# Patient Record
Sex: MALE | Race: WHITE | NOT HISPANIC OR LATINO | Employment: FULL TIME | URBAN - METROPOLITAN AREA
[De-identification: names, ages, dates, MRNs, and addresses within clinical notes are randomized per-mention and may not be internally consistent; named-entity substitution may affect disease eponyms.]

---

## 2024-01-02 ENCOUNTER — HOSPITAL ENCOUNTER (INPATIENT)
Facility: HOSPITAL | Age: 59
LOS: 19 days | Discharge: HOME | DRG: 381 | End: 2024-01-22
Attending: EMERGENCY MEDICINE | Admitting: INTERNAL MEDICINE
Payer: COMMERCIAL

## 2024-01-02 DIAGNOSIS — D64.9 SEVERE ANEMIA: ICD-10-CM

## 2024-01-02 DIAGNOSIS — F10.11 HISTORY OF ALCOHOL ABUSE: ICD-10-CM

## 2024-01-02 DIAGNOSIS — Z71.89 ACP (ADVANCE CARE PLANNING): ICD-10-CM

## 2024-01-02 DIAGNOSIS — Z51.5 PALLIATIVE CARE ENCOUNTER: ICD-10-CM

## 2024-01-02 DIAGNOSIS — K92.2 ACUTE UPPER GI BLEED: ICD-10-CM

## 2024-01-02 DIAGNOSIS — K74.60 HEPATIC CIRRHOSIS, UNSPECIFIED HEPATIC CIRRHOSIS TYPE, UNSPECIFIED WHETHER ASCITES PRESENT: Primary | ICD-10-CM

## 2024-01-02 DIAGNOSIS — D69.6 THROMBOCYTOPENIA: ICD-10-CM

## 2024-01-02 DIAGNOSIS — K72.00 ACUTE LIVER FAILURE WITHOUT HEPATIC COMA: ICD-10-CM

## 2024-01-02 LAB
BLD PROD TYP BPU: NORMAL
BLOOD UNIT EXPIRATION DATE: NORMAL
BLOOD UNIT TYPE CODE: 9500
BLOOD UNIT TYPE: NORMAL
CODING SYSTEM: NORMAL
CROSSMATCH INTERPRETATION: NORMAL
DISPENSE STATUS: NORMAL
TRANS ERYTHROCYTES VOL PATIENT: NORMAL ML

## 2024-01-02 PROCEDURE — 86920 COMPATIBILITY TEST SPIN: CPT | Performed by: EMERGENCY MEDICINE

## 2024-01-02 PROCEDURE — P9021 RED BLOOD CELLS UNIT: HCPCS | Performed by: EMERGENCY MEDICINE

## 2024-01-02 PROCEDURE — 82077 ASSAY SPEC XCP UR&BREATH IA: CPT | Performed by: EMERGENCY MEDICINE

## 2024-01-02 PROCEDURE — 36430 TRANSFUSION BLD/BLD COMPNT: CPT

## 2024-01-02 PROCEDURE — 25000003 PHARM REV CODE 250: Performed by: EMERGENCY MEDICINE

## 2024-01-02 PROCEDURE — 99285 EMERGENCY DEPT VISIT HI MDM: CPT | Mod: 25

## 2024-01-02 PROCEDURE — C9113 INJ PANTOPRAZOLE SODIUM, VIA: HCPCS | Performed by: EMERGENCY MEDICINE

## 2024-01-02 PROCEDURE — 85610 PROTHROMBIN TIME: CPT | Performed by: EMERGENCY MEDICINE

## 2024-01-02 PROCEDURE — 86901 BLOOD TYPING SEROLOGIC RH(D): CPT | Performed by: EMERGENCY MEDICINE

## 2024-01-02 PROCEDURE — 80053 COMPREHEN METABOLIC PANEL: CPT | Performed by: EMERGENCY MEDICINE

## 2024-01-02 PROCEDURE — 85025 COMPLETE CBC W/AUTO DIFF WBC: CPT | Performed by: EMERGENCY MEDICINE

## 2024-01-02 PROCEDURE — 85730 THROMBOPLASTIN TIME PARTIAL: CPT | Performed by: EMERGENCY MEDICINE

## 2024-01-02 PROCEDURE — 96375 TX/PRO/DX INJ NEW DRUG ADDON: CPT

## 2024-01-02 PROCEDURE — 96374 THER/PROPH/DIAG INJ IV PUSH: CPT

## 2024-01-02 PROCEDURE — 96361 HYDRATE IV INFUSION ADD-ON: CPT

## 2024-01-02 PROCEDURE — 63600175 PHARM REV CODE 636 W HCPCS: Performed by: EMERGENCY MEDICINE

## 2024-01-02 RX ORDER — OCTREOTIDE ACETATE 100 UG/ML
100 INJECTION, SOLUTION INTRAVENOUS; SUBCUTANEOUS ONCE
Status: COMPLETED | OUTPATIENT
Start: 2024-01-03 | End: 2024-01-02

## 2024-01-02 RX ORDER — PANTOPRAZOLE SODIUM 40 MG/10ML
80 INJECTION, POWDER, LYOPHILIZED, FOR SOLUTION INTRAVENOUS
Status: COMPLETED | OUTPATIENT
Start: 2024-01-02 | End: 2024-01-02

## 2024-01-02 RX ORDER — HYDROCODONE BITARTRATE AND ACETAMINOPHEN 500; 5 MG/1; MG/1
TABLET ORAL
Status: DISCONTINUED | OUTPATIENT
Start: 2024-01-03 | End: 2024-01-03

## 2024-01-02 RX ADMIN — SODIUM CHLORIDE 1000 ML: 9 INJECTION, SOLUTION INTRAVENOUS at 11:01

## 2024-01-02 RX ADMIN — PANTOPRAZOLE SODIUM 80 MG: 40 INJECTION, POWDER, FOR SOLUTION INTRAVENOUS at 11:01

## 2024-01-02 RX ADMIN — OCTREOTIDE ACETATE 100 MCG: 100 INJECTION, SOLUTION INTRAVENOUS; SUBCUTANEOUS at 11:01

## 2024-01-03 ENCOUNTER — ANESTHESIA (OUTPATIENT)
Dept: ENDOSCOPY | Facility: HOSPITAL | Age: 59
DRG: 381 | End: 2024-01-03
Payer: COMMERCIAL

## 2024-01-03 ENCOUNTER — ANESTHESIA EVENT (OUTPATIENT)
Dept: ENDOSCOPY | Facility: HOSPITAL | Age: 59
DRG: 381 | End: 2024-01-03
Payer: COMMERCIAL

## 2024-01-03 PROBLEM — K92.2 ACUTE UPPER GI BLEED: Status: ACTIVE | Noted: 2024-01-03

## 2024-01-03 PROBLEM — E87.20 LACTIC ACIDOSIS: Status: ACTIVE | Noted: 2024-01-03

## 2024-01-03 PROBLEM — E87.29 HIGH ANION GAP METABOLIC ACIDOSIS: Status: ACTIVE | Noted: 2024-01-03

## 2024-01-03 PROBLEM — R74.01 TRANSAMINITIS: Status: ACTIVE | Noted: 2024-01-03

## 2024-01-03 PROBLEM — D68.9 COAGULOPATHY: Status: ACTIVE | Noted: 2024-01-03

## 2024-01-03 PROBLEM — K74.60 CIRRHOSIS: Status: ACTIVE | Noted: 2024-01-03

## 2024-01-03 PROBLEM — N17.9 AKI (ACUTE KIDNEY INJURY): Status: ACTIVE | Noted: 2024-01-03

## 2024-01-03 PROBLEM — D69.6 THROMBOCYTOPENIA: Status: ACTIVE | Noted: 2024-01-03

## 2024-01-03 PROBLEM — Z78.9 ALCOHOL USE: Status: ACTIVE | Noted: 2024-01-03

## 2024-01-03 LAB
ABO + RH BLD: NORMAL
ALBUMIN SERPL BCP-MCNC: 2.1 G/DL (ref 3.5–5.2)
ALBUMIN SERPL BCP-MCNC: 2.1 G/DL (ref 3.5–5.2)
ALP SERPL-CCNC: 82 U/L (ref 55–135)
ALP SERPL-CCNC: 91 U/L (ref 55–135)
ALT SERPL W/O P-5'-P-CCNC: 44 U/L (ref 10–44)
ALT SERPL W/O P-5'-P-CCNC: 52 U/L (ref 10–44)
ANION GAP SERPL CALC-SCNC: 15 MMOL/L (ref 8–16)
ANION GAP SERPL CALC-SCNC: 25 MMOL/L (ref 8–16)
ANISOCYTOSIS BLD QL SMEAR: SLIGHT
APTT PPP: 28.1 SEC (ref 21–32)
AST SERPL-CCNC: 153 U/L (ref 10–40)
AST SERPL-CCNC: 193 U/L (ref 10–40)
BASOPHILS # BLD AUTO: 0.01 K/UL (ref 0–0.2)
BASOPHILS NFR BLD: 0.1 % (ref 0–1.9)
BILIRUB DIRECT SERPL-MCNC: 3.1 MG/DL (ref 0.1–0.3)
BILIRUB SERPL-MCNC: 5.4 MG/DL (ref 0.1–1)
BILIRUB SERPL-MCNC: 5.9 MG/DL (ref 0.1–1)
BLD GP AB SCN CELLS X3 SERPL QL: NORMAL
BLD PROD TYP BPU: NORMAL
BLOOD UNIT EXPIRATION DATE: NORMAL
BLOOD UNIT TYPE CODE: 2800
BLOOD UNIT TYPE CODE: 8400
BLOOD UNIT TYPE: NORMAL
BUN SERPL-MCNC: 57 MG/DL (ref 6–20)
BUN SERPL-MCNC: 58 MG/DL (ref 6–20)
BURR CELLS BLD QL SMEAR: ABNORMAL
CALCIUM SERPL-MCNC: 7.9 MG/DL (ref 8.7–10.5)
CALCIUM SERPL-MCNC: 7.9 MG/DL (ref 8.7–10.5)
CHLORIDE SERPL-SCNC: 102 MMOL/L (ref 95–110)
CHLORIDE SERPL-SCNC: 106 MMOL/L (ref 95–110)
CO2 SERPL-SCNC: 14 MMOL/L (ref 23–29)
CO2 SERPL-SCNC: 21 MMOL/L (ref 23–29)
CODING SYSTEM: NORMAL
CREAT SERPL-MCNC: 1.6 MG/DL (ref 0.5–1.4)
CREAT SERPL-MCNC: 1.7 MG/DL (ref 0.5–1.4)
CROSSMATCH INTERPRETATION: NORMAL
DIFFERENTIAL METHOD BLD: ABNORMAL
DISPENSE STATUS: NORMAL
DOHLE BOD BLD QL SMEAR: PRESENT
EOSINOPHIL # BLD AUTO: 0 K/UL (ref 0–0.5)
EOSINOPHIL NFR BLD: 0 % (ref 0–8)
EOSINOPHIL NFR BLD: 0 % (ref 0–8)
EOSINOPHIL NFR BLD: 0.1 % (ref 0–8)
EOSINOPHIL NFR BLD: 0.2 % (ref 0–8)
ERYTHROCYTE [DISTWIDTH] IN BLOOD BY AUTOMATED COUNT: 17.5 % (ref 11.5–14.5)
ERYTHROCYTE [DISTWIDTH] IN BLOOD BY AUTOMATED COUNT: 22.5 % (ref 11.5–14.5)
ERYTHROCYTE [DISTWIDTH] IN BLOOD BY AUTOMATED COUNT: 24.7 % (ref 11.5–14.5)
ERYTHROCYTE [DISTWIDTH] IN BLOOD BY AUTOMATED COUNT: 25.2 % (ref 11.5–14.5)
EST. GFR  (NO RACE VARIABLE): 46.4 ML/MIN/1.73 M^2
EST. GFR  (NO RACE VARIABLE): 49.9 ML/MIN/1.73 M^2
ETHANOL SERPL-MCNC: <10 MG/DL
GLUCOSE SERPL-MCNC: 104 MG/DL (ref 70–110)
GLUCOSE SERPL-MCNC: 122 MG/DL (ref 70–110)
HCT VFR BLD AUTO: 15.2 % (ref 40–54)
HCT VFR BLD AUTO: 18.1 % (ref 40–54)
HCT VFR BLD AUTO: 21.5 % (ref 40–54)
HCT VFR BLD AUTO: 24.5 % (ref 40–54)
HGB BLD-MCNC: 5 G/DL (ref 14–18)
HGB BLD-MCNC: 6.2 G/DL (ref 14–18)
HGB BLD-MCNC: 7.2 G/DL (ref 14–18)
HGB BLD-MCNC: 8.4 G/DL (ref 14–18)
HOWELL-JOLLY BOD BLD QL SMEAR: ABNORMAL
HYPOCHROMIA BLD QL SMEAR: ABNORMAL
HYPOCHROMIA BLD QL SMEAR: ABNORMAL
IMM GRANULOCYTES # BLD AUTO: 0.09 K/UL (ref 0–0.04)
IMM GRANULOCYTES # BLD AUTO: 0.09 K/UL (ref 0–0.04)
IMM GRANULOCYTES # BLD AUTO: 0.1 K/UL (ref 0–0.04)
IMM GRANULOCYTES # BLD AUTO: 0.14 K/UL (ref 0–0.04)
IMM GRANULOCYTES NFR BLD AUTO: 0.6 % (ref 0–0.5)
IMM GRANULOCYTES NFR BLD AUTO: 0.7 % (ref 0–0.5)
IMM GRANULOCYTES NFR BLD AUTO: 1 % (ref 0–0.5)
IMM GRANULOCYTES NFR BLD AUTO: 1.6 % (ref 0–0.5)
INR PPP: 2.4 (ref 0.8–1.2)
INR PPP: 2.4 (ref 0.8–1.2)
LACTATE SERPL-SCNC: 2.3 MMOL/L (ref 0.5–2.2)
LACTATE SERPL-SCNC: 4.5 MMOL/L (ref 0.5–2.2)
LACTATE SERPL-SCNC: >12 MMOL/L (ref 0.5–2.2)
LYMPHOCYTES # BLD AUTO: 1.5 K/UL (ref 1–4.8)
LYMPHOCYTES # BLD AUTO: 1.6 K/UL (ref 1–4.8)
LYMPHOCYTES # BLD AUTO: 1.8 K/UL (ref 1–4.8)
LYMPHOCYTES # BLD AUTO: 2 K/UL (ref 1–4.8)
LYMPHOCYTES NFR BLD: 13.4 % (ref 18–48)
LYMPHOCYTES NFR BLD: 13.5 % (ref 18–48)
LYMPHOCYTES NFR BLD: 15.8 % (ref 18–48)
LYMPHOCYTES NFR BLD: 18 % (ref 18–48)
MAGNESIUM SERPL-MCNC: 1.3 MG/DL (ref 1.6–2.6)
MCH RBC QN AUTO: 32.9 PG (ref 27–31)
MCH RBC QN AUTO: 34 PG (ref 27–31)
MCH RBC QN AUTO: 34.6 PG (ref 27–31)
MCH RBC QN AUTO: 38.2 PG (ref 27–31)
MCHC RBC AUTO-ENTMCNC: 32.9 G/DL (ref 32–36)
MCHC RBC AUTO-ENTMCNC: 33.5 G/DL (ref 32–36)
MCHC RBC AUTO-ENTMCNC: 34.3 G/DL (ref 32–36)
MCHC RBC AUTO-ENTMCNC: 34.3 G/DL (ref 32–36)
MCV RBC AUTO: 101 FL (ref 82–98)
MCV RBC AUTO: 101 FL (ref 82–98)
MCV RBC AUTO: 116 FL (ref 82–98)
MCV RBC AUTO: 96 FL (ref 82–98)
MONOCYTES # BLD AUTO: 1 K/UL (ref 0.3–1)
MONOCYTES # BLD AUTO: 1.1 K/UL (ref 0.3–1)
MONOCYTES # BLD AUTO: 1.2 K/UL (ref 0.3–1)
MONOCYTES # BLD AUTO: 1.6 K/UL (ref 0.3–1)
MONOCYTES NFR BLD: 10.3 % (ref 4–15)
MONOCYTES NFR BLD: 10.7 % (ref 4–15)
MONOCYTES NFR BLD: 11.8 % (ref 4–15)
MONOCYTES NFR BLD: 8.7 % (ref 4–15)
NEUTROPHILS # BLD AUTO: 10.4 K/UL (ref 1.8–7.7)
NEUTROPHILS # BLD AUTO: 11.3 K/UL (ref 1.8–7.7)
NEUTROPHILS # BLD AUTO: 6.1 K/UL (ref 1.8–7.7)
NEUTROPHILS # BLD AUTO: 6.8 K/UL (ref 1.8–7.7)
NEUTROPHILS NFR BLD: 68.3 % (ref 38–73)
NEUTROPHILS NFR BLD: 72.7 % (ref 38–73)
NEUTROPHILS NFR BLD: 75.2 % (ref 38–73)
NEUTROPHILS NFR BLD: 77 % (ref 38–73)
NRBC BLD-RTO: 0 /100 WBC
NRBC BLD-RTO: 0 /100 WBC
NRBC BLD-RTO: 1 /100 WBC
NRBC BLD-RTO: 1 /100 WBC
NUM UNITS TRANS FFP: NORMAL
NUM UNITS TRANS PACKED RBC: NORMAL
OVALOCYTES BLD QL SMEAR: ABNORMAL
PHOSPHATE SERPL-MCNC: 3.1 MG/DL (ref 2.7–4.5)
PLATELET # BLD AUTO: 107 K/UL (ref 150–450)
PLATELET # BLD AUTO: 42 K/UL (ref 150–450)
PLATELET # BLD AUTO: 42 K/UL (ref 150–450)
PLATELET # BLD AUTO: 71 K/UL (ref 150–450)
PLATELET BLD QL SMEAR: ABNORMAL
PMV BLD AUTO: 11.3 FL (ref 9.2–12.9)
PMV BLD AUTO: 11.5 FL (ref 9.2–12.9)
PMV BLD AUTO: 11.6 FL (ref 9.2–12.9)
PMV BLD AUTO: 11.6 FL (ref 9.2–12.9)
POIKILOCYTOSIS BLD QL SMEAR: SLIGHT
POLYCHROMASIA BLD QL SMEAR: ABNORMAL
POTASSIUM SERPL-SCNC: 4.7 MMOL/L (ref 3.5–5.1)
POTASSIUM SERPL-SCNC: 4.8 MMOL/L (ref 3.5–5.1)
PROT SERPL-MCNC: 4.7 G/DL (ref 6–8.4)
PROT SERPL-MCNC: 5.1 G/DL (ref 6–8.4)
PROTHROMBIN TIME: 24.4 SEC (ref 9–12.5)
PROTHROMBIN TIME: 24.5 SEC (ref 9–12.5)
RBC # BLD AUTO: 1.31 M/UL (ref 4.6–6.2)
RBC # BLD AUTO: 1.79 M/UL (ref 4.6–6.2)
RBC # BLD AUTO: 2.12 M/UL (ref 4.6–6.2)
RBC # BLD AUTO: 2.55 M/UL (ref 4.6–6.2)
SODIUM SERPL-SCNC: 141 MMOL/L (ref 136–145)
SODIUM SERPL-SCNC: 142 MMOL/L (ref 136–145)
SPECIMEN OUTDATE: NORMAL
TOXIC GRANULES BLD QL SMEAR: PRESENT
TRANS ERYTHROCYTES VOL PATIENT: NORMAL ML
TRANS ERYTHROCYTES VOL PATIENT: NORMAL ML
WBC # BLD AUTO: 13.56 K/UL (ref 3.9–12.7)
WBC # BLD AUTO: 15.06 K/UL (ref 3.9–12.7)
WBC # BLD AUTO: 8.9 K/UL (ref 3.9–12.7)
WBC # BLD AUTO: 9.35 K/UL (ref 3.9–12.7)

## 2024-01-03 PROCEDURE — 84100 ASSAY OF PHOSPHORUS: CPT | Performed by: NURSE PRACTITIONER

## 2024-01-03 PROCEDURE — P9016 RBC LEUKOCYTES REDUCED: HCPCS | Performed by: EMERGENCY MEDICINE

## 2024-01-03 PROCEDURE — 63600175 PHARM REV CODE 636 W HCPCS: Performed by: NURSE PRACTITIONER

## 2024-01-03 PROCEDURE — 63600175 PHARM REV CODE 636 W HCPCS

## 2024-01-03 PROCEDURE — 30233L1 TRANSFUSION OF NONAUTOLOGOUS FRESH PLASMA INTO PERIPHERAL VEIN, PERCUTANEOUS APPROACH: ICD-10-PCS | Performed by: INTERNAL MEDICINE

## 2024-01-03 PROCEDURE — 37000008 HC ANESTHESIA 1ST 15 MINUTES: Performed by: INTERNAL MEDICINE

## 2024-01-03 PROCEDURE — D9220A PRA ANESTHESIA: Mod: CRNA,,, | Performed by: NURSE ANESTHETIST, CERTIFIED REGISTERED

## 2024-01-03 PROCEDURE — 80053 COMPREHEN METABOLIC PANEL: CPT | Performed by: NURSE PRACTITIONER

## 2024-01-03 PROCEDURE — 83605 ASSAY OF LACTIC ACID: CPT | Performed by: NURSE PRACTITIONER

## 2024-01-03 PROCEDURE — 20000000 HC ICU ROOM

## 2024-01-03 PROCEDURE — 63600175 PHARM REV CODE 636 W HCPCS: Mod: JA | Performed by: EMERGENCY MEDICINE

## 2024-01-03 PROCEDURE — P9021 RED BLOOD CELLS UNIT: HCPCS | Performed by: EMERGENCY MEDICINE

## 2024-01-03 PROCEDURE — 86920 COMPATIBILITY TEST SPIN: CPT | Performed by: EMERGENCY MEDICINE

## 2024-01-03 PROCEDURE — 25000003 PHARM REV CODE 250

## 2024-01-03 PROCEDURE — 94761 N-INVAS EAR/PLS OXIMETRY MLT: CPT

## 2024-01-03 PROCEDURE — 82248 BILIRUBIN DIRECT: CPT | Performed by: NURSE PRACTITIONER

## 2024-01-03 PROCEDURE — 85610 PROTHROMBIN TIME: CPT | Performed by: STUDENT IN AN ORGANIZED HEALTH CARE EDUCATION/TRAINING PROGRAM

## 2024-01-03 PROCEDURE — D9220A PRA ANESTHESIA: Mod: ANES,,, | Performed by: ANESTHESIOLOGY

## 2024-01-03 PROCEDURE — 85025 COMPLETE CBC W/AUTO DIFF WBC: CPT | Mod: 91 | Performed by: NURSE PRACTITIONER

## 2024-01-03 PROCEDURE — 43235 EGD DIAGNOSTIC BRUSH WASH: CPT | Mod: ,,, | Performed by: INTERNAL MEDICINE

## 2024-01-03 PROCEDURE — P9017 PLASMA 1 DONOR FRZ W/IN 8 HR: HCPCS

## 2024-01-03 PROCEDURE — 37000009 HC ANESTHESIA EA ADD 15 MINS: Performed by: INTERNAL MEDICINE

## 2024-01-03 PROCEDURE — 5A09457 ASSISTANCE WITH RESPIRATORY VENTILATION, 24-96 CONSECUTIVE HOURS, CONTINUOUS POSITIVE AIRWAY PRESSURE: ICD-10-PCS | Performed by: INTERNAL MEDICINE

## 2024-01-03 PROCEDURE — 63600175 PHARM REV CODE 636 W HCPCS: Mod: JA

## 2024-01-03 PROCEDURE — 63600175 PHARM REV CODE 636 W HCPCS: Performed by: NURSE ANESTHETIST, CERTIFIED REGISTERED

## 2024-01-03 PROCEDURE — 25000003 PHARM REV CODE 250: Performed by: NURSE PRACTITIONER

## 2024-01-03 PROCEDURE — 0DJ08ZZ INSPECTION OF UPPER INTESTINAL TRACT, VIA NATURAL OR ARTIFICIAL OPENING ENDOSCOPIC: ICD-10-PCS | Performed by: INTERNAL MEDICINE

## 2024-01-03 PROCEDURE — 25000003 PHARM REV CODE 250: Performed by: NURSE ANESTHETIST, CERTIFIED REGISTERED

## 2024-01-03 PROCEDURE — C9113 INJ PANTOPRAZOLE SODIUM, VIA: HCPCS | Performed by: NURSE PRACTITIONER

## 2024-01-03 PROCEDURE — 83605 ASSAY OF LACTIC ACID: CPT | Mod: 91

## 2024-01-03 PROCEDURE — 83735 ASSAY OF MAGNESIUM: CPT | Performed by: NURSE PRACTITIONER

## 2024-01-03 PROCEDURE — 43239 EGD BIOPSY SINGLE/MULTIPLE: CPT | Performed by: INTERNAL MEDICINE

## 2024-01-03 RX ORDER — SODIUM CHLORIDE 0.9 % (FLUSH) 0.9 %
10 SYRINGE (ML) INJECTION
Status: DISCONTINUED | OUTPATIENT
Start: 2024-01-03 | End: 2024-01-22 | Stop reason: HOSPADM

## 2024-01-03 RX ORDER — HYDROCODONE BITARTRATE AND ACETAMINOPHEN 500; 5 MG/1; MG/1
TABLET ORAL
Status: DISCONTINUED | OUTPATIENT
Start: 2024-01-03 | End: 2024-01-03

## 2024-01-03 RX ORDER — HYDROCHLOROTHIAZIDE 25 MG/1
25 TABLET ORAL DAILY
COMMUNITY
Start: 2023-12-23

## 2024-01-03 RX ORDER — LIDOCAINE HYDROCHLORIDE 20 MG/ML
INJECTION, SOLUTION EPIDURAL; INFILTRATION; INTRACAUDAL; PERINEURAL
Status: DISCONTINUED | OUTPATIENT
Start: 2024-01-03 | End: 2024-01-03

## 2024-01-03 RX ORDER — ESCITALOPRAM OXALATE 10 MG/1
10 TABLET ORAL DAILY
Status: DISCONTINUED | OUTPATIENT
Start: 2024-01-04 | End: 2024-01-22 | Stop reason: HOSPADM

## 2024-01-03 RX ORDER — MAGNESIUM SULFATE HEPTAHYDRATE 40 MG/ML
2 INJECTION, SOLUTION INTRAVENOUS ONCE
Status: COMPLETED | OUTPATIENT
Start: 2024-01-03 | End: 2024-01-03

## 2024-01-03 RX ORDER — PROPOFOL 10 MG/ML
VIAL (ML) INTRAVENOUS
Status: DISCONTINUED | OUTPATIENT
Start: 2024-01-03 | End: 2024-01-03

## 2024-01-03 RX ORDER — PANTOPRAZOLE SODIUM 40 MG/1
40 TABLET, DELAYED RELEASE ORAL 2 TIMES DAILY
Status: DISCONTINUED | OUTPATIENT
Start: 2024-01-03 | End: 2024-01-22 | Stop reason: HOSPADM

## 2024-01-03 RX ORDER — THIAMINE HCL 100 MG
100 TABLET ORAL DAILY
Status: DISCONTINUED | OUTPATIENT
Start: 2024-01-03 | End: 2024-01-22 | Stop reason: HOSPADM

## 2024-01-03 RX ORDER — LORAZEPAM 2 MG/ML
2 INJECTION INTRAMUSCULAR
Status: DISCONTINUED | OUTPATIENT
Start: 2024-01-03 | End: 2024-01-13

## 2024-01-03 RX ORDER — ESCITALOPRAM OXALATE 10 MG/1
10 TABLET ORAL DAILY
COMMUNITY
Start: 2023-12-23

## 2024-01-03 RX ORDER — TRAZODONE HYDROCHLORIDE 50 MG/1
50-100 TABLET ORAL NIGHTLY
COMMUNITY
Start: 2023-12-26

## 2024-01-03 RX ORDER — PANTOPRAZOLE SODIUM 40 MG/10ML
40 INJECTION, POWDER, LYOPHILIZED, FOR SOLUTION INTRAVENOUS 2 TIMES DAILY
Status: DISCONTINUED | OUTPATIENT
Start: 2024-01-03 | End: 2024-01-03

## 2024-01-03 RX ORDER — FOLIC ACID 1 MG/1
1 TABLET ORAL DAILY
Status: DISCONTINUED | OUTPATIENT
Start: 2024-01-03 | End: 2024-01-22 | Stop reason: HOSPADM

## 2024-01-03 RX ORDER — HYDROCODONE BITARTRATE AND ACETAMINOPHEN 500; 5 MG/1; MG/1
TABLET ORAL
Status: DISCONTINUED | OUTPATIENT
Start: 2024-01-03 | End: 2024-01-22 | Stop reason: HOSPADM

## 2024-01-03 RX ADMIN — PHYTONADIONE 10 MG: 10 INJECTION, EMULSION INTRAMUSCULAR; INTRAVENOUS; SUBCUTANEOUS at 11:01

## 2024-01-03 RX ADMIN — THERA TABS 1 TABLET: TAB at 08:01

## 2024-01-03 RX ADMIN — OCTREOTIDE ACETATE 50 MCG/HR: 500 INJECTION, SOLUTION INTRAVENOUS; SUBCUTANEOUS at 05:01

## 2024-01-03 RX ADMIN — PROPOFOL 40 MG: 10 INJECTION, EMULSION INTRAVENOUS at 03:01

## 2024-01-03 RX ADMIN — CEFTRIAXONE SODIUM 1 G: 1 INJECTION, POWDER, FOR SOLUTION INTRAMUSCULAR; INTRAVENOUS at 04:01

## 2024-01-03 RX ADMIN — MAGNESIUM SULFATE HEPTAHYDRATE 2 G: 40 INJECTION, SOLUTION INTRAVENOUS at 09:01

## 2024-01-03 RX ADMIN — OCTREOTIDE ACETATE 50 MCG/HR: 500 INJECTION, SOLUTION INTRAVENOUS; SUBCUTANEOUS at 02:01

## 2024-01-03 RX ADMIN — SODIUM CHLORIDE: 9 INJECTION, SOLUTION INTRAVENOUS at 03:01

## 2024-01-03 RX ADMIN — PANTOPRAZOLE SODIUM 40 MG: 40 TABLET, DELAYED RELEASE ORAL at 08:01

## 2024-01-03 RX ADMIN — Medication 100 MG: at 08:01

## 2024-01-03 RX ADMIN — PROPOFOL 80 MG: 10 INJECTION, EMULSION INTRAVENOUS at 03:01

## 2024-01-03 RX ADMIN — FOLIC ACID 1 MG: 1 TABLET ORAL at 08:01

## 2024-01-03 RX ADMIN — LIDOCAINE HYDROCHLORIDE 100 MG: 20 INJECTION, SOLUTION EPIDURAL; INFILTRATION; INTRACAUDAL; PERINEURAL at 03:01

## 2024-01-03 RX ADMIN — PANTOPRAZOLE SODIUM 40 MG: 40 INJECTION, POWDER, FOR SOLUTION INTRAVENOUS at 08:01

## 2024-01-03 NOTE — ED TRIAGE NOTES
Pt presents to ED via EMS with c/o GI bleeding with multiple episodes of dark red bloody stool that has been worsening x4 days with intermittent episodes of hematemesis. Hx of liver cirrhosis. Pt reports feeling dizzy on the way to restroom and states he fell and hit his face on the toilet seat. +bleeding from pt's nose. Denies LOC. Denies fever, chills, CP, SOB, dysuria,

## 2024-01-03 NOTE — ED PROVIDER NOTES
History:  Blu Deleon is a 58 y.o. male who presents to the ED with GI Bleeding (Pt arrives c/o gi bleed. )    Described as 58-year-old male with a history of hypertension and cirrhosis as well as alcohol abuse presenting to the emergency department with concern for GI bleeding.  He reports the past 4 days he has had multiple episodes of bright red bloody emesis and dark black tarry diarrhea.  He denies any abdominal pain.  He denies any history of GI bleeds in the past and has never had an endoscopy.  Last drink was yesterday.  He endorses history of alcohol withdrawal in the past, though does not feel as though he was currently withdrawing.  He reports today he felt lightheaded trying to go to the toilet to vomit and fell, striking his face on the edge of the toilet.  He denies any head or neck pain.    Review of Systems: All systems reviewed and are negative except as per history of present illness.    Medications:   Previous Medications    No medications on file       PMH:   Past Medical History:   Diagnosis Date    Hypertension      PSH: History reviewed. No pertinent surgical history.  Allergies: He has no allergies on file.  Social History: Marital Status: . He  reports that he has never smoked. He has never used smokeless tobacco.. He  reports current alcohol use..       Exam:  VITAL SIGNS:   Vitals:    01/03/24 0302 01/03/24 0322 01/03/24 0400 01/03/24 0432   BP: 122/77 (!) 157/76 (!) 164/82 (!) 164/83   Pulse: (!) 122 (!) 122 (!) 122 (!) 119   Resp: 17 18 19 12   Temp:  98.6 °F (37 °C) 99 °F (37.2 °C)    TempSrc:  Oral Oral    SpO2: 98% 98% 97% 98%   Weight:         Const: Awake and alert, ill-appearing, covered in bright red blood and dark tarry stool.   Head: Atraumatic  Eyes:  Pale Conjunctiva  ENT: Normal External Ears, Nose and Mouth. +bright red blood in oropharynx. + nosebleed. No nasal TTP.   Neck: Full range of motion. No meningismus. No midline TTP, no stepoffs  Resp: Normal  respiratory effort, No distress, CTAB  Cardio: Equal and intact distal pulses, RRR  Abd: Soft, non tender, non distended.  No rebound or guarding  Skin:  Pallor  Ext: No cyanosis, or edema  Neur: Awake and alert, GCS 15, moves all extremities equally, no tremor  Psych: Normal Mood and Affect    Data:  Results for orders placed or performed during the hospital encounter of 01/02/24   CBC auto differential   Result Value Ref Range    WBC 13.56 (H) 3.90 - 12.70 K/uL    RBC 1.31 (L) 4.60 - 6.20 M/uL    Hemoglobin 5.0 (LL) 14.0 - 18.0 g/dL    Hematocrit 15.2 (LL) 40.0 - 54.0 %     (H) 82 - 98 fL    MCH 38.2 (H) 27.0 - 31.0 pg    MCHC 32.9 32.0 - 36.0 g/dL    RDW 17.5 (H) 11.5 - 14.5 %    Platelets 107 (L) 150 - 450 K/uL    MPV 11.6 9.2 - 12.9 fL    Immature Granulocytes 0.7 (H) 0.0 - 0.5 %    Gran # (ANC) 10.4 (H) 1.8 - 7.7 K/uL    Immature Grans (Abs) 0.10 (H) 0.00 - 0.04 K/uL    Lymph # 1.8 1.0 - 4.8 K/uL    Mono # 1.2 (H) 0.3 - 1.0 K/uL    Eos # 0.0 0.0 - 0.5 K/uL    Baso # 0.01 0.00 - 0.20 K/uL    nRBC 0 0 /100 WBC    Gran % 77.0 (H) 38.0 - 73.0 %    Lymph % 13.5 (L) 18.0 - 48.0 %    Mono % 8.7 4.0 - 15.0 %    Eosinophil % 0.0 0.0 - 8.0 %    Basophil % 0.1 0.0 - 1.9 %    Platelet Estimate Decreased (A)     Aniso Slight     Poik Slight     Poly Occasional     Hypo Occasional     Ovalocytes Occasional     Lenore Cells Occasional     Differential Method Automated    Comprehensive metabolic panel   Result Value Ref Range    Sodium 141 136 - 145 mmol/L    Potassium 4.7 3.5 - 5.1 mmol/L    Chloride 102 95 - 110 mmol/L    CO2 14 (L) 23 - 29 mmol/L    Glucose 104 70 - 110 mg/dL    BUN 57 (H) 6 - 20 mg/dL    Creatinine 1.6 (H) 0.5 - 1.4 mg/dL    Calcium 7.9 (L) 8.7 - 10.5 mg/dL    Total Protein 5.1 (L) 6.0 - 8.4 g/dL    Albumin 2.1 (L) 3.5 - 5.2 g/dL    Total Bilirubin 5.4 (H) 0.1 - 1.0 mg/dL    Alkaline Phosphatase 91 55 - 135 U/L     (H) 10 - 40 U/L    ALT 44 10 - 44 U/L    eGFR 49.9 (A) >60 mL/min/1.73 m^2     Anion Gap 25 (H) 8 - 16 mmol/L   Protime-INR   Result Value Ref Range    Prothrombin Time 24.4 (H) 9.0 - 12.5 sec    INR 2.4 (H) 0.8 - 1.2   APTT   Result Value Ref Range    aPTT 28.1 21.0 - 32.0 sec   Ethanol   Result Value Ref Range    Alcohol, Serum <10 <10 mg/dL   Lactic acid, plasma   Result Value Ref Range    Lactate (Lactic Acid) >12.0 (HH) 0.5 - 2.2 mmol/L   Comprehensive metabolic panel   Result Value Ref Range    Sodium 142 136 - 145 mmol/L    Potassium 4.8 3.5 - 5.1 mmol/L    Chloride 106 95 - 110 mmol/L    CO2 21 (L) 23 - 29 mmol/L    Glucose 122 (H) 70 - 110 mg/dL    BUN 58 (H) 6 - 20 mg/dL    Creatinine 1.7 (H) 0.5 - 1.4 mg/dL    Calcium 7.9 (L) 8.7 - 10.5 mg/dL    Total Protein 4.7 (L) 6.0 - 8.4 g/dL    Albumin 2.1 (L) 3.5 - 5.2 g/dL    Total Bilirubin 5.9 (H) 0.1 - 1.0 mg/dL    Alkaline Phosphatase 82 55 - 135 U/L     (H) 10 - 40 U/L    ALT 52 (H) 10 - 44 U/L    eGFR 46.4 (A) >60 mL/min/1.73 m^2    Anion Gap 15 8 - 16 mmol/L   Magnesium   Result Value Ref Range    Magnesium 1.3 (L) 1.6 - 2.6 mg/dL   Phosphorus   Result Value Ref Range    Phosphorus 3.1 2.7 - 4.5 mg/dL   Type & Screen   Result Value Ref Range    Group & Rh AB POS     Indirect Kadi NEG     Specimen Outdate 01/05/2024 23:59    Prepare RBC 2 Units; active upper GI bleed   Result Value Ref Range    UNIT NUMBER [id number]     Product Code [id number]     DISPENSE STATUS TRANSFUSED     CODING SYSTEM [id number]     Unit Blood Type Code 9500     Unit Blood Type O NEG     Unit Expiration [id number]     CROSSMATCH INTERPRETATION Compatible    Prepare RBC 2 Units; Emergency   Result Value Ref Range    UNIT NUMBER [id number]     Product Code [id number]     DISPENSE STATUS ISSUED     CODING SYSTEM [id number]     Unit Blood Type Code 8400     Unit Blood Type AB POS     Unit Expiration [id number]     CROSSMATCH INTERPRETATION Compatible     UNIT NUMBER [id number]     Product Code [id number]     DISPENSE STATUS CROSSMATCHED     CODING SYSTEM  OWKE599     Unit Blood Type Code 8400     Unit Blood Type AB POS     Unit Expiration [id number]     CROSSMATCH INTERPRETATION Compatible      Imaging Results              CT Cervical Spine Without Contrast (Final result)  Result time 01/03/24 02:24:10      Final result by Keyshawn Olguin MD (01/03/24 02:24:10)                   Impression:      No fracture or traumatic malalignment of the cervical spine.    Degenerative changes cervical spine with moderate spinal canal stenosis at C5-C6.    Electronically signed by resident: Renetta Domínguez  Date:    01/03/2024  Time:    01:59    Electronically signed by: Keyshawn Olguin MD  Date:    01/03/2024  Time:    02:24               Narrative:    EXAMINATION:  CT CERVICAL SPINE WITHOUT CONTRAST    CLINICAL HISTORY:  Neck trauma, intoxicated or obtunded (Age >= 16y);    TECHNIQUE:  Low dose axial CT images through the cervical spine, with sagittal and coronal reformations.  Contrast was not administered.    COMPARISON:  None    FINDINGS:  The vertebral bodies are normal in height and morphology without evidence of fracture or osseous destructive process.  There is straightening of the normal cervical lordosis.  Minimal anterolisthesis of C2 with respect to C3.  Otherwise, grossly normal sagittal alignment.    Mild degenerative changes including multilevel posterior disc osteophyte complexes, facet arthropathy, and uncovertebral spurring.  Mild spinal canal stenosis C4-C5 and moderate spinal canal stenosis at C5-C6.  No high grade neural foraminal narrowing.  Mild multilevel disc height loss.    Limited evaluation of the intraspinal contents demonstrates no hematoma or mass.  Paraspinal soft tissues exhibit no acute abnormalities.  Left apical pulmonary micronodule measuring less than 3 mm.                                       CT Head Without Contrast (Final result)  Result time 01/03/24 02:20:22      Final result by Keyshawn Olguin MD (01/03/24 02:20:22)                    Impression:      No evidence of acute intracranial pathology.    Bilateral mastoid effusions.    Electronically signed by resident: Renetta Domínguez  Date:    2024  Time:    01:54    Electronically signed by: Keyshawn Olguin MD  Date:    2024  Time:    02:20               Narrative:    EXAMINATION:  CT HEAD WITHOUT CONTRAST    CLINICAL HISTORY:  Head trauma, moderate-severe;    TECHNIQUE:  Low dose axial CT images obtained throughout the head without the use of intravenous contrast.  Axial, sagittal and coronal reconstructions were performed.    COMPARISON:  None.    FINDINGS:  Intracranial compartment:    Generalized cerebral volume loss with compensatory enlargement of the ventricles.  No parenchymal mass, hemorrhage, edema or major vascular distribution infarct.    No extra-axial blood or fluid collections.    Skull/extracranial contents (limited evaluation):    No displaced calvarial fracture.    Partial opacification of the mastoid air cells bilaterally.  Mucous retention cysts in the right maxillary and sphenoid sinuses.  The remaining paranasal sinuses are essentially clear.                                    Labs & Imaging studies were reviewed independently by me.     Medical Decision Makin-year-old male presenting to the emergency department with GI bleeding x4 days.  He was extremely pale, tachycardic, and ill-appearing on examination.  He was blood in his oropharynx, blood coming out of his nose, and is covered in dark and tarry stool.  He was tachycardic up into the 140s, blood pressure was normal, though he does have a history of hypertension and did not take his medications.  Emergency blood transfusion was administered as well as Protonix and octreotide bolus.  Hemoglobin returned at 5.0, LFTs concerning for liver failure with a lactate greater than 12.  His INR is 2.4.  Given his history of a fall, CT head and C-spine imaging were obtained and negative for acute  traumatic injury or ICH.  [facility] was consulted and accepts the patient to their service.    Critical Care Time: 55 minutes  Treatments/Evaluations: Close monitoring and treatment of unstable vital signs, cardiorespiratory, and neurologic status, while maintaining tight balance of fluid, respiratory, and cardiac interventions. This time includes discussing the case with the patient and the patients family. This time does not include all procedures stated elsewhere in this record. This time also includes reviewing old records, labs and radiological studies. This time includes examining and re-examining the patient. Additionally, this time also includes arranging care with admitting and consulting physicians.       Clinical Impression:  1. Acute upper GI bleed    2. Severe anemia    3. Acute liver failure without hepatic coma    4. History of alcohol abuse           Dasia Cedillo MD  01/03/24 3527

## 2024-01-03 NOTE — TREATMENT PLAN
GI Post Procedure Treatment Plan  Procedure Performed: EGD    Impression:    - Esophageal ulcers.                          - Erythematous mucosa in the antrum.                          - Normal examined duodenum.                          - No specimens collected.     Recommendation:                                - Return patient to hospital la for ongoing care.                          - Advance diet as tolerated.                          - Continue present medications. Stop Octreotide.                          Continue Protonix 40mg twice per day for 8 weeks.                          - Repeat upper endoscopy in 8 weeks to check                          healing of his esophageal ulcers.     - We will sign-off.    Neelam Christina MD  Gastroenterology, PGY-4

## 2024-01-03 NOTE — NURSING
MICU DAILY GOALS     Family/Goals of care/Code Status   Code Status: Full Code    24H Vital Sign Range  Temp:  [97.6 °F (36.4 °C)-99 °F (37.2 °C)]   Pulse:  [117-145]   Resp:  [12-19]   BP: (112-167)/(57-88)   SpO2:  [96 %-100 %]      Shift Events (include procedures and significant events)   Pt admitted to MICU 6096 via stretcher and cardiac monitoring. Octreotide gtt infusing. Pt AAOx4 and on RA    AWAKE RASS: Goal -    Actual -      Restraint necessity: Not necessary   BREATHE SBT: Not intubated    Coordinate A & B, analgesics/sedatives Pain: managed   SAT: Not intubated   Delirium CAM-ICU:     Early(intubated/ Progressive (non-intubated) Mobility MOVE Screen (INTUBATED ONLY): Not intubated    Activity:     Feeding/Nutrition Diet order:  ,     Thrombus DVT prophylaxis:     HOB Elevation     Ulcer Prophylaxis GI: yes   Glucose control managed     Skin Skin assessed during: Admit    Sacrum intact/not altered? Yes  Heels intact/not altered? Yes  Surgical wound? No    Check one (no altered skin or altered skin) and sub boxes:  [x] No Altered Skin Integrity Present    [x]Prevention Measures Documented    [] Altered Skin Integrity Present or Discovered   [] LDA present in EPIC              [] LDA added in EPIC   [] Wound Image Taken (required on admit,                   transfer/discharge and every Tuesday)    Wound Care Consulted? No    Attending Nurse: Alison RN    Second RN/Staff Member: Radha Moore RN   Bowel Function no issues    Indwelling Catheter Necessity         no   De-escalation Antibiotics Antibiotics per MAR       VS and assessment per flow sheet, patient progressing towards goals as tolerated, plan of care reviewed with  Mr. Hurt and spouse , all concerns addressed, will continue to monitor.

## 2024-01-03 NOTE — [employer] PATIENT INSTRUCTIONS
Discharge Summary/Instructions after an Endoscopic Procedure  Patient Name: Blu Deleon  Patient MRN: [id number]  Patient YOB: 1965  Wednesday, January 3, 2024  Madison Hinojosa MD  Dear patient,  As a result of recent federal legislation (The Federal Cures Act), you may   receive lab or pathology results from your procedure in your [facility]   account before your physician is able to contact you. Your physician or   their representative will relay the results to you with their   recommendations at their soonest availability.  Thank you,  RESTRICTIONS:  During your procedure today, you received medications for sedation.  These   medications may affect your judgment, balance and coordination.  Therefore,   for 24 hours, you have the following restrictions:   - DO NOT drive a car, operate machinery, make legal/financial decisions,   sign important papers or drink alcohol.    ACTIVITY:  Today: no heavy lifting, straining or running due to procedural   sedation/anesthesia.  The following day: return to full activity including work.  DIET:  Eat and drink normally unless instructed otherwise.     TREATMENT FOR COMMON SIDE EFFECTS:  - Mild abdominal pain, nausea, belching, bloating or excessive gas:  rest,   eat lightly and use a heating pad.  - Sore Throat: treat with throat lozenges and/or gargle with warm salt   water.  - Because air was used during the procedure, expelling large amounts of air   from your rectum or belching is normal.  - If a bowel prep was taken, you may not have a bowel movement for 1-3 days.    This is normal.  SYMPTOMS TO WATCH FOR AND REPORT TO YOUR PHYSICIAN:  1. Abdominal pain or bloating, other than gas cramps.  2. Chest pain.  3. Back pain.  4. Signs of infection such as: chills or fever occurring within 24 hours   after the procedure.  5. Rectal bleeding, which would show as bright red, maroon, or black stools.   (A tablespoon of blood from the rectum is not serious,  especially if   hemorrhoids are present.)  6. Vomiting.  7. Weakness or dizziness.  GO DIRECTLY TO THE NEAREST EMERGENCY ROOM IF YOU HAVE ANY OF THE FOLLOWING:      Difficulty breathing              Chills and/or fever over 101 F   Persistent vomiting and/or vomiting blood   Severe abdominal pain   Severe chest pain   Black, tarry stools   Bleeding- more than one tablespoon   Any other symptom or condition that you feel may need urgent attention  Your doctor recommends these additional instructions:  If any biopsies were taken, your doctors clinic will contact you in 1 to 2   weeks with any results.  - Return patient to hospital la for ongoing care.   - Advance diet as tolerated.   - Continue present medications. Stop Octreotide.  Continue Protonix 40mg   twice per day for 8 weeks.  - Repeat upper endoscopy in 8 weeks to check healing of his esophageal   ulcers.  For questions, problems or results please call your physician - Madison Hinojosa MD at Work:  [phone].  [facility], EMERGENCY ROOM PHONE NUMBER: [phone]  IF A COMPLICATION OR EMERGENCY SITUATION ARISES AND YOU ARE UNABLE TO REACH   YOUR PHYSICIAN - GO DIRECTLY TO THE EMERGENCY ROOM.  Madison Hinojosa MD  1/3/2024 3:56:07 PM  This report has been verified and signed electronically.  Dear patient,  As a result of recent federal legislation (The Federal Cures Act), you may   receive lab or pathology results from your procedure in your [facility]   account before your physician is able to contact you. Your physician or   their representative will relay the results to you with their   recommendations at their soonest availability.  Thank you,  PROVATION

## 2024-01-03 NOTE — CONSULTS
[facility] - Card[facility] Medical ICU  Gastroenterology  Consult Note    Patient Name: Blu Deleon  MRN: [id number]  Admission Date: 1/2/2024  Hospital Length of Stay: 0 days  Code Status: Full Code   Attending Provider: Shayy Arce MD   Consulting Provider: Dallin Manuel MD  Primary Care Physician: Radha, Primary Doctor  Principal Problem:Acute upper GI bleed    Inpatient consult to Gastroenterology  Consult performed by: Dallin Manuel MD  Consult ordered by: Dasia Cedillo MD        Subjective:     HPI:  58 years old  male patient with ongoing medical history of alcohol abuse disorder is being consulted to gastroenterology for upper GI bleeding.  The patient is being seen and examined on the bedside, alert and oriented, complained about nausea and black tarry stools. The patient also stated nausea and vomiting started from 4 days ago and was associated with black tarry stools, and also bright red blood per rectum 1-2 episodes. The diarrhea was not associated with abdominal pain, all the symptoms started simultaneously over the last 4 days, denied any recent travels, sick contact or addition of any new medication. The patient also denied any history of CAD, anti-coagulants however, he reported was taking Advil on and off for the generalized body aches.   The patient also stated had EGD around 20 years ag and it was normal also he had Colonoscopy 5 years ago in outside facilit and it was also unremarkable. Since hospitalization he had 1 episode of melena. He was also having lightheadness lately and fell at home. All the images in our facility were unremarkable.   CBC showed Hbg trend 5.0 and it improved to 7.2 after 2 packs of RBCs transfusion, HCT 16% , PLATELET COUNT 71. ALP 82, , ALT 52, Bilirubin 5.9  Past medical history is significant for alcohol use disorder (beer and wine) last drink was 2 days ago, HTN and reported cirrhosis no evidence available, was supposed to follow  hepatology.     Past Medical History:   Diagnosis Date    Hypertension        History reviewed. No pertinent surgical history.    Review of patient's allergies indicates:  Not on File  Family History    None       Tobacco Use    Smoking status: Never    Smokeless tobacco: Never   Substance and Sexual Activity    Alcohol use: Yes    Drug use: Never    Sexual activity: Not on file     Review of Systems   Constitutional:  Positive for fatigue. Negative for appetite change, chills and diaphoresis.   Respiratory:  Negative for cough, chest tightness and shortness of breath.    Cardiovascular:  Negative for chest pain and palpitations.   Gastrointestinal:  Positive for diarrhea and nausea. Negative for abdominal distention, abdominal pain, anal bleeding, blood in stool, constipation and vomiting.   Endocrine: Negative for cold intolerance, heat intolerance, polydipsia, polyphagia and polyuria.   Neurological:  Positive for light-headedness. Negative for dizziness and syncope.   Psychiatric/Behavioral:  Negative for agitation.      Objective:     Vital Signs (Most Recent):  Temp: 97.6 °F (36.4 °C) (01/03/24 0557)  Pulse: (!) 116 (01/03/24 0759)  Resp: 10 (01/03/24 0759)  BP: (!) 165/77 (01/03/24 0759)  SpO2: 99 % (01/03/24 0759) Vital Signs (24h Range):  Temp:  [97.6 °F (36.4 °C)-99 °F (37.2 °C)] 97.6 °F (36.4 °C)  Pulse:  [116-145] 116  Resp:  [10-19] 10  SpO2:  [96 %-100 %] 99 %  BP: (112-167)/(57-88) 165/77     Weight: 97.6 kg (215 lb 2.7 oz) (01/03/24 0855)  Body mass index is 32.72 kg/m².      Intake/Output Summary (Last 24 hours) at 1/3/2024 0918  Last data filed at 1/3/2024 0158  Gross per 24 hour   Intake 271 ml   Output --   Net 271 ml       Lines/Drains/Airways       Peripheral Intravenous Line  Duration                  Peripheral IV - Single Lumen 01/02/24 2332 18 G Left Antecubital <1 day         Peripheral IV - Single Lumen 01/02/24 2333 18 G Right Antecubital <1 day                     Physical  Exam  Constitutional:       Appearance: Normal appearance.   HENT:      Head: Normocephalic and atraumatic.   Cardiovascular:      Rate and Rhythm: Normal rate and regular rhythm.   Pulmonary:      Effort: Pulmonary effort is normal. No respiratory distress.      Breath sounds: Normal breath sounds. No stridor. No wheezing or rhonchi.   Abdominal:      General: There is no distension.      Tenderness: There is no abdominal tenderness. There is no guarding or rebound.   Melena   Musculoskeletal:         General: Normal range of motion.      Cervical back: Normal range of motion and neck supple.   Skin:     General: Skin is warm.   Neurological:      General: No focal deficit present.      Mental Status: He is alert and oriented to person, place, and time.   Psychiatric:         Mood and Affect: Mood normal.         Behavior: Behavior normal.          Significant Labs:  CBC:   Recent Labs   Lab 01/02/24 2329 01/03/24  0407   WBC 13.56* 15.06*   HGB 5.0* 7.2*   HCT 15.2* 21.5*   * 71*     CMP:   Recent Labs   Lab 01/03/24  0407   *   CALCIUM 7.9*   ALBUMIN 2.1*   PROT 4.7*      K 4.8   CO2 21*      BUN 58*   CREATININE 1.7*   ALKPHOS 82   ALT 52*   *   BILITOT 5.9*       Significant Imaging:  Imaging results within the past 24 hours have been reviewed.  Assessment/Plan:     GI  * Acute upper GI bleed  58 years old male patient with ongoing medical history of alcohol use disorder and early signs of liver cirrhosis is being consulted to gastroenterology for upper GI bleeding.  Nausea, vomiting and diarrhea with black tarry stools started over the last 4 days  No record of EGD and Colonoscopy here.  Patient was taking Ibuprofen on and off for body aches  CBC showed Hbg trend 5.0 and it improved to 7.2 after 2 packs of RBCs transfusion, HCT 16%     Recommendations  Continue to monitor for Hbg, transfuse if needed to keep target Hbg>7  Continue pantoprazole 40 mg IV BID, ceftriaxone,  and also continue octreotide  Avoid NSAIDs,and anti-coagulants  Correct any coagulopathy with platelets and FFP for goal of platelets >50K and INR <2.0   We will perform EGD today   We informed the family with procedure and will get informed consent            Thank you for your consult. I will follow-up with patient. Please contact us if you have any additional questions.    Dallin Manuel MD  Gastroenterology  [facility] - Cardiac Medical ICU

## 2024-01-03 NOTE — TRANSFER OF CARE
Anesthesia Transfer of Care Note    Patient: Blu Deleon    Procedure(s) Performed: Procedure(s) (LRB):  EGD (ESOPHAGOGASTRODUODENOSCOPY) (N/A)    Patient location: ICU    Anesthesia Type: general    Transport from OR: Transported from OR on 2-3 L/min O2 by NC with adequate spontaneous ventilation    Post pain: adequate analgesia    Post assessment: no apparent anesthetic complications and tolerated procedure well    Post vital signs: stable    Level of consciousness: awake    Nausea/Vomiting: no nausea/vomiting    Complications: none    Transfer of care protocol was followed      Last vitals:

## 2024-01-03 NOTE — H&P
[facility] - Emergency Dept  Critical Care Medicine  History & Physical    Patient Name: Blu Deleon  MRN: [id number]  Admission Date: 1/2/2024  Hospital Length of Stay: 0 days  Code Status: Full Code  Attending Physician: Hilda Crow MD   Primary Care Provider: No primary care provider on file.   Principal Problem: Acute upper GI bleed    Subjective:     HPI:  Mr. Deleon is a 58 year old male with PMH notable for HTN, ETOH use, reported cirrhosis (has not seen a hepatologist), and depression who presented to [facility] ED with acute GI bleeding. In speaking with patient and wife at bedside, patient reports four days of nausea and vomiting with bright red blood and black tarry diarrhea. Additionally, he reports a fall yesterday after attempting to go to the restroom secondary to being lightheaded. He denies this happening previously. He was scheduled for an outpatient EGD, which has not been completed. He has not had a colonoscopy. He denies any anticoagulation or antiplatelet use.     In the emergency department he was found to be acutely anemic with a hgb of 5. He has not been hypotensive while in the emergency department, however, notably tachycardic with -140. Labs otherwise notable for WBC 13.56, Plt 107, INR 2.4, BUN 57, Cr 1.6, TB 5.4, , ALT 44. Initial lactate >12. ETOH level <10. Patient is s/p 2u pRBCs. Repeat CBC pending.     Admitted to MICU for UGIB.     Hospital/ICU Course:  No notes on file     Past Medical History:   Diagnosis Date    Hypertension        History reviewed. No pertinent surgical history.    Review of patient's allergies indicates:  Not on File    Family History    None       Tobacco Use    Smoking status: Never    Smokeless tobacco: Never   Substance and Sexual Activity    Alcohol use: Yes    Drug use: Never    Sexual activity: Not on file      Review of Systems   Constitutional:  Positive for activity change and fatigue. Negative for chills and fever.   HENT:   Negative for congestion.    Respiratory:  Negative for cough and shortness of breath.    Cardiovascular:  Negative for chest pain and leg swelling.   Gastrointestinal:  Positive for blood in stool, nausea and vomiting. Negative for abdominal distention and abdominal pain.   Genitourinary:  Negative for difficulty urinating and hematuria.   Skin:  Positive for pallor. Negative for color change.   Neurological:  Positive for dizziness and light-headedness.     Objective:     Vital Signs (Most Recent):  Temp: 98.5 °F (36.9 °C) (01/03/24 0216)  Pulse: (!) 122 (01/03/24 0302)  Resp: 17 (01/03/24 0302)  BP: 122/77 (01/03/24 0302)  SpO2: 98 % (01/03/24 0302) Vital Signs (24h Range):  Temp:  [98 °F (36.7 °C)-98.8 °F (37.1 °C)] 98.5 °F (36.9 °C)  Pulse:  [122-145] 122  Resp:  [14-18] 17  SpO2:  [96 %-100 %] 98 %  BP: (112-143)/(57-88) 122/77   Weight: 100.7 kg (222 lb)  There is no height or weight on file to calculate BMI.      Intake/Output Summary (Last 24 hours) at 1/3/2024 0318  Last data filed at 1/3/2024 0158  Gross per 24 hour   Intake 271 ml   Output --   Net 271 ml          Physical Exam  Constitutional:       General: He is not in acute distress.     Appearance: Normal appearance.   HENT:      Head: Normocephalic and atraumatic.      Mouth/Throat:      Mouth: Mucous membranes are dry.      Comments: Sublingual jaundice   Eyes:      Extraocular Movements: Extraocular movements intact.      Conjunctiva/sclera: Conjunctivae normal.   Cardiovascular:      Rate and Rhythm: Regular rhythm. Tachycardia present.      Heart sounds: Normal heart sounds. No murmur heard.  Pulmonary:      Effort: No respiratory distress.      Breath sounds: Normal breath sounds.   Abdominal:      General: Abdomen is flat.      Palpations: Abdomen is soft.      Tenderness: There is no abdominal tenderness.      Comments: No fluid shift    Musculoskeletal:      Right lower leg: No edema.      Left lower leg: No edema.   Skin:     Capillary  Refill: Capillary refill takes less than 2 seconds.   Neurological:      General: No focal deficit present.      Mental Status: He is alert and oriented to person, place, and time.            Vents:     Lines/Drains/Airways       Peripheral Intravenous Line  Duration                  Peripheral IV - Single Lumen 01/02/24 2332 18 G Left Antecubital <1 day         Peripheral IV - Single Lumen 01/02/24 2333 18 G Right Antecubital <1 day                  Significant Labs:    CBC/Anemia Profile:  Recent Labs   Lab 01/02/24  2329   WBC 13.56*   HGB 5.0*   HCT 15.2*   *   *   RDW 17.5*        Chemistries:  Recent Labs   Lab 01/02/24  2329      K 4.7      CO2 14*   BUN 57*   CREATININE 1.6*   CALCIUM 7.9*   ALBUMIN 2.1*   PROT 5.1*   BILITOT 5.4*   ALKPHOS 91   ALT 44   *       All pertinent labs within the past 24 hours have been reviewed.    Significant Imaging: I have reviewed all pertinent imaging results/findings within the past 24 hours.  Assessment/Plan:     Psychiatric  Alcohol use disorder  - Last alcoholic drink on 1/01. Alcohol level <10 on admission. Denies history of withdrawal in the past including seizures. Remains tachycardic but likely 2/2 acute GIB. Will continue to monitor for signs of withdrawal w/ CIWA protocol and start benzodiazepines if indicated.   - thiamine, folic acid, MV    Renal/  High anion gap metabolic acidosis  - Likely 2/2 lactic acidosis. Expect improvement w/ resuscitation; will continue to monitor.     CODY (acute kidney injury)  - Cr 1.6 at time of admission; no baseline Cr available. Last CMP from 2018 showed a Cr of 1.08.   - Trend Cr/ Serial BMPs  - Strict I&Os, close monitoring of UOP  - Replace electrolytes PRN, goal K/Phos/Mg 4/3/2  - Avoid nephrotoxins (NSAIDs, ACEi/ARB, IV radiocontrast, gadolinium, etc.)  - Renally dose meds       Lactic acidosis  - see GIB    Hematology  Thrombocytopenia  - Plt 107; etiology likely 2/2 cirrhosis  - Monitor w/  daily CBC and transfuse if Plt <10 or <50 w/ signs of active bleeding     Coagulopathy  - INR 2.4; likely in setting of cirrhosis. Correct as indicated     GI  * Acute upper GI bleed  58 year old M w/ cirrhosis and ETOH use d/o presenting w/ melana and ground coffee emesis/hematemesis. Denies prior symptoms. No EGDs on file. Normotensive and tachycardic w/ HR 130s. Initial Hgb 5.0; now s/p 2u pRBCs. Initial lactate >12. Labs otherwise notable for Plt 116, INR 2.4, BUN 57, Cr 1.6, TB 5.4, , ALT 44. Admitted to MICU for management of acute upper GIB.     - GI consulted; appreciate recs. Anticipate need for EGD; keep NPO  - Trend Hgb and transfuse for goal Hgb > 7, unless otherwise indicated  - Trend lactate Q6H until normal   - Maintain IV access with 2 large bore IV's  - Intravascular resuscitation/support with IVF's   - Hold all NSAIDs and anticoagulants, unless contraindicated  - Bolus IV pantoprazole 80 mg followed by 40 mg BID  - Correct any coagulopathy with platelets and FFP for goal of platelets >50K and INR <2.0      Transaminitis  - 2:1 pattern; likely 2/2 ETOH use d/o. Will continue to monitor     Cirrhosis  Patient is visiting from out of town; outside records not available to review thus etiology of cirrhosis not confirmed. However, patient does have a history of ETOH use d/o so this is included in the differential. States he is currently in the process of being referred to a hepatologist and having an EGD scheduled.         Critical Care Daily Checklist:    A: Awake: RASS Goal/Actual Goal:    Actual:     B: Spontaneous Breathing Trial Performed?     C: SAT & SBT Coordinated?  N/A                      D: Delirium: CAM-ICU     E: Early Mobility Performed? No   F: Feeding Goal:    Status:     Current Diet Order   Procedures    Diet NPO      AS: Analgesia/Sedation Hold   T: Thromboembolic Prophylaxis Holding in setting of acute GIB   H: HOB > 300 Yes   U: Stress Ulcer Prophylaxis (if needed) Protonix    G: Glucose Control < 180 goal    B: Bowel Function     I: Indwelling Catheter (Lines & Cruz) Necessity PIV   D: De-escalation of Antimicrobials/Pharmacotherapies Continue Rocephin     Plan for the day/ETD Trend CBC. GI consult. Transfuse for hgb < 7    Code Status:  Family/Goals of Care: Full Code  Updated at bedside      Critical Care Time: 50 minutes  Critical secondary to Patient has a condition that poses threat to life and bodily function: acute gastrointestinal bleeding    Plan of care discussed with Dr. Mccarty. To be discussed with Dr. Crow and attestation to follow.     Critical care was time spent personally by me on the following activities: development of treatment plan with patient or surrogate and bedside caregivers, discussions with consultants, evaluation of patient's response to treatment, examination of patient, ordering and performing treatments and interventions, ordering and review of laboratory studies, ordering and review of radiographic studies, pulse oximetry, re-evaluation of patient's condition. This critical care time did not overlap with that of any other provider or involve time for any procedures.     Isra Negrete NP  Critical Care Medicine  [facility] - Emergency Dept

## 2024-01-03 NOTE — ASSESSMENT & PLAN NOTE
Patient is visiting from out of town; outside records not available to review thus etiology of cirrhosis not confirmed. However, patient does have a history of ETOH use d/o so this is included in the differential. States he is currently in the process of being referred to a hepatologist and having an EGD scheduled.

## 2024-01-03 NOTE — ASSESSMENT & PLAN NOTE
58 years old male patient with ongoing medical history of alcohol use disorder and early signs of liver cirrhosis is being consulted to gastroenterology for upper GI bleeding.  Nausea, vomiting and diarrhea with black tarry stools started over the last 4 days  No record of EGD and Colonoscopy here.  Patient was taking Ibuprofen on and off for body aches  CBC showed Hbg trend 5.0 and it improved to 7.2 after 2 packs of RBCs transfusion, HCT 16%     Recommendations  Continue to monitor for Hbg, transfuse if needed to keep target Hbg>7  Continue pantoprazole 40 mg IV BID, ceftriaxone, and also continue octreotide  Avoid NSAIDs,and anti-coagulants  Correct any coagulopathy with platelets and FFP for goal of platelets >50K and INR <2.0   We will perform EGD today   We informed the family with procedure and will get informed consent

## 2024-01-03 NOTE — CONSULTS
Patient seen and evaluated by critical care medicine. To be admitted to ICU for further management. Full H&P to follow.     CHINTAN Minaya, [facility]  Pulmonary Critical Care Medicine   01/03/2024

## 2024-01-03 NOTE — ASSESSMENT & PLAN NOTE
- Cr 1.6 at time of admission; no baseline Cr available. Last CMP from 2018 showed a Cr of 1.08.   - Trend Cr/ Serial BMPs  - Strict I&Os, close monitoring of UOP  - Replace electrolytes PRN, goal K/Phos/Mg 4/3/2  - Avoid nephrotoxins (NSAIDs, ACEi/ARB, IV radiocontrast, gadolinium, etc.)  - Renally dose meds

## 2024-01-03 NOTE — ASSESSMENT & PLAN NOTE
- Last alcoholic drink on 1/01. Alcohol level <10 on admission. Denies history of withdrawal in the past including seizures. Remains tachycardic but likely 2/2 acute GIB. Will continue to monitor for signs of withdrawal w/ CIWA protocol and start benzodiazepines if indicated.   - thiamine, folic acid, MV

## 2024-01-03 NOTE — ASSESSMENT & PLAN NOTE
- Plt 107; etiology likely 2/2 cirrhosis  - Monitor w/ daily CBC and transfuse if Plt <10 or <50 w/ signs of active bleeding

## 2024-01-03 NOTE — ANESTHESIA PREPROCEDURE EVALUATION
[facility]  Anesthesia Pre-Operative Evaluation         Patient Name: Blu Deleon  YOB: 1965  MRN: [id number]    SUBJECTIVE:     Pre-operative evaluation for Procedure(s) (LRB):  EGD (ESOPHAGOGASTRODUODENOSCOPY) (N/A)     01/03/2024    Blu Deleon is a 58 y.o. male w/ a significant PMHx of alcohol abuse disorder, HTN, reported cirrhosis? And depression presenting with GIB. Plan for EGD at bedside.     Patient now presents for the above procedure(s).      LDA:        Peripheral IV - Single Lumen 01/02/24 2332 18 G Left Antecubital (Active)   Site Assessment Clean;Dry;Intact 01/02/24 2332   Extremity Assessment Distal to IV No abnormal discoloration;No redness;No swelling 01/02/24 2332   Dressing Status Clean;Dry;Intact 01/02/24 2332   Dressing Intervention Integrity maintained 01/02/24 2332   Number of days: 0            Peripheral IV - Single Lumen 01/02/24 2333 18 G Right Antecubital (Active)   Site Assessment Clean;Dry;Intact 01/02/24 2333   Extremity Assessment Distal to IV No abnormal discoloration;No redness;No swelling 01/02/24 2333   Dressing Status Clean;Dry;Intact 01/02/24 2333   Dressing Intervention Integrity maintained 01/02/24 2333   Number of days: 0            Peripheral IV - Single Lumen 01/03/24 1045 18 G;1 1/4 in Anterior;Left Upper Arm (Active)   Number of days: 0       Prev airway: None documented    Drips:    octreotide (SANDOSTATIN) 500 mcg in sodium chloride 0.9% 100 mL infusion 50 mcg/hr (01/03/24 5809)       Patient Active Problem List   Diagnosis    Acute upper GI bleed    Cirrhosis    Coagulopathy    Lactic acidosis    Alcohol use disorder    CODY (acute kidney injury)    High anion gap metabolic acidosis    Thrombocytopenia    Transaminitis       Review of patient's allergies indicates:  Not on File    Current Inpatient Medications:   cefTRIAXone (ROCEPHIN) IVPB  1 g Intravenous Q24H    folic acid  1 mg Oral Daily    multivitamin  1  tablet Oral Daily    pantoprazole  40 mg Intravenous BID    phytonadione vitamin k (AQUA-MEPHYTON) 10 mg in dextrose 5 % (D5W) 50 mL IVPB  10 mg Intravenous Daily    thiamine  100 mg Oral Daily       No current facility-administered medications on file prior to encounter.     Current Outpatient Medications on File Prior to Encounter   Medication Sig Dispense Refill    EScitalopram oxalate (LEXAPRO) 10 MG tablet Take 10 mg by mouth once daily.      hydroCHLOROthiazide (HYDRODIURIL) 25 MG tablet Take 25 mg by mouth once daily.      traZODone (DESYREL) 50 MG tablet Take  mg by mouth every evening.         History reviewed. No pertinent surgical history.    Social History     Socioeconomic History    Marital status:    Tobacco Use    Smoking status: Never    Smokeless tobacco: Never   Substance and Sexual Activity    Alcohol use: Yes    Drug use: Never       OBJECTIVE:     Vital Signs Range (Last 24H):  Temp:  [36.4 °C (97.6 °F)-37.2 °C (99 °F)]   Pulse:  []   Resp:  [9-25]   BP: (112-167)/(57-88)   SpO2:  [93 %-100 %]       Significant Labs:  Lab Results   Component Value Date    WBC 15.06 (H) 01/03/2024    HGB 7.2 (L) 01/03/2024    HCT 21.5 (L) 01/03/2024    PLT 71 (L) 01/03/2024    ALT 52 (H) 01/03/2024     (H) 01/03/2024     01/03/2024    K 4.8 01/03/2024     01/03/2024    CREATININE 1.7 (H) 01/03/2024    BUN 58 (H) 01/03/2024    CO2 21 (L) 01/03/2024    INR 2.4 (H) 01/03/2024       Diagnostic Studies: No relevant studies.    EKG:   No results found for this or any previous visit.    2D ECHO:  TTE:  No results found for this or any previous visit.    DENISE:  No results found for this or any previous visit.    ASSESSMENT/PLAN:         Pre-op Assessment    I have reviewed the Patient Summary Reports.     I have reviewed the Nursing Notes. I have reviewed the NPO Status.   I have reviewed the Medications.     Review of Systems  Anesthesia Hx:             Denies Family Hx of  Anesthesia complications.    Denies Personal Hx of Anesthesia complications.                    Hematology/Oncology:  Hematology Normal   Oncology Normal                                   EENT/Dental:  EENT/Dental Normal           Cardiovascular:     Hypertension   Denies MI.        Denies Angina.                            Hypertension         Pulmonary:  Pulmonary Normal   Denies COPD.  Denies Asthma.   Denies Shortness of breath.                  Renal/:  Chronic Renal Disease, ARF        Kidney Function/Disease             Hepatic/GI:      Liver Disease, (etoh and gatty liver chirrosis)         Liver Disease        Musculoskeletal:  Musculoskeletal Normal                Neurological:  Neurology Normal Denies TIA.  Denies CVA.    Denies Seizures.                                Endocrine:  Endocrine Normal Denies Diabetes.           Dermatological:  Skin Normal    Psych:  Psychiatric Normal                    Physical Exam  General: Well nourished, Cooperative and Alert    Airway:  Mallampati: III / II  Mouth Opening: Normal  TM Distance: Normal  Tongue: Normal  Neck ROM: Normal ROM    Dental:  Intact        Anesthesia Plan  Type of Anesthesia, risks & benefits discussed:    Anesthesia Type: Gen ETT, Gen Natural Airway, MAC  Intra-op Monitoring Plan: Standard ASA Monitors  Post Op Pain Control Plan: multimodal analgesia and IV/PO Opioids PRN  Induction:  IV  Airway Plan: Video, Post-Induction  Informed Consent: Informed consent signed with the Patient and all parties understand the risks and agree with anesthesia plan.  All questions answered.   ASA Score: 3  Day of Surgery Review of History & Physical: H&P Update referred to the surgeon/provider.    Ready For Surgery From Anesthesia Perspective.     .  Date/Time Temp Temp #2 Pulse Resp BP Patient Position MAP (mmHg) SpO2 Weight Flow (L/min) Oxygen Concentration (%) Device (Oxygen Therapy) Arterial Line BP Arterial Line BP 2 Temp #2 [facility]   01/03/24 1137 37.1 °C  (98.8 °F) -- 98 15 140/63 Abnormal  -- -- 99 % -- -- -- room air -- -- -- LN   01/03/24 1122 36.9 °C (98.5 °F) -- 98 12 138/61 -- -- 93 % Abnormal  -- -- -- room air -- -- -- LN   01/03/24 1100 36.9 °C (98.5 °F) -- 100 14 135/67 -- -- 94 % Abnormal  -- -- -- room air -- -- -- LN   01/03/24 0900 -- -- 110 14 147/71 Abnormal  Lying 100 99 % -- -- -- room air -- -- -- LN   01/03/24 0855 -- -- -- -- -- -- -- -- 97.6 kg (215 lb 2.7 oz) -- -- -- -- -- -- LN   01/03/24 0800 -- -- 116 Abnormal  9 Abnormal  165/78 Abnormal  Lying 112 97 % -- -- -- room air -- -- -- LN   01/03/24 0759 -- -- 116 Abnormal  10 165/77 Abnormal  -- -- 99 % -- -- -- room air -- -- -- CC   01/03/24 0700 36.9 °C (98.5 °F) -- 114 Abnormal  25 Abnormal  126/58 Abnormal  Lying 83 94 % Abnormal  -- -- -- -- -- -- -- LN   01/03/24 0605 -- -- -- -- -- -- -- -- 99.6 kg (219 lb 9.3 oz) -- -- -- -- -- -- KS   01/03/24 0600 -- -- 117 Abnormal  16 167/84 Abnormal  Lying 118 98 % -- -- -- room air -- -- -- AV   01/03/24 0557 36.4 °C (97.6 °F) -- -- -- -- -- -- -- -- -- -- -- -- -- -- KS   01/03/24 0432 -- -- 119 Abnormal  12 164/83 Abnormal  -- 114 98 % -- -- -- -- -- -- --    01/03/24 0400 37.2 °C (99 °F) -- 122 Abnormal  19 164/82 Abnormal  -- 114 97 % -- -- -- room air -- -- -- SD   01/03/24 0322 37 °C (98.6 °F) -- 122 Abnormal  18 157/76 Abnormal  -- -- 98 % -- -- -- room air -- -- -- ZO

## 2024-01-03 NOTE — HPI
Mr. Deleon is a 58 year old male with PMH notable for HTN, ETOH use, reported cirrhosis (has not seen a hepatologist), and depression who presented to [facility] ED with acute GI bleeding. In speaking with patient and wife at bedside, patient reports four days of nausea and vomiting with bright red blood and black tarry diarrhea. Additionally, he reports a fall yesterday after attempting to go to the restroom secondary to being lightheaded. He denies this happening previously. He was scheduled for an outpatient EGD, which has not been completed. He has not had a colonoscopy. He denies any anticoagulation or antiplatelet use.     In the emergency department he was found to be acutely anemic with a hgb of 5. He has not been hypotensive while in the emergency department, however, notably tachycardic with -140. Labs otherwise notable for WBC 13.56, Plt 107, INR 2.4, BUN 57, Cr 1.6, TB 5.4, , ALT 44. Initial lactate >12. ETOH level <10. Patient is s/p 2u pRBCs. Repeat CBC pending.     Admitted to MICU for UGIB.

## 2024-01-03 NOTE — ASSESSMENT & PLAN NOTE
58 year old M w/ cirrhosis and ETOH use d/o presenting w/ melana and ground coffee emesis/hematemesis. Denies prior symptoms. No EGDs on file. Normotensive and tachycardic w/ HR 130s. Initial Hgb 5.0; now s/p 2u pRBCs. Initial lactate >12. Labs otherwise notable for Plt 116, INR 2.4, BUN 57, Cr 1.6, TB 5.4, , ALT 44. Admitted to MICU for management of acute upper GIB.     - GI consulted; appreciate recs. Anticipate need for EGD; keep NPO  - Trend Hgb and transfuse for goal Hgb > 7, unless otherwise indicated  - Trend lactate Q6H until normal   - Maintain IV access with 2 large bore IV's  - Intravascular resuscitation/support with IVF's   - Hold all NSAIDs and anticoagulants, unless contraindicated  - Bolus IV pantoprazole 80 mg followed by 40 mg BID  - Correct any coagulopathy with platelets and FFP for goal of platelets >50K and INR <2.0

## 2024-01-03 NOTE — ANESTHESIA POSTPROCEDURE EVALUATION
Anesthesia Post Evaluation    Patient: Blu Deleon    Procedure(s) Performed: Procedure(s) (LRB):  EGD (ESOPHAGOGASTRODUODENOSCOPY) (N/A)    Final Anesthesia Type: general      Patient location during evaluation: ICU  Patient participation: No - Unable to Participate, Other Reason (see comments)  Level of consciousness: sedated  Post-procedure vital signs: reviewed and stable  Pain management: adequate  Airway patency: patent    PONV status at discharge: No PONV  Anesthetic complications: no      Cardiovascular status: stable  Respiratory status: unassisted, spontaneous ventilation and face mask  Hydration status: euvolemic  Follow-up not needed.  Comments: Interview with ICUnurse,  He had woken up and reported feeling great but fell back to sleep              Vitals Value Taken Time   /61 01/03/24 1702   Temp 36.8 °C (98.2 °F) 01/03/24 1607   Pulse 81 01/03/24 1715   Resp 12 01/03/24 1715   SpO2 99 % 01/03/24 1715   Vitals shown include unvalidated device data.      No case tracking events are documented in the log.      Pain/Riley Score: No data recorded

## 2024-01-03 NOTE — SUBJECTIVE & OBJECTIVE
Past Medical History:   Diagnosis Date    Hypertension        History reviewed. No pertinent surgical history.    Review of patient's allergies indicates:  Not on File    Family History    None       Tobacco Use    Smoking status: Never    Smokeless tobacco: Never   Substance and Sexual Activity    Alcohol use: Yes    Drug use: Never    Sexual activity: Not on file      Review of Systems   Constitutional:  Positive for activity change and fatigue. Negative for chills and fever.   HENT:  Negative for congestion.    Respiratory:  Negative for cough and shortness of breath.    Cardiovascular:  Negative for chest pain and leg swelling.   Gastrointestinal:  Positive for blood in stool, nausea and vomiting. Negative for abdominal distention and abdominal pain.   Genitourinary:  Negative for difficulty urinating and hematuria.   Skin:  Positive for pallor. Negative for color change.   Neurological:  Positive for dizziness and light-headedness.     Objective:     Vital Signs (Most Recent):  Temp: 98.5 °F (36.9 °C) (01/03/24 0216)  Pulse: (!) 122 (01/03/24 0302)  Resp: 17 (01/03/24 0302)  BP: 122/77 (01/03/24 0302)  SpO2: 98 % (01/03/24 0302) Vital Signs (24h Range):  Temp:  [98 °F (36.7 °C)-98.8 °F (37.1 °C)] 98.5 °F (36.9 °C)  Pulse:  [122-145] 122  Resp:  [14-18] 17  SpO2:  [96 %-100 %] 98 %  BP: (112-143)/(57-88) 122/77   Weight: 100.7 kg (222 lb)  There is no height or weight on file to calculate BMI.      Intake/Output Summary (Last 24 hours) at 1/3/2024 0318  Last data filed at 1/3/2024 0158  Gross per 24 hour   Intake 271 ml   Output --   Net 271 ml          Physical Exam  Constitutional:       General: He is not in acute distress.     Appearance: Normal appearance.   HENT:      Head: Normocephalic and atraumatic.      Mouth/Throat:      Mouth: Mucous membranes are dry.      Comments: Sublingual jaundice   Eyes:      Extraocular Movements: Extraocular movements intact.      Conjunctiva/sclera: Conjunctivae normal.    Cardiovascular:      Rate and Rhythm: Regular rhythm. Tachycardia present.      Heart sounds: Normal heart sounds. No murmur heard.  Pulmonary:      Effort: No respiratory distress.      Breath sounds: Normal breath sounds.   Abdominal:      General: Abdomen is flat.      Palpations: Abdomen is soft.      Tenderness: There is no abdominal tenderness.      Comments: No fluid shift    Musculoskeletal:      Right lower leg: No edema.      Left lower leg: No edema.   Skin:     Capillary Refill: Capillary refill takes less than 2 seconds.   Neurological:      General: No focal deficit present.      Mental Status: He is alert and oriented to person, place, and time.            Vents:     Lines/Drains/Airways       Peripheral Intravenous Line  Duration                  Peripheral IV - Single Lumen 01/02/24 2332 18 G Left Antecubital <1 day         Peripheral IV - Single Lumen 01/02/24 2333 18 G Right Antecubital <1 day                  Significant Labs:    CBC/Anemia Profile:  Recent Labs   Lab 01/02/24  2329   WBC 13.56*   HGB 5.0*   HCT 15.2*   *   *   RDW 17.5*        Chemistries:  Recent Labs   Lab 01/02/24  2329      K 4.7      CO2 14*   BUN 57*   CREATININE 1.6*   CALCIUM 7.9*   ALBUMIN 2.1*   PROT 5.1*   BILITOT 5.4*   ALKPHOS 91   ALT 44   *       All pertinent labs within the past 24 hours have been reviewed.    Significant Imaging: I have reviewed all pertinent imaging results/findings within the past 24 hours.

## 2024-01-03 NOTE — ED NOTES
Bed: 32  Expected date: 1/2/24  Expected time: 11:08 PM  Means of arrival:   Comments:  Christophwcraft

## 2024-01-03 NOTE — HPI
58 years old  male patient with ongoing medical history of alcohol abuse disorder is being consulted to gastroenterology for upper GI bleeding.  The patient is being seen and examined on the bedside, alert and oriented, complained about nausea and black tarry stools. The patient also stated nausea and vomiting started from 4 days ago and was associated with black tarry stools, and also bright red blood per rectum 1-2 episodes. The diarrhea was not associated with abdominal pain, all the symptoms started simultaneously over the last 4 days, denied any recent travels, sick contact or addition of any new medication. The patient also denied any history of CAD, anti-coagulants however, he reported was taking Advil on and off for the generalized body aches.   The patient also stated had EGD around 20 years ag and it was normal also he had Colonoscopy 5 years ago in outside facilit and it was also unremarkable. Since hospitalization he had 1 episode of melena. He was also having lightheadness lately and fell at home. All the images in our facility were unremarkable.   CBC showed Hbg trend 5.0 and it improved to 7.2 after 2 packs of RBCs transfusion, HCT 16% , PLATELET COUNT 71. ALP 82, , ALT 52, Bilirubin 5.9  Past medical history is significant for alcohol use disorder (beer and wine) last drink was 2 days ago, HTN and reported cirrhosis no evidence available, was supposed to follow hepatology.

## 2024-01-03 NOTE — PLAN OF CARE
[facility] - Cardiac Medical ICU  Initial Discharge Assessment       Primary Care Provider: Prosper Bui MD ([facility])    Admission Diagnosis: History of alcohol abuse [F10.11]  Acute upper GI bleed [K92.2]  Severe anemia [D64.9]  Acute liver failure without hepatic coma [K72.00]    Admission Date: 1/2/2024  Expected Discharge Date: 1/8/2024    Transition of Care Barriers: None    Payor: GENERIC COMMERCIAL / Plan: GENERIC COMMERCIAL / Product Type: Commercial /     Extended Emergency Contact Information  Primary Emergency Contact: Thuy Deleon  Address: [address] [place] [address]  Mobile Phone: [phone]  Relation: Spouse  Preferred language: English   needed? No    Discharge Plan A: Home with family  Discharge Plan B: Home with family    No Pharmacies Listed      Transferred from:     Past Medical History:   Diagnosis Date    Hypertension          CM met with patient and Thuy Yoon (spouse) [phone]  in room for Discharge Planning Assessment.  Patient was unable to answer questions due to being drowsy.  Per Thuy, patient/Thuy live in a 2-story home with 20 steps up to upstairs bedroom, and with 5 step(s) to enter resident.   Per Thuy, she and patient were in [place] for [place] [employer] [place]. They are from [place], [place]. The were staying in hotel in [place]. Per Thuy, patient was independent with ADLS and used not DME for ambulation. Per Thuy, patient is not on dialysis and does not take Coumadin. Thuy advised that she was checking out of hotel in [place] and registering to stay at [facility] while patient is admitted to MICU.  Patient will have help from Thuy Yoon (spouse) [phone]  upon discharge.   Discharge Planning discussed with Thuy Yoon (spouse) [phone] in patient's room.  All questions addressed.  CM will follow for  needs.      Discharge Plan A and Plan B have been determined by review of patient's clinical status, future medical and therapeutic needs, and coverage/benefits for post-acute care in coordination with multidisciplinary team members.        Initial Assessment (most recent)       Adult Discharge Assessment - 01/03/24 1735          Discharge Assessment    Assessment Type Discharge Planning Assessment     Confirmed/corrected address, phone number and insurance Yes     Confirmed Demographics Correct on Facesheet     Source of Information family     When was your last doctors appointment? --   October 2023    Communicated MILKA with patient/caregiver Date not available/Unable to determine     Reason For Admission Acute upper GI bleed     People in Home spouse     Facility Arrived From: From [facility] [place]     Do you expect to return to your current living situation? Yes     Prior to hospitilization cognitive status: Alert/Oriented     Current cognitive status: Alert/Oriented     Walking or Climbing Stairs Difficulty no     Dressing/Bathing Difficulty no     Home Layout Bedroom on 2nd floor;Bathroom on 2nd floor     Equipment Currently Used at Home none     Readmission within 30 days? No     Patient currently being followed by outpatient case management? No     Do you currently have service(s) that help you manage your care at home? No     Do you take prescription medications? Yes     Do you have prescription coverage? Yes     Coverage GENERIC COMMERCIAL - GENERIC COMMERCIAL -     Do you have any problems affording any of your prescribed medications? No     Is the patient taking medications as prescribed? yes     Who is going to help you get home at discharge? Thuy Medtiffany (spouse) [phone]     How do you get to doctors appointments? car, drives self     Are you on dialysis? No     Do you take coumadin? No     Discharge Plan A Home with family     Discharge Plan B Home with family     DME Needed Upon  Discharge  other (see comments)   TBD    Discharge Plan discussed with: Spouse/sig other     Name(s) and Number(s) Thuy Yoon (spouse) [phone]     Transition of Care Barriers None        Physical Activity    On average, how many days per week do you engage in moderate to strenuous exercise (like a brisk walk)? 0 days     On average, how many minutes do you engage in exercise at this level? 0 min        Financial Resource Strain    How hard is it for you to pay for the very basics like food, housing, medical care, and heating? Not very hard        Housing Stability    In the last 12 months, was there a time when you were not able to pay the mortgage or rent on time? No     In the last 12 months, was there a time when you did not have a steady place to sleep or slept in a shelter (including now)? No        Transportation Needs    In the past 12 months, has lack of transportation kept you from medical appointments or from getting medications? No     In the past 12 months, has lack of transportation kept you from meetings, work, or from getting things needed for daily living? No        Food Insecurity    Within the past 12 months, you worried that your food would run out before you got the money to buy more. Never true     Within the past 12 months, the food you bought just didn't last and you didn't have money to get more. Never true        Stress    Do you feel stress - tense, restless, nervous, or anxious, or unable to sleep at night because your mind is troubled all the time - these days? To some extent        Social Connections    In a typical week, how many times do you talk on the phone with family, friends, or neighbors? More than three times a week     How often do you get together with friends or relatives? More than three times a week     How often do you attend Yarsani or Anabaptism services? 1 to 4 times per year     Do you belong to any clubs or organizations such as Yarsani groups, unions,  fraternal or athletic groups, or school groups? No     How often do you attend meetings of the clubs or organizations you belong to? Never     Are you , , , , never , or living with a partner?         Alcohol Use    Q1: How often do you have a drink containing alcohol? 4 or more times a week     Q2: How many drinks containing alcohol do you have on a typical day when you are drinking? 5 or 6     Q3: How often do you have six or more drinks on one occasion? Weekly        OTHER    Name(s) of People in Home Thuy Yoon (spouse) [phone]                            PCP:  No, Primary Doctor  None        Pharmacy:  No Pharmacies Listed      Emergency Contacts:  Extended Emergency Contact Information  Primary Emergency Contact: Thuy Deleon  Address: [address] [place] [address]  Mobile Phone: [phone]  Relation: Spouse  Preferred language: English   needed? No      Insurance:    Payor: GENERIC COMMERCIAL / Plan: GENERIC COMMERCIAL / Product Type: Commercial /     Olga Burnett RN     [phone]      01/03/2024  5:52 PM

## 2024-01-04 LAB
ALBUMIN SERPL BCP-MCNC: 2.2 G/DL (ref 3.5–5.2)
ALP SERPL-CCNC: 78 U/L (ref 55–135)
ALT SERPL W/O P-5'-P-CCNC: 60 U/L (ref 10–44)
ANION GAP SERPL CALC-SCNC: 7 MMOL/L (ref 8–16)
ANISOCYTOSIS BLD QL SMEAR: SLIGHT
AST SERPL-CCNC: 183 U/L (ref 10–40)
BASOPHILS # BLD AUTO: 0.01 K/UL (ref 0–0.2)
BASOPHILS NFR BLD: 0.1 % (ref 0–1.9)
BILIRUB SERPL-MCNC: 8.3 MG/DL (ref 0.1–1)
BUN SERPL-MCNC: 55 MG/DL (ref 6–20)
BURR CELLS BLD QL SMEAR: ABNORMAL
CALCIUM SERPL-MCNC: 7.8 MG/DL (ref 8.7–10.5)
CHLORIDE SERPL-SCNC: 106 MMOL/L (ref 95–110)
CO2 SERPL-SCNC: 28 MMOL/L (ref 23–29)
CREAT SERPL-MCNC: 1.4 MG/DL (ref 0.5–1.4)
DIFFERENTIAL METHOD BLD: ABNORMAL
EOSINOPHIL # BLD AUTO: 0 K/UL (ref 0–0.5)
EOSINOPHIL NFR BLD: 0.3 % (ref 0–8)
EOSINOPHIL NFR BLD: 0.4 % (ref 0–8)
EOSINOPHIL NFR BLD: 0.4 % (ref 0–8)
ERYTHROCYTE [DISTWIDTH] IN BLOOD BY AUTOMATED COUNT: 25.2 % (ref 11.5–14.5)
ERYTHROCYTE [DISTWIDTH] IN BLOOD BY AUTOMATED COUNT: 25.2 % (ref 11.5–14.5)
ERYTHROCYTE [DISTWIDTH] IN BLOOD BY AUTOMATED COUNT: 25.3 % (ref 11.5–14.5)
EST. GFR  (NO RACE VARIABLE): 58.6 ML/MIN/1.73 M^2
GLUCOSE SERPL-MCNC: 120 MG/DL (ref 70–110)
HCT VFR BLD AUTO: 24.6 % (ref 40–54)
HCT VFR BLD AUTO: 25 % (ref 40–54)
HCT VFR BLD AUTO: 26.3 % (ref 40–54)
HGB BLD-MCNC: 8.6 G/DL (ref 14–18)
HGB BLD-MCNC: 8.6 G/DL (ref 14–18)
HGB BLD-MCNC: 9.1 G/DL (ref 14–18)
HYPOCHROMIA BLD QL SMEAR: ABNORMAL
IMM GRANULOCYTES # BLD AUTO: 0.06 K/UL (ref 0–0.04)
IMM GRANULOCYTES # BLD AUTO: 0.06 K/UL (ref 0–0.04)
IMM GRANULOCYTES # BLD AUTO: 0.11 K/UL (ref 0–0.04)
IMM GRANULOCYTES NFR BLD AUTO: 0.8 % (ref 0–0.5)
IMM GRANULOCYTES NFR BLD AUTO: 0.9 % (ref 0–0.5)
IMM GRANULOCYTES NFR BLD AUTO: 1.5 % (ref 0–0.5)
INR PPP: 2.2 (ref 0.8–1.2)
LACTATE SERPL-SCNC: 1 MMOL/L (ref 0.5–2.2)
LYMPHOCYTES # BLD AUTO: 1 K/UL (ref 1–4.8)
LYMPHOCYTES # BLD AUTO: 1 K/UL (ref 1–4.8)
LYMPHOCYTES # BLD AUTO: 1.3 K/UL (ref 1–4.8)
LYMPHOCYTES NFR BLD: 13 % (ref 18–48)
LYMPHOCYTES NFR BLD: 14.5 % (ref 18–48)
LYMPHOCYTES NFR BLD: 17.3 % (ref 18–48)
MAGNESIUM SERPL-MCNC: 2 MG/DL (ref 1.6–2.6)
MCH RBC QN AUTO: 32.7 PG (ref 27–31)
MCH RBC QN AUTO: 33.5 PG (ref 27–31)
MCH RBC QN AUTO: 33.5 PG (ref 27–31)
MCHC RBC AUTO-ENTMCNC: 34.4 G/DL (ref 32–36)
MCHC RBC AUTO-ENTMCNC: 34.6 G/DL (ref 32–36)
MCHC RBC AUTO-ENTMCNC: 35 G/DL (ref 32–36)
MCV RBC AUTO: 95 FL (ref 82–98)
MCV RBC AUTO: 96 FL (ref 82–98)
MCV RBC AUTO: 97 FL (ref 82–98)
MONOCYTES # BLD AUTO: 0.7 K/UL (ref 0.3–1)
MONOCYTES # BLD AUTO: 0.7 K/UL (ref 0.3–1)
MONOCYTES # BLD AUTO: 0.9 K/UL (ref 0.3–1)
MONOCYTES NFR BLD: 10.1 % (ref 4–15)
MONOCYTES NFR BLD: 10.6 % (ref 4–15)
MONOCYTES NFR BLD: 12.5 % (ref 4–15)
NEUTROPHILS # BLD AUTO: 4.9 K/UL (ref 1.8–7.7)
NEUTROPHILS # BLD AUTO: 5.2 K/UL (ref 1.8–7.7)
NEUTROPHILS # BLD AUTO: 5.3 K/UL (ref 1.8–7.7)
NEUTROPHILS NFR BLD: 71.3 % (ref 38–73)
NEUTROPHILS NFR BLD: 72.5 % (ref 38–73)
NEUTROPHILS NFR BLD: 73.6 % (ref 38–73)
NRBC BLD-RTO: 1 /100 WBC
OVALOCYTES BLD QL SMEAR: ABNORMAL
PHOSPHATE SERPL-MCNC: 2.1 MG/DL (ref 2.7–4.5)
PLATELET # BLD AUTO: 38 K/UL (ref 150–450)
PLATELET # BLD AUTO: 43 K/UL (ref 150–450)
PLATELET # BLD AUTO: 45 K/UL (ref 150–450)
PLATELET BLD QL SMEAR: ABNORMAL
PMV BLD AUTO: 10.9 FL (ref 9.2–12.9)
PMV BLD AUTO: 11.3 FL (ref 9.2–12.9)
PMV BLD AUTO: 11.8 FL (ref 9.2–12.9)
POIKILOCYTOSIS BLD QL SMEAR: SLIGHT
POLYCHROMASIA BLD QL SMEAR: ABNORMAL
POTASSIUM SERPL-SCNC: 3.7 MMOL/L (ref 3.5–5.1)
PROT SERPL-MCNC: 4.8 G/DL (ref 6–8.4)
PROTHROMBIN TIME: 22.3 SEC (ref 9–12.5)
RBC # BLD AUTO: 2.57 M/UL (ref 4.6–6.2)
RBC # BLD AUTO: 2.63 M/UL (ref 4.6–6.2)
RBC # BLD AUTO: 2.72 M/UL (ref 4.6–6.2)
SODIUM SERPL-SCNC: 141 MMOL/L (ref 136–145)
WBC # BLD AUTO: 6.69 K/UL (ref 3.9–12.7)
WBC # BLD AUTO: 7.3 K/UL (ref 3.9–12.7)
WBC # BLD AUTO: 7.32 K/UL (ref 3.9–12.7)

## 2024-01-04 PROCEDURE — 94660 CPAP INITIATION&MGMT: CPT

## 2024-01-04 PROCEDURE — 11000001 HC ACUTE MED/SURG PRIVATE ROOM

## 2024-01-04 PROCEDURE — 83605 ASSAY OF LACTIC ACID: CPT

## 2024-01-04 PROCEDURE — 63600175 PHARM REV CODE 636 W HCPCS

## 2024-01-04 PROCEDURE — 85610 PROTHROMBIN TIME: CPT

## 2024-01-04 PROCEDURE — 25000003 PHARM REV CODE 250

## 2024-01-04 PROCEDURE — 94761 N-INVAS EAR/PLS OXIMETRY MLT: CPT

## 2024-01-04 PROCEDURE — 84100 ASSAY OF PHOSPHORUS: CPT | Performed by: NURSE PRACTITIONER

## 2024-01-04 PROCEDURE — 99900035 HC TECH TIME PER 15 MIN (STAT)

## 2024-01-04 PROCEDURE — 85025 COMPLETE CBC W/AUTO DIFF WBC: CPT | Performed by: NURSE PRACTITIONER

## 2024-01-04 PROCEDURE — 80053 COMPREHEN METABOLIC PANEL: CPT | Performed by: NURSE PRACTITIONER

## 2024-01-04 PROCEDURE — 25000003 PHARM REV CODE 250: Performed by: NURSE PRACTITIONER

## 2024-01-04 PROCEDURE — 83735 ASSAY OF MAGNESIUM: CPT | Performed by: NURSE PRACTITIONER

## 2024-01-04 PROCEDURE — 99233 SBSQ HOSP IP/OBS HIGH 50: CPT | Mod: ,,, | Performed by: INTERNAL MEDICINE

## 2024-01-04 RX ORDER — BACLOFEN 10 MG/1
10 TABLET ORAL 3 TIMES DAILY
Status: DISCONTINUED | OUTPATIENT
Start: 2024-01-04 | End: 2024-01-13

## 2024-01-04 RX ADMIN — ESCITALOPRAM OXALATE 10 MG: 10 TABLET ORAL at 09:01

## 2024-01-04 RX ADMIN — THERA TABS 1 TABLET: TAB at 09:01

## 2024-01-04 RX ADMIN — CEFTRIAXONE SODIUM 1 G: 1 INJECTION, POWDER, FOR SOLUTION INTRAMUSCULAR; INTRAVENOUS at 04:01

## 2024-01-04 RX ADMIN — PANTOPRAZOLE SODIUM 40 MG: 40 TABLET, DELAYED RELEASE ORAL at 08:01

## 2024-01-04 RX ADMIN — PANTOPRAZOLE SODIUM 40 MG: 40 TABLET, DELAYED RELEASE ORAL at 09:01

## 2024-01-04 RX ADMIN — BACLOFEN 10 MG: 10 TABLET ORAL at 08:01

## 2024-01-04 RX ADMIN — BACLOFEN 10 MG: 10 TABLET ORAL at 04:01

## 2024-01-04 RX ADMIN — Medication 100 MG: at 09:01

## 2024-01-04 RX ADMIN — PHYTONADIONE 10 MG: 10 INJECTION, EMULSION INTRAMUSCULAR; INTRAVENOUS; SUBCUTANEOUS at 10:01

## 2024-01-04 RX ADMIN — FOLIC ACID 1 MG: 1 TABLET ORAL at 09:01

## 2024-01-04 NOTE — NURSING
MICU DAILY GOALS     Family/Goals of care/Code Status   Code Status: Full Code    24H Vital Sign Range  Temp:  [97.7 °F (36.5 °C)-98.8 °F (37.1 °C)]   Pulse:  []   Resp:  [6-26]   BP: (102-191)/(54-88)   SpO2:  [93 %-100 %]      Shift Events (include procedures and significant events)   No acute events throughout shift, No BM overnight    AWAKE RASS: Goal -    Actual -      Restraint necessity: Not necessary   BREATHE SBT: Not intubated    Coordinate A & B, analgesics/sedatives Pain: managed   SAT: Not intubated   Delirium CAM-ICU:     Early(intubated/ Progressive (non-intubated) Mobility MOVE Screen (INTUBATED ONLY): Not intubated    Activity: Activity Management: Rolling - L1   Feeding/Nutrition Diet order: Diet/Nutrition Received: clear liquid,     Thrombus DVT prophylaxis: VTE Required Core Measure: Pharmacological prophylaxis initiated/maintained   HOB Elevation Head of Bed (HOB) Positioning: HOB elevated   Ulcer Prophylaxis GI: yes   Glucose control managed     Skin Skin assessed during: Daily Assessment    Sacrum intact/not altered? Yes  Heels intact/not altered? Yes  Surgical wound? No    Check one (no altered skin or altered skin) and sub boxes:  [x] No Altered Skin Integrity Present    [x]Prevention Measures Documented    [] Altered Skin Integrity Present or Discovered   [] LDA present in EPIC              [] LDA added in EPIC   [] Wound Image Taken (required on admit,                   transfer/discharge and every Tuesday)    Wound Care Consulted? No    Attending Nurse:     Second RN/Staff Member:    Bowel Function no issues    Indwelling Catheter Necessity         no   De-escalation Antibiotics Yes       VS and assessment per flow sheet, patient progressing towards goals as tolerated, plan of care reviewed with  Mr. Hurt , all concerns addressed, will continue to monitor.

## 2024-01-04 NOTE — PROGRESS NOTES
[facility] - Card[facility] Medical [facility]  Critical Care Medicine  Progress Note    Patient Name: Blu Deleon  MRN: [id number]  Admission Date: 1/2/2024  Hospital Length of Stay: 1 days  Code Status: Full Code  Attending Provider: Shayy Arce MD  Primary Care Provider: No, Primary Doctor   Principal Problem: Acute upper GI bleed    Subjective:     HPI:  Mr. Deleon is a 58 year old male with PMH notable for HTN, ETOH use, reported cirrhosis (has not seen a hepatologist), and depression who presented to [facility] [place] with acute GI bleeding. In speaking with patient and wife at bedside, patient reports four days of nausea and vomiting with bright red blood and black tarry diarrhea. Additionally, he reports a fall yesterday after attempting to go to the restroom secondary to being lightheaded. He denies this happening previously. He was scheduled for an outpatient EGD, which has not been completed. He has not had a colonoscopy. He denies any anticoagulation or antiplatelet use.     In the emergency department he was found to be acutely anemic with a hgb of 5. He has not been hypotensive while in the emergency department, however, notably tachycardic with -140. Labs otherwise notable for WBC 13.56, Plt 107, INR 2.4, BUN 57, Cr 1.6, TB 5.4, , ALT 44. Initial lactate >12. ETOH level <10. Patient is s/p 2u pRBCs. Repeat CBC pending.     Admitted to MICU for UGIB.     Hospital/ICU Course:  EGD showed esophageal ulcers. Pt to continue PPI with repeat scope to follow. Hgb stable s/p 3u PRBC and 1u FFP. HDS and tolerating clear liquid diet. CIWA ordered and no ativan required. Renal function improving. Stable to stepdown to .    Interval History/Significant Events: NAEO. HDS. Tolerating clear liquid diet. Mentation appropriate. Has not required ativan.    Review of Systems   Constitutional:  Positive for activity change and fatigue. Negative for chills and fever.   HENT:  Negative for congestion.    Respiratory:   Negative for cough and shortness of breath.    Cardiovascular:  Negative for chest pain and leg swelling.   Gastrointestinal:  Negative for abdominal distention, abdominal pain, blood in stool, nausea and vomiting.   Genitourinary:  Negative for difficulty urinating and hematuria.   Skin:  Negative for color change and pallor.   Neurological:  Negative for dizziness and light-headedness.     Objective:     Vital Signs (Most Recent):  Temp: 98.4 °F (36.9 °C) (01/04/24 0700)  Pulse: 62 (01/04/24 0800)  Resp: 16 (01/04/24 0800)  BP: (!) 105/56 (01/04/24 0800)  SpO2: 97 % (01/04/24 0800) Vital Signs (24h Range):  Temp:  [97.7 °F (36.5 °C)-98.8 °F (37.1 °C)] 98.4 °F (36.9 °C)  Pulse:  [] 62  Resp:  [6-26] 16  SpO2:  [93 %-100 %] 97 %  BP: (102-191)/(54-88) 105/56   Weight: 97.6 kg (215 lb 2.7 oz)  Body mass index is 32.72 kg/m².      Intake/Output Summary (Last 24 hours) at 1/4/2024 0840  Last data filed at 1/4/2024 0301  Gross per 24 hour   Intake 1373.64 ml   Output 830 ml   Net 543.64 ml          Physical Exam  Constitutional:       General: He is not in acute distress.     Appearance: Normal appearance.   HENT:      Head: Normocephalic and atraumatic.      Mouth/Throat:      Mouth: Mucous membranes are moist.      Pharynx: Oropharynx is clear.      Comments: Sublingual jaundice   Eyes:      Extraocular Movements: Extraocular movements intact.      Conjunctiva/sclera: Conjunctivae normal.   Cardiovascular:      Rate and Rhythm: Normal rate and regular rhythm.      Heart sounds: Normal heart sounds. No murmur heard.  Pulmonary:      Effort: No respiratory distress.      Breath sounds: Normal breath sounds.   Abdominal:      General: Abdomen is flat.      Palpations: Abdomen is soft.      Tenderness: There is no abdominal tenderness.      Comments: No fluid shift    Musculoskeletal:      Right lower leg: No edema.      Left lower leg: No edema.   Skin:     Capillary Refill: Capillary refill takes less than 2  "seconds.   Neurological:      General: No focal deficit present.      Mental Status: He is alert and oriented to person, place, and time.            Vents:     Lines/Drains/Airways       Peripheral Intravenous Line  Duration                  Peripheral IV - Single Lumen 01/02/24 2332 18 G Left Antecubital 1 day         Peripheral IV - Single Lumen 01/03/24 1045 18 G;1 1/4 in Anterior;Left Upper Arm <1 day         Peripheral IV - Single Lumen 01/03/24 1400 18 G;1 1/4 in Anterior;Right Upper Arm <1 day                  Significant Labs:    CBC/Anemia Profile:  Recent Labs   Lab 01/03/24  1245 01/03/24 1942 01/04/24  0400   WBC 8.90 9.35 6.69   HGB 6.2* 8.4* 8.6*   HCT 18.1* 24.5* 25.0*   PLT 42* 42* 43*   * 96 95   RDW 24.7* 25.2* 25.2*        Chemistries:  Recent Labs   Lab 01/02/24 2329 01/03/24 0407 01/04/24  0400    142 141   K 4.7 4.8 3.7    106 106   CO2 14* 21* 28   BUN 57* 58* 55*   CREATININE 1.6* 1.7* 1.4   CALCIUM 7.9* 7.9* 7.8*   ALBUMIN 2.1* 2.1* 2.2*   PROT 5.1* 4.7* 4.8*   BILITOT 5.4* 5.9* 8.3*   ALKPHOS 91 82 78   ALT 44 52* 60*   * 193* 183*   MG  --  1.3* 2.0   PHOS  --  3.1 2.1*       CMP:   Recent Labs   Lab 01/02/24 2329 01/03/24 0407 01/04/24  0400    142 141   K 4.7 4.8 3.7    106 106   CO2 14* 21* 28    122* 120*   BUN 57* 58* 55*   CREATININE 1.6* 1.7* 1.4   CALCIUM 7.9* 7.9* 7.8*   PROT 5.1* 4.7* 4.8*   ALBUMIN 2.1* 2.1* 2.2*   BILITOT 5.4* 5.9* 8.3*   ALKPHOS 91 82 78   * 193* 183*   ALT 44 52* 60*   ANIONGAP 25* 15 7*     Coagulation:   Recent Labs   Lab 01/02/24  2331 01/03/24  0909   INR 2.4* 2.4*   APTT 28.1  --      Lactic Acid:   Recent Labs   Lab 01/03/24  0909 01/03/24  1627 01/04/24  0038   LACTATE 4.5* 2.3* 1.0       Significant Imaging:  I have reviewed all pertinent imaging results/findings within the past 24 hours.    ABG  No results for input(s): "PH", "PO2", "PCO2", "HCO3", "BE" in the last 168 hours.  Assessment/Plan: "     Psychiatric  Alcohol use disorder  [facility] protocol however has not required ativan.    - Last alcoholic drink on 1/01. Alcohol level <10 on admission. Denies history of withdrawal in the past including seizures. Remains tachycardic but likely 2/2 acute GIB. Will continue to monitor for signs of withdrawal w/ CIWA protocol and start benzodiazepines if indicated.   - thiamine, folic acid, MV    Renal/  High anion gap metabolic acidosis  - Likely 2/2 lactic acidosis. Expect improvement w/ resuscitation; will continue to monitor.     CODY (acute kidney injury)  Improved    - Cr 1.6 at time of admission; no baseline Cr available. Last CMP from 2018 showed a Cr of 1.08.   - Trend Cr/ Serial BMPs  - Strict I&Os, close monitoring of UOP  - Replace electrolytes PRN, goal K/Phos/Mg 4/3/2  - Avoid nephrotoxins (NSAIDs, ACEi/ARB, IV radiocontrast, gadolinium, etc.)  - Renally dose meds       Lactic acidosis  - see GIB    Hematology  Thrombocytopenia  - Plt 43; etiology likely 2/2 cirrhosis  - Monitor w/ daily CBC and transfuse if Plt <10 or <50 w/ signs of active bleeding     Coagulopathy  - INR 2.4; likely in setting of cirrhosis. Correct as indicated     GI  * Acute upper GI bleed  58 year old M w/ cirrhosis and ETOH use d/o presenting w/ melana and ground coffee emesis/hematemesis. Denies prior symptoms. No EGDs on file. Normotensive and tachycardic w/ HR 130s. Initial Hgb 5.0; now s/p 2u pRBCs. Initial lactate >12. Labs otherwise notable for Plt 116, INR 2.4, BUN 57, Cr 1.6, TB 5.4, , ALT 44. Admitted to MICU for management of acute upper GIB.     EGD showed esophageal varices. Hgb stable s/p 3u PRBC. Will need scope after 8 week course of PPI.    - Clear liquid diet, advance as tolerated  - Trend Hgb and transfuse for goal Hgb > 7, unless otherwise indicated  - Maintain IV access with 2 large bore IV's  - Intravascular resuscitation/support with IVF's   - Hold all NSAIDs and anticoagulants, unless  contraindicated  - Pantoprazole 40 mg BID for a total of 8 weeks  - Correct any coagulopathy with platelets and FFP for goal of platelets >50K and INR <2.0      Transaminitis  - 2:1 pattern; likely 2/2 ETOH use d/o. Will continue to monitor     Cirrhosis  Patient is visiting from out of town; outside records not available to review thus etiology of cirrhosis not confirmed. However, patient does have a history of ETOH use d/o so this is included in the differential. States he is currently in the process of being referred to a hepatologist and having an EGD scheduled.          Critical Care Daily Checklist:     A: Awake: RASS Goal/Actual Goal:    Actual:     B: Spontaneous Breathing Trial Performed?    C: SAT & SBT Coordinated?  N/A                      D: Delirium: CAM-ICU    E: Early Mobility Performed? No   F: Feeding Goal:    Status:         Current Diet Order   Procedures    Clear liquid      AS: Analgesia/Sedation Hold   T: Thromboembolic Prophylaxis Holding in setting of acute GIB   H: HOB > 300 Yes   U: Stress Ulcer Prophylaxis (if needed) Protonix   G: Glucose Control < 180 goal    B: Bowel Function    I: Indwelling Catheter (Lines & Cruz) Necessity PIV   D: De-escalation of Antimicrobials/Pharmacotherapies Continue Rocephin      Plan for the day/ETD Trend CBC. Stepdown.     Code Status:  Family/Goals of Care: Full Code  Updated at bedside         Critical secondary to Patient has a condition that poses threat to life and bodily function: acute gastrointestinal bleeding     Critical care was time spent personally by me on the following activities: development of treatment plan with patient or surrogate and bedside caregivers, discussions with consultants, evaluation of patient's response to treatment, examination of patient, ordering and performing treatments and interventions, ordering and review of laboratory studies, ordering and review of radiographic studies, pulse oximetry, re-evaluation of patient's  condition. This critical care time did not overlap with that of any other provider or involve time for any procedures.     Christopher Parra MD  Critical Care Medicine  [facility] - Cardiac Medical ICU

## 2024-01-04 NOTE — ASSESSMENT & PLAN NOTE
- Plt 43; etiology likely 2/2 cirrhosis  - Monitor w/ daily CBC and transfuse if Plt <10 or <50 w/ signs of active bleeding

## 2024-01-04 NOTE — PLAN OF CARE
MICU DAILY GOALS     Family/Goals of care/Code Status   Code Status: Full Code    24H Vital Sign Range  Temp:  [97.7 °F (36.5 °C)-98.6 °F (37 °C)]   Pulse:  [62-95]   Resp:  [10-31]   BP: (102-191)/(55-88)   SpO2:  [95 %-100 %]      Shift Events (include procedures and significant events)   Step-down orders in. No acute events this shift. No bowel movements this shift. Patient up to bathroom with stand-by assist; no dizziness/headache/SOB reported by patient during movement. Platelets 38; MD Fidel notified with no new orders. Patient educated on signs of bleeding and prevention management and risk for fall-related injuries.    AWAKE RASS: Goal -    Actual -      Restraint necessity: Not necessary   BREATHE SBT: Not intubated    Coordinate A & B, analgesics/sedatives Pain: managed   SAT: Not intubated   Delirium CAM-ICU:     Early(intubated/ Progressive (non-intubated) Mobility MOVE Screen (INTUBATED ONLY): Not intubated    Activity: Activity Management: Ambulated to bathroom - L4   Feeding/Nutrition Diet order: Diet/Nutrition Received: clear liquid,     Thrombus DVT prophylaxis: VTE Required Core Measure: Pharmacological prophylaxis initiated/maintained   HOB Elevation Head of Bed (HOB) Positioning: HOB at 20-30 degrees   Ulcer Prophylaxis GI: yes   Glucose control managed     Skin Skin assessed during: Daily Assessment    Sacrum intact/not altered? Yes  Heels intact/not altered? Yes  Surgical wound? No    Check one (no altered skin or altered skin) and sub boxes:  [x] No Altered Skin Integrity Present    [x]Prevention Measures Documented    [] Altered Skin Integrity Present or Discovered   [] LDA present in EPIC              [] LDA added in EPIC   [] Wound Image Taken (required on admit,                   transfer/discharge and every Tuesday)    Wound Care Consulted? Yes    Attending Nurse:     Second RN/Staff Member:    Bowel Function no issues    Indwelling Catheter Necessity            De-escalation Antibiotics  No     Problem: Adult Inpatient Plan of Care  Goal: Plan of Care Review  Outcome: Ongoing, Progressing  Goal: Patient-Specific Goal (Individualized)  Outcome: Ongoing, Progressing  Goal: Absence of Hospital-Acquired Illness or Injury  Outcome: Ongoing, Progressing  Goal: Optimal Comfort and Wellbeing  Outcome: Ongoing, Progressing  Goal: Readiness for Transition of Care  Outcome: Ongoing, Progressing     Problem: Fluid and Electrolyte Imbalance (Acute Kidney Injury/Impairment)  Goal: Fluid and Electrolyte Balance  Outcome: Ongoing, Progressing     Problem: Fall Injury Risk  Goal: Absence of Fall and Fall-Related Injury  Outcome: Ongoing, Progressing

## 2024-01-04 NOTE — RESIDENT HANDOFF
Transfer of Care Note  MICU    Admit Date: 1/2/2024  LOS: 1    CC: Acute upper GI bleed    Code Status: Full Code     Transfer to Hospital Medicine discussed with TORIE Chapman at 0840.     HPI and Hospital Course:     HPI:  Mr. Deleon is a 58 year old male with PMH notable for HTN, ETOH use, reported cirrhosis (has not seen a hepatologist), and depression who presented to [facility] ED with acute GI bleeding. In speaking with patient and wife at bedside, patient reports four days of nausea and vomiting with bright red blood and black tarry diarrhea. Additionally, he reports a fall yesterday after attempting to go to the restroom secondary to being lightheaded. He denies this happening previously. He was scheduled for an outpatient EGD, which has not been completed. He has not had a colonoscopy. He denies any anticoagulation or antiplatelet use.     In the emergency department he was found to be acutely anemic with a hgb of 5. He has not been hypotensive while in the emergency department, however, notably tachycardic with -140. Labs otherwise notable for WBC 13.56, Plt 107, INR 2.4, BUN 57, Cr 1.6, TB 5.4, , ALT 44. Initial lactate >12. ETOH level <10. Patient is s/p 2u pRBCs. Repeat CBC pending.     Admitted to MICU for UGIB.     Hospital/ICU Course:  EGD showed esophageal ulcers. Pt to continue PPI with repeat scope to follow. Hgb stable s/p 3u PRBC and 1u FFP. HDS and tolerating clear liquid diet. CIWA ordered and no ativan required. Renal function improving. Stable to stepdown to .      To Follow Up:     - Advance diet  - Repeat PT/INR (ordered)  - Renal function (improved)  - Outpatient GI f/u for repeat EGD  - Alcohol cessation counseling      Discharge Plan:     Home    Please call [phone] with any questions.

## 2024-01-04 NOTE — ASSESSMENT & PLAN NOTE
58 year old M w/ cirrhosis and ETOH use d/o presenting w/ melana and ground coffee emesis/hematemesis. Denies prior symptoms. No EGDs on file. Normotensive and tachycardic w/ HR 130s. Initial Hgb 5.0; now s/p 2u pRBCs. Initial lactate >12. Labs otherwise notable for Plt 116, INR 2.4, BUN 57, Cr 1.6, TB 5.4, , ALT 44. Admitted to MICU for management of acute upper GIB.     EGD showed esophageal varices. Hgb stable s/p 3u PRBC. Will need scope after 8 week course of PPI.    - Clear liquid diet, advance as tolerated  - Trend Hgb and transfuse for goal Hgb > 7, unless otherwise indicated  - Maintain IV access with 2 large bore IV's  - Intravascular resuscitation/support with IVF's   - Hold all NSAIDs and anticoagulants, unless contraindicated  - Pantoprazole 40 mg BID for a total of 8 weeks  - Correct any coagulopathy with platelets and FFP for goal of platelets >50K and INR <2.0

## 2024-01-04 NOTE — ASSESSMENT & PLAN NOTE
Improved    - Cr 1.6 at time of admission; no baseline Cr available. Last CMP from 2018 showed a Cr of 1.08.   - Trend Cr/ Serial BMPs  - Strict I&Os, close monitoring of UOP  - Replace electrolytes PRN, goal K/Phos/Mg 4/3/2  - Avoid nephrotoxins (NSAIDs, ACEi/ARB, IV radiocontrast, gadolinium, etc.)  - Renally dose meds

## 2024-01-04 NOTE — SUBJECTIVE & OBJECTIVE
Interval History/Significant Events: NAEO. HDS. Tolerating clear liquid diet. Mentation appropriate. Has not required ativan.    Review of Systems   Constitutional:  Positive for activity change and fatigue. Negative for chills and fever.   HENT:  Negative for congestion.    Respiratory:  Negative for cough and shortness of breath.    Cardiovascular:  Negative for chest pain and leg swelling.   Gastrointestinal:  Negative for abdominal distention, abdominal pain, blood in stool, nausea and vomiting.   Genitourinary:  Negative for difficulty urinating and hematuria.   Skin:  Negative for color change and pallor.   Neurological:  Negative for dizziness and light-headedness.     Objective:     Vital Signs (Most Recent):  Temp: 98.4 °F (36.9 °C) (01/04/24 0700)  Pulse: 62 (01/04/24 0800)  Resp: 16 (01/04/24 0800)  BP: (!) 105/56 (01/04/24 0800)  SpO2: 97 % (01/04/24 0800) Vital Signs (24h Range):  Temp:  [97.7 °F (36.5 °C)-98.8 °F (37.1 °C)] 98.4 °F (36.9 °C)  Pulse:  [] 62  Resp:  [6-26] 16  SpO2:  [93 %-100 %] 97 %  BP: (102-191)/(54-88) 105/56   Weight: 97.6 kg (215 lb 2.7 oz)  Body mass index is 32.72 kg/m².      Intake/Output Summary (Last 24 hours) at 1/4/2024 0840  Last data filed at 1/4/2024 0301  Gross per 24 hour   Intake 1373.64 ml   Output 830 ml   Net 543.64 ml          Physical Exam  Constitutional:       General: He is not in acute distress.     Appearance: Normal appearance.   HENT:      Head: Normocephalic and atraumatic.      Mouth/Throat:      Mouth: Mucous membranes are moist.      Pharynx: Oropharynx is clear.      Comments: Sublingual jaundice   Eyes:      Extraocular Movements: Extraocular movements intact.      Conjunctiva/sclera: Conjunctivae normal.   Cardiovascular:      Rate and Rhythm: Normal rate and regular rhythm.      Heart sounds: Normal heart sounds. No murmur heard.  Pulmonary:      Effort: No respiratory distress.      Breath sounds: Normal breath sounds.   Abdominal:       General: Abdomen is flat.      Palpations: Abdomen is soft.      Tenderness: There is no abdominal tenderness.      Comments: No fluid shift    Musculoskeletal:      Right lower leg: No edema.      Left lower leg: No edema.   Skin:     Capillary Refill: Capillary refill takes less than 2 seconds.   Neurological:      General: No focal deficit present.      Mental Status: He is alert and oriented to person, place, and time.            Vents:     Lines/Drains/Airways       Peripheral Intravenous Line  Duration                  Peripheral IV - Single Lumen 01/02/24 2332 18 G Left Antecubital 1 day         Peripheral IV - Single Lumen 01/03/24 1045 18 G;1 1/4 in Anterior;Left Upper Arm <1 day         Peripheral IV - Single Lumen 01/03/24 1400 18 G;1 1/4 in Anterior;Right Upper Arm <1 day                  Significant Labs:    CBC/Anemia Profile:  Recent Labs   Lab 01/03/24  1245 01/03/24 1942 01/04/24  0400   WBC 8.90 9.35 6.69   HGB 6.2* 8.4* 8.6*   HCT 18.1* 24.5* 25.0*   PLT 42* 42* 43*   * 96 95   RDW 24.7* 25.2* 25.2*        Chemistries:  Recent Labs   Lab 01/02/24 2329 01/03/24 0407 01/04/24  0400    142 141   K 4.7 4.8 3.7    106 106   CO2 14* 21* 28   BUN 57* 58* 55*   CREATININE 1.6* 1.7* 1.4   CALCIUM 7.9* 7.9* 7.8*   ALBUMIN 2.1* 2.1* 2.2*   PROT 5.1* 4.7* 4.8*   BILITOT 5.4* 5.9* 8.3*   ALKPHOS 91 82 78   ALT 44 52* 60*   * 193* 183*   MG  --  1.3* 2.0   PHOS  --  3.1 2.1*       CMP:   Recent Labs   Lab 01/02/24 2329 01/03/24 0407 01/04/24  0400    142 141   K 4.7 4.8 3.7    106 106   CO2 14* 21* 28    122* 120*   BUN 57* 58* 55*   CREATININE 1.6* 1.7* 1.4   CALCIUM 7.9* 7.9* 7.8*   PROT 5.1* 4.7* 4.8*   ALBUMIN 2.1* 2.1* 2.2*   BILITOT 5.4* 5.9* 8.3*   ALKPHOS 91 82 78   * 193* 183*   ALT 44 52* 60*   ANIONGAP 25* 15 7*     Coagulation:   Recent Labs   Lab 01/02/24  2331 01/03/24  0909   INR 2.4* 2.4*   APTT 28.1  --      Lactic Acid:   Recent Labs    Lab 01/03/24  0909 01/03/24  1627 01/04/24  0038   LACTATE 4.5* 2.3* 1.0       Significant Imaging:  I have reviewed all pertinent imaging results/findings within the past 24 hours.

## 2024-01-04 NOTE — HOSPITAL COURSE
EGD showed esophageal ulcers. Pt to continue PPI with repeat scope to follow. Hgb stable s/p 3u PRBC and 1u FFP. HDS and tolerating clear liquid diet. CIWA ordered and no ativan required. Renal function improving. Stable to stepdown to .

## 2024-01-04 NOTE — PLAN OF CARE
MICU DAILY GOALS     Family/Goals of care/Code Status   Code Status: Full Code    24H Vital Sign Range  Temp:  [97.6 °F (36.4 °C)-99 °F (37.2 °C)]   Pulse:  []   Resp:  [6-25]   BP: (105-167)/(54-88)   SpO2:  [93 %-100 %]      Shift Events (include procedures and significant events)   Patient had 3x dark, tarry BMs. 1 unit FFP & 2 units pRBCs transfused today. Patient no longer tachycardic and blood pressure stable. EGD completed at bedside.     AWAKE RASS: Goal -    Actual -      Restraint necessity: Not necessary   BREATHE SBT: Not intubated    Coordinate A & B, analgesics/sedatives Pain: managed   SAT: Not intubated   Delirium CAM-ICU:     Early(intubated/ Progressive (non-intubated) Mobility MOVE Screen (INTUBATED ONLY): Not intubated    Activity:     Feeding/Nutrition Diet order:  ,     Thrombus DVT prophylaxis:     HOB Elevation     Ulcer Prophylaxis GI: yes   Glucose control managed     Skin Skin assessed during: Daily Assessment    Sacrum intact/not altered? Yes  Heels intact/not altered? Yes  Surgical wound? No    Check one (no altered skin or altered skin) and sub boxes:  [x] No Altered Skin Integrity Present    [x]Prevention Measures Documented    [] Altered Skin Integrity Present or Discovered   [] LDA present in EPIC              [] LDA added in EPIC   [] Wound Image Taken (required on admit,                   transfer/discharge and every Tuesday)    Wound Care Consulted? No    Attending Nurse:     Second RN/Staff Member:    Bowel Function diarrhea    Indwelling Catheter Necessity            De-escalation Antibiotics No       Problem: Adult Inpatient Plan of Care  Goal: Plan of Care Review  Outcome: Ongoing, Progressing  Goal: Patient-Specific Goal (Individualized)  Outcome: Ongoing, Progressing     Problem: Fluid and Electrolyte Imbalance (Acute Kidney Injury/Impairment)  Goal: Fluid and Electrolyte Balance  Outcome: Ongoing, Progressing     Problem: Renal Function Impairment (Acute Kidney  Injury/Impairment)  Goal: Effective Renal Function  Outcome: Ongoing, Progressing

## 2024-01-04 NOTE — ASSESSMENT & PLAN NOTE
CIWA protocol however has not required ativan.    - Last alcoholic drink on 1/01. Alcohol level <10 on admission. Denies history of withdrawal in the past including seizures. Remains tachycardic but likely 2/2 acute GIB. Will continue to monitor for signs of withdrawal w/ CIWA protocol and start benzodiazepines if indicated.   - thiamine, folic acid, MV

## 2024-01-05 LAB
ALBUMIN SERPL BCP-MCNC: 2 G/DL (ref 3.5–5.2)
ALP SERPL-CCNC: 74 U/L (ref 55–135)
ALT SERPL W/O P-5'-P-CCNC: 79 U/L (ref 10–44)
ANION GAP SERPL CALC-SCNC: 7 MMOL/L (ref 8–16)
AST SERPL-CCNC: 256 U/L (ref 10–40)
BASOPHILS # BLD AUTO: 0.01 K/UL (ref 0–0.2)
BASOPHILS # BLD AUTO: 0.02 K/UL (ref 0–0.2)
BASOPHILS # BLD AUTO: 0.02 K/UL (ref 0–0.2)
BASOPHILS NFR BLD: 0.2 % (ref 0–1.9)
BILIRUB SERPL-MCNC: 6.7 MG/DL (ref 0.1–1)
BUN SERPL-MCNC: 40 MG/DL (ref 6–20)
CALCIUM SERPL-MCNC: 7.6 MG/DL (ref 8.7–10.5)
CHLORIDE SERPL-SCNC: 106 MMOL/L (ref 95–110)
CO2 SERPL-SCNC: 26 MMOL/L (ref 23–29)
CREAT SERPL-MCNC: 1.3 MG/DL (ref 0.5–1.4)
DIFFERENTIAL METHOD BLD: ABNORMAL
EOSINOPHIL # BLD AUTO: 0.1 K/UL (ref 0–0.5)
EOSINOPHIL NFR BLD: 1 % (ref 0–8)
EOSINOPHIL NFR BLD: 1.2 % (ref 0–8)
EOSINOPHIL NFR BLD: 1.2 % (ref 0–8)
ERYTHROCYTE [DISTWIDTH] IN BLOOD BY AUTOMATED COUNT: 24.9 % (ref 11.5–14.5)
ERYTHROCYTE [DISTWIDTH] IN BLOOD BY AUTOMATED COUNT: 25.4 % (ref 11.5–14.5)
ERYTHROCYTE [DISTWIDTH] IN BLOOD BY AUTOMATED COUNT: 25.4 % (ref 11.5–14.5)
EST. GFR  (NO RACE VARIABLE): >60 ML/MIN/1.73 M^2
GLUCOSE SERPL-MCNC: 101 MG/DL (ref 70–110)
HCT VFR BLD AUTO: 24.7 % (ref 40–54)
HCT VFR BLD AUTO: 24.9 % (ref 40–54)
HCT VFR BLD AUTO: 28.2 % (ref 40–54)
HGB BLD-MCNC: 8.5 G/DL (ref 14–18)
HGB BLD-MCNC: 9.3 G/DL (ref 14–18)
HGB BLD-MCNC: 9.8 G/DL (ref 14–18)
IMM GRANULOCYTES # BLD AUTO: 0.04 K/UL (ref 0–0.04)
IMM GRANULOCYTES # BLD AUTO: 0.13 K/UL (ref 0–0.04)
IMM GRANULOCYTES # BLD AUTO: 0.14 K/UL (ref 0–0.04)
IMM GRANULOCYTES NFR BLD AUTO: 0.7 % (ref 0–0.5)
IMM GRANULOCYTES NFR BLD AUTO: 1.2 % (ref 0–0.5)
IMM GRANULOCYTES NFR BLD AUTO: 1.4 % (ref 0–0.5)
LYMPHOCYTES # BLD AUTO: 1 K/UL (ref 1–4.8)
LYMPHOCYTES # BLD AUTO: 1.4 K/UL (ref 1–4.8)
LYMPHOCYTES # BLD AUTO: 1.5 K/UL (ref 1–4.8)
LYMPHOCYTES NFR BLD: 12.6 % (ref 18–48)
LYMPHOCYTES NFR BLD: 15.3 % (ref 18–48)
LYMPHOCYTES NFR BLD: 17.1 % (ref 18–48)
MAGNESIUM SERPL-MCNC: 2.1 MG/DL (ref 1.6–2.6)
MCH RBC QN AUTO: 33.1 PG (ref 27–31)
MCH RBC QN AUTO: 33.1 PG (ref 27–31)
MCH RBC QN AUTO: 34 PG (ref 27–31)
MCHC RBC AUTO-ENTMCNC: 34.4 G/DL (ref 32–36)
MCHC RBC AUTO-ENTMCNC: 34.8 G/DL (ref 32–36)
MCHC RBC AUTO-ENTMCNC: 37.3 G/DL (ref 32–36)
MCV RBC AUTO: 89 FL (ref 82–98)
MCV RBC AUTO: 96 FL (ref 82–98)
MCV RBC AUTO: 98 FL (ref 82–98)
MONOCYTES # BLD AUTO: 0.7 K/UL (ref 0.3–1)
MONOCYTES # BLD AUTO: 1.1 K/UL (ref 0.3–1)
MONOCYTES # BLD AUTO: 1.8 K/UL (ref 0.3–1)
MONOCYTES NFR BLD: 11.8 % (ref 4–15)
MONOCYTES NFR BLD: 12 % (ref 4–15)
MONOCYTES NFR BLD: 15 % (ref 4–15)
NEUTROPHILS # BLD AUTO: 4 K/UL (ref 1.8–7.7)
NEUTROPHILS # BLD AUTO: 6.4 K/UL (ref 1.8–7.7)
NEUTROPHILS # BLD AUTO: 8.2 K/UL (ref 1.8–7.7)
NEUTROPHILS NFR BLD: 69 % (ref 38–73)
NEUTROPHILS NFR BLD: 69.8 % (ref 38–73)
NEUTROPHILS NFR BLD: 70.1 % (ref 38–73)
NRBC BLD-RTO: 0 /100 WBC
NRBC BLD-RTO: 0 /100 WBC
NRBC BLD-RTO: 1 /100 WBC
PHOSPHATE SERPL-MCNC: 1.7 MG/DL (ref 2.7–4.5)
PLATELET # BLD AUTO: 49 K/UL (ref 150–450)
PLATELET # BLD AUTO: 54 K/UL (ref 150–450)
PLATELET # BLD AUTO: 54 K/UL (ref 150–450)
PMV BLD AUTO: 11.3 FL (ref 9.2–12.9)
PMV BLD AUTO: 11.6 FL (ref 9.2–12.9)
PMV BLD AUTO: 12.2 FL (ref 9.2–12.9)
POTASSIUM SERPL-SCNC: 4.3 MMOL/L (ref 3.5–5.1)
PROT SERPL-MCNC: 5 G/DL (ref 6–8.4)
RBC # BLD AUTO: 2.57 M/UL (ref 4.6–6.2)
RBC # BLD AUTO: 2.81 M/UL (ref 4.6–6.2)
RBC # BLD AUTO: 2.88 M/UL (ref 4.6–6.2)
SODIUM SERPL-SCNC: 139 MMOL/L (ref 136–145)
WBC # BLD AUTO: 11.77 K/UL (ref 3.9–12.7)
WBC # BLD AUTO: 5.85 K/UL (ref 3.9–12.7)
WBC # BLD AUTO: 9.19 K/UL (ref 3.9–12.7)

## 2024-01-05 PROCEDURE — 25000003 PHARM REV CODE 250: Performed by: STUDENT IN AN ORGANIZED HEALTH CARE EDUCATION/TRAINING PROGRAM

## 2024-01-05 PROCEDURE — 83735 ASSAY OF MAGNESIUM: CPT | Performed by: NURSE PRACTITIONER

## 2024-01-05 PROCEDURE — 63600175 PHARM REV CODE 636 W HCPCS: Performed by: STUDENT IN AN ORGANIZED HEALTH CARE EDUCATION/TRAINING PROGRAM

## 2024-01-05 PROCEDURE — 25000003 PHARM REV CODE 250

## 2024-01-05 PROCEDURE — 80053 COMPREHEN METABOLIC PANEL: CPT | Performed by: NURSE PRACTITIONER

## 2024-01-05 PROCEDURE — 85025 COMPLETE CBC W/AUTO DIFF WBC: CPT | Mod: 91 | Performed by: NURSE PRACTITIONER

## 2024-01-05 PROCEDURE — 36415 COLL VENOUS BLD VENIPUNCTURE: CPT | Mod: XB | Performed by: NURSE PRACTITIONER

## 2024-01-05 PROCEDURE — 25000003 PHARM REV CODE 250: Performed by: NURSE PRACTITIONER

## 2024-01-05 PROCEDURE — 63600175 PHARM REV CODE 636 W HCPCS

## 2024-01-05 PROCEDURE — 99233 SBSQ HOSP IP/OBS HIGH 50: CPT | Mod: ,,, | Performed by: INTERNAL MEDICINE

## 2024-01-05 PROCEDURE — 21400001 HC TELEMETRY ROOM

## 2024-01-05 PROCEDURE — 84100 ASSAY OF PHOSPHORUS: CPT | Performed by: NURSE PRACTITIONER

## 2024-01-05 RX ORDER — HYDRALAZINE HYDROCHLORIDE 20 MG/ML
10 INJECTION INTRAMUSCULAR; INTRAVENOUS EVERY 6 HOURS PRN
Status: DISCONTINUED | OUTPATIENT
Start: 2024-01-05 | End: 2024-01-22 | Stop reason: HOSPADM

## 2024-01-05 RX ORDER — SODIUM,POTASSIUM PHOSPHATES 280-250MG
2 POWDER IN PACKET (EA) ORAL ONCE
Status: COMPLETED | OUTPATIENT
Start: 2024-01-05 | End: 2024-01-05

## 2024-01-05 RX ORDER — HYDROCHLOROTHIAZIDE 25 MG/1
25 TABLET ORAL DAILY
Status: DISCONTINUED | OUTPATIENT
Start: 2024-01-05 | End: 2024-01-11

## 2024-01-05 RX ADMIN — FOLIC ACID 1 MG: 1 TABLET ORAL at 08:01

## 2024-01-05 RX ADMIN — PANTOPRAZOLE SODIUM 40 MG: 40 TABLET, DELAYED RELEASE ORAL at 08:01

## 2024-01-05 RX ADMIN — HYDROCHLOROTHIAZIDE 25 MG: 25 TABLET ORAL at 01:01

## 2024-01-05 RX ADMIN — ESCITALOPRAM OXALATE 10 MG: 10 TABLET ORAL at 08:01

## 2024-01-05 RX ADMIN — BACLOFEN 10 MG: 10 TABLET ORAL at 08:01

## 2024-01-05 RX ADMIN — CEFTRIAXONE SODIUM 1 G: 1 INJECTION, POWDER, FOR SOLUTION INTRAMUSCULAR; INTRAVENOUS at 04:01

## 2024-01-05 RX ADMIN — PHYTONADIONE 10 MG: 10 INJECTION, EMULSION INTRAMUSCULAR; INTRAVENOUS; SUBCUTANEOUS at 10:01

## 2024-01-05 RX ADMIN — POTASSIUM & SODIUM PHOSPHATES POWDER PACK 280-160-250 MG 2 PACKET: 280-160-250 PACK at 06:01

## 2024-01-05 RX ADMIN — THERA TABS 1 TABLET: TAB at 08:01

## 2024-01-05 RX ADMIN — Medication 100 MG: at 08:01

## 2024-01-05 NOTE — ASSESSMENT & PLAN NOTE
58 year old M w/ cirrhosis and ETOH use d/o presenting w/ melana and ground coffee emesis/hematemesis. Denies prior symptoms. No EGDs on file. Normotensive and tachycardic w/ HR 130s. Initial Hgb 5.0; now s/p 2u pRBCs. Initial lactate >12. Labs otherwise notable for Plt 116, INR 2.4, BUN 57, Cr 1.6, TB 5.4, , ALT 44. Admitted to MICU for management of acute upper GIB.     EGD showed esophageal varices. Hgb stable s/p 3u PRBC. Will need scope after 8 week course of PPI.    - Soft and bite-sized diet, advance as tolerated  - Trend Hgb and transfuse for goal Hgb > 7, unless otherwise indicated  - Maintain IV access with 2 large bore IV's  - Intravascular resuscitation/support with IVF's   - Hold all NSAIDs and anticoagulants, unless contraindicated  - Pantoprazole 40 mg BID for a total of 8 weeks  - Correct any coagulopathy with platelets and FFP for goal of platelets >50K and INR <2.0

## 2024-01-05 NOTE — PHYSICIAN QUERY
PT Name: Blu Deleon  MR #: [id number]     Documentation Clarification      CDS/: Mirta Leonard RN, CDI            Contact information:[email or web address]    This form is a permanent document in the medical record.     Query Date: January 5, 2024    By submitting this query, we are merely seeking further clarification of documentation. Please utilize your independent clinical judgment when addressing the question(s) below.    The Medical Record reflects the following:    Supporting Clinical Findings Location in Medical Record   Patient with Upper GI bleed secondary to esophageal ulcers.     EGD showed esophageal varices.  Critical care PN 1/4   Impression:            - Esophageal ulcers.                          - Erythematous mucosa in the antrum.                          - Normal examined duodenum.                          - No specimens collected.  EGD 1/3                                                                            Provider, due to the conflicting documentation, please clarify what was seen on the EGD.    [ X ] Esophageal ulcer   [   ] Esophageal varices    [   ] Other (please specify): ____________

## 2024-01-05 NOTE — SUBJECTIVE & OBJECTIVE
Interval History/Significant Events: No acute events overnight. Patient remains afebrile and hemodynamically stable. Still no need for ativan.    Review of Systems   Constitutional:  Positive for activity change and fatigue. Negative for chills and fever.   HENT:  Negative for congestion.    Respiratory:  Negative for cough and shortness of breath.    Cardiovascular:  Negative for chest pain and leg swelling.   Gastrointestinal:  Negative for abdominal distention, abdominal pain, blood in stool, nausea and vomiting.   Genitourinary:  Negative for difficulty urinating and hematuria.   Skin:  Negative for color change and pallor.   Neurological:  Negative for dizziness and light-headedness.     Objective:     Vital Signs (Most Recent):  Temp: 98.2 °F (36.8 °C) (01/05/24 1515)  Pulse: 92 (01/05/24 1515)  Resp: 18 (01/05/24 1515)  BP: (!) 148/88 (01/05/24 1515)  SpO2: (!) 94 % (01/05/24 1515) Vital Signs (24h Range):  Temp:  [97.8 °F (36.6 °C)-99.3 °F (37.4 °C)] 98.2 °F (36.8 °C)  Pulse:  [] 92  Resp:  [11-21] 18  SpO2:  [94 %-99 %] 94 %  BP: (111-183)/(57-88) 148/88   Weight: 97.6 kg (215 lb 2.7 oz)  Body mass index is 32.72 kg/m².      Intake/Output Summary (Last 24 hours) at 1/5/2024 1602  Last data filed at 1/5/2024 0701  Gross per 24 hour   Intake 107.33 ml   Output 0 ml   Net 107.33 ml            Physical Exam  Constitutional:       General: He is not in acute distress.     Appearance: Normal appearance.   HENT:      Head: Normocephalic and atraumatic.      Mouth/Throat:      Mouth: Mucous membranes are moist.      Pharynx: Oropharynx is clear.      Comments: Sublingual jaundice   Eyes:      Extraocular Movements: Extraocular movements intact.      Conjunctiva/sclera: Conjunctivae normal.   Cardiovascular:      Rate and Rhythm: Normal rate and regular rhythm.      Heart sounds: Normal heart sounds. No murmur heard.  Pulmonary:      Effort: No respiratory distress.      Breath sounds: Normal breath sounds.    Abdominal:      General: Abdomen is flat.      Palpations: Abdomen is soft.      Tenderness: There is no abdominal tenderness.      Comments: No fluid shift    Musculoskeletal:      Right lower leg: No edema.      Left lower leg: No edema.   Skin:     Capillary Refill: Capillary refill takes less than 2 seconds.   Neurological:      General: No focal deficit present.      Mental Status: He is alert and oriented to person, place, and time.            Vents:     Lines/Drains/Airways       Peripheral Intravenous Line  Duration                  Peripheral IV - Single Lumen 01/03/24 1045 18 G;1 1/4 in Anterior;Left Upper Arm 2 days         Peripheral IV - Single Lumen 01/03/24 1400 18 G;1 1/4 in Anterior;Right Upper Arm 2 days                  Significant Labs:    CBC/Anemia Profile:  Recent Labs   Lab 01/04/24 2025 01/05/24  0341 01/05/24  1206   WBC 7.32 5.85 9.19   HGB 9.1* 8.5* 9.8*   HCT 26.3* 24.7* 28.2*   PLT 45* 54* 54*   MCV 97 96 98   RDW 25.3* 24.9* 25.4*          Chemistries:  Recent Labs   Lab 01/04/24  0400 01/05/24  0341    139   K 3.7 4.3    106   CO2 28 26   BUN 55* 40*   CREATININE 1.4 1.3   CALCIUM 7.8* 7.6*   ALBUMIN 2.2* 2.0*   PROT 4.8* 5.0*   BILITOT 8.3* 6.7*   ALKPHOS 78 74   ALT 60* 79*   * 256*   MG 2.0 2.1   PHOS 2.1* 1.7*         CMP:   Recent Labs   Lab 01/04/24 0400 01/05/24  0341    139   K 3.7 4.3    106   CO2 28 26   * 101   BUN 55* 40*   CREATININE 1.4 1.3   CALCIUM 7.8* 7.6*   PROT 4.8* 5.0*   ALBUMIN 2.2* 2.0*   BILITOT 8.3* 6.7*   ALKPHOS 78 74   * 256*   ALT 60* 79*   ANIONGAP 7* 7*       Coagulation:   Recent Labs   Lab 01/04/24  1008   INR 2.2*       Lactic Acid:   Recent Labs   Lab 01/03/24  1627 01/04/24  0038   LACTATE 2.3* 1.0         Significant Imaging:  I have reviewed all pertinent imaging results/findings within the past 24 hours.

## 2024-01-05 NOTE — ASSESSMENT & PLAN NOTE
- Plt 54; etiology likely 2/2 cirrhosis  - Monitor w/ daily CBC and transfuse if Plt <10 or <50 w/ signs of active bleeding

## 2024-01-05 NOTE — PLAN OF CARE
01/05/24 1203   Post-Acute Status   Post-Acute Authorization Other   Other Status No Post-Acute Service Needs   Hospital Resources/Appts/Education Provided Appointments scheduled and added to AVS;Community resources provided   Discharge Delays None known at this time   Discharge Plan   Discharge Plan A Home;Home with family   Discharge Plan B Home with family;Home

## 2024-01-05 NOTE — NURSING
Pt has been assessed, 1901, 1/4; vitals are stable; nightly medications administered per MD order, see MAR; POC discussed; wife at bedside, bed in low, SR up x3, call light in reach; transfer orders in place

## 2024-01-05 NOTE — PROGRESS NOTES
[facility] Surg  Critical Care Medicine  Progress Note    Patient Name: Blu Deleon  MRN: [id number]  Admission Date: 1/2/2024  Hospital Length of Stay: 2 days  Code Status: Full Code  Attending Provider: Bailey Cline MD  Primary Care Provider: No, Primary Doctor   Principal Problem: Acute upper GI bleed    Subjective:     HPI:  Mr. Deleon is a 58 year old male with PMH notable for HTN, ETOH use, reported cirrhosis (has not seen a hepatologist), and depression who presented to [facility] ED with acute GI bleeding. In speaking with patient and wife at bedside, patient reports four days of nausea and vomiting with bright red blood and black tarry diarrhea. Additionally, he reports a fall yesterday after attempting to go to the restroom secondary to being lightheaded. He denies this happening previously. He was scheduled for an outpatient EGD, which has not been completed. He has not had a colonoscopy. He denies any anticoagulation or antiplatelet use.     In the emergency department he was found to be acutely anemic with a hgb of 5. He has not been hypotensive while in the emergency department, however, notably tachycardic with -140. Labs otherwise notable for WBC 13.56, Plt 107, INR 2.4, BUN 57, Cr 1.6, TB 5.4, , ALT 44. Initial lactate >12. ETOH level <10. Patient is s/p 2u pRBCs. Repeat CBC pending.     Admitted to MICU for UGIB.     Hospital/ICU Course:  EGD showed esophageal ulcers. Pt to continue PPI with repeat scope to follow. Hgb stable s/p 3u PRBC and 1u FFP. HDS and tolerating clear liquid diet. CIWA ordered and no ativan required. Renal function improving. Stable to stepdown to .    Interval History/Significant Events: No acute events overnight. Patient remains afebrile and hemodynamically stable. Still no need for ativan.    Review of Systems   Constitutional:  Positive for activity change and fatigue. Negative for chills and fever.   HENT:  Negative for congestion.     Respiratory:  Negative for cough and shortness of breath.    Cardiovascular:  Negative for chest pain and leg swelling.   Gastrointestinal:  Negative for abdominal distention, abdominal pain, blood in stool, nausea and vomiting.   Genitourinary:  Negative for difficulty urinating and hematuria.   Skin:  Negative for color change and pallor.   Neurological:  Negative for dizziness and light-headedness.     Objective:     Vital Signs (Most Recent):  Temp: 98.2 °F (36.8 °C) (01/05/24 1515)  Pulse: 92 (01/05/24 1515)  Resp: 18 (01/05/24 1515)  BP: (!) 148/88 (01/05/24 1515)  SpO2: (!) 94 % (01/05/24 1515) Vital Signs (24h Range):  Temp:  [97.8 °F (36.6 °C)-99.3 °F (37.4 °C)] 98.2 °F (36.8 °C)  Pulse:  [] 92  Resp:  [11-21] 18  SpO2:  [94 %-99 %] 94 %  BP: (111-183)/(57-88) 148/88   Weight: 97.6 kg (215 lb 2.7 oz)  Body mass index is 32.72 kg/m².      Intake/Output Summary (Last 24 hours) at 1/5/2024 1602  Last data filed at 1/5/2024 0701  Gross per 24 hour   Intake 107.33 ml   Output 0 ml   Net 107.33 ml            Physical Exam  Constitutional:       General: He is not in acute distress.     Appearance: Normal appearance.   HENT:      Head: Normocephalic and atraumatic.      Mouth/Throat:      Mouth: Mucous membranes are moist.      Pharynx: Oropharynx is clear.      Comments: Sublingual jaundice   Eyes:      Extraocular Movements: Extraocular movements intact.      Conjunctiva/sclera: Conjunctivae normal.   Cardiovascular:      Rate and Rhythm: Normal rate and regular rhythm.      Heart sounds: Normal heart sounds. No murmur heard.  Pulmonary:      Effort: No respiratory distress.      Breath sounds: Normal breath sounds.   Abdominal:      General: Abdomen is flat.      Palpations: Abdomen is soft.      Tenderness: There is no abdominal tenderness.      Comments: No fluid shift    Musculoskeletal:      Right lower leg: No edema.      Left lower leg: No edema.   Skin:     Capillary Refill: Capillary refill  "takes less than 2 seconds.   Neurological:      General: No focal deficit present.      Mental Status: He is alert and oriented to person, place, and time.            Vents:     Lines/Drains/Airways       Peripheral Intravenous Line  Duration                  Peripheral IV - Single Lumen 01/03/24 1045 18 G;1 1/4 in Anterior;Left Upper Arm 2 days         Peripheral IV - Single Lumen 01/03/24 1400 18 G;1 1/4 in Anterior;Right Upper Arm 2 days                  Significant Labs:    CBC/Anemia Profile:  Recent Labs   Lab 01/04/24 2025 01/05/24  0341 01/05/24  1206   WBC 7.32 5.85 9.19   HGB 9.1* 8.5* 9.8*   HCT 26.3* 24.7* 28.2*   PLT 45* 54* 54*   MCV 97 96 98   RDW 25.3* 24.9* 25.4*          Chemistries:  Recent Labs   Lab 01/04/24 0400 01/05/24  0341    139   K 3.7 4.3    106   CO2 28 26   BUN 55* 40*   CREATININE 1.4 1.3   CALCIUM 7.8* 7.6*   ALBUMIN 2.2* 2.0*   PROT 4.8* 5.0*   BILITOT 8.3* 6.7*   ALKPHOS 78 74   ALT 60* 79*   * 256*   MG 2.0 2.1   PHOS 2.1* 1.7*         CMP:   Recent Labs   Lab 01/04/24 0400 01/05/24  0341    139   K 3.7 4.3    106   CO2 28 26   * 101   BUN 55* 40*   CREATININE 1.4 1.3   CALCIUM 7.8* 7.6*   PROT 4.8* 5.0*   ALBUMIN 2.2* 2.0*   BILITOT 8.3* 6.7*   ALKPHOS 78 74   * 256*   ALT 60* 79*   ANIONGAP 7* 7*       Coagulation:   Recent Labs   Lab 01/04/24  1008   INR 2.2*       Lactic Acid:   Recent Labs   Lab 01/03/24  1627 01/04/24  0038   LACTATE 2.3* 1.0         Significant Imaging:  I have reviewed all pertinent imaging results/findings within the past 24 hours.    ABG  No results for input(s): "PH", "PO2", "PCO2", "HCO3", "BE" in the last 168 hours.  Assessment/Plan:     Psychiatric  Alcohol use disorder  [facility] protocol however has not required ativan.    - Last alcoholic drink on 1/01. Alcohol level <10 on admission. Denies history of withdrawal in the past including seizures. Remains tachycardic but likely 2/2 acute GIB. Will continue " to monitor for signs of withdrawal w/ CIWA protocol and start benzodiazepines if indicated.   - thiamine, folic acid, MV    Renal/  High anion gap metabolic acidosis  - Likely 2/2 lactic acidosis. Expect improvement w/ resuscitation; will continue to monitor.     CODY (acute kidney injury)  Improved    - Cr 1.6 at time of admission; no baseline Cr available. Last CMP from 2018 showed a Cr of 1.08.   - Trend Cr/ Serial BMPs  - Strict I&Os, close monitoring of UOP  - Replace electrolytes PRN, goal K/Phos/Mg 4/3/2  - Avoid nephrotoxins (NSAIDs, ACEi/ARB, IV radiocontrast, gadolinium, etc.)  - Renally dose meds       Lactic acidosis  - see GIB    Hematology  Thrombocytopenia  - Plt 54; etiology likely 2/2 cirrhosis  - Monitor w/ daily CBC and transfuse if Plt <10 or <50 w/ signs of active bleeding     Coagulopathy  - INR 2.4; likely in setting of cirrhosis. Correct as indicated     GI  * Acute upper GI bleed  58 year old M w/ cirrhosis and ETOH use d/o presenting w/ melana and ground coffee emesis/hematemesis. Denies prior symptoms. No EGDs on file. Normotensive and tachycardic w/ HR 130s. Initial Hgb 5.0; now s/p 2u pRBCs. Initial lactate >12. Labs otherwise notable for Plt 116, INR 2.4, BUN 57, Cr 1.6, TB 5.4, , ALT 44. Admitted to MICU for management of acute upper GIB.     EGD showed esophageal varices. Hgb stable s/p 3u PRBC. Will need scope after 8 week course of PPI.    - Soft and bite-sized diet, advance as tolerated  - Trend Hgb and transfuse for goal Hgb > 7, unless otherwise indicated  - Maintain IV access with 2 large bore IV's  - Intravascular resuscitation/support with IVF's   - Hold all NSAIDs and anticoagulants, unless contraindicated  - Pantoprazole 40 mg BID for a total of 8 weeks  - Correct any coagulopathy with platelets and FFP for goal of platelets >50K and INR <2.0      Transaminitis  - 2:1 pattern; likely 2/2 ETOH use d/o. Will continue to monitor     Cirrhosis  Patient is visiting from  out of town; outside records not available to review thus etiology of cirrhosis not confirmed. However, patient does have a history of ETOH use d/o so this is included in the differential. States he is currently in the process of being referred to a hepatologist and having an EGD scheduled.        Critical Care Daily Checklist:    A: Awake: RASS Goal/Actual Goal: RASS Goal: 0-->alert and calm  Actual:     B: Spontaneous Breathing Trial Performed?     C: SAT & SBT Coordinated?  N/A                      D: Delirium: CAM-ICU Overall CAM-ICU: Negative   E: Early Mobility Performed? Yes   F: Feeding Goal:    Status:     Current Diet Order   Procedures    Diet Soft & Bite Sized (IDDSI Level 6)      AS: Analgesia/Sedation N/A   T: Thromboembolic Prophylaxis contraindicated   H: HOB > 300 Yes   U: Stress Ulcer Prophylaxis (if needed) Iv protonix   G: Glucose Control euglycemic   B: Bowel Function Stool Occurrence: 1   I: Indwelling Catheter (Lines & Cruz) Necessity PIV x2   D: De-escalation of Antimicrobials/Pharmacotherapies N/A    Plan for the day/ETD stepdown    Code Status:  Family/Goals of Care: Full Code         Critical secondary to Patient has a condition that poses threat to life and bodily function: Acute GIB with HD instability      Critical care was time spent personally by me on the following activities: development of treatment plan with patient or surrogate and bedside caregivers, discussions with consultants, evaluation of patient's response to treatment, examination of patient, ordering and performing treatments and interventions, ordering and review of laboratory studies, ordering and review of radiographic studies, pulse oximetry, re-evaluation of patient's condition. This critical care time did not overlap with that of any other provider or involve time for any procedures.     Laron Viera MD  Critical Care Medicine  [facility] S[facility]

## 2024-01-06 LAB
ALBUMIN SERPL BCP-MCNC: 2 G/DL (ref 3.5–5.2)
ALBUMIN SERPL BCP-MCNC: 2.1 G/DL (ref 3.5–5.2)
ALP SERPL-CCNC: 90 U/L (ref 55–135)
ALP SERPL-CCNC: 92 U/L (ref 55–135)
ALT SERPL W/O P-5'-P-CCNC: 97 U/L (ref 10–44)
ALT SERPL W/O P-5'-P-CCNC: 97 U/L (ref 10–44)
ANION GAP SERPL CALC-SCNC: 7 MMOL/L (ref 8–16)
ANION GAP SERPL CALC-SCNC: 8 MMOL/L (ref 8–16)
ANISOCYTOSIS BLD QL SMEAR: SLIGHT
AST SERPL-CCNC: 262 U/L (ref 10–40)
AST SERPL-CCNC: 280 U/L (ref 10–40)
BASOPHILS # BLD AUTO: 0.02 K/UL (ref 0–0.2)
BASOPHILS # BLD AUTO: 0.02 K/UL (ref 0–0.2)
BASOPHILS NFR BLD: 0.2 % (ref 0–1.9)
BASOPHILS NFR BLD: 0.3 % (ref 0–1.9)
BILIRUB DIRECT SERPL-MCNC: 6.8 MG/DL (ref 0.1–0.3)
BILIRUB SERPL-MCNC: 8.3 MG/DL (ref 0.1–1)
BILIRUB SERPL-MCNC: 9.8 MG/DL (ref 0.1–1)
BUN SERPL-MCNC: 33 MG/DL (ref 6–20)
BUN SERPL-MCNC: 34 MG/DL (ref 6–20)
CALCIUM SERPL-MCNC: 7.6 MG/DL (ref 8.7–10.5)
CALCIUM SERPL-MCNC: 7.7 MG/DL (ref 8.7–10.5)
CHLORIDE SERPL-SCNC: 102 MMOL/L (ref 95–110)
CHLORIDE SERPL-SCNC: 104 MMOL/L (ref 95–110)
CO2 SERPL-SCNC: 24 MMOL/L (ref 23–29)
CO2 SERPL-SCNC: 28 MMOL/L (ref 23–29)
CREAT SERPL-MCNC: 1.4 MG/DL (ref 0.5–1.4)
CREAT SERPL-MCNC: 1.5 MG/DL (ref 0.5–1.4)
DIFFERENTIAL METHOD BLD: ABNORMAL
DIFFERENTIAL METHOD BLD: ABNORMAL
EOSINOPHIL # BLD AUTO: 0.1 K/UL (ref 0–0.5)
EOSINOPHIL # BLD AUTO: 0.2 K/UL (ref 0–0.5)
EOSINOPHIL NFR BLD: 1.4 % (ref 0–8)
EOSINOPHIL NFR BLD: 1.6 % (ref 0–8)
ERYTHROCYTE [DISTWIDTH] IN BLOOD BY AUTOMATED COUNT: 26.4 % (ref 11.5–14.5)
ERYTHROCYTE [DISTWIDTH] IN BLOOD BY AUTOMATED COUNT: 26.9 % (ref 11.5–14.5)
EST. GFR  (NO RACE VARIABLE): 54 ML/MIN/1.73 M^2
EST. GFR  (NO RACE VARIABLE): 58.6 ML/MIN/1.73 M^2
GLUCOSE SERPL-MCNC: 103 MG/DL (ref 70–110)
GLUCOSE SERPL-MCNC: 133 MG/DL (ref 70–110)
HCT VFR BLD AUTO: 27.2 % (ref 40–54)
HCT VFR BLD AUTO: 29.4 % (ref 40–54)
HGB BLD-MCNC: 9.2 G/DL (ref 14–18)
HGB BLD-MCNC: 9.9 G/DL (ref 14–18)
HYPOCHROMIA BLD QL SMEAR: ABNORMAL
IMM GRANULOCYTES # BLD AUTO: 0.06 K/UL (ref 0–0.04)
IMM GRANULOCYTES # BLD AUTO: 0.1 K/UL (ref 0–0.04)
IMM GRANULOCYTES NFR BLD AUTO: 0.8 % (ref 0–0.5)
IMM GRANULOCYTES NFR BLD AUTO: 0.8 % (ref 0–0.5)
LYMPHOCYTES # BLD AUTO: 1 K/UL (ref 1–4.8)
LYMPHOCYTES # BLD AUTO: 1.8 K/UL (ref 1–4.8)
LYMPHOCYTES NFR BLD: 14 % (ref 18–48)
LYMPHOCYTES NFR BLD: 14.8 % (ref 18–48)
MAGNESIUM SERPL-MCNC: 1.9 MG/DL (ref 1.6–2.6)
MCH RBC QN AUTO: 33.3 PG (ref 27–31)
MCH RBC QN AUTO: 34 PG (ref 27–31)
MCHC RBC AUTO-ENTMCNC: 33.7 G/DL (ref 32–36)
MCHC RBC AUTO-ENTMCNC: 33.8 G/DL (ref 32–36)
MCV RBC AUTO: 101 FL (ref 82–98)
MCV RBC AUTO: 99 FL (ref 82–98)
MONOCYTES # BLD AUTO: 0.9 K/UL (ref 0.3–1)
MONOCYTES # BLD AUTO: 1.9 K/UL (ref 0.3–1)
MONOCYTES NFR BLD: 12.7 % (ref 4–15)
MONOCYTES NFR BLD: 15.6 % (ref 4–15)
NEUTROPHILS # BLD AUTO: 5 K/UL (ref 1.8–7.7)
NEUTROPHILS # BLD AUTO: 8.4 K/UL (ref 1.8–7.7)
NEUTROPHILS NFR BLD: 67 % (ref 38–73)
NEUTROPHILS NFR BLD: 70.8 % (ref 38–73)
NRBC BLD-RTO: 0 /100 WBC
NRBC BLD-RTO: 0 /100 WBC
OVALOCYTES BLD QL SMEAR: ABNORMAL
PHOSPHATE SERPL-MCNC: 2.7 MG/DL (ref 2.7–4.5)
PLATELET # BLD AUTO: 50 K/UL (ref 150–450)
PLATELET # BLD AUTO: 78 K/UL (ref 150–450)
PMV BLD AUTO: 11.1 FL (ref 9.2–12.9)
PMV BLD AUTO: 11.5 FL (ref 9.2–12.9)
POIKILOCYTOSIS BLD QL SMEAR: SLIGHT
POLYCHROMASIA BLD QL SMEAR: ABNORMAL
POTASSIUM SERPL-SCNC: 3.3 MMOL/L (ref 3.5–5.1)
POTASSIUM SERPL-SCNC: 3.5 MMOL/L (ref 3.5–5.1)
PROT SERPL-MCNC: 4.8 G/DL (ref 6–8.4)
PROT SERPL-MCNC: 4.9 G/DL (ref 6–8.4)
RBC # BLD AUTO: 2.76 M/UL (ref 4.6–6.2)
RBC # BLD AUTO: 2.91 M/UL (ref 4.6–6.2)
SODIUM SERPL-SCNC: 135 MMOL/L (ref 136–145)
SODIUM SERPL-SCNC: 138 MMOL/L (ref 136–145)
SPHEROCYTES BLD QL SMEAR: ABNORMAL
WBC # BLD AUTO: 12.46 K/UL (ref 3.9–12.7)
WBC # BLD AUTO: 7.08 K/UL (ref 3.9–12.7)

## 2024-01-06 PROCEDURE — 83735 ASSAY OF MAGNESIUM: CPT | Performed by: NURSE PRACTITIONER

## 2024-01-06 PROCEDURE — 84100 ASSAY OF PHOSPHORUS: CPT | Performed by: NURSE PRACTITIONER

## 2024-01-06 PROCEDURE — 36415 COLL VENOUS BLD VENIPUNCTURE: CPT | Mod: XB | Performed by: STUDENT IN AN ORGANIZED HEALTH CARE EDUCATION/TRAINING PROGRAM

## 2024-01-06 PROCEDURE — 25000003 PHARM REV CODE 250

## 2024-01-06 PROCEDURE — 63600175 PHARM REV CODE 636 W HCPCS: Performed by: STUDENT IN AN ORGANIZED HEALTH CARE EDUCATION/TRAINING PROGRAM

## 2024-01-06 PROCEDURE — 82248 BILIRUBIN DIRECT: CPT | Performed by: STUDENT IN AN ORGANIZED HEALTH CARE EDUCATION/TRAINING PROGRAM

## 2024-01-06 PROCEDURE — 80053 COMPREHEN METABOLIC PANEL: CPT | Mod: 91 | Performed by: STUDENT IN AN ORGANIZED HEALTH CARE EDUCATION/TRAINING PROGRAM

## 2024-01-06 PROCEDURE — 94660 CPAP INITIATION&MGMT: CPT

## 2024-01-06 PROCEDURE — 80053 COMPREHEN METABOLIC PANEL: CPT | Performed by: NURSE PRACTITIONER

## 2024-01-06 PROCEDURE — 94761 N-INVAS EAR/PLS OXIMETRY MLT: CPT

## 2024-01-06 PROCEDURE — 25000003 PHARM REV CODE 250: Performed by: NURSE PRACTITIONER

## 2024-01-06 PROCEDURE — 85025 COMPLETE CBC W/AUTO DIFF WBC: CPT | Performed by: NURSE PRACTITIONER

## 2024-01-06 PROCEDURE — 25000003 PHARM REV CODE 250: Performed by: STUDENT IN AN ORGANIZED HEALTH CARE EDUCATION/TRAINING PROGRAM

## 2024-01-06 PROCEDURE — 99900035 HC TECH TIME PER 15 MIN (STAT)

## 2024-01-06 PROCEDURE — 36415 COLL VENOUS BLD VENIPUNCTURE: CPT | Performed by: NURSE PRACTITIONER

## 2024-01-06 PROCEDURE — 21400001 HC TELEMETRY ROOM

## 2024-01-06 RX ORDER — SODIUM CHLORIDE, SODIUM LACTATE, POTASSIUM CHLORIDE, CALCIUM CHLORIDE 600; 310; 30; 20 MG/100ML; MG/100ML; MG/100ML; MG/100ML
INJECTION, SOLUTION INTRAVENOUS CONTINUOUS
Status: ACTIVE | OUTPATIENT
Start: 2024-01-06 | End: 2024-01-07

## 2024-01-06 RX ORDER — CIPROFLOXACIN 500 MG/1
500 TABLET ORAL 2 TIMES DAILY
Qty: 2 TABLET | Refills: 0 | Status: SHIPPED | OUTPATIENT
Start: 2024-01-07 | End: 2024-01-11 | Stop reason: HOSPADM

## 2024-01-06 RX ORDER — PANTOPRAZOLE SODIUM 40 MG/1
40 TABLET, DELAYED RELEASE ORAL 2 TIMES DAILY
Qty: 60 TABLET | Refills: 2 | Status: SHIPPED | OUTPATIENT
Start: 2024-01-06 | End: 2024-01-11 | Stop reason: HOSPADM

## 2024-01-06 RX ORDER — POTASSIUM CHLORIDE 20 MEQ/1
40 TABLET, EXTENDED RELEASE ORAL ONCE
Status: COMPLETED | OUTPATIENT
Start: 2024-01-06 | End: 2024-01-06

## 2024-01-06 RX ORDER — FOLIC ACID 1 MG/1
1 TABLET ORAL DAILY
Qty: 30 TABLET | Refills: 0 | Status: SHIPPED | OUTPATIENT
Start: 2024-01-06 | End: 2025-01-05

## 2024-01-06 RX ORDER — LANOLIN ALCOHOL/MO/W.PET/CERES
100 CREAM (GRAM) TOPICAL DAILY
Qty: 30 TABLET | Refills: 1 | Status: SHIPPED | OUTPATIENT
Start: 2024-01-06

## 2024-01-06 RX ADMIN — BACLOFEN 10 MG: 10 TABLET ORAL at 08:01

## 2024-01-06 RX ADMIN — HYDROCHLOROTHIAZIDE 25 MG: 25 TABLET ORAL at 08:01

## 2024-01-06 RX ADMIN — PANTOPRAZOLE SODIUM 40 MG: 40 TABLET, DELAYED RELEASE ORAL at 08:01

## 2024-01-06 RX ADMIN — POTASSIUM CHLORIDE 40 MEQ: 1500 TABLET, EXTENDED RELEASE ORAL at 10:01

## 2024-01-06 RX ADMIN — Medication 100 MG: at 08:01

## 2024-01-06 RX ADMIN — FOLIC ACID 1 MG: 1 TABLET ORAL at 08:01

## 2024-01-06 RX ADMIN — CEFTRIAXONE SODIUM 1 G: 1 INJECTION, POWDER, FOR SOLUTION INTRAMUSCULAR; INTRAVENOUS at 05:01

## 2024-01-06 RX ADMIN — ESCITALOPRAM OXALATE 10 MG: 10 TABLET ORAL at 08:01

## 2024-01-06 RX ADMIN — THERA TABS 1 TABLET: TAB at 08:01

## 2024-01-06 RX ADMIN — BACLOFEN 10 MG: 10 TABLET ORAL at 03:01

## 2024-01-06 RX ADMIN — SODIUM CHLORIDE, POTASSIUM CHLORIDE, SODIUM LACTATE AND CALCIUM CHLORIDE: 600; 310; 30; 20 INJECTION, SOLUTION INTRAVENOUS at 03:01

## 2024-01-06 RX ADMIN — SODIUM CHLORIDE, POTASSIUM CHLORIDE, SODIUM LACTATE AND CALCIUM CHLORIDE: 600; 310; 30; 20 INJECTION, SOLUTION INTRAVENOUS at 05:01

## 2024-01-06 NOTE — PLAN OF CARE
Problem: Adult Inpatient Plan of Care  Goal: Plan of Care Review  Outcome: Ongoing, Progressing  Goal: Patient-Specific Goal (Individualized)  Outcome: Ongoing, Progressing  Goal: Absence of Hospital-Acquired Illness or Injury  Outcome: Ongoing, Progressing  Intervention: Identify and Manage Fall Risk  Flowsheets (Taken 1/6/2024 0609)  Safety Promotion/Fall Prevention:   assistive device/personal item within reach   bed alarm refused   commode/urinal/bedpan at bedside   diversional activities provided   Fall Risk reviewed with patient/family   Fall Risk signage in place   lighting adjusted   medications reviewed   muscle strengthening facilitated   nonskid shoes/socks when out of bed   side rails raised x 2  Intervention: Prevent Skin Injury  Flowsheets (Taken 1/6/2024 0609)  Body Position: position changed independently  Skin Protection: adhesive use limited  Intervention: Prevent and Manage VTE (Venous Thromboembolism) Risk  Flowsheets (Taken 1/6/2024 0609)  Activity Management: Ambulated to bathroom - L4  VTE Prevention/Management: bleeding precations maintained  Range of Motion: active ROM (range of motion) encouraged  Intervention: Prevent Infection  Flowsheets (Taken 1/6/2024 0609)  Infection Prevention:   cohorting utilized   environmental surveillance performed   equipment surfaces disinfected   hand hygiene promoted   single patient room provided   rest/sleep promoted  Goal: Optimal Comfort and Wellbeing  Outcome: Ongoing, Progressing  Intervention: Monitor Pain and Promote Comfort  Flowsheets (Taken 1/6/2024 0609)  Pain Management Interventions:   quiet environment facilitated   relaxation techniques promoted  Intervention: Provide Person-Centered Care  Flowsheets (Taken 1/6/2024 0609)  Trust Relationship/Rapport:   care explained   choices provided   emotional support provided   empathic listening provided   questions answered   thoughts/feelings acknowledged   questions encouraged  Goal: Readiness for  Transition of Care  Outcome: Ongoing, Progressing     Problem: Fluid and Electrolyte Imbalance (Acute Kidney Injury/Impairment)  Goal: Fluid and Electrolyte Balance  Outcome: Ongoing, Progressing  Intervention: Monitor and Manage Fluid and Electrolyte Balance  Flowsheets (Taken 1/6/2024 0609)  Fluid/Electrolyte Management: fluids provided     Problem: Oral Intake Inadequate (Acute Kidney Injury/Impairment)  Goal: Optimal Nutrition Intake  Outcome: Ongoing, Progressing  Intervention: Promote and Optimize Nutrition  Flowsheets (Taken 1/6/2024 0609)  Oral Nutrition Promotion: rest periods promoted     Problem: Renal Function Impairment (Acute Kidney Injury/Impairment)  Goal: Effective Renal Function  Outcome: Ongoing, Progressing  Intervention: Monitor and Support Renal Function  Flowsheets (Taken 1/6/2024 0609)  Medication Review/Management: medications reviewed     Problem: Fall Injury Risk  Goal: Absence of Fall and Fall-Related Injury  Outcome: Ongoing, Progressing  Intervention: Identify and Manage Contributors  Flowsheets (Taken 1/6/2024 0609)  Self-Care Promotion: independence encouraged  Medication Review/Management: medications reviewed  Intervention: Promote Injury-Free Environment  Flowsheets (Taken 1/6/2024 0609)  Safety Promotion/Fall Prevention:   assistive device/personal item within reach   bed alarm refused   commode/urinal/bedpan at bedside   diversional activities provided   Fall Risk reviewed with patient/family   Fall Risk signage in place   lighting adjusted   medications reviewed   muscle strengthening facilitated   nonskid shoes/socks when out of bed   side rails raised x 2

## 2024-01-07 LAB
AFP SERPL-MCNC: 3 NG/ML (ref 0–8.4)
ALBUMIN SERPL BCP-MCNC: 2.2 G/DL (ref 3.5–5.2)
ALP SERPL-CCNC: 127 U/L (ref 55–135)
ALT SERPL W/O P-5'-P-CCNC: 106 U/L (ref 10–44)
ANION GAP SERPL CALC-SCNC: 8 MMOL/L (ref 8–16)
AST SERPL-CCNC: 259 U/L (ref 10–40)
BILIRUB SERPL-MCNC: 11 MG/DL (ref 0.1–1)
BUN SERPL-MCNC: 29 MG/DL (ref 6–20)
CALCIUM SERPL-MCNC: 7.7 MG/DL (ref 8.7–10.5)
CERULOPLASMIN SERPL-MCNC: 22 MG/DL (ref 15–45)
CHLORIDE SERPL-SCNC: 100 MMOL/L (ref 95–110)
CO2 SERPL-SCNC: 25 MMOL/L (ref 23–29)
CREAT SERPL-MCNC: 1.3 MG/DL (ref 0.5–1.4)
EST. GFR  (NO RACE VARIABLE): >60 ML/MIN/1.73 M^2
FERRITIN SERPL-MCNC: 363 NG/ML (ref 20–300)
GLUCOSE SERPL-MCNC: 111 MG/DL (ref 70–110)
HAV IGM SERPL QL IA: NORMAL
HBV CORE IGM SERPL QL IA: NORMAL
HBV SURFACE AG SERPL QL IA: NORMAL
HCV AB SERPL QL IA: NORMAL
IGA SERPL-MCNC: 651 MG/DL (ref 40–350)
IGG SERPL-MCNC: 1483 MG/DL (ref 650–1600)
IGM SERPL-MCNC: 277 MG/DL (ref 50–300)
IRON SERPL-MCNC: 39 UG/DL (ref 45–160)
MAGNESIUM SERPL-MCNC: 1.9 MG/DL (ref 1.6–2.6)
PHOSPHATE SERPL-MCNC: 2.4 MG/DL (ref 2.7–4.5)
POTASSIUM SERPL-SCNC: 3.6 MMOL/L (ref 3.5–5.1)
PROT SERPL-MCNC: 5.4 G/DL (ref 6–8.4)
SATURATED IRON: 17 % (ref 20–50)
SODIUM SERPL-SCNC: 133 MMOL/L (ref 136–145)
TOTAL IRON BINDING CAPACITY: 226 UG/DL (ref 250–450)
TRANSFERRIN SERPL-MCNC: 153 MG/DL (ref 200–375)

## 2024-01-07 PROCEDURE — 36415 COLL VENOUS BLD VENIPUNCTURE: CPT | Mod: XB | Performed by: STUDENT IN AN ORGANIZED HEALTH CARE EDUCATION/TRAINING PROGRAM

## 2024-01-07 PROCEDURE — 80074 ACUTE HEPATITIS PANEL: CPT | Performed by: STUDENT IN AN ORGANIZED HEALTH CARE EDUCATION/TRAINING PROGRAM

## 2024-01-07 PROCEDURE — 82390 ASSAY OF CERULOPLASMIN: CPT | Performed by: STUDENT IN AN ORGANIZED HEALTH CARE EDUCATION/TRAINING PROGRAM

## 2024-01-07 PROCEDURE — 63600175 PHARM REV CODE 636 W HCPCS: Performed by: STUDENT IN AN ORGANIZED HEALTH CARE EDUCATION/TRAINING PROGRAM

## 2024-01-07 PROCEDURE — 82728 ASSAY OF FERRITIN: CPT | Performed by: STUDENT IN AN ORGANIZED HEALTH CARE EDUCATION/TRAINING PROGRAM

## 2024-01-07 PROCEDURE — 21400001 HC TELEMETRY ROOM

## 2024-01-07 PROCEDURE — 86381 MITOCHONDRIAL ANTIBODY EACH: CPT | Performed by: STUDENT IN AN ORGANIZED HEALTH CARE EDUCATION/TRAINING PROGRAM

## 2024-01-07 PROCEDURE — 80321 ALCOHOLS BIOMARKERS 1OR 2: CPT | Performed by: STUDENT IN AN ORGANIZED HEALTH CARE EDUCATION/TRAINING PROGRAM

## 2024-01-07 PROCEDURE — 84100 ASSAY OF PHOSPHORUS: CPT | Performed by: NURSE PRACTITIONER

## 2024-01-07 PROCEDURE — 83540 ASSAY OF IRON: CPT | Performed by: STUDENT IN AN ORGANIZED HEALTH CARE EDUCATION/TRAINING PROGRAM

## 2024-01-07 PROCEDURE — 25000003 PHARM REV CODE 250: Performed by: STUDENT IN AN ORGANIZED HEALTH CARE EDUCATION/TRAINING PROGRAM

## 2024-01-07 PROCEDURE — 36415 COLL VENOUS BLD VENIPUNCTURE: CPT | Performed by: NURSE PRACTITIONER

## 2024-01-07 PROCEDURE — 82784 ASSAY IGA/IGD/IGG/IGM EACH: CPT | Performed by: STUDENT IN AN ORGANIZED HEALTH CARE EDUCATION/TRAINING PROGRAM

## 2024-01-07 PROCEDURE — 83735 ASSAY OF MAGNESIUM: CPT | Performed by: NURSE PRACTITIONER

## 2024-01-07 PROCEDURE — 82103 ALPHA-1-ANTITRYPSIN TOTAL: CPT | Performed by: STUDENT IN AN ORGANIZED HEALTH CARE EDUCATION/TRAINING PROGRAM

## 2024-01-07 PROCEDURE — 99223 1ST HOSP IP/OBS HIGH 75: CPT | Mod: ,,, | Performed by: INTERNAL MEDICINE

## 2024-01-07 PROCEDURE — 82105 ALPHA-FETOPROTEIN SERUM: CPT | Performed by: STUDENT IN AN ORGANIZED HEALTH CARE EDUCATION/TRAINING PROGRAM

## 2024-01-07 PROCEDURE — 25000003 PHARM REV CODE 250: Performed by: NURSE PRACTITIONER

## 2024-01-07 PROCEDURE — 80053 COMPREHEN METABOLIC PANEL: CPT | Performed by: NURSE PRACTITIONER

## 2024-01-07 PROCEDURE — 86038 ANTINUCLEAR ANTIBODIES: CPT | Performed by: STUDENT IN AN ORGANIZED HEALTH CARE EDUCATION/TRAINING PROGRAM

## 2024-01-07 PROCEDURE — 25000003 PHARM REV CODE 250

## 2024-01-07 PROCEDURE — 86015 ACTIN ANTIBODY EACH: CPT | Performed by: STUDENT IN AN ORGANIZED HEALTH CARE EDUCATION/TRAINING PROGRAM

## 2024-01-07 RX ADMIN — PANTOPRAZOLE SODIUM 40 MG: 40 TABLET, DELAYED RELEASE ORAL at 08:01

## 2024-01-07 RX ADMIN — THERA TABS 1 TABLET: TAB at 08:01

## 2024-01-07 RX ADMIN — CEFTRIAXONE SODIUM 1 G: 1 INJECTION, POWDER, FOR SOLUTION INTRAMUSCULAR; INTRAVENOUS at 04:01

## 2024-01-07 RX ADMIN — Medication 100 MG: at 08:01

## 2024-01-07 RX ADMIN — BACLOFEN 10 MG: 10 TABLET ORAL at 08:01

## 2024-01-07 RX ADMIN — FOLIC ACID 1 MG: 1 TABLET ORAL at 08:01

## 2024-01-07 RX ADMIN — HYDROCHLOROTHIAZIDE 25 MG: 25 TABLET ORAL at 08:01

## 2024-01-07 RX ADMIN — ESCITALOPRAM OXALATE 10 MG: 10 TABLET ORAL at 08:01

## 2024-01-07 RX ADMIN — BACLOFEN 10 MG: 10 TABLET ORAL at 02:01

## 2024-01-07 NOTE — ASSESSMENT & PLAN NOTE
Patient is visiting from out of town; outside records not available to review thus etiology of cirrhosis not confirmed. However, patient does have a history of ETOH use d/o so this is included in the differential. States he is currently in the process of being referred to a hepatologist and having an EGD scheduled.     MELD-Na score calculated; MELD 3.0: 30 at 1/6/2024  4:23 PM  MELD-Na: 29 at 1/6/2024  4:23 PM  Calculated from:  Serum Creatinine: 1.5 mg/dL at 1/6/2024  4:23 PM  Serum Sodium: 135 mmol/L at 1/6/2024  4:23 PM  Total Bilirubin: 9.8 mg/dL at 1/6/2024  4:23 PM  Serum Albumin: 2.0 g/dL at 1/6/2024  4:23 PM  INR(ratio): 2.2 at 1/4/2024 10:08 AM  Age at listing (hypothetical): 58 years  Sex: Male at 1/6/2024  4:23 PM      Continue chronic meds. Etiology likely ETOH. Will avoid any hepatotoxic meds, and monitor CBC/CMP/INR for synthetic function.

## 2024-01-07 NOTE — PLAN OF CARE
Pt is AAOx4. Pt remain free from fall and injuries. VS remain stable. Questions and concerns voiced and answered. Medication compliance.  IV CTX given. Call light in reach. Bed in low locked position. Side rails x2. Belongings at bedside.

## 2024-01-07 NOTE — HPI
Mr. Deleon is a 58 year old male with PMH notable for HTN, ETOH use, reported cirrhosis (has not seen a hepatologist), and depression who presented to [facility] ED with acute GI bleeding. In speaking with patient and wife at bedside, patient reports four days of nausea and vomiting with bright red blood and black tarry diarrhea. Additionally, he reports a fall yesterday after attempting to go to the restroom secondary to being lightheaded. He denies this happening previously. He was scheduled for an outpatient EGD, which has not been completed. He has not had a colonoscopy. He denies any anticoagulation or antiplatelet use.      In the emergency department he was found to be acutely anemic with a hgb of 5. He has not been hypotensive while in the emergency department, however, notably tachycardic with -140. Labs otherwise notable for WBC 13.56, Plt 107, INR 2.4, BUN 57, Cr 1.6, TB 5.4, , ALT 44. Initial lactate >12. ETOH level <10. Patient is s/p 3u pRBCs.    Admitted to MICU for UGIB.

## 2024-01-07 NOTE — PROGRESS NOTES
[facility] Medicine  Progress Note    Patient Name: Blu Deleon  MRN: [id number]  Patient Class: IP- Inpatient   Admission Date: 1/2/2024  Length of Stay: 4 days  Attending Physician: Bailey Cline MD  Primary Care Provider: Radha, Primary Doctor        Subjective:     Principal Problem:Acute upper GI bleed        HPI:  Mr. Deleon is a 58 year old male with PMH notable for HTN, ETOH use, reported cirrhosis (has not seen a hepatologist), and depression who presented to [facility] ED with acute GI bleeding. In speaking with patient and wife at bedside, patient reports four days of nausea and vomiting with bright red blood and black tarry diarrhea. Additionally, he reports a fall yesterday after attempting to go to the restroom secondary to being lightheaded. He denies this happening previously. He was scheduled for an outpatient EGD, which has not been completed. He has not had a colonoscopy. He denies any anticoagulation or antiplatelet use.      In the emergency department he was found to be acutely anemic with a hgb of 5. He has not been hypotensive while in the emergency department, however, notably tachycardic with -140. Labs otherwise notable for WBC 13.56, Plt 107, INR 2.4, BUN 57, Cr 1.6, TB 5.4, , ALT 44. Initial lactate >12. ETOH level <10. Patient is s/p 3u pRBCs.    Admitted to MICU for UGIB.      Overview/Hospital Course:  EGD showed esophageal ulcers. Pt to continue PPI with repeat scope to follow. Hgb stable s/p 3u PRBC and 1u FFP. HDS and tolerating clear liquid diet. CIWA ordered and no ativan required.  Patient was transferred to floors on 01/05.  Creatinine has remained stable at 1.4.  T bilirubin trending up.  Hepatology was consulted in the setting of decompensated liver cirrhosis.  Ultrasound right upper quadrant with Dopplers, PEth, AFP, serological workup, and acute hepatitis panel.  Plan for paracentesis.  IR consulted.    Interval History/Significant  Events: No acute events overnight. Patient remains afebrile and hemodynamically stable.  Creatinine improved to 1.3.  T bili elevated to 11.   Hepatology was consulted in the setting of decompensated liver cirrhosis.  Ultrasound right upper quadrant with Dopplers, PEth, AFP, serological workup, and acute hepatitis panel.  Plan for paracentesis.  IR consulted.  Discussed with patient and wife.  Wife has to go to [place] and plans to return back on Tuesday.    Review of Systems   Constitutional:  Positive for activity change and fatigue. Negative for chills and fever.   HENT:  Negative for congestion.    Respiratory:  Negative for cough and shortness of breath.    Cardiovascular:  Negative for chest pain and leg swelling.   Gastrointestinal:  Negative for abdominal distention, abdominal pain, blood in stool, nausea and vomiting.   Genitourinary:  Negative for difficulty urinating and hematuria.   Skin:  Negative for color change and pallor.   Neurological:  Negative for dizziness and light-headedness.     Objective:     Vital Signs (Most Recent):  Temp: 98.3 °F (36.8 °C) (01/07/24 1108)  Pulse: 100 (01/07/24 1108)  Resp: 18 (01/07/24 1108)  BP: (!) 174/74 (01/07/24 1108)  SpO2: 99 % (01/07/24 1108) Vital Signs (24h Range):  Temp:  [97.7 °F (36.5 °C)-98.6 °F (37 °C)] 98.3 °F (36.8 °C)  Pulse:  [] 100  Resp:  [16-18] 18  SpO2:  [96 %-99 %] 99 %  BP: (139-174)/(65-81) 174/74   Weight: 97.6 kg (215 lb 2.7 oz)  Body mass index is 32.72 kg/m².      Intake/Output Summary (Last 24 hours) at 1/7/2024 1415  Last data filed at 1/7/2024 0551  Gross per 24 hour   Intake 835 ml   Output --   Net 835 ml            Physical Exam  Constitutional:       General: He is not in acute distress.     Appearance: Normal appearance.   HENT:      Head: Normocephalic and atraumatic.      Mouth/Throat:      Mouth: Mucous membranes are moist.      Pharynx: Oropharynx is clear.      Comments: Sublingual jaundice   Eyes:      Extraocular  Movements: Extraocular movements intact.      Conjunctiva/sclera: Conjunctivae normal.   Cardiovascular:      Rate and Rhythm: Normal rate and regular rhythm.      Heart sounds: Normal heart sounds. No murmur heard.  Pulmonary:      Effort: No respiratory distress.      Breath sounds: Normal breath sounds.   Abdominal:      General: Abdomen is flat.      Palpations: Abdomen is soft.      Tenderness: There is no abdominal tenderness.      Comments: No fluid shift    Musculoskeletal:      Right lower leg: No edema.      Left lower leg: No edema.   Skin:     Capillary Refill: Capillary refill takes less than 2 seconds.   Neurological:      General: No focal deficit present.      Mental Status: He is alert and oriented to person, place, and time.            Vents:     Lines/Drains/Airways       Peripheral Intravenous Line  Duration                  Peripheral IV - Single Lumen 01/03/24 1045 18 G;1 1/4 in Anterior;Left Upper Arm 4 days         Peripheral IV - Single Lumen 01/03/24 1400 18 G;1 1/4 in Anterior;Right Upper Arm 4 days                  Significant Labs:    CBC/Anemia Profile:  Recent Labs   Lab 01/05/24  2251 01/06/24  0449 01/06/24  1124   WBC 11.77 7.08 12.46   HGB 9.3* 9.2* 9.9*   HCT 24.9* 27.2* 29.4*   PLT 49* 50* 78*   MCV 89 99* 101*   RDW 25.4* 26.4* 26.9*          Chemistries:  Recent Labs   Lab 01/06/24 0449 01/06/24  1623 01/07/24  0542    135* 133*   K 3.3* 3.5 3.6    104 100   CO2 28 24 25   BUN 34* 33* 29*   CREATININE 1.4 1.5* 1.3   CALCIUM 7.6* 7.7* 7.7*   ALBUMIN 2.1* 2.0* 2.2*   PROT 4.8* 4.9* 5.4*   BILITOT 8.3* 9.8* 11.0*   ALKPHOS 90 92 127   ALT 97* 97* 106*   * 262* 259*   MG 1.9  --  1.9   PHOS 2.7  --  2.4*         CMP:   Recent Labs   Lab 01/06/24 0449 01/06/24  1623 01/07/24  0542    135* 133*   K 3.3* 3.5 3.6    104 100   CO2 28 24 25    133* 111*   BUN 34* 33* 29*   CREATININE 1.4 1.5* 1.3   CALCIUM 7.6* 7.7* 7.7*   PROT 4.8* 4.9* 5.4*  "  ALBUMIN 2.1* 2.0* 2.2*   BILITOT 8.3* 9.8* 11.0*   ALKPHOS 90 92 127   * 262* 259*   ALT 97* 97* 106*   ANIONGAP 8 7* 8       Coagulation:   No results for input(s): "PT", "INR", "APTT" in the last 48 hours.    Lactic Acid:   No results for input(s): "LACTATE" in the last 48 hours.      Significant Imaging:  I have reviewed all pertinent imaging results/findings within the past 24 hours.    Assessment/Plan:      * Acute upper GI bleed    58 year old M w/ cirrhosis and ETOH use d/o presenting w/ melana and ground coffee emesis/hematemesis. Denies prior symptoms. No EGDs on file. Normotensive and tachycardic w/ HR 130s. Initial Hgb 5.0; now s/p 2u pRBCs. Initial lactate >12. Labs otherwise notable for Plt 116, INR 2.4, BUN 57, Cr 1.6, TB 5.4, , ALT 44. Admitted to MICU for management of acute upper GIB.      EGD showed esophageal varices. Hgb stable s/p 3u PRBC. Will need scope after 8 week course of PPI.     - Soft and bite-sized diet, advance as tolerated  - Trend Hgb and transfuse for goal Hgb > 7, unless otherwise indicated  - Maintain IV access with 2 large bore IV's  - Intravascular resuscitation/support with IVF's   - Hold all NSAIDs and anticoagulants, unless contraindicated  - Pantoprazole 40 mg BID for a total of 8 weeks  - Correct any coagulopathy with platelets and FFP for goal of platelets >50K and INR <2.0    Transaminitis    - 2:1 pattern; likely 2/2 ETOH use d/o. Will continue to monitor        Thrombocytopenia  etiology likely 2/2 cirrhosis  - Monitor w/ daily CBC and transfuse if Plt <10 or <50 w/ signs of active bleeding   Recent Labs   Lab 01/06/24  1124   PLT 78*         High anion gap metabolic acidosis    - Likely 2/2 lactic acidosis.   Resolved       CODY (acute kidney injury)  Improved     - Cr 1.6 at time of admission; no baseline Cr available. Last CMP from 2018 showed a Cr of 1.08.   - Trend Cr/ Serial BMPs  - Strict I&Os, close monitoring of UOP  - Replace electrolytes PRN, " goal K/Phos/Mg 4/3/2  - Avoid nephrotoxins (NSAIDs, ACEi/ARB, IV radiocontrast, gadolinium, etc.)  - Renally dose meds   Started patient on maintenance fluids    Alcohol use disorder    CIWA protocol however has not required ativan.     - Last alcoholic drink on 1/01. Alcohol level <10 on admission. Denies history of withdrawal in the past including seizures. Remains tachycardic but likely 2/2 acute GIB. Will continue to monitor for signs of withdrawal w/ CIWA protocol and start benzodiazepines if indicated.   - thiamine, folic acid, MV       Lactic acidosis  - see GIB         Coagulopathy    - INR 2.4; likely in setting of cirrhosis. Correct as indicated        Cirrhosis  Patient is visiting from out of town; outside records not available to review thus etiology of cirrhosis not confirmed. However, patient does have a history of ETOH use d/o so this is included in the differential. States he is currently in the process of being referred to a hepatologist and having an EGD scheduled.     MELD-Na score calculated; MELD 3.0: 30 at 1/6/2024  4:23 PM  MELD-Na: 29 at 1/6/2024  4:23 PM  Calculated from:  Serum Creatinine: 1.5 mg/dL at 1/6/2024  4:23 PM  Serum Sodium: 135 mmol/L at 1/6/2024  4:23 PM  Total Bilirubin: 9.8 mg/dL at 1/6/2024  4:23 PM  Serum Albumin: 2.0 g/dL at 1/6/2024  4:23 PM  INR(ratio): 2.2 at 1/4/2024 10:08 AM  Age at listing (hypothetical): 58 years  Sex: Male at 1/6/2024  4:23 PM      Continue chronic meds. Etiology likely ETOH. Will avoid any hepatotoxic meds, and monitor CBC/CMP/INR for synthetic function.   T bilirubin trending up.  Hepatology was consulted in the setting of decompensated liver cirrhosis.  Ultrasound right upper quadrant with Dopplers, PEth, AFP, serological workup, and acute hepatitis panel.  Plan for paracentesis.  IR consulted.      VTE Risk Mitigation (From admission, onward)           Ordered     Place sequential compression device  Until discontinued         01/03/24 0256      IP VTE LOW RISK PATIENT  Once         01/03/24 0256                    Discharge Planning   MILKA: 1/9/2024     Code Status: Full Code   Is the patient medically ready for discharge?: No    Reason for patient still in hospital (select all that apply): Patient new problem, Patient trending condition, Laboratory test, Treatment, Imaging, and PT / OT recommendations  Discharge Plan A: Home, Home with family   Discharge Delays: None known at this time              Bailey Cline MD  Department of Hospital Medicine   [facility]

## 2024-01-07 NOTE — SUBJECTIVE & OBJECTIVE
Interval History/Significant Events: No acute events overnight. Patient remains afebrile and hemodynamically stable.  Creatinine improved to 1.3.  T bili elevated to 11.   Hepatology was consulted in the setting of decompensated liver cirrhosis.  Ultrasound right upper quadrant with Dopplers, PEth, AFP, serological workup, and acute hepatitis panel.  Plan for paracentesis.  IR consulted.  Discussed with patient and wife.  Wife has to go to [place] and plans to return back on Tuesday.    Review of Systems   Constitutional:  Positive for activity change and fatigue. Negative for chills and fever.   HENT:  Negative for congestion.    Respiratory:  Negative for cough and shortness of breath.    Cardiovascular:  Negative for chest pain and leg swelling.   Gastrointestinal:  Negative for abdominal distention, abdominal pain, blood in stool, nausea and vomiting.   Genitourinary:  Negative for difficulty urinating and hematuria.   Skin:  Negative for color change and pallor.   Neurological:  Negative for dizziness and light-headedness.     Objective:     Vital Signs (Most Recent):  Temp: 98.3 °F (36.8 °C) (01/07/24 1108)  Pulse: 100 (01/07/24 1108)  Resp: 18 (01/07/24 1108)  BP: (!) 174/74 (01/07/24 1108)  SpO2: 99 % (01/07/24 1108) Vital Signs (24h Range):  Temp:  [97.7 °F (36.5 °C)-98.6 °F (37 °C)] 98.3 °F (36.8 °C)  Pulse:  [] 100  Resp:  [16-18] 18  SpO2:  [96 %-99 %] 99 %  BP: (139-174)/(65-81) 174/74   Weight: 97.6 kg (215 lb 2.7 oz)  Body mass index is 32.72 kg/m².      Intake/Output Summary (Last 24 hours) at 1/7/2024 1415  Last data filed at 1/7/2024 0551  Gross per 24 hour   Intake 835 ml   Output --   Net 835 ml            Physical Exam  Constitutional:       General: He is not in acute distress.     Appearance: Normal appearance.   HENT:      Head: Normocephalic and atraumatic.      Mouth/Throat:      Mouth: Mucous membranes are moist.      Pharynx: Oropharynx is clear.      Comments: Sublingual jaundice    Eyes:      Extraocular Movements: Extraocular movements intact.      Conjunctiva/sclera: Conjunctivae normal.   Cardiovascular:      Rate and Rhythm: Normal rate and regular rhythm.      Heart sounds: Normal heart sounds. No murmur heard.  Pulmonary:      Effort: No respiratory distress.      Breath sounds: Normal breath sounds.   Abdominal:      General: Abdomen is flat.      Palpations: Abdomen is soft.      Tenderness: There is no abdominal tenderness.      Comments: No fluid shift    Musculoskeletal:      Right lower leg: No edema.      Left lower leg: No edema.   Skin:     Capillary Refill: Capillary refill takes less than 2 seconds.   Neurological:      General: No focal deficit present.      Mental Status: He is alert and oriented to person, place, and time.            Vents:     Lines/Drains/Airways       Peripheral Intravenous Line  Duration                  Peripheral IV - Single Lumen 01/03/24 1045 18 G;1 1/4 in Anterior;Left Upper Arm 4 days         Peripheral IV - Single Lumen 01/03/24 1400 18 G;1 1/4 in Anterior;Right Upper Arm 4 days                  Significant Labs:    CBC/Anemia Profile:  Recent Labs   Lab 01/05/24  2251 01/06/24  0449 01/06/24  1124   WBC 11.77 7.08 12.46   HGB 9.3* 9.2* 9.9*   HCT 24.9* 27.2* 29.4*   PLT 49* 50* 78*   MCV 89 99* 101*   RDW 25.4* 26.4* 26.9*          Chemistries:  Recent Labs   Lab 01/06/24 0449 01/06/24  1623 01/07/24  0542    135* 133*   K 3.3* 3.5 3.6    104 100   CO2 28 24 25   BUN 34* 33* 29*   CREATININE 1.4 1.5* 1.3   CALCIUM 7.6* 7.7* 7.7*   ALBUMIN 2.1* 2.0* 2.2*   PROT 4.8* 4.9* 5.4*   BILITOT 8.3* 9.8* 11.0*   ALKPHOS 90 92 127   ALT 97* 97* 106*   * 262* 259*   MG 1.9  --  1.9   PHOS 2.7  --  2.4*         CMP:   Recent Labs   Lab 01/06/24 0449 01/06/24  1623 01/07/24  0542    135* 133*   K 3.3* 3.5 3.6    104 100   CO2 28 24 25    133* 111*   BUN 34* 33* 29*   CREATININE 1.4 1.5* 1.3   CALCIUM 7.6* 7.7* 7.7*  "  PROT 4.8* 4.9* 5.4*   ALBUMIN 2.1* 2.0* 2.2*   BILITOT 8.3* 9.8* 11.0*   ALKPHOS 90 92 127   * 262* 259*   ALT 97* 97* 106*   ANIONGAP 8 7* 8       Coagulation:   No results for input(s): "PT", "INR", "APTT" in the last 48 hours.    Lactic Acid:   No results for input(s): "LACTATE" in the last 48 hours.      Significant Imaging:  I have reviewed all pertinent imaging results/findings within the past 24 hours.  "

## 2024-01-07 NOTE — SUBJECTIVE & OBJECTIVE
Interval History/Significant Events: No acute events overnight. Patient remains afebrile and hemodynamically stable.  Creatinine stable at 1.4.  Patient received a bolus of fluid, and was started on maintenance fluids.  T bilirubin trending up to 9.8.  Patient and wife wanted patient to be discharged.  As they were supposed to go to [place].  However with elevated T bilirubin, advised patient on holding on discharge her today.  Family agreeable.    Review of Systems   Constitutional:  Positive for activity change and fatigue. Negative for chills and fever.   HENT:  Negative for congestion.    Respiratory:  Negative for cough and shortness of breath.    Cardiovascular:  Negative for chest pain and leg swelling.   Gastrointestinal:  Negative for abdominal distention, abdominal pain, blood in stool, nausea and vomiting.   Genitourinary:  Negative for difficulty urinating and hematuria.   Skin:  Negative for color change and pallor.   Neurological:  Negative for dizziness and light-headedness.     Objective:     Vital Signs (Most Recent):  Temp: 97.7 °F (36.5 °C) (01/06/24 1936)  Pulse: 84 (01/06/24 1936)  Resp: 18 (01/06/24 1936)  BP: 139/65 (01/06/24 1936)  SpO2: 98 % (01/06/24 1936) Vital Signs (24h Range):  Temp:  [97.7 °F (36.5 °C)-98.6 °F (37 °C)] 97.7 °F (36.5 °C)  Pulse:  [82-92] 84  Resp:  [16-18] 18  SpO2:  [96 %-98 %] 98 %  BP: (134-144)/(59-75) 139/65   Weight: 97.6 kg (215 lb 2.7 oz)  Body mass index is 32.72 kg/m².    No intake or output data in the 24 hours ending 01/06/24 2007         Physical Exam  Constitutional:       General: He is not in acute distress.     Appearance: Normal appearance.   HENT:      Head: Normocephalic and atraumatic.      Mouth/Throat:      Mouth: Mucous membranes are moist.      Pharynx: Oropharynx is clear.      Comments: Sublingual jaundice   Eyes:      Extraocular Movements: Extraocular movements intact.      Conjunctiva/sclera: Conjunctivae normal.   Cardiovascular:      Rate  and Rhythm: Normal rate and regular rhythm.      Heart sounds: Normal heart sounds. No murmur heard.  Pulmonary:      Effort: No respiratory distress.      Breath sounds: Normal breath sounds.   Abdominal:      General: Abdomen is flat.      Palpations: Abdomen is soft.      Tenderness: There is no abdominal tenderness.      Comments: No fluid shift    Musculoskeletal:      Right lower leg: No edema.      Left lower leg: No edema.   Skin:     Capillary Refill: Capillary refill takes less than 2 seconds.   Neurological:      General: No focal deficit present.      Mental Status: He is alert and oriented to person, place, and time.            Vents:     Lines/Drains/Airways       Peripheral Intravenous Line  Duration                  Peripheral IV - Single Lumen 01/03/24 1045 18 G;1 1/4 in Anterior;Left Upper Arm 3 days         Peripheral IV - Single Lumen 01/03/24 1400 18 G;1 1/4 in Anterior;Right Upper Arm 3 days                  Significant Labs:    CBC/Anemia Profile:  Recent Labs   Lab 01/05/24  2251 01/06/24 0449 01/06/24  1124   WBC 11.77 7.08 12.46   HGB 9.3* 9.2* 9.9*   HCT 24.9* 27.2* 29.4*   PLT 49* 50* 78*   MCV 89 99* 101*   RDW 25.4* 26.4* 26.9*          Chemistries:  Recent Labs   Lab 01/05/24 0341 01/06/24 0449 01/06/24  1623    138 135*   K 4.3 3.3* 3.5    102 104   CO2 26 28 24   BUN 40* 34* 33*   CREATININE 1.3 1.4 1.5*   CALCIUM 7.6* 7.6* 7.7*   ALBUMIN 2.0* 2.1* 2.0*   PROT 5.0* 4.8* 4.9*   BILITOT 6.7* 8.3* 9.8*   ALKPHOS 74 90 92   ALT 79* 97* 97*   * 280* 262*   MG 2.1 1.9  --    PHOS 1.7* 2.7  --          CMP:   Recent Labs   Lab 01/05/24  0341 01/06/24 0449 01/06/24  1623    138 135*   K 4.3 3.3* 3.5    102 104   CO2 26 28 24    103 133*   BUN 40* 34* 33*   CREATININE 1.3 1.4 1.5*   CALCIUM 7.6* 7.6* 7.7*   PROT 5.0* 4.8* 4.9*   ALBUMIN 2.0* 2.1* 2.0*   BILITOT 6.7* 8.3* 9.8*   ALKPHOS 74 90 92   * 280* 262*   ALT 79* 97* 97*   ANIONGAP 7* 8 7*  "      Coagulation:   No results for input(s): "PT", "INR", "APTT" in the last 48 hours.    Lactic Acid:   No results for input(s): "LACTATE" in the last 48 hours.      Significant Imaging:  I have reviewed all pertinent imaging results/findings within the past 24 hours.  "

## 2024-01-07 NOTE — ASSESSMENT & PLAN NOTE
Patient is visiting from out of town; outside records not available to review thus etiology of cirrhosis not confirmed. However, patient does have a history of ETOH use d/o so this is included in the differential. States he is currently in the process of being referred to a hepatologist and having an EGD scheduled.     MELD-Na score calculated; MELD 3.0: 30 at 1/6/2024  4:23 PM  MELD-Na: 29 at 1/6/2024  4:23 PM  Calculated from:  Serum Creatinine: 1.5 mg/dL at 1/6/2024  4:23 PM  Serum Sodium: 135 mmol/L at 1/6/2024  4:23 PM  Total Bilirubin: 9.8 mg/dL at 1/6/2024  4:23 PM  Serum Albumin: 2.0 g/dL at 1/6/2024  4:23 PM  INR(ratio): 2.2 at 1/4/2024 10:08 AM  Age at listing (hypothetical): 58 years  Sex: Male at 1/6/2024  4:23 PM      Continue chronic meds. Etiology likely ETOH. Will avoid any hepatotoxic meds, and monitor CBC/CMP/INR for synthetic function.   T bilirubin trending up.  Hepatology was consulted in the setting of decompensated liver cirrhosis.  Ultrasound right upper quadrant with Dopplers, PEth, AFP, serological workup, and acute hepatitis panel.  Plan for paracentesis.  IR consulted.

## 2024-01-07 NOTE — CONSULTS
"[facility]  Gastroenterology  Consult Note    Patient Name: Blu Deleon  MRN: [id number]  Admission Date: 2024  Hospital Length of Stay: 4 days  Code Status: Full Code   Attending Provider:  Dr. Monroe  Consulting Provider: Lyle Thomas MD  Primary Care Physician: Radha, Primary Doctor  Principal Problem:Acute upper GI bleed    Inpatient consult to Hepatology  Consult performed by: Lyle Thomas MD  Consult ordered by: Bailey Cline MD        Subjective:     HPI: Blu Deleon is a 58 y.o. male with history of alcohol use disorder, HTN, and compensated EtOH cirrhosis.  He was admitted to [facility] on  with melena.  EGD on  revealed esophageal ulcers, erythematous mucosa in the antrum, and normal duodenal.  The patient required resuscitation with multiple units of PRBCs.  Now he is hemodynamically stable and on the floor.  Hepatology is consulted for worsening bilirubin. Labs today show hb 9.9,  Na 133, Cr 1.3, T bili 11 (up from 6 on admission). He reports icterus but no confusion, hematemesis, rectal bleeding, decreased urine output, or lower extremity swelling.     The patient is visiting [place] from [place] for the [place].  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  He lives with his wife and their 5 children. He reports that he began drinking several glasses of wine nightly since he was 21 years old.  He also drank gin when he was younger.  He denies any tattoos, IV drug abuse, or unprotected sex.  He has been aware of "early cirrhosis" and elevated LFTs for the last couple of years, but has persisted drinking.  An ultrasound of the liver performed 3 years ago in [place] reveal early changes of cirrhosis. His sister  of EtOH cirrhosis.         Review of Systems   Constitutional:  Positive for malaise/fatigue. Negative for chills and fever.   Eyes:         Yellowing of eyes   Respiratory:  Negative for cough.    Cardiovascular:  " Negative for chest pain.   Gastrointestinal:  Negative for melena and vomiting.        Abdominal distention   Skin:         Yellowing of skin   Neurological:  Negative for tremors, speech change and seizures.   Psychiatric/Behavioral:  Positive for substance abuse.         Objective:     Vitals:    01/07/24 0735   BP: (!) 173/73   Pulse: 87   Resp: 18   Temp: 98 °F (36.7 °C)         Constitutional:  not in acute distress, ill appearing, but non-toxic, oriented to time, place, and person, and well developed  HENT: Head: Normal, normocephalic, atraumatic.  Eyes: conjunctiva clear and sclera icteric  GI: soft, non-tender, without masses or organomegaly, distended, without guarding, and without rebound  Skin: jaundice present  Neurological: alert, oriented x3  speech normal in context and clarity  memory intact grossly  Psychiatric: mood and affect are within normal limits, pt is a good historian; no memory problems were noted      Significant Labs:  Recent Labs   Lab 01/05/24  2251 01/06/24  0449 01/06/24  1124   HGB 9.3* 9.2* 9.9*       Lab Results   Component Value Date    WBC 12.46 01/06/2024    HGB 9.9 (L) 01/06/2024    HCT 29.4 (L) 01/06/2024     (H) 01/06/2024    PLT 78 (L) 01/06/2024       Lab Results   Component Value Date     (L) 01/07/2024    K 3.6 01/07/2024     01/07/2024    CO2 25 01/07/2024    BUN 29 (H) 01/07/2024    CREATININE 1.3 01/07/2024    CALCIUM 7.7 (L) 01/07/2024    ANIONGAP 8 01/07/2024       Lab Results   Component Value Date     (H) 01/07/2024     (H) 01/07/2024    ALKPHOS 127 01/07/2024    BILITOT 11.0 (H) 01/07/2024       Lab Results   Component Value Date    INR 2.2 (H) 01/04/2024    INR 2.4 (H) 01/03/2024    INR 2.4 (H) 01/02/2024       Significant Imaging:  Reviewed pertinent radiology findings.       Assessment/Plan:     Blu Adrienne is a 58 y.o. male with history of alcohol use disorder, HTN, and compensated EtOH cirrhosis who [facility] on  with melena.   After resuscitation, EGD on 1/3/24 showed esophageal ulcers, erythematous mucosa in the antrum, and normal duodenum.  Hepatology now consulted for worsening bilirubin.    He hails from [place], visiting from the [facility]. The patient has known about his history of "early cirrhosis" since at least  when liver ultrasound confirmed cirrhosis.  He has continued to drink in spite of knowledge of liver disease and being told to stop; has consumed alcohol regularly since the age of 21.  Last drink 24. No illicit drug use.  Sister  of EtOH cirrhosis.     Problem List:  Newly decompensated EtOH cirrhosis  EtOH use disorder  Esophageal ulcers    MELD 3.0: 30 at 2024  4:23 PM  MELD-Na: 29 at 2024  4:23 PM  Calculated from:  Serum Creatinine: 1.5 mg/dL at 2024  4:23 PM  Serum Sodium: 135 mmol/L at 2024  4:23 PM  Total Bilirubin: 9.8 mg/dL at 2024  4:23 PM  Serum Albumin: 2.0 g/dL at 2024  4:23 PM  INR(ratio): 2.2 at 2024 10:08 AM  Age at listing (hypothetical): 58 years  Sex: Male at 2024  4:23 PM       Recommendations:  - Obtain US liver with Doppler, PEth, AFP, serological workup, and acute hepatitis panel. We are not going to proceed with liver tx eval at this time since patient was aware of cirrhosis but persisted drinking.  Patient wishes to go to [place] to watch a football game after discharge.  I strongly dissuaded him from doing the same.     - Strict alcohol cessation advised. Consult addiction psych.     - Paracentesis with ascitic fluid studies (WBC, culture, Gram stain, albumin, & total protein).    - Obtain GI notes from [facility].    - nutrition consult (to  him on a low Na, high protein diet).     - Continue Protonix 40mg twice per day for 8 weeks. Repeat upper endoscopy in 8 weeks to check healing of his esophageal ulcers. He would like to have this done in [facility].     - Avoid sedating drugs like " opiates & BZDs.     - Daily CBC, CMP, INR.     Thank you for involving us in the care of Blu Deleon. Please call with any additional questions, concerns or changes in the patient's clinical status. We will continue to follow.     Lyle Thomas MD  Gastroenterology Fellow PGY V  [facility]

## 2024-01-07 NOTE — ASSESSMENT & PLAN NOTE
Improved     - Cr 1.6 at time of admission; no baseline Cr available. Last CMP from 2018 showed a Cr of 1.08.   - Trend Cr/ Serial BMPs  - Strict I&Os, close monitoring of UOP  - Replace electrolytes PRN, goal K/Phos/Mg 4/3/2  - Avoid nephrotoxins (NSAIDs, ACEi/ARB, IV radiocontrast, gadolinium, etc.)  - Renally dose meds   Started patient on maintenance fluids

## 2024-01-07 NOTE — PROGRESS NOTES
[facility] Medicine  Progress Note    Patient Name: Blu Deleon  MRN: [id number]  Patient Class: IP- Inpatient   Admission Date: 1/2/2024  Length of Stay: 3 days  Attending Physician: Bailey Cline MD  Primary Care Provider: Radha, Primary Doctor        Subjective:     Principal Problem:Acute upper GI bleed        HPI:  Mr. Deleon is a 58 year old male with PMH notable for HTN, ETOH use, reported cirrhosis (has not seen a hepatologist), and depression who presented to [facility] ED with acute GI bleeding. In speaking with patient and wife at bedside, patient reports four days of nausea and vomiting with bright red blood and black tarry diarrhea. Additionally, he reports a fall yesterday after attempting to go to the restroom secondary to being lightheaded. He denies this happening previously. He was scheduled for an outpatient EGD, which has not been completed. He has not had a colonoscopy. He denies any anticoagulation or antiplatelet use.      In the emergency department he was found to be acutely anemic with a hgb of 5. He has not been hypotensive while in the emergency department, however, notably tachycardic with -140. Labs otherwise notable for WBC 13.56, Plt 107, INR 2.4, BUN 57, Cr 1.6, TB 5.4, , ALT 44. Initial lactate >12. ETOH level <10. Patient is s/p 3u pRBCs.    Admitted to MICU for UGIB.      Overview/Hospital Course:  EGD showed esophageal ulcers. Pt to continue PPI with repeat scope to follow. Hgb stable s/p 3u PRBC and 1u FFP. HDS and tolerating clear liquid diet. CIWA ordered and no ativan required.  Patient was transferred to floors on 01/05.  Creatinine has remained stable at 1.4.  T bilirubin trending up.    Interval History/Significant Events: No acute events overnight. Patient remains afebrile and hemodynamically stable.  Creatinine stable at 1.4.  Patient received a bolus of fluid, and was started on maintenance fluids.  T bilirubin trending up to 9.8.   Patient and wife wanted patient to be discharged.  As they were supposed to go to [place].  However with elevated T bilirubin, advised patient on holding on discharge her today.  Family agreeable.    Review of Systems   Constitutional:  Positive for activity change and fatigue. Negative for chills and fever.   HENT:  Negative for congestion.    Respiratory:  Negative for cough and shortness of breath.    Cardiovascular:  Negative for chest pain and leg swelling.   Gastrointestinal:  Negative for abdominal distention, abdominal pain, blood in stool, nausea and vomiting.   Genitourinary:  Negative for difficulty urinating and hematuria.   Skin:  Negative for color change and pallor.   Neurological:  Negative for dizziness and light-headedness.     Objective:     Vital Signs (Most Recent):  Temp: 97.7 °F (36.5 °C) (01/06/24 1936)  Pulse: 84 (01/06/24 1936)  Resp: 18 (01/06/24 1936)  BP: 139/65 (01/06/24 1936)  SpO2: 98 % (01/06/24 1936) Vital Signs (24h Range):  Temp:  [97.7 °F (36.5 °C)-98.6 °F (37 °C)] 97.7 °F (36.5 °C)  Pulse:  [82-92] 84  Resp:  [16-18] 18  SpO2:  [96 %-98 %] 98 %  BP: (134-144)/(59-75) 139/65   Weight: 97.6 kg (215 lb 2.7 oz)  Body mass index is 32.72 kg/m².    No intake or output data in the 24 hours ending 01/06/24 2007         Physical Exam  Constitutional:       General: He is not in acute distress.     Appearance: Normal appearance.   HENT:      Head: Normocephalic and atraumatic.      Mouth/Throat:      Mouth: Mucous membranes are moist.      Pharynx: Oropharynx is clear.      Comments: Sublingual jaundice   Eyes:      Extraocular Movements: Extraocular movements intact.      Conjunctiva/sclera: Conjunctivae normal.   Cardiovascular:      Rate and Rhythm: Normal rate and regular rhythm.      Heart sounds: Normal heart sounds. No murmur heard.  Pulmonary:      Effort: No respiratory distress.      Breath sounds: Normal breath sounds.   Abdominal:      General: Abdomen is flat.       "Palpations: Abdomen is soft.      Tenderness: There is no abdominal tenderness.      Comments: No fluid shift    Musculoskeletal:      Right lower leg: No edema.      Left lower leg: No edema.   Skin:     Capillary Refill: Capillary refill takes less than 2 seconds.   Neurological:      General: No focal deficit present.      Mental Status: He is alert and oriented to person, place, and time.            Vents:     Lines/Drains/Airways       Peripheral Intravenous Line  Duration                  Peripheral IV - Single Lumen 01/03/24 1045 18 G;1 1/4 in Anterior;Left Upper Arm 3 days         Peripheral IV - Single Lumen 01/03/24 1400 18 G;1 1/4 in Anterior;Right Upper Arm 3 days                  Significant Labs:    CBC/Anemia Profile:  Recent Labs   Lab 01/05/24  2251 01/06/24 0449 01/06/24  1124   WBC 11.77 7.08 12.46   HGB 9.3* 9.2* 9.9*   HCT 24.9* 27.2* 29.4*   PLT 49* 50* 78*   MCV 89 99* 101*   RDW 25.4* 26.4* 26.9*          Chemistries:  Recent Labs   Lab 01/05/24  0341 01/06/24  0449 01/06/24  1623    138 135*   K 4.3 3.3* 3.5    102 104   CO2 26 28 24   BUN 40* 34* 33*   CREATININE 1.3 1.4 1.5*   CALCIUM 7.6* 7.6* 7.7*   ALBUMIN 2.0* 2.1* 2.0*   PROT 5.0* 4.8* 4.9*   BILITOT 6.7* 8.3* 9.8*   ALKPHOS 74 90 92   ALT 79* 97* 97*   * 280* 262*   MG 2.1 1.9  --    PHOS 1.7* 2.7  --          CMP:   Recent Labs   Lab 01/05/24  0341 01/06/24  0449 01/06/24  1623    138 135*   K 4.3 3.3* 3.5    102 104   CO2 26 28 24    103 133*   BUN 40* 34* 33*   CREATININE 1.3 1.4 1.5*   CALCIUM 7.6* 7.6* 7.7*   PROT 5.0* 4.8* 4.9*   ALBUMIN 2.0* 2.1* 2.0*   BILITOT 6.7* 8.3* 9.8*   ALKPHOS 74 90 92   * 280* 262*   ALT 79* 97* 97*   ANIONGAP 7* 8 7*       Coagulation:   No results for input(s): "PT", "INR", "APTT" in the last 48 hours.    Lactic Acid:   No results for input(s): "LACTATE" in the last 48 hours.      Significant Imaging:  I have reviewed all pertinent imaging " results/findings within the past 24 hours.    Assessment/Plan:      * Acute upper GI bleed    58 year old M w/ cirrhosis and ETOH use d/o presenting w/ melana and ground coffee emesis/hematemesis. Denies prior symptoms. No EGDs on file. Normotensive and tachycardic w/ HR 130s. Initial Hgb 5.0; now s/p 2u pRBCs. Initial lactate >12. Labs otherwise notable for Plt 116, INR 2.4, BUN 57, Cr 1.6, TB 5.4, , ALT 44. Admitted to MICU for management of acute upper GIB.      EGD showed esophageal varices. Hgb stable s/p 3u PRBC. Will need scope after 8 week course of PPI.     - Soft and bite-sized diet, advance as tolerated  - Trend Hgb and transfuse for goal Hgb > 7, unless otherwise indicated  - Maintain IV access with 2 large bore IV's  - Intravascular resuscitation/support with IVF's   - Hold all NSAIDs and anticoagulants, unless contraindicated  - Pantoprazole 40 mg BID for a total of 8 weeks  - Correct any coagulopathy with platelets and FFP for goal of platelets >50K and INR <2.0    Transaminitis    - 2:1 pattern; likely 2/2 ETOH use d/o. Will continue to monitor        Thrombocytopenia  etiology likely 2/2 cirrhosis  - Monitor w/ daily CBC and transfuse if Plt <10 or <50 w/ signs of active bleeding   Recent Labs   Lab 01/06/24  1124   PLT 78*         High anion gap metabolic acidosis    - Likely 2/2 lactic acidosis.   Resolved       CODY (acute kidney injury)  Improved     - Cr 1.6 at time of admission; no baseline Cr available. Last CMP from 2018 showed a Cr of 1.08.   - Trend Cr/ Serial BMPs  - Strict I&Os, close monitoring of UOP  - Replace electrolytes PRN, goal K/Phos/Mg 4/3/2  - Avoid nephrotoxins (NSAIDs, ACEi/ARB, IV radiocontrast, gadolinium, etc.)  - Renally dose meds   Started patient on maintenance fluids    Alcohol use disorder    CIWA protocol however has not required ativan.     - Last alcoholic drink on 1/01. Alcohol level <10 on admission. Denies history of withdrawal in the past including  seizures. Remains tachycardic but likely 2/2 acute GIB. Will continue to monitor for signs of withdrawal w/ CIWA protocol and start benzodiazepines if indicated.   - thiamine, folic acid, MV       Lactic acidosis  - see GIB         Coagulopathy    - INR 2.4; likely in setting of cirrhosis. Correct as indicated        Cirrhosis  Patient is visiting from out of town; outside records not available to review thus etiology of cirrhosis not confirmed. However, patient does have a history of ETOH use d/o so this is included in the differential. States he is currently in the process of being referred to a hepatologist and having an EGD scheduled.     MELD-Na score calculated; MELD 3.0: 30 at 1/6/2024  4:23 PM  MELD-Na: 29 at 1/6/2024  4:23 PM  Calculated from:  Serum Creatinine: 1.5 mg/dL at 1/6/2024  4:23 PM  Serum Sodium: 135 mmol/L at 1/6/2024  4:23 PM  Total Bilirubin: 9.8 mg/dL at 1/6/2024  4:23 PM  Serum Albumin: 2.0 g/dL at 1/6/2024  4:23 PM  INR(ratio): 2.2 at 1/4/2024 10:08 AM  Age at listing (hypothetical): 58 years  Sex: Male at 1/6/2024  4:23 PM      Continue chronic meds. Etiology likely ETOH. Will avoid any hepatotoxic meds, and monitor CBC/CMP/INR for synthetic function.       VTE Risk Mitigation (From admission, onward)           Ordered     Place sequential compression device  Until discontinued         01/03/24 0256     IP VTE LOW RISK PATIENT  Once         01/03/24 0256                    Discharge Planning   MILKA: 1/6/2024     Code Status: Full Code   Is the patient medically ready for discharge?: No    Reason for patient still in hospital (select all that apply): Patient trending condition, Laboratory test, Treatment, and Consult recommendations  Discharge Plan A: Home, Home with family   Discharge Delays: None known at this time              Bailey Cline MD  Department of Hospital Medicine   [facility]

## 2024-01-07 NOTE — HOSPITAL COURSE
Patient admitted for UGIB. GI consulted and EGD showed esophageal ulcers. Pt to continue PPI for 8 weeks with repeat scope to follow. Hgb stable s/p 3u PRBC and 1u FFP. HDS and tolerating clear liquid diet. CIWA ordered and no ativan required.  Patient was transferred to floors on 01/05.  Creatinine has remained stable at 1.4.  T bilirubin trending up.  Hepatology was consulted in the setting of decompensated liver cirrhosis.  Ultrasound right upper quadrant with Dopplers, PEth, AFP, serological workup, and acute hepatitis panel were ordered.  Patient underwent paracentesis on 01/08 and ascitic fluid negative for SBP.  As patient has history of alcohol abuse even after diagnosis of cirrhosis patient is not a candidate for liver transplant.   He was started on CTX for concern for possible SBP. Repeat CBC with stable Hgb and WBC WNL. Paracentesis performed with IR on 1/12 with 4.2L out. Ascitic labs with no evidence of SBP, ascitic and blood cultures showed no growth.  Hospital course further complicated with abdominal pain.  CT-CAP without evidence of infection.  Antibiotics were broadened to cefepime and micafungin.  Patient remained alert and oriented.  Was receiving lactulose and rifaximin.   Unfortunately, kidney function continued to worsen. UA/urine sediment as well as elevated/normotensive blood pressures not in line with HRS.  Nephrology was on board.  Patient had no urgent need for dialysis.  S/p therapeutic LP on 1/17. At this time patients stable for transfer to [place] via flight.  Patient has been accepted by Monik zapata in [place].      Blu Deleon was deemed appropriate for discharge.  At the time of discharge, the plan of care was discussed with patient/family, who were agreeable and amenable.  All medications were verbally reviewed and discussed with the patient/family.  They endorsed understanding and compliance.  Informed them that these changes will be available on their discharge  paperwork, as well.  Outpatient follow-ups were scheduled, or if unable to be scheduled ambulatory referrals were placed, and ER return precautions were given.  All questions were answered to the patient/family's satisfaction.  He was subsequently discharged in stable condition.

## 2024-01-07 NOTE — ASSESSMENT & PLAN NOTE
etiology likely 2/2 cirrhosis  - Monitor w/ daily CBC and transfuse if Plt <10 or <50 w/ signs of active bleeding   Recent Labs   Lab 01/06/24  1124   PLT 78*

## 2024-01-07 NOTE — PLAN OF CARE
Discharge pending improvement of labs. Plan of care continued.    Problem: Adult Inpatient Plan of Care  Goal: Plan of Care Review  Outcome: Ongoing, Progressing  Goal: Patient-Specific Goal (Individualized)  Outcome: Ongoing, Progressing  Goal: Absence of Hospital-Acquired Illness or Injury  Outcome: Ongoing, Progressing  Goal: Optimal Comfort and Wellbeing  Outcome: Ongoing, Progressing  Goal: Readiness for Transition of Care  Outcome: Ongoing, Progressing     Problem: Fluid and Electrolyte Imbalance (Acute Kidney Injury/Impairment)  Goal: Fluid and Electrolyte Balance  Outcome: Ongoing, Progressing     Problem: Oral Intake Inadequate (Acute Kidney Injury/Impairment)  Goal: Optimal Nutrition Intake  Outcome: Ongoing, Progressing     Problem: Renal Function Impairment (Acute Kidney Injury/Impairment)  Goal: Effective Renal Function  Outcome: Ongoing, Progressing     Problem: Fall Injury Risk  Goal: Absence of Fall and Fall-Related Injury  Outcome: Ongoing, Progressing

## 2024-01-08 LAB
ALBUMIN FLD-MCNC: <0.4 G/DL
ALBUMIN FLD-MCNC: <0.4 G/DL
ALBUMIN SERPL BCP-MCNC: 2 G/DL (ref 3.5–5.2)
ALP SERPL-CCNC: 129 U/L (ref 55–135)
ALT SERPL W/O P-5'-P-CCNC: 97 U/L (ref 10–44)
AMMONIA PLAS-SCNC: 29 UMOL/L (ref 10–50)
ANA SER QL IF: NORMAL
ANION GAP SERPL CALC-SCNC: 9 MMOL/L (ref 8–16)
APPEARANCE FLD: CLEAR
APPEARANCE FLD: CLEAR
AST SERPL-CCNC: 222 U/L (ref 10–40)
BASOPHILS # BLD AUTO: 0.03 K/UL (ref 0–0.2)
BASOPHILS NFR BLD: 0.3 % (ref 0–1.9)
BILIRUB SERPL-MCNC: 13 MG/DL (ref 0.1–1)
BODY FLD TYPE: NORMAL
BODY FLD TYPE: NORMAL
BODY FLUID SOURCE, LDH: NORMAL
BODY FLUID SOURCE, LDH: NORMAL
BUN SERPL-MCNC: 29 MG/DL (ref 6–20)
CALCIUM SERPL-MCNC: 7.7 MG/DL (ref 8.7–10.5)
CHLORIDE SERPL-SCNC: 98 MMOL/L (ref 95–110)
CO2 SERPL-SCNC: 25 MMOL/L (ref 23–29)
COLOR FLD: YELLOW
COLOR FLD: YELLOW
CREAT SERPL-MCNC: 1.3 MG/DL (ref 0.5–1.4)
DIFFERENTIAL METHOD BLD: ABNORMAL
EOSINOPHIL # BLD AUTO: 0.2 K/UL (ref 0–0.5)
EOSINOPHIL NFR BLD: 1.9 % (ref 0–8)
EOSINOPHIL NFR FLD MANUAL: 1 %
ERYTHROCYTE [DISTWIDTH] IN BLOOD BY AUTOMATED COUNT: 26.4 % (ref 11.5–14.5)
EST. GFR  (NO RACE VARIABLE): >60 ML/MIN/1.73 M^2
GLUCOSE SERPL-MCNC: 91 MG/DL (ref 70–110)
GRAM STN SPEC: NORMAL
GRAM STN SPEC: NORMAL
HCT VFR BLD AUTO: 25.6 % (ref 40–54)
HGB BLD-MCNC: 8.5 G/DL (ref 14–18)
IMM GRANULOCYTES # BLD AUTO: 0.08 K/UL (ref 0–0.04)
IMM GRANULOCYTES NFR BLD AUTO: 0.8 % (ref 0–0.5)
INR PPP: 2.3 (ref 0.8–1.2)
LDH FLD L TO P-CCNC: <30 U/L
LDH FLD L TO P-CCNC: <30 U/L
LYMPHOCYTES # BLD AUTO: 1.4 K/UL (ref 1–4.8)
LYMPHOCYTES NFR BLD: 13.4 % (ref 18–48)
LYMPHOCYTES NFR FLD MANUAL: 29 %
LYMPHOCYTES NFR FLD MANUAL: 30 %
MAGNESIUM SERPL-MCNC: 1.9 MG/DL (ref 1.6–2.6)
MCH RBC QN AUTO: 33.5 PG (ref 27–31)
MCHC RBC AUTO-ENTMCNC: 33.2 G/DL (ref 32–36)
MCV RBC AUTO: 101 FL (ref 82–98)
MESOTHL CELL NFR FLD MANUAL: 10 %
MESOTHL CELL NFR FLD MANUAL: 8 %
MITOCHONDRIA AB TITR SER IF: NORMAL {TITER}
MONOCYTES # BLD AUTO: 1.4 K/UL (ref 0.3–1)
MONOCYTES NFR BLD: 13.1 % (ref 4–15)
MONOS+MACROS NFR FLD MANUAL: 58 %
MONOS+MACROS NFR FLD MANUAL: 59 %
NEUTROPHILS # BLD AUTO: 7.5 K/UL (ref 1.8–7.7)
NEUTROPHILS NFR BLD: 70.5 % (ref 38–73)
NEUTROPHILS NFR FLD MANUAL: 2 %
NEUTROPHILS NFR FLD MANUAL: 3 %
NRBC BLD-RTO: 0 /100 WBC
PHOSPHATE SERPL-MCNC: 2.4 MG/DL (ref 2.7–4.5)
PLATELET # BLD AUTO: 64 K/UL (ref 150–450)
PMV BLD AUTO: 10.8 FL (ref 9.2–12.9)
POCT GLUCOSE: 110 MG/DL (ref 70–110)
POTASSIUM SERPL-SCNC: 3.7 MMOL/L (ref 3.5–5.1)
PROT FLD-MCNC: <1 G/DL
PROT FLD-MCNC: <1 G/DL
PROT SERPL-MCNC: 4.9 G/DL (ref 6–8.4)
PROTHROMBIN TIME: 23.1 SEC (ref 9–12.5)
RBC # BLD AUTO: 2.54 M/UL (ref 4.6–6.2)
SODIUM SERPL-SCNC: 132 MMOL/L (ref 136–145)
SPECIMEN SOURCE: NORMAL
WBC # BLD AUTO: 10.6 K/UL (ref 3.9–12.7)
WBC # FLD: 18 /CU MM
WBC # FLD: 22 /CU MM

## 2024-01-08 PROCEDURE — 25000003 PHARM REV CODE 250: Performed by: STUDENT IN AN ORGANIZED HEALTH CARE EDUCATION/TRAINING PROGRAM

## 2024-01-08 PROCEDURE — 83735 ASSAY OF MAGNESIUM: CPT | Performed by: NURSE PRACTITIONER

## 2024-01-08 PROCEDURE — 36415 COLL VENOUS BLD VENIPUNCTURE: CPT | Mod: XB | Performed by: INTERNAL MEDICINE

## 2024-01-08 PROCEDURE — 0W9G3ZZ DRAINAGE OF PERITONEAL CAVITY, PERCUTANEOUS APPROACH: ICD-10-PCS | Performed by: RADIOLOGY

## 2024-01-08 PROCEDURE — 82140 ASSAY OF AMMONIA: CPT | Performed by: STUDENT IN AN ORGANIZED HEALTH CARE EDUCATION/TRAINING PROGRAM

## 2024-01-08 PROCEDURE — 99900035 HC TECH TIME PER 15 MIN (STAT)

## 2024-01-08 PROCEDURE — 36415 COLL VENOUS BLD VENIPUNCTURE: CPT | Performed by: NURSE PRACTITIONER

## 2024-01-08 PROCEDURE — 84157 ASSAY OF PROTEIN OTHER: CPT | Mod: 91 | Performed by: STUDENT IN AN ORGANIZED HEALTH CARE EDUCATION/TRAINING PROGRAM

## 2024-01-08 PROCEDURE — 94660 CPAP INITIATION&MGMT: CPT

## 2024-01-08 PROCEDURE — 83615 LACTATE (LD) (LDH) ENZYME: CPT | Performed by: STUDENT IN AN ORGANIZED HEALTH CARE EDUCATION/TRAINING PROGRAM

## 2024-01-08 PROCEDURE — 89051 BODY FLUID CELL COUNT: CPT | Mod: 91 | Performed by: STUDENT IN AN ORGANIZED HEALTH CARE EDUCATION/TRAINING PROGRAM

## 2024-01-08 PROCEDURE — 87075 CULTR BACTERIA EXCEPT BLOOD: CPT | Performed by: STUDENT IN AN ORGANIZED HEALTH CARE EDUCATION/TRAINING PROGRAM

## 2024-01-08 PROCEDURE — 21400001 HC TELEMETRY ROOM

## 2024-01-08 PROCEDURE — 85025 COMPLETE CBC W/AUTO DIFF WBC: CPT | Performed by: STUDENT IN AN ORGANIZED HEALTH CARE EDUCATION/TRAINING PROGRAM

## 2024-01-08 PROCEDURE — 87205 SMEAR GRAM STAIN: CPT | Performed by: STUDENT IN AN ORGANIZED HEALTH CARE EDUCATION/TRAINING PROGRAM

## 2024-01-08 PROCEDURE — 84100 ASSAY OF PHOSPHORUS: CPT | Performed by: NURSE PRACTITIONER

## 2024-01-08 PROCEDURE — 85610 PROTHROMBIN TIME: CPT | Performed by: INTERNAL MEDICINE

## 2024-01-08 PROCEDURE — 25000003 PHARM REV CODE 250: Performed by: NURSE PRACTITIONER

## 2024-01-08 PROCEDURE — 87070 CULTURE OTHR SPECIMN AEROBIC: CPT | Performed by: STUDENT IN AN ORGANIZED HEALTH CARE EDUCATION/TRAINING PROGRAM

## 2024-01-08 PROCEDURE — 36415 COLL VENOUS BLD VENIPUNCTURE: CPT | Mod: XB | Performed by: STUDENT IN AN ORGANIZED HEALTH CARE EDUCATION/TRAINING PROGRAM

## 2024-01-08 PROCEDURE — 25000003 PHARM REV CODE 250

## 2024-01-08 PROCEDURE — 30233N1 TRANSFUSION OF NONAUTOLOGOUS RED BLOOD CELLS INTO PERIPHERAL VEIN, PERCUTANEOUS APPROACH: ICD-10-PCS | Performed by: EMERGENCY MEDICINE

## 2024-01-08 PROCEDURE — 82042 OTHER SOURCE ALBUMIN QUAN EA: CPT | Performed by: STUDENT IN AN ORGANIZED HEALTH CARE EDUCATION/TRAINING PROGRAM

## 2024-01-08 PROCEDURE — 80053 COMPREHEN METABOLIC PANEL: CPT | Performed by: NURSE PRACTITIONER

## 2024-01-08 PROCEDURE — 94761 N-INVAS EAR/PLS OXIMETRY MLT: CPT

## 2024-01-08 RX ORDER — AMLODIPINE BESYLATE 10 MG/1
10 TABLET ORAL DAILY
Status: DISCONTINUED | OUTPATIENT
Start: 2024-01-09 | End: 2024-01-15

## 2024-01-08 RX ADMIN — ESCITALOPRAM OXALATE 10 MG: 10 TABLET ORAL at 09:01

## 2024-01-08 RX ADMIN — HYDROCHLOROTHIAZIDE 25 MG: 25 TABLET ORAL at 09:01

## 2024-01-08 RX ADMIN — FOLIC ACID 1 MG: 1 TABLET ORAL at 09:01

## 2024-01-08 RX ADMIN — PANTOPRAZOLE SODIUM 40 MG: 40 TABLET, DELAYED RELEASE ORAL at 08:01

## 2024-01-08 RX ADMIN — BACLOFEN 10 MG: 10 TABLET ORAL at 08:01

## 2024-01-08 RX ADMIN — Medication 100 MG: at 09:01

## 2024-01-08 RX ADMIN — PANTOPRAZOLE SODIUM 40 MG: 40 TABLET, DELAYED RELEASE ORAL at 09:01

## 2024-01-08 NOTE — CONSULTS
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 He is more "spiritual not Latter-day." F/U then.    Amalia James  Staff   "

## 2024-01-08 NOTE — ASSESSMENT & PLAN NOTE
Patient is visiting from out of town; outside records not available to review thus etiology of cirrhosis not confirmed. However, patient does have a history of ETOH use d/o so this is included in the differential. States he is currently in the process of being referred to a hepatologist and having an EGD scheduled.     MELD-Na score calculated; MELD 3.0: 31 at 1/8/2024  8:21 AM  MELD-Na: 30 at 1/8/2024  8:21 AM  Calculated from:  Serum Creatinine: 1.3 mg/dL at 1/8/2024  2:49 AM  Serum Sodium: 132 mmol/L at 1/8/2024  2:49 AM  Total Bilirubin: 13.0 mg/dL at 1/8/2024  2:49 AM  Serum Albumin: 2.0 g/dL at 1/8/2024  2:49 AM  INR(ratio): 2.3 at 1/8/2024  8:21 AM  Age at listing (hypothetical): 58 years  Sex: Male at 1/8/2024  8:21 AM      Continue chronic meds. Etiology likely ETOH. Will avoid any hepatotoxic meds, and monitor CBC/CMP/INR for synthetic function.   T bilirubin trending up.  Hepatology was consulted in the setting of decompensated liver cirrhosis.  Ultrasound right upper quadrant with Dopplers, PEth, AFP, serological workup, and acute hepatitis panel. Cirrhotic morphology of the liver with sequela of portal hypertension as detailed above.  No discrete hepatic lesions identified.  Biliary sludge with thickened gallbladder wall, likely secondary to hepatic dysfunction.  Underwent paracentesis.  As patient is not a candidate for transplant and with his high MELD score he would require air transfer to  [place] where he can go to the hospital close to his home.

## 2024-01-08 NOTE — SUBJECTIVE & OBJECTIVE
Interval History/Significant Events: No acute events overnight. Patient remains afebrile and hemodynamically stable.  Creatinine improved to 1.3.  T bili elevated to 13.   Patient underwent paracentesis today.   working on transfer to [place].      Review of Systems   Constitutional:  Positive for activity change and fatigue. Negative for chills and fever.   HENT:  Negative for congestion.    Respiratory:  Negative for cough and shortness of breath.    Cardiovascular:  Negative for chest pain and leg swelling.   Gastrointestinal:  Negative for abdominal distention, abdominal pain, blood in stool, nausea and vomiting.   Genitourinary:  Negative for difficulty urinating and hematuria.   Skin:  Negative for color change and pallor.   Neurological:  Negative for dizziness and light-headedness.     Objective:     Vital Signs (Most Recent):  Temp: 97.3 °F (36.3 °C) (01/08/24 1215)  Pulse: 82 (01/08/24 1333)  Resp: 18 (01/08/24 1333)  BP: 127/73 (01/08/24 1333)  SpO2: 99 % (01/08/24 1333) Vital Signs (24h Range):  Temp:  [97.3 °F (36.3 °C)-98.6 °F (37 °C)] 97.3 °F (36.3 °C)  Pulse:  [80-96] 82  Resp:  [16-18] 18  SpO2:  [96 %-99 %] 99 %  BP: (127-173)/(67-77) 127/73   Weight: 97.6 kg (215 lb 2.7 oz)  Body mass index is 32.72 kg/m².      Intake/Output Summary (Last 24 hours) at 1/8/2024 1505  Last data filed at 1/8/2024 1327  Gross per 24 hour   Intake 0 ml   Output 3200 ml   Net -3200 ml            Physical Exam  Constitutional:       General: He is not in acute distress.     Appearance: Normal appearance.   HENT:      Head: Normocephalic and atraumatic.      Mouth/Throat:      Mouth: Mucous membranes are moist.      Pharynx: Oropharynx is clear.      Comments: Sublingual jaundice   Eyes:      Extraocular Movements: Extraocular movements intact.      Conjunctiva/sclera: Conjunctivae normal.   Cardiovascular:      Rate and Rhythm: Normal rate and regular rhythm.      Heart sounds: Normal heart sounds. No murmur  heard.  Pulmonary:      Effort: No respiratory distress.      Breath sounds: Normal breath sounds.   Abdominal:      General: Abdomen is flat.      Palpations: Abdomen is soft.      Tenderness: There is no abdominal tenderness.      Comments: No fluid shift    Musculoskeletal:      Right lower leg: No edema.      Left lower leg: No edema.   Skin:     Capillary Refill: Capillary refill takes less than 2 seconds.   Neurological:      General: No focal deficit present.      Mental Status: He is alert and oriented to person, place, and time.            Vents:     Lines/Drains/Airways       Peripheral Intravenous Line  Duration                  Peripheral IV - Single Lumen 01/03/24 1045 18 G;1 1/4 in Anterior;Left Upper Arm 5 days         Peripheral IV - Single Lumen 01/03/24 1400 18 G;1 1/4 in Anterior;Right Upper Arm 5 days                  Significant Labs:    CBC/Anemia Profile:  Recent Labs   Lab 01/07/24  1657 01/08/24  0249   WBC  --  10.60   HGB  --  8.5*   HCT  --  25.6*   PLT  --  64*   MCV  --  101*   RDW  --  26.4*   IRON 39*  --    FERRITIN 363*  --           Chemistries:  Recent Labs   Lab 01/06/24  1623 01/07/24  0542 01/08/24  0249   * 133* 132*   K 3.5 3.6 3.7    100 98   CO2 24 25 25   BUN 33* 29* 29*   CREATININE 1.5* 1.3 1.3   CALCIUM 7.7* 7.7* 7.7*   ALBUMIN 2.0* 2.2* 2.0*   PROT 4.9* 5.4* 4.9*   BILITOT 9.8* 11.0* 13.0*   ALKPHOS 92 127 129   ALT 97* 106* 97*   * 259* 222*   MG  --  1.9 1.9   PHOS  --  2.4* 2.4*         CMP:   Recent Labs   Lab 01/06/24  1623 01/07/24  0542 01/08/24  0249   * 133* 132*   K 3.5 3.6 3.7    100 98   CO2 24 25 25   * 111* 91   BUN 33* 29* 29*   CREATININE 1.5* 1.3 1.3   CALCIUM 7.7* 7.7* 7.7*   PROT 4.9* 5.4* 4.9*   ALBUMIN 2.0* 2.2* 2.0*   BILITOT 9.8* 11.0* 13.0*   ALKPHOS 92 127 129   * 259* 222*   ALT 97* 106* 97*   ANIONGAP 7* 8 9       Coagulation:   Recent Labs   Lab 01/08/24  0821   INR 2.3*       Lactic Acid:   No  "results for input(s): "LACTATE" in the last 48 hours.      Significant Imaging:  I have reviewed all pertinent imaging results/findings within the past 24 hours.  "

## 2024-01-08 NOTE — TREATMENT PLAN
"Hepatology Treatment Plan    Blu Deleon is a 58 y.o. male admitted to hospital 2024 (Hospital Day: 7) due to Acute upper GI bleed.     Interval History  Denies any ongoing melena, no confusion. INR remains elevated at 2.3, Tbili 13 up from 11 yesterday and 5.4 on admission.     Pertinent Hep/GI hx  Known history of alcohol use disorder, HTN, and compensated EtOH cirrhosis.  He was admitted to [facility] on  with melena.  EGD on  revealed esophageal ulcers, erythematous mucosa in the antrum, and normal duodenal.  The patient required resuscitation with multiple units of PRBCs.  Now he is hemodynamically stable and on the floor.      The patient is visiting [place] from [place] for the [place].  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  He lives with his wife and their 5 children. He reports that he began drinking several glasses of wine nightly since he was 21 years old.  He also drank gin when he was younger.  He denies any tattoos, IV drug abuse, or unprotected sex.  He has been aware of "early cirrhosis" and elevated LFTs for the last couple of years, but has persisted drinking.  An ultrasound of the liver performed 3 years ago in [place] reveal early changes of cirrhosis. His sister  of EtOH cirrhosis.     Objective  Temp:  [97.6 °F (36.4 °C)-98.6 °F (37 °C)] 97.6 °F (36.4 °C) (807)  Pulse:  [] 93 (807)  BP: (134-174)/(67-77) 156/72 (807)  Resp:  [16-18] 16 (807)  SpO2:  [96 %-99 %] 98 % (807)    General: Alert, Oriented x3, no distress  Neurologic: Asterixis absent  Abdomen: Normoactive bowel sounds. Non-distended. Normal tympany. Soft. Non-tender. No peritoneal signs.    Laboratory    Lab Results   Component Value Date    WBC 10.60 2024    HGB 8.5 (L) 2024    HCT 25.6 (L) 2024     (H) 2024    PLT 64 (L) 2024       Lab Results   Component Value Date     (L) 2024    K 3.7 " 01/08/2024    CL 98 01/08/2024    CO2 25 01/08/2024    BUN 29 (H) 01/08/2024    CREATININE 1.3 01/08/2024    CALCIUM 7.7 (L) 01/08/2024       Lab Results   Component Value Date    ALBUMIN 2.0 (L) 01/08/2024    ALT 97 (H) 01/08/2024     (H) 01/08/2024    ALKPHOS 129 01/08/2024    BILITOT 13.0 (H) 01/08/2024       Lab Results   Component Value Date    INR 2.3 (H) 01/08/2024    INR 2.2 (H) 01/04/2024    INR 2.4 (H) 01/03/2024       MELD 3.0: 31 at 1/8/2024  8:21 AM  MELD-Na: 30 at 1/8/2024  8:21 AM  Calculated from:  Serum Creatinine: 1.3 mg/dL at 1/8/2024  2:49 AM  Serum Sodium: 132 mmol/L at 1/8/2024  2:49 AM  Total Bilirubin: 13.0 mg/dL at 1/8/2024  2:49 AM  Serum Albumin: 2.0 g/dL at 1/8/2024  2:49 AM  INR(ratio): 2.3 at 1/8/2024  8:21 AM  Age at listing (hypothetical): 58 years  Sex: Male at 1/8/2024  8:21 AM    RUQ Ultrasound 1/7/23   Impression:  1. Cirrhotic morphology of the liver with sequela of portal hypertension as detailed above.  No discrete hepatic lesions identified. Small volume ascites.   2. Biliary sludge with thickened gallbladder wall, likely secondary to hepatic dysfunction.    Assessment  Newly decompensated EtOH cirrhosis  EtOH use disorder  Esophageal ulcers    Plan  - consult to addiction psych  - awaiting PEth  - please obtain daily CBC, BMP, LFT, INR  - No transplant eval at this time due to ongoing alcohol use despite prior knowledge of cirrhosis   - Consideration for medical transfer back to [place], [place] as nothing to offer here  - Needs to continue BID protonix until next EGD in approx 8wks (wants to have it done in [place])     Thank you for involving us in the care of Blu Hawthorneeddie. Please call with any additional concerns or questions.    Mary Smith MD  [employer] GI PGY5  #[phone]

## 2024-01-08 NOTE — PLAN OF CARE
Recommendations    1. ADAT to Low Sodium Diet.  Encourage small, frequent meals/snacks as tolerated.   2. Recommend adding Ensure Plus BID for additional calories/PRO.   3. Monitor I/O's, labs and weight.   4. RD following.     If aggressive nutrition interventions warranted, please re-consult.    Goals: Meet % EEN/EPN by follow-up date.  Nutrition Goal Status: new  Communication of RD Recs: other (comment) (POC)

## 2024-01-08 NOTE — PLAN OF CARE
Problem: Adult Inpatient Plan of Care  Goal: Plan of Care Review  Outcome: Ongoing, Not Progressing  Goal: Patient-Specific Goal (Individualized)  Outcome: Ongoing, Not Progressing  Goal: Absence of Hospital-Acquired Illness or Injury  Outcome: Ongoing, Not Progressing  Goal: Optimal Comfort and Wellbeing  Outcome: Ongoing, Not Progressing  Goal: Readiness for Transition of Care  Outcome: Ongoing, Not Progressing     Problem: Fluid and Electrolyte Imbalance (Acute Kidney Injury/Impairment)  Goal: Fluid and Electrolyte Balance  Outcome: Ongoing, Not Progressing     Problem: Oral Intake Inadequate (Acute Kidney Injury/Impairment)  Goal: Optimal Nutrition Intake  Outcome: Ongoing, Not Progressing     Problem: Renal Function Impairment (Acute Kidney Injury/Impairment)  Goal: Effective Renal Function  Outcome: Ongoing, Not Progressing     Problem: Fall Injury Risk  Goal: Absence of Fall and Fall-Related Injury  Outcome: Ongoing, Not Progressing

## 2024-01-08 NOTE — PROGRESS NOTES
[facility] Medicine  Progress Note    Patient Name: Blu Deleon  MRN: [id number]  Patient Class: IP- Inpatient   Admission Date: 1/2/2024  Length of Stay: 5 days  Attending Physician: Bailey Cline MD  Primary Care Provider: Radha, Primary Doctor        Subjective:     Principal Problem:Acute upper GI bleed        HPI:  Mr. Deleon is a 58 year old male with PMH notable for HTN, ETOH use, reported cirrhosis (has not seen a hepatologist), and depression who [place] [facility] [place] bleeding. In speaking with patient and wife at bedside, patient reports four days of nausea and vomiting with bright red blood and black tarry diarrhea. Additionally, he reports a fall yesterday after attempting to go to the restroom secondary to being lightheaded. He denies this happening previously. He was scheduled for an outpatient EGD, which has not been completed. He has not had a colonoscopy. He denies any anticoagulation or antiplatelet use.      In the emergency department he was found to be acutely anemic with a hgb of 5. He has not been hypotensive while in the emergency department, however, notably tachycardic with -140. Labs otherwise notable for WBC 13.56, Plt 107, INR 2.4, BUN 57, Cr 1.6, TB 5.4, , ALT 44. Initial lactate >12. ETOH level <10. Patient is s/p 3u pRBCs.    Admitted to MICU for UGIB.      Overview/Hospital Course:  EGD showed esophageal ulcers. Pt to continue PPI with repeat scope to follow. Hgb stable s/p 3u PRBC and 1u FFP. HDS and tolerating clear liquid diet. CIWA ordered and no ativan required.  Patient was transferred to floors on 01/05.  Creatinine has remained stable at 1.4.  T bilirubin trending up.  Hepatology was consulted in the setting of decompensated liver cirrhosis.  Ultrasound right upper quadrant with Dopplers, PEth, AFP, serological workup, and acute hepatitis panel.  Patient underwent paracentesis on 01/08.  As patient is not a candidate  for transplant and with his high MELD score he would require air transfer to  [place] where he can go to the hospital close to his home.    Interval History/Significant Events: No acute events overnight. Patient remains afebrile and hemodynamically stable.  Creatinine improved to 1.3.  T bili elevated to 13.   Patient underwent paracentesis today.   working on transfer to [place].      Review of Systems   Constitutional:  Positive for activity change and fatigue. Negative for chills and fever.   HENT:  Negative for congestion.    Respiratory:  Negative for cough and shortness of breath.    Cardiovascular:  Negative for chest pain and leg swelling.   Gastrointestinal:  Negative for abdominal distention, abdominal pain, blood in stool, nausea and vomiting.   Genitourinary:  Negative for difficulty urinating and hematuria.   Skin:  Negative for color change and pallor.   Neurological:  Negative for dizziness and light-headedness.     Objective:     Vital Signs (Most Recent):  Temp: 97.3 °F (36.3 °C) (01/08/24 1215)  Pulse: 82 (01/08/24 1333)  Resp: 18 (01/08/24 1333)  BP: 127/73 (01/08/24 1333)  SpO2: 99 % (01/08/24 1333) Vital Signs (24h Range):  Temp:  [97.3 °F (36.3 °C)-98.6 °F (37 °C)] 97.3 °F (36.3 °C)  Pulse:  [80-96] 82  Resp:  [16-18] 18  SpO2:  [96 %-99 %] 99 %  BP: (127-173)/(67-77) 127/73   Weight: 97.6 kg (215 lb 2.7 oz)  Body mass index is 32.72 kg/m².      Intake/Output Summary (Last 24 hours) at 1/8/2024 1505  Last data filed at 1/8/2024 1327  Gross per 24 hour   Intake 0 ml   Output 3200 ml   Net -3200 ml            Physical Exam  Constitutional:       General: He is not in acute distress.     Appearance: Normal appearance.   HENT:      Head: Normocephalic and atraumatic.      Mouth/Throat:      Mouth: Mucous membranes are moist.      Pharynx: Oropharynx is clear.      Comments: Sublingual jaundice   Eyes:      Extraocular Movements: Extraocular movements intact.      Conjunctiva/sclera:  Conjunctivae normal.   Cardiovascular:      Rate and Rhythm: Normal rate and regular rhythm.      Heart sounds: Normal heart sounds. No murmur heard.  Pulmonary:      Effort: No respiratory distress.      Breath sounds: Normal breath sounds.   Abdominal:      General: Abdomen is flat.      Palpations: Abdomen is soft.      Tenderness: There is no abdominal tenderness.      Comments: No fluid shift    Musculoskeletal:      Right lower leg: No edema.      Left lower leg: No edema.   Skin:     Capillary Refill: Capillary refill takes less than 2 seconds.   Neurological:      General: No focal deficit present.      Mental Status: He is alert and oriented to person, place, and time.            Vents:     Lines/Drains/Airways       Peripheral Intravenous Line  Duration                  Peripheral IV - Single Lumen 01/03/24 1045 18 G;1 1/4 in Anterior;Left Upper Arm 5 days         Peripheral IV - Single Lumen 01/03/24 1400 18 G;1 1/4 in Anterior;Right Upper Arm 5 days                  Significant Labs:    CBC/Anemia Profile:  Recent Labs   Lab 01/07/24  1657 01/08/24  0249   WBC  --  10.60   HGB  --  8.5*   HCT  --  25.6*   PLT  --  64*   MCV  --  101*   RDW  --  26.4*   IRON 39*  --    FERRITIN 363*  --           Chemistries:  Recent Labs   Lab 01/06/24  1623 01/07/24  0542 01/08/24  0249   * 133* 132*   K 3.5 3.6 3.7    100 98   CO2 24 25 25   BUN 33* 29* 29*   CREATININE 1.5* 1.3 1.3   CALCIUM 7.7* 7.7* 7.7*   ALBUMIN 2.0* 2.2* 2.0*   PROT 4.9* 5.4* 4.9*   BILITOT 9.8* 11.0* 13.0*   ALKPHOS 92 127 129   ALT 97* 106* 97*   * 259* 222*   MG  --  1.9 1.9   PHOS  --  2.4* 2.4*         CMP:   Recent Labs   Lab 01/06/24  1623 01/07/24  0542 01/08/24  0249   * 133* 132*   K 3.5 3.6 3.7    100 98   CO2 24 25 25   * 111* 91   BUN 33* 29* 29*   CREATININE 1.5* 1.3 1.3   CALCIUM 7.7* 7.7* 7.7*   PROT 4.9* 5.4* 4.9*   ALBUMIN 2.0* 2.2* 2.0*   BILITOT 9.8* 11.0* 13.0*   ALKPHOS 92 127 129   AST  "262* 259* 222*   ALT 97* 106* 97*   ANIONGAP 7* 8 9       Coagulation:   Recent Labs   Lab 01/08/24  0821   INR 2.3*       Lactic Acid:   No results for input(s): "LACTATE" in the last 48 hours.      Significant Imaging:  I have reviewed all pertinent imaging results/findings within the past 24 hours.    Assessment/Plan:      * Acute upper GI bleed    58 year old M w/ cirrhosis and ETOH use d/o presenting w/ melana and ground coffee emesis/hematemesis. Denies prior symptoms. No EGDs on file. Normotensive and tachycardic w/ HR 130s. Initial Hgb 5.0; now s/p 2u pRBCs. Initial lactate >12. Labs otherwise notable for Plt 116, INR 2.4, BUN 57, Cr 1.6, TB 5.4, , ALT 44. Admitted to MICU for management of acute upper GIB.      EGD showed esophageal varices. Hgb stable s/p 3u PRBC. Will need scope after 8 week course of PPI.     - Soft and bite-sized diet, advance as tolerated  - Trend Hgb and transfuse for goal Hgb > 7, unless otherwise indicated  - Maintain IV access with 2 large bore IV's  - Intravascular resuscitation/support with IVF's   - Hold all NSAIDs and anticoagulants, unless contraindicated  - Pantoprazole 40 mg BID for a total of 8 weeks  - Correct any coagulopathy with platelets and FFP for goal of platelets >50K and INR <2.0    Transaminitis    - 2:1 pattern; likely 2/2 ETOH use d/o. Will continue to monitor        Thrombocytopenia  etiology likely 2/2 cirrhosis  - Monitor w/ daily CBC and transfuse if Plt <10 or <50 w/ signs of active bleeding   Recent Labs   Lab 01/06/24  1124   PLT 78*         High anion gap metabolic acidosis    - Likely 2/2 lactic acidosis.   Resolved       CODY (acute kidney injury)  Improved     - Cr 1.6 at time of admission; no baseline Cr available. Last CMP from 2018 showed a Cr of 1.08.   - Trend Cr/ Serial BMPs  - Strict I&Os, close monitoring of UOP  - Replace electrolytes PRN, goal K/Phos/Mg 4/3/2  - Avoid nephrotoxins (NSAIDs, ACEi/ARB, IV radiocontrast, gadolinium, " etc.)  - Renally dose meds   Started patient on maintenance fluids    Alcohol use disorder    CIWA protocol however has not required ativan.     - Last alcoholic drink on 1/01. Alcohol level <10 on admission. Denies history of withdrawal in the past including seizures. Remains tachycardic but likely 2/2 acute GIB. Will continue to monitor for signs of withdrawal w/ CIWA protocol and start benzodiazepines if indicated.   - thiamine, folic acid, MV       Lactic acidosis  - see GIB         Coagulopathy    - INR 2.4; likely in setting of cirrhosis. Correct as indicated        Cirrhosis  Patient is visiting from out of town; outside records not available to review thus etiology of cirrhosis not confirmed. However, patient does have a history of ETOH use d/o so this is included in the differential. States he is currently in the process of being referred to a hepatologist and having an EGD scheduled.     MELD-Na score calculated; MELD 3.0: 31 at 1/8/2024  8:21 AM  MELD-Na: 30 at 1/8/2024  8:21 AM  Calculated from:  Serum Creatinine: 1.3 mg/dL at 1/8/2024  2:49 AM  Serum Sodium: 132 mmol/L at 1/8/2024  2:49 AM  Total Bilirubin: 13.0 mg/dL at 1/8/2024  2:49 AM  Serum Albumin: 2.0 g/dL at 1/8/2024  2:49 AM  INR(ratio): 2.3 at 1/8/2024  8:21 AM  Age at listing (hypothetical): 58 years  Sex: Male at 1/8/2024  8:21 AM      Continue chronic meds. Etiology likely ETOH. Will avoid any hepatotoxic meds, and monitor CBC/CMP/INR for synthetic function.   T bilirubin trending up.  Hepatology was consulted in the setting of decompensated liver cirrhosis.  Ultrasound right upper quadrant with Dopplers, PEth, AFP, serological workup, and acute hepatitis panel. Cirrhotic morphology of the liver with sequela of portal hypertension as detailed above.  No discrete hepatic lesions identified.  Biliary sludge with thickened gallbladder wall, likely secondary to hepatic dysfunction.  Underwent paracentesis.  As patient is not a candidate for  transplant and with his high MELD score he would require air transfer to  [place] where he can go to the hospital close to his home.      VTE Risk Mitigation (From admission, onward)           Ordered     Place sequential compression device  Until discontinued         01/03/24 0256     IP VTE LOW RISK PATIENT  Once         01/03/24 0256                    Discharge Planning   MILKA: 1/9/2024     Code Status: Full Code   Is the patient medically ready for discharge?: No    Reason for patient still in hospital (select all that apply): Patient trending condition, Laboratory test, Treatment, Consult recommendations, and Pending disposition  Discharge Plan A: Home, Home with family   Discharge Delays: None known at this time              Bailey Cline MD  Department of Hospital Medicine   [facility] S[facility]

## 2024-01-08 NOTE — PROCEDURES
Radiology Post-Procedure Note    Pre Op Diagnosis: Ascites  Post Op Diagnosis: Same    Procedure: Ultrasound Guided Paracentesis    Procedure performed by: Gina Perea PA-C    Written Informed Consent Obtained: Yes  Specimen Removed: YES, clear yellow fluid  Estimated Blood Loss: Minimal    Findings:   Successful paracentesis. Access obtained in the LLQ. Albumin administered PRN per protocol.    Patient tolerated procedure well.    Gina Perea PA-C  Interventional Radiology   Spectra: [id number]

## 2024-01-08 NOTE — H&P
Inpatient Radiology Pre-procedure Note    History of Present Illness:  Blu Deleon is a 58 y.o. male who presents for ultrasound guided paracentesis.    Admission H&P reviewed.  Past Medical History:   Diagnosis Date    Hypertension      Past Surgical History:   Procedure Laterality Date    ESOPHAGOGASTRODUODENOSCOPY N/A 1/3/2024    Procedure: EGD (ESOPHAGOGASTRODUODENOSCOPY);  Surgeon: Madison Hinojosa MD;  Location: [address];  Service: Endoscopy;  Laterality: N/A;       Review of Systems:   As documented in primary team H&P    Home Meds:   Prior to Admission medications    Medication Sig Start Date End Date Taking? Authorizing Provider   EScitalopram oxalate (LEXAPRO) 10 MG tablet Take 10 mg by mouth once daily. 12/23/23  Yes Provider, Historical   hydroCHLOROthiazide (HYDRODIURIL) 25 MG tablet Take 25 mg by mouth once daily. 12/23/23  Yes Provider, Historical   traZODone (DESYREL) 50 MG tablet Take  mg by mouth every evening. 12/26/23  Yes Provider, Historical   ciprofloxacin HCl (CIPRO) 500 MG tablet Take 1 tablet (500 mg total) by mouth 2 (two) times a day. for 1 day 1/7/24 1/8/24  Bailey Cline MD   folic acid (FOLVITE) 1 MG tablet Take 1 tablet (1 mg total) by mouth once daily. 1/6/24 1/5/25  Bailey Cline MD   pantoprazole (PROTONIX) 40 MG tablet Take 1 tablet (40 mg total) by mouth 2 (two) times daily. 1/6/24 1/5/25  Bailey Cline MD   thiamine 100 MG tablet Take 1 tablet (100 mg total) by mouth once daily. 1/6/24   Bailey Cline MD     Scheduled Meds:    baclofen  10 mg Oral TID    EScitalopram oxalate  10 mg Oral Daily    folic acid  1 mg Oral Daily    hydroCHLOROthiazide  25 mg Oral Daily    multivitamin  1 tablet Oral Daily    pantoprazole  40 mg Oral BID    thiamine  100 mg Oral Daily     Continuous Infusions:   PRN Meds:0.9%  NaCl infusion (for blood administration), hydrALAZINE, lorazepam, sodium chloride 0.9%  Anticoagulants/Antiplatelets:  no anticoagulation    Allergies: Review of patient's allergies indicates:  No Known Allergies  Sedation Hx: have not been any systemic reactions    Vitals:  Temp: 97.9 °F (36.6 °C) (01/08/24 1115)  Pulse: 89 (01/08/24 1104)  Resp: 16 (01/08/24 0807)  BP: (!) 173/70 (01/08/24 1104)  SpO2: 96 % (01/08/24 1104)     Physical Exam:  ASA: 3  Mallampati: n/a    General: no acute distress  Mental Status: alert and oriented to person, place and time  HEENT: normocephalic, atraumatic  Chest: unlabored breathing  Heart: regular heart rate  Abdomen: distended  Extremity: moves all extremities    Plan: ultrasound guided paracentesis  Sedation Plan: local    Gnia Perea PA-C  Interventional Radiology   Spectra: [id number]

## 2024-01-08 NOTE — PROGRESS NOTES
"[facility] - Med Surg  Adult Nutrition  Progress Note    SUMMARY       Recommendations    1. ADAT to Low Sodium Diet.  Encourage small, frequent meals/snacks as tolerated.   2. Recommend adding Ensure Plus BID for additional calories/PRO.   3. Monitor I/O's, labs and weight.   4. RD following.     If aggressive nutrition interventions warranted, please re-consult.    Goals: Meet % EEN/EPN by follow-up date.  Nutrition Goal Status: new  Communication of RD Recs: other (comment) (POC)    Assessment and Plan    Nutrition Problem  Altered nutrition-related lab values    Related to (etiology):   New onset of cirrhosis    Signs and Symptoms (as evidenced by):   Elevated BUN, AST, ALT, Total Bilirubin     Interventions/Recommendations (treatment strategy):  Collaboration of nutrition care with other providers  NPO  ADAT    Nutrition Diagnosis Status:   New         Reason for Assessment    Reason For Assessment: consult  Diagnosis: other (see comments) (Acute upper GI Bleed)  Relevant Medical History: HTN, ETOH use, cirrhosis  Interdisciplinary Rounds: did not attend  General Information Comments: RD consulted for education. NPO. Patient expected to receive paracentesis later today. Nutrition education provided. Pt appears nourished. Abdominal distention noted. No issues with N/V/C/D/chewing/swallowing.  Nutrition Discharge Planning: Pending Clinical Course, Nutrition education provided 1/8    Nutrition Risk Screen    Nutrition Risk Screen: no indicators present    Nutrition/Diet History    Patient Reported Diet/Restrictions/Preferences: low salt, general  Typical Food/Fluid Intake: small meals  Spiritual, Cultural Beliefs, Anabaptist Practices, Values that Affect Care: no  Food Allergies: NKFA  Factors Affecting Nutritional Intake: None identified at this time, NPO    Anthropometrics    Temp: 97.9 °F (36.6 °C)  Height Method: Stated  Height: 5' 8" (172.7 cm)  Height (inches): 68 in  Weight Method: Bed Scale  Weight: " 97.6 kg (215 lb 2.7 oz)  Weight (lb): 215.17 lb  Ideal Body Weight (IBW), Male: 154 lb  % Ideal Body Weight, Male (lb): 142.58 %  BMI (Calculated): 32.7       Lab/Procedures/Meds    Pertinent Labs Reviewed: reviewed  Pertinent Labs Comments: H/H: 8.5/25.6, Na 132, BUN 29, TP 4.9, Ca 7.7, P 2.4, TBili 13.0, , ALT 97  Pertinent Medications Reviewed: reviewed  Pertinent Medications Comments: MVI, hydrochlorothiazide, escitalopram oxalate, pantoprazole, folic acid, thiamine      Estimated/Assessed Needs    Weight Used For Calorie Calculations: 69.9 kg (154 lb) (IBW)  Energy Calorie Requirements (kcal): 1974 kcal/d (MSJ x 1.3)  Energy Need Method: Kcal/kg  Protein Requirements: 70-84 g/d (1.0-1.2 g/kg)  Weight Used For Protein Calculations: 69.9 kg (154 lb) (IBW)        RDA Method (mL): 1974         Nutrition Prescription Ordered    Current Diet Order: NPO    Evaluation of Received Nutrient/Fluid Intake    I/O: +1.11 L  Comments: LBM: 1/8  % Intake of Estimated Energy Needs: 75 - 100 %  % Meal Intake: NPO    Nutrition Risk    Level of Risk/Frequency of Follow-up: low (1x/ week)     Monitor and Evaluation    Food and Nutrient Intake: energy intake, food and beverage intake  Food and Nutrient Adminstration: diet order  Physical Activity and Function: nutrition-related ADLs and IADLs, factors affecting access to physical activity  Anthropometric Measurements: weight change, weight, body mass index  Biochemical Data, Medical Tests and Procedures: lipid profile, inflammatory profile, glucose/endocrine profile, gastrointestinal profile, electrolyte and renal panel  Nutrition-Focused Physical Findings: skin, head and eyes, extremities, muscles and bones, overall appearance     Nutrition Follow-Up    RD Follow-up?: Yes    Marta [place] Registration Eligible, Provisional LDN

## 2024-01-08 NOTE — PLAN OF CARE
[facility] - Med Surg  Discharge Reassessment    Primary Care Provider: Radha, Primary Doctor    Expected Discharge Date: 1/9/2024    Pt not medically ready for d/c at this time.      SW met with patient.  Pt is ambulatory.  No needs identified.  Pt's wife will provide transportation home.      2:21 PM  Medical team advised pt is not a candidate for a transplant at this time; would like to have pt transferred to [place].  Per medical team, pt is unable to discharge home via car.  SW working to coordinate transfer and cost to transport.  ADT 22 completed; waiting for an accepting facility.      [facility] # [id number].  [phone].      Discharge Plan A and Plan B have been determined by review of patient's clinical status, future medical and therapeutic needs, and coverage/benefits for post-acute care in coordination with multidisciplinary team members.      Reassessment (most recent)       Discharge Reassessment - 01/08/24 1231          Discharge Reassessment    Assessment Type Discharge Planning Reassessment     Did the patient's condition or plan change since previous assessment? No     Discharge Plan discussed with: Patient     Discharge Plan A Home;Home with family     Discharge Plan B Home     DME Needed Upon Discharge  none     Transition of Care Barriers None     Why the patient remains in the hospital Requires continued medical care        Post-Acute Status    Post-Acute Authorization Other     Coverage Generic Commercial-Generic Commercial     Other Status No Post-Acute Service Needs     Discharge Delays None known at this time                   Mirian Wells LMSW  Part-Time-  [facility]  Ext. [phone]

## 2024-01-08 NOTE — CONSULTS
Food & Nutrition  Education    Diet Education: Low Sodium, High-Calorie, High-Protein   Time Spent: 15-20 minutes  Learners: Patient      Nutrition Education provided with handouts:       Comments: We discussed limiting sodium in his diet to control buildup of fluids around the heart, stomach, lungs, and legs. Limiting fluid in the diet also helps because too much fluid can cause SOB, poor appetite, and weight gain from swelling or edema, which makes the heart work harder. RD explained the importance of reading the Nutrition Facts label, which will help determine the sodium content in 1 serving of a food. Select foods with <140 mg or less per serving. Avoid processed foods. Eat more fresh foods. Encouraging patient to uses more herb/spices to season foods without adding salt. Lastly, RD discussed weight monitoring to determine sudden weight changes, if any. Choose high-calorie foods. Aim to eat at least 6 meals/snacks. Add high-calorie/high-protein supplement drink that contains 200-400 calories. Eat more fat. Small, frequent meals may be more easily tolerated than larger meals. Patient understands the information provided, agrees to comply with recommendations. All questions/concerns were addressed.      All questions and concerns answered. Dietitian's contact information provided.       Follow-Up: Yes    Please Re-consult as needed        Thanks!    Marta [place] Registration Eligible, Provisional LDN

## 2024-01-09 ENCOUNTER — TELEPHONE (OUTPATIENT)
Dept: ENDOSCOPY | Facility: HOSPITAL | Age: 59
End: 2024-01-09
Payer: COMMERCIAL

## 2024-01-09 LAB
ALBUMIN SERPL BCP-MCNC: 2 G/DL (ref 3.5–5.2)
ALP SERPL-CCNC: 169 U/L (ref 55–135)
ALT SERPL W/O P-5'-P-CCNC: 110 U/L (ref 10–44)
ANION GAP SERPL CALC-SCNC: 8 MMOL/L (ref 8–16)
AST SERPL-CCNC: 248 U/L (ref 10–40)
BASOPHILS # BLD AUTO: 0.05 K/UL (ref 0–0.2)
BASOPHILS NFR BLD: 0.4 % (ref 0–1.9)
BILIRUB DIRECT SERPL-MCNC: 10.6 MG/DL (ref 0.1–0.3)
BILIRUB SERPL-MCNC: 16.2 MG/DL (ref 0.1–1)
BILIRUB UR QL STRIP: ABNORMAL
BUN SERPL-MCNC: 31 MG/DL (ref 6–20)
CALCIUM SERPL-MCNC: 7.7 MG/DL (ref 8.7–10.5)
CHLORIDE SERPL-SCNC: 100 MMOL/L (ref 95–110)
CLARITY UR REFRACT.AUTO: CLEAR
CO2 SERPL-SCNC: 23 MMOL/L (ref 23–29)
COLOR UR AUTO: YELLOW
CREAT SERPL-MCNC: 1.4 MG/DL (ref 0.5–1.4)
DIFFERENTIAL METHOD BLD: ABNORMAL
EOSINOPHIL # BLD AUTO: 0.2 K/UL (ref 0–0.5)
EOSINOPHIL NFR BLD: 1.6 % (ref 0–8)
ERYTHROCYTE [DISTWIDTH] IN BLOOD BY AUTOMATED COUNT: 26.1 % (ref 11.5–14.5)
EST. GFR  (NO RACE VARIABLE): 58.6 ML/MIN/1.73 M^2
GLUCOSE SERPL-MCNC: 108 MG/DL (ref 70–110)
GLUCOSE UR QL STRIP: NEGATIVE
HCT VFR BLD AUTO: 26.8 % (ref 40–54)
HGB BLD-MCNC: 9.1 G/DL (ref 14–18)
HGB UR QL STRIP: ABNORMAL
IMM GRANULOCYTES # BLD AUTO: 0.12 K/UL (ref 0–0.04)
IMM GRANULOCYTES NFR BLD AUTO: 0.9 % (ref 0–0.5)
KETONES UR QL STRIP: NEGATIVE
LEUKOCYTE ESTERASE UR QL STRIP: NEGATIVE
LYMPHOCYTES # BLD AUTO: 1.9 K/UL (ref 1–4.8)
LYMPHOCYTES NFR BLD: 14.2 % (ref 18–48)
MAGNESIUM SERPL-MCNC: 1.9 MG/DL (ref 1.6–2.6)
MCH RBC QN AUTO: 33.8 PG (ref 27–31)
MCHC RBC AUTO-ENTMCNC: 34 G/DL (ref 32–36)
MCV RBC AUTO: 100 FL (ref 82–98)
MONOCYTES # BLD AUTO: 1.8 K/UL (ref 0.3–1)
MONOCYTES NFR BLD: 13.4 % (ref 4–15)
NEUTROPHILS # BLD AUTO: 9.4 K/UL (ref 1.8–7.7)
NEUTROPHILS NFR BLD: 69.5 % (ref 38–73)
NITRITE UR QL STRIP: NEGATIVE
NRBC BLD-RTO: 0 /100 WBC
PH UR STRIP: 6 [PH] (ref 5–8)
PHOSPHATE SERPL-MCNC: 2.8 MG/DL (ref 2.7–4.5)
PLATELET # BLD AUTO: 93 K/UL (ref 150–450)
PMV BLD AUTO: 11 FL (ref 9.2–12.9)
POTASSIUM SERPL-SCNC: 3.4 MMOL/L (ref 3.5–5.1)
PROT SERPL-MCNC: 5.4 G/DL (ref 6–8.4)
PROT UR QL STRIP: NEGATIVE
RBC # BLD AUTO: 2.69 M/UL (ref 4.6–6.2)
SODIUM SERPL-SCNC: 131 MMOL/L (ref 136–145)
SP GR UR STRIP: 1.02 (ref 1–1.03)
URN SPEC COLLECT METH UR: ABNORMAL
WBC # BLD AUTO: 13.51 K/UL (ref 3.9–12.7)

## 2024-01-09 PROCEDURE — 25000003 PHARM REV CODE 250: Performed by: NURSE PRACTITIONER

## 2024-01-09 PROCEDURE — 84100 ASSAY OF PHOSPHORUS: CPT | Performed by: NURSE PRACTITIONER

## 2024-01-09 PROCEDURE — 83735 ASSAY OF MAGNESIUM: CPT | Performed by: NURSE PRACTITIONER

## 2024-01-09 PROCEDURE — 25000003 PHARM REV CODE 250

## 2024-01-09 PROCEDURE — 36415 COLL VENOUS BLD VENIPUNCTURE: CPT | Performed by: NURSE PRACTITIONER

## 2024-01-09 PROCEDURE — 80053 COMPREHEN METABOLIC PANEL: CPT | Performed by: NURSE PRACTITIONER

## 2024-01-09 PROCEDURE — 81003 URINALYSIS AUTO W/O SCOPE: CPT | Performed by: STUDENT IN AN ORGANIZED HEALTH CARE EDUCATION/TRAINING PROGRAM

## 2024-01-09 PROCEDURE — 82248 BILIRUBIN DIRECT: CPT | Performed by: STUDENT IN AN ORGANIZED HEALTH CARE EDUCATION/TRAINING PROGRAM

## 2024-01-09 PROCEDURE — 94660 CPAP INITIATION&MGMT: CPT

## 2024-01-09 PROCEDURE — 94761 N-INVAS EAR/PLS OXIMETRY MLT: CPT

## 2024-01-09 PROCEDURE — 99900035 HC TECH TIME PER 15 MIN (STAT)

## 2024-01-09 PROCEDURE — 87040 BLOOD CULTURE FOR BACTERIA: CPT | Performed by: STUDENT IN AN ORGANIZED HEALTH CARE EDUCATION/TRAINING PROGRAM

## 2024-01-09 PROCEDURE — 99223 1ST HOSP IP/OBS HIGH 75: CPT | Mod: ,,, | Performed by: STUDENT IN AN ORGANIZED HEALTH CARE EDUCATION/TRAINING PROGRAM

## 2024-01-09 PROCEDURE — 25000003 PHARM REV CODE 250: Performed by: STUDENT IN AN ORGANIZED HEALTH CARE EDUCATION/TRAINING PROGRAM

## 2024-01-09 PROCEDURE — 85025 COMPLETE CBC W/AUTO DIFF WBC: CPT | Performed by: STUDENT IN AN ORGANIZED HEALTH CARE EDUCATION/TRAINING PROGRAM

## 2024-01-09 PROCEDURE — 21400001 HC TELEMETRY ROOM

## 2024-01-09 RX ORDER — POTASSIUM CHLORIDE 20 MEQ/1
40 TABLET, EXTENDED RELEASE ORAL ONCE
Status: COMPLETED | OUTPATIENT
Start: 2024-01-09 | End: 2024-01-09

## 2024-01-09 RX ORDER — LACTULOSE 10 G/15ML
20 SOLUTION ORAL 3 TIMES DAILY
Status: DISCONTINUED | OUTPATIENT
Start: 2024-01-09 | End: 2024-01-22 | Stop reason: HOSPADM

## 2024-01-09 RX ADMIN — LACTULOSE 20 G: 20 SOLUTION ORAL at 03:01

## 2024-01-09 RX ADMIN — HYDROCHLOROTHIAZIDE 25 MG: 25 TABLET ORAL at 08:01

## 2024-01-09 RX ADMIN — PANTOPRAZOLE SODIUM 40 MG: 40 TABLET, DELAYED RELEASE ORAL at 08:01

## 2024-01-09 RX ADMIN — FOLIC ACID 1 MG: 1 TABLET ORAL at 08:01

## 2024-01-09 RX ADMIN — ESCITALOPRAM OXALATE 10 MG: 10 TABLET ORAL at 08:01

## 2024-01-09 RX ADMIN — POTASSIUM CHLORIDE 40 MEQ: 1500 TABLET, EXTENDED RELEASE ORAL at 11:01

## 2024-01-09 RX ADMIN — BACLOFEN 10 MG: 10 TABLET ORAL at 08:01

## 2024-01-09 RX ADMIN — THERA TABS 1 TABLET: TAB at 08:01

## 2024-01-09 RX ADMIN — AMLODIPINE BESYLATE 10 MG: 10 TABLET ORAL at 08:01

## 2024-01-09 RX ADMIN — Medication 100 MG: at 08:01

## 2024-01-09 NOTE — PLAN OF CARE
APPOINTMENT:    Patient Appointment(s) scheduled with Atif Garcias MD Thursday Jan 11, 2024 1:00 PM

## 2024-01-09 NOTE — TELEPHONE ENCOUNTER
"----- Message from Ale Sultana sent at 2024  8:18 AM CST -----    ----- Message -----  From: Neelam Christina MD  Sent: 1/3/2024   4:35 PM CST  To: [facility]    Procedure: EGD    Diagnosis: Peptic ulcer disease    Procedure Timin weeks    #If within 4 weeks selected, please mckayla as high priority#    #If greater than 12 weeks, please select "5-12 weeks" and delay sending until 2 months prior to requested date#     Provider: Any endoscopist    Location: No Preference    Additional Scheduling Information: No scheduling concerns    Prep Specifications:N/A    Have you attached a patient to this message: yes        "

## 2024-01-09 NOTE — PLAN OF CARE
Problem: Adult Inpatient Plan of Care  Goal: Plan of Care Review  Outcome: Ongoing, Progressing     Problem: Fluid and Electrolyte Imbalance (Acute Kidney Injury/Impairment)  Goal: Fluid and Electrolyte Balance  Outcome: Ongoing, Progressing     Problem: Adult Inpatient Plan of Care  Goal: Plan of Care Review  Outcome: Ongoing, Progressing     Problem: Adult Inpatient Plan of Care  Goal: Optimal Comfort and Wellbeing  Outcome: Ongoing, Progressing   VSS. Denies any pain or SOB. Patient is jaundice. No acute events overnight. Bed alarm set and call light within reach.

## 2024-01-09 NOTE — PLAN OF CARE
GUNJAN sent updated clinical to [facility].  Info faxed to [facility], Phone:  [phone]; Fax: [phone].    Medical team notified.     Mirian Wells LMSW  Part-Time-  [facility]  Ext. [phone]

## 2024-01-09 NOTE — ASSESSMENT & PLAN NOTE
Patient is visiting from out of town; outside records not available to review thus etiology of cirrhosis not confirmed. However, patient does have a history of ETOH use d/o so this is included in the differential. States he is currently in the process of being referred to a hepatologist and having an EGD scheduled.     MELD-Na score calculated; MELD 3.0: 33 at 1/9/2024  4:08 AM  MELD-Na: 32 at 1/9/2024  4:08 AM  Calculated from:  Serum Creatinine: 1.4 mg/dL at 1/9/2024  4:08 AM  Serum Sodium: 131 mmol/L at 1/9/2024  4:08 AM  Total Bilirubin: 16.2 mg/dL at 1/9/2024  4:08 AM  Serum Albumin: 2.0 g/dL at 1/9/2024  4:08 AM  INR(ratio): 2.3 at 1/8/2024  8:21 AM  Age at listing (hypothetical): 58 years  Sex: Male at 1/9/2024  4:08 AM      Continue chronic meds. Etiology likely ETOH. Will avoid any hepatotoxic meds, and monitor CBC/CMP/INR for synthetic function.   T bilirubin trending up.  Hepatology was consulted in the setting of decompensated liver cirrhosis.  Ultrasound right upper quadrant with Dopplers, PEth, AFP, serological workup, and acute hepatitis panel. Cirrhotic morphology of the liver with sequela of portal hypertension as detailed above.  No discrete hepatic lesions identified.  Biliary sludge with thickened gallbladder wall, likely secondary to hepatic dysfunction.  Underwent paracentesis.  As patient is not a candidate for transplant and with his high MELD score he would require air transfer to  [place] where he can go to the hospital close to his home.

## 2024-01-09 NOTE — PROGRESS NOTES
[facility]  Hepatology  Progress note    Patient Name: Blu Deleon  MRN: [id number]  Admission Date: 1/2/2024  Hospital Length of Stay: 6 days  Code Status: Full Code   Attending Provider: Bailey Cline MD   Consulting Provider: Dimitri Francois MD  Primary Care Physician: No, Primary Doctor  Principal Problem:Acute upper GI bleed    Subjective:     Interval history:  TB worsening  NAOEN  MELD 33 today    Asked about his prior knowledge of liver disease  Reports he was told 4 months ago that he had cirrhosis and was told it was likely due to alcohol  He continued to drink alcohol, about two glasses of wine per day  Reports he had champagne on ASHLEY    Discussed worsening bilirubin and MELD and that he is not a candidate for transplant evaluation  He is amenable to transfer to hospital closer to home  Discussed that we would not consider it medically safe to discharge him and allow for him to travel commercially at this time      Objective:     Vitals:    01/09/24 0741   BP: (!) 164/74   Pulse: 95   Resp: 16   Temp: 98.9 °F (37.2 °C)         Physical Exam:  Constitutional:  not in acute distress and well developed  HENT: Head: Normal, normocephalic.  Eyes: scleral icterus  Respiratory: normal chest expansion & respiratory effort and no accessory muscle use  GI: soft, non-tender, without masses or organomegaly  Skin: jaundiced  Neurological: alert, oriented x3.       Significant Labs:  Recent Labs   Lab 01/06/24  1124 01/08/24  0249 01/09/24  0408   HGB 9.9* 8.5* 9.1*       Lab Results   Component Value Date    WBC 13.51 (H) 01/09/2024    HGB 9.1 (L) 01/09/2024    HCT 26.8 (L) 01/09/2024     (H) 01/09/2024    PLT 93 (L) 01/09/2024       Lab Results   Component Value Date     (L) 01/09/2024    K 3.4 (L) 01/09/2024     01/09/2024    CO2 23 01/09/2024    BUN 31 (H) 01/09/2024    CREATININE 1.4 01/09/2024    CALCIUM 7.7 (L) 01/09/2024    ANIONGAP 8 01/09/2024       Lab  "Results   Component Value Date     (H) 2024     (H) 2024    ALKPHOS 169 (H) 2024    BILITOT 16.2 (H) 2024       Lab Results   Component Value Date    INR 2.3 (H) 2024    INR 2.2 (H) 2024    INR 2.4 (H) 2024       MELD 3.0: 33 at 2024  4:08 AM  MELD-Na: 32 at 2024  4:08 AM  Calculated from:  Serum Creatinine: 1.4 mg/dL at 2024  4:08 AM  Serum Sodium: 131 mmol/L at 2024  4:08 AM  Total Bilirubin: 16.2 mg/dL at 2024  4:08 AM  Serum Albumin: 2.0 g/dL at 2024  4:08 AM  INR(ratio): 2.3 at 2024  8:21 AM  Age at listing (hypothetical): 58 years  Sex: Male at 2024  4:08 AM      Significant Imaging:  Reviewed pertinent radiology findings.       Assessment/Plan:     Blu Deleon is a 58 y.o. male with history of alcohol use disorder, HTN, and compensated EtOH cirrhosis who was admitted to [facility] on  with melena.   After resuscitation, EGD on 1/3/24 showed esophageal ulcers, erythematous mucosa in the antrum, and normal duodenum.  Hepatology now consulted for worsening bilirubin.     He hails from [place], visiting from the [facility]. The patient has known about his history of "early cirrhosis" since at least  when liver ultrasound confirmed cirrhosis.  He has continued to drink in spite of knowledge of liver disease and being told to stop; has consumed alcohol regularly since the age of 21.  Last drink 24. No illicit drug use.  Sister  of EtOH cirrhosis.     Problem List:  Newly decompensated EtOH cirrhosis  EtOH use disorder  Esophageal ulcers      Recommendations:  - We are not going to proceed with liver tx eval at this time since patient was aware of cirrhosis but persisted drinking.    - Would recommend transfer to hospital closer to home, preferably one with transplant hepatology available. Concerned that his trend in bilirubin indicates very high mortality and that he should be closer to home if " this trend continues.  - Serologic eval negative to date. Pending A1AT  - PETH pending  - Would start lactulose titrated to 2-3 BMs per day  - Noted paracentesis negative for SBP  - Hold diuretics for now, sodium downtrending.  - low sodium, high protein diet  - Continue Protonix 40mg twice per day for 8 weeks. Repeat upper endoscopy in 8 weeks to check healing of his esophageal ulcers. He would like to have this done in [facility].   - Avoid sedating drugs like opiates & BZDs.   - Daily CBC, CMP, INR.     Thank you for involving us in the care of Blu Deleon. Please call with any additional questions, concerns or changes in the patient's clinical status.    Dimitri Francois MD  Gastroenterology & Hepatology Fellow PGY V   [facility]

## 2024-01-09 NOTE — ASSESSMENT & PLAN NOTE
Patient is visiting from out of town; outside records not available to review thus etiology of cirrhosis not confirmed. However, patient does have a history of ETOH use d/o so this is included in the differential. States he is currently in the process of being referred to a hepatologist and having an EGD scheduled.     MELD-Na score calculated; MELD 3.0: 33 at 1/9/2024  4:08 AM  MELD-Na: 32 at 1/9/2024  4:08 AM  Calculated from:  Serum Creatinine: 1.4 mg/dL at 1/9/2024  4:08 AM  Serum Sodium: 131 mmol/L at 1/9/2024  4:08 AM  Total Bilirubin: 16.2 mg/dL at 1/9/2024  4:08 AM  Serum Albumin: 2.0 g/dL at 1/9/2024  4:08 AM  INR(ratio): 2.3 at 1/8/2024  8:21 AM  Age at listing (hypothetical): 58 years  Sex: Male at 1/9/2024  4:08 AM    Continue chronic meds. Etiology likely ETOH. Will avoid any hepatotoxic meds, and monitor CBC/CMP/INR for synthetic function.   T bilirubin trending up. Hepatology was consulted in the setting of decompensated liver cirrhosis. Ultrasound right upper quadrant with Dopplers, PEth, AFP, serological workup, and acute hepatitis panel. Cirrhotic morphology of the liver with sequela of portal hypertension as detailed above. No discrete hepatic lesions identified. Biliary sludge with thickened gallbladder wall, likely secondary to hepatic dysfunction.  Underwent paracentesis.  As patient is not a candidate for transplant and with his high MELD score he would require air transfer to  [place] where he can go to the hospital close to his home. On lactulose and albumin per hepatology recs.   - Continue lactulose goal 3 BM's daily  - Continue albumin 50g BID for 48 hours  - Accepted to [facility] in [place]. Pending flight logistics.

## 2024-01-09 NOTE — PLAN OF CARE
Problem: Adult Inpatient Plan of Care  Goal: Plan of Care Review  Outcome: Ongoing, Progressing  Flowsheets (Taken 1/8/2024 1853)  Plan of Care Reviewed With:   patient   spouse   family  Goal: Patient-Specific Goal (Individualized)  Outcome: Ongoing, Progressing  Goal: Absence of Hospital-Acquired Illness or Injury  Outcome: Ongoing, Progressing  Intervention: Identify and Manage Fall Risk  Flowsheets (Taken 1/8/2024 1853)  Safety Promotion/Fall Prevention:   assistive device/personal item within reach   bed alarm refused   Fall Risk reviewed with patient/family   family to remain at bedside   medications reviewed   nonskid shoes/socks when out of bed   side rails raised x 2   supervised activity   toileting scheduled   instructed to call staff for mobility  Intervention: Prevent Skin Injury  Flowsheets (Taken 1/8/2024 1853)  Body Position: position changed independently  Skin Protection:   adhesive use limited   skin-to-device areas padded   skin-to-skin areas padded   skin sealant/moisture barrier applied   tubing/devices free from skin contact  Intervention: Prevent and Manage VTE (Venous Thromboembolism) Risk  Flowsheets (Taken 1/8/2024 1853)  Activity Management:   Ambulated -L4   Ambulated to bathroom - L4   Ambulated in room - L4   Ankle pumps - L1   Arm raise - L1   Leg kicks - L2   Rolling - L1   Sitting at edge of bed - L2   Standing - L3   Step forward and back - L3   Seated marching - L2   Step side-to-side along edge of bed - L3   Up in chair - L3   Walking in place - L3   Walk with assistive devise and /or staff member - L3  VTE Prevention/Management:   ROM (active) performed   ROM (passive) performed   ambulation promoted   dorsiflexion/plantar flexion performed   bleeding precations maintained   bleeding risk assessed   bleeding risk factor(s) identified, provider notified  Range of Motion:   active ROM (range of motion) encouraged   ROM (range of motion) performed  Goal: Optimal Comfort and  Wellbeing  Outcome: Ongoing, Progressing  Goal: Readiness for Transition of Care  Outcome: Ongoing, Progressing     Problem: Fluid and Electrolyte Imbalance (Acute Kidney Injury/Impairment)  Goal: Fluid and Electrolyte Balance  Outcome: Ongoing, Progressing     Problem: Oral Intake Inadequate (Acute Kidney Injury/Impairment)  Goal: Optimal Nutrition Intake  Outcome: Ongoing, Progressing  Intervention: Promote and Optimize Nutrition  Flowsheets (Taken 1/8/2024 1853)  Oral Nutrition Promotion:   physical activity promoted   rest periods promoted   safe use of adaptive equipment encouraged     Problem: Renal Function Impairment (Acute Kidney Injury/Impairment)  Goal: Effective Renal Function  Outcome: Ongoing, Progressing     Problem: Fall Injury Risk  Goal: Absence of Fall and Fall-Related Injury  Outcome: Ongoing, Progressing  Intervention: Identify and Manage Contributors  Flowsheets (Taken 1/8/2024 1853)  Self-Care Promotion:   independence encouraged   BADL personal objects within reach   BADL personal routines maintained   meal set-up provided   safe use of adaptive equipment encouraged  Medication Review/Management: medications reviewed     Problem: Adjustment to Illness (Gastrointestinal Bleeding)  Goal: Optimal Coping with Acute Illness  Outcome: Ongoing, Progressing  Intervention: Optimize Psychosocial Response  Flowsheets (Taken 1/8/2024 1853)  Supportive Measures:   relaxation techniques promoted   self-care encouraged   self-reflection promoted   self-responsibility promoted   verbalization of feelings encouraged     Problem: Bleeding (Gastrointestinal Bleeding)  Goal: Hemostasis  Outcome: Met  Intervention: Manage Gastrointestinal Bleeding  Flowsheets (Taken 1/8/2024 1853)  Environmental Support: calm environment promoted

## 2024-01-09 NOTE — SUBJECTIVE & OBJECTIVE
Interval History/Significant Events: No acute events overnight. Patient remains afebrile and hemodynamically stable.  Creatinine improved to 1.3.  T bili elevated to 16.   Paracentesis labs reviewed. Labs suggestive of neutrophilic leucocytosis of 13k, infectious work including bcx, UA ordered.  Patient was started on lactulose.  working on transfer to [place].      Review of Systems   Constitutional:  Positive for activity change and fatigue. Negative for chills and fever.   HENT:  Negative for congestion.    Respiratory:  Negative for cough and shortness of breath.    Cardiovascular:  Negative for chest pain and leg swelling.   Gastrointestinal:  Negative for abdominal distention, abdominal pain, blood in stool, nausea and vomiting.   Genitourinary:  Negative for difficulty urinating and hematuria.   Skin:  Negative for color change and pallor.   Neurological:  Negative for dizziness and light-headedness.     Objective:     Vital Signs (Most Recent):  Temp: 98.5 °F (36.9 °C) (01/09/24 1129)  Pulse: 95 (01/09/24 1129)  Resp: 16 (01/09/24 1129)  BP: (!) 160/79 (01/09/24 1129)  SpO2: (!) 94 % (01/09/24 1129) Vital Signs (24h Range):  Temp:  [97.9 °F (36.6 °C)-98.9 °F (37.2 °C)] 98.5 °F (36.9 °C)  Pulse:  [] 95  Resp:  [16-19] 16  SpO2:  [94 %-99 %] 94 %  BP: (141-164)/(64-79) 160/79   Weight: 97.6 kg (215 lb 2.7 oz)  Body mass index is 32.72 kg/m².      Intake/Output Summary (Last 24 hours) at 1/9/2024 1409  Last data filed at 1/9/2024 0855  Gross per 24 hour   Intake 420 ml   Output --   Net 420 ml            Physical Exam  Constitutional:       General: He is not in acute distress.     Appearance: Normal appearance.   HENT:      Head: Normocephalic and atraumatic.      Mouth/Throat:      Mouth: Mucous membranes are moist.      Pharynx: Oropharynx is clear.      Comments: Sublingual jaundice   Eyes:      Extraocular Movements: Extraocular movements intact.      Conjunctiva/sclera: Conjunctivae  normal.   Cardiovascular:      Rate and Rhythm: Normal rate and regular rhythm.      Heart sounds: Normal heart sounds. No murmur heard.  Pulmonary:      Effort: No respiratory distress.      Breath sounds: Normal breath sounds.   Abdominal:      General: Abdomen is flat.      Palpations: Abdomen is soft.      Tenderness: There is no abdominal tenderness.      Comments: No fluid shift    Musculoskeletal:      Right lower leg: No edema.      Left lower leg: No edema.   Skin:     Capillary Refill: Capillary refill takes less than 2 seconds.   Neurological:      General: No focal deficit present.      Mental Status: He is alert and oriented to person, place, and time.            Vents:     Lines/Drains/Airways       Peripheral Intravenous Line  Duration                  Peripheral IV - Single Lumen 01/03/24 1045 18 G;1 1/4 in Anterior;Left Upper Arm 6 days         Peripheral IV - Single Lumen 01/03/24 1400 18 G;1 1/4 in Anterior;Right Upper Arm 6 days                  Significant Labs:    CBC/Anemia Profile:  Recent Labs   Lab 01/07/24  1657 01/08/24 0249 01/09/24 0408   WBC  --  10.60 13.51*   HGB  --  8.5* 9.1*   HCT  --  25.6* 26.8*   PLT  --  64* 93*   MCV  --  101* 100*   RDW  --  26.4* 26.1*   IRON 39*  --   --    FERRITIN 363*  --   --         Chemistries:  Recent Labs   Lab 01/08/24  0249 01/09/24  0408   * 131*   K 3.7 3.4*   CL 98 100   CO2 25 23   BUN 29* 31*   CREATININE 1.3 1.4   CALCIUM 7.7* 7.7*   ALBUMIN 2.0* 2.0*   PROT 4.9* 5.4*   BILITOT 13.0* 16.2*   ALKPHOS 129 169*   ALT 97* 110*   * 248*   MG 1.9 1.9   PHOS 2.4* 2.8         CMP:   Recent Labs   Lab 01/08/24  0249 01/09/24  0408   * 131*   K 3.7 3.4*   CL 98 100   CO2 25 23   GLU 91 108   BUN 29* 31*   CREATININE 1.3 1.4   CALCIUM 7.7* 7.7*   PROT 4.9* 5.4*   ALBUMIN 2.0* 2.0*   BILITOT 13.0* 16.2*   ALKPHOS 129 169*   * 248*   ALT 97* 110*   ANIONGAP 9 8       Coagulation:   Recent Labs   Lab 01/08/24  0821   INR 2.3*  "      Lactic Acid:   No results for input(s): "LACTATE" in the last 48 hours.      Significant Imaging:  I have reviewed all pertinent imaging results/findings within the past 24 hours.  "

## 2024-01-09 NOTE — PROGRESS NOTES
[facility] Medicine  Progress Note    Patient Name: Blu Deleon  MRN: [id number]  Patient Class: IP- Inpatient   Admission Date: 1/2/2024  Length of Stay: 6 days  Attending Physician: Bailye Cline MD  Primary Care Provider: Radha, Primary Doctor        Subjective:     Principal Problem:Acute upper GI bleed        HPI:  Mr. Deleon is a 58 year old male with PMH notable for HTN, ETOH use, reported cirrhosis (has not seen a hepatologist), and depression who presented to [facility] ED with acute GI bleeding. In speaking with patient and wife at bedside, patient reports four days of nausea and vomiting with bright red blood and black tarry diarrhea. Additionally, he reports a fall yesterday after attempting to go to the restroom secondary to being lightheaded. He denies this happening previously. He was scheduled for an outpatient EGD, which has not been completed. He has not had a colonoscopy. He denies any anticoagulation or antiplatelet use.      In the emergency department he was found to be acutely anemic with a hgb of 5. He has not been hypotensive while in the emergency department, however, notably tachycardic with -140. Labs otherwise notable for WBC 13.56, Plt 107, INR 2.4, BUN 57, Cr 1.6, TB 5.4, , ALT 44. Initial lactate >12. ETOH level <10. Patient is s/p 3u pRBCs.    Admitted to MICU for UGIB.      Overview/Hospital Course:  EGD showed esophageal ulcers. Pt to continue PPI with repeat scope to follow. Hgb stable s/p 3u PRBC and 1u FFP. HDS and tolerating clear liquid diet. CIWA ordered and no ativan required.  Patient was transferred to floors on 01/05.  Creatinine has remained stable at 1.4.  T bilirubin trending up.  Hepatology was consulted in the setting of decompensated liver cirrhosis.  Ultrasound right upper quadrant with Dopplers, PEth, AFP, serological workup, and acute hepatitis panel.  Patient underwent paracentesis on 01/08.  As patient is not a candidate  for transplant and with his high MELD score he would require air transfer to  [place] where he can go to the hospital close to his home.    Interval History/Significant Events: No acute events overnight. Patient remains afebrile and hemodynamically stable.  Creatinine improved to 1.3.  T bili elevated to 16.   Paracentesis labs reviewed. Labs suggestive of neutrophilic leucocytosis of 13k, infectious work including bcx, UA ordered.  Patient was started on lactulose.  working on transfer to [place].      Review of Systems   Constitutional:  Positive for activity change and fatigue. Negative for chills and fever.   HENT:  Negative for congestion.    Respiratory:  Negative for cough and shortness of breath.    Cardiovascular:  Negative for chest pain and leg swelling.   Gastrointestinal:  Negative for abdominal distention, abdominal pain, blood in stool, nausea and vomiting.   Genitourinary:  Negative for difficulty urinating and hematuria.   Skin:  Negative for color change and pallor.   Neurological:  Negative for dizziness and light-headedness.     Objective:     Vital Signs (Most Recent):  Temp: 98.5 °F (36.9 °C) (01/09/24 1129)  Pulse: 95 (01/09/24 1129)  Resp: 16 (01/09/24 1129)  BP: (!) 160/79 (01/09/24 1129)  SpO2: (!) 94 % (01/09/24 1129) Vital Signs (24h Range):  Temp:  [97.9 °F (36.6 °C)-98.9 °F (37.2 °C)] 98.5 °F (36.9 °C)  Pulse:  [] 95  Resp:  [16-19] 16  SpO2:  [94 %-99 %] 94 %  BP: (141-164)/(64-79) 160/79   Weight: 97.6 kg (215 lb 2.7 oz)  Body mass index is 32.72 kg/m².      Intake/Output Summary (Last 24 hours) at 1/9/2024 1409  Last data filed at 1/9/2024 0855  Gross per 24 hour   Intake 420 ml   Output --   Net 420 ml            Physical Exam  Constitutional:       General: He is not in acute distress.     Appearance: Normal appearance.   HENT:      Head: Normocephalic and atraumatic.      Mouth/Throat:      Mouth: Mucous membranes are moist.      Pharynx: Oropharynx is clear.       Comments: Sublingual jaundice   Eyes:      Extraocular Movements: Extraocular movements intact.      Conjunctiva/sclera: Conjunctivae normal.   Cardiovascular:      Rate and Rhythm: Normal rate and regular rhythm.      Heart sounds: Normal heart sounds. No murmur heard.  Pulmonary:      Effort: No respiratory distress.      Breath sounds: Normal breath sounds.   Abdominal:      General: Abdomen is flat.      Palpations: Abdomen is soft.      Tenderness: There is no abdominal tenderness.      Comments: No fluid shift    Musculoskeletal:      Right lower leg: No edema.      Left lower leg: No edema.   Skin:     Capillary Refill: Capillary refill takes less than 2 seconds.   Neurological:      General: No focal deficit present.      Mental Status: He is alert and oriented to person, place, and time.            Vents:     Lines/Drains/Airways       Peripheral Intravenous Line  Duration                  Peripheral IV - Single Lumen 01/03/24 1045 18 G;1 1/4 in Anterior;Left Upper Arm 6 days         Peripheral IV - Single Lumen 01/03/24 1400 18 G;1 1/4 in Anterior;Right Upper Arm 6 days                  Significant Labs:    CBC/Anemia Profile:  Recent Labs   Lab 01/07/24  1657 01/08/24 0249 01/09/24  0408   WBC  --  10.60 13.51*   HGB  --  8.5* 9.1*   HCT  --  25.6* 26.8*   PLT  --  64* 93*   MCV  --  101* 100*   RDW  --  26.4* 26.1*   IRON 39*  --   --    FERRITIN 363*  --   --         Chemistries:  Recent Labs   Lab 01/08/24  0249 01/09/24  0408   * 131*   K 3.7 3.4*   CL 98 100   CO2 25 23   BUN 29* 31*   CREATININE 1.3 1.4   CALCIUM 7.7* 7.7*   ALBUMIN 2.0* 2.0*   PROT 4.9* 5.4*   BILITOT 13.0* 16.2*   ALKPHOS 129 169*   ALT 97* 110*   * 248*   MG 1.9 1.9   PHOS 2.4* 2.8         CMP:   Recent Labs   Lab 01/08/24  0249 01/09/24  0408   * 131*   K 3.7 3.4*   CL 98 100   CO2 25 23   GLU 91 108   BUN 29* 31*   CREATININE 1.3 1.4   CALCIUM 7.7* 7.7*   PROT 4.9* 5.4*   ALBUMIN 2.0* 2.0*   BILITOT 13.0*  "16.2*   ALKPHOS 129 169*   * 248*   ALT 97* 110*   ANIONGAP 9 8       Coagulation:   Recent Labs   Lab 01/08/24  0821   INR 2.3*       Lactic Acid:   No results for input(s): "LACTATE" in the last 48 hours.      Significant Imaging:  I have reviewed all pertinent imaging results/findings within the past 24 hours.    Assessment/Plan:      * Acute upper GI bleed    58 year old M w/ cirrhosis and ETOH use d/o presenting w/ melana and ground coffee emesis/hematemesis. Denies prior symptoms. No EGDs on file. Normotensive and tachycardic w/ HR 130s. Initial Hgb 5.0; now s/p 2u pRBCs. Initial lactate >12. Labs otherwise notable for Plt 116, INR 2.4, BUN 57, Cr 1.6, TB 5.4, , ALT 44. Admitted to MICU for management of acute upper GIB.      EGD showed esophageal varices. Hgb stable s/p 3u PRBC. Will need scope after 8 week course of PPI.     - Soft and bite-sized diet, advance as tolerated  - Trend Hgb and transfuse for goal Hgb > 7, unless otherwise indicated  - Maintain IV access with 2 large bore IV's  - Intravascular resuscitation/support with IVF's   - Hold all NSAIDs and anticoagulants, unless contraindicated  - Pantoprazole 40 mg BID for a total of 8 weeks  - Correct any coagulopathy with platelets and FFP for goal of platelets >50K and INR <2.0    Transaminitis    - 2:1 pattern; likely 2/2 ETOH use d/o. Will continue to monitor        Thrombocytopenia  etiology likely 2/2 cirrhosis  - Monitor w/ daily CBC and transfuse if Plt <10 or <50 w/ signs of active bleeding   Recent Labs   Lab 01/06/24  1124   PLT 78*         High anion gap metabolic acidosis    - Likely 2/2 lactic acidosis.   Resolved       CODY (acute kidney injury)  Improved     - Cr 1.6 at time of admission; no baseline Cr available. Last CMP from 2018 showed a Cr of 1.08.   - Trend Cr/ Serial BMPs  - Strict I&Os, close monitoring of UOP  - Replace electrolytes PRN, goal K/Phos/Mg 4/3/2  - Avoid nephrotoxins (NSAIDs, ACEi/ARB, IV " radiocontrast, gadolinium, etc.)  - Renally dose meds   Started patient on maintenance fluids    Alcohol use disorder    CIWA protocol however has not required ativan.     - Last alcoholic drink on 1/01. Alcohol level <10 on admission. Denies history of withdrawal in the past including seizures. Remains tachycardic but likely 2/2 acute GIB. Will continue to monitor for signs of withdrawal w/ CIWA protocol and start benzodiazepines if indicated.   - thiamine, folic acid, MV       Lactic acidosis  - see GIB         Coagulopathy    - INR 2.4; likely in setting of cirrhosis. Correct as indicated        Cirrhosis  Patient is visiting from out of town; outside records not available to review thus etiology of cirrhosis not confirmed. However, patient does have a history of ETOH use d/o so this is included in the differential. States he is currently in the process of being referred to a hepatologist and having an EGD scheduled.     MELD-Na score calculated; MELD 3.0: 33 at 1/9/2024  4:08 AM  MELD-Na: 32 at 1/9/2024  4:08 AM  Calculated from:  Serum Creatinine: 1.4 mg/dL at 1/9/2024  4:08 AM  Serum Sodium: 131 mmol/L at 1/9/2024  4:08 AM  Total Bilirubin: 16.2 mg/dL at 1/9/2024  4:08 AM  Serum Albumin: 2.0 g/dL at 1/9/2024  4:08 AM  INR(ratio): 2.3 at 1/8/2024  8:21 AM  Age at listing (hypothetical): 58 years  Sex: Male at 1/9/2024  4:08 AM      Continue chronic meds. Etiology likely ETOH. Will avoid any hepatotoxic meds, and monitor CBC/CMP/INR for synthetic function.   T bilirubin trending up.  Hepatology was consulted in the setting of decompensated liver cirrhosis.  Ultrasound right upper quadrant with Dopplers, PEth, AFP, serological workup, and acute hepatitis panel. Cirrhotic morphology of the liver with sequela of portal hypertension as detailed above.  No discrete hepatic lesions identified.  Biliary sludge with thickened gallbladder wall, likely secondary to hepatic dysfunction.  Underwent paracentesis.  As patient  is not a candidate for transplant and with his high MELD score he would require air transfer to  [place] where he can go to the hospital close to his home.      VTE Risk Mitigation (From admission, onward)           Ordered     Place sequential compression device  Until discontinued         01/03/24 0256     IP VTE LOW RISK PATIENT  Once         01/03/24 0256                    Discharge Planning   MILKA: 1/9/2024     Code Status: Full Code   Is the patient medically ready for discharge?: No    Reason for patient still in hospital (select all that apply): Patient trending condition, Laboratory test, Treatment, Consult recommendations, and Pending disposition  Discharge Plan A: Home, Home with family   Discharge Delays: None known at this time              Bailey Cline MD  Department of Hospital Medicine   [facility]

## 2024-01-10 LAB
ALBUMIN SERPL BCP-MCNC: 1.9 G/DL (ref 3.5–5.2)
ALP SERPL-CCNC: 203 U/L (ref 55–135)
ALT SERPL W/O P-5'-P-CCNC: 108 U/L (ref 10–44)
ANION GAP SERPL CALC-SCNC: 8 MMOL/L (ref 8–16)
AST SERPL-CCNC: 254 U/L (ref 10–40)
BASOPHILS # BLD AUTO: 0.04 K/UL (ref 0–0.2)
BASOPHILS NFR BLD: 0.4 % (ref 0–1.9)
BILIRUB SERPL-MCNC: 16.2 MG/DL (ref 0.1–1)
BUN SERPL-MCNC: 33 MG/DL (ref 6–20)
CALCIUM SERPL-MCNC: 7.9 MG/DL (ref 8.7–10.5)
CHLORIDE SERPL-SCNC: 97 MMOL/L (ref 95–110)
CLINICAL BIOCHEMIST REVIEW: NORMAL
CO2 SERPL-SCNC: 24 MMOL/L (ref 23–29)
CREAT SERPL-MCNC: 1.6 MG/DL (ref 0.5–1.4)
DIFFERENTIAL METHOD BLD: ABNORMAL
EOSINOPHIL # BLD AUTO: 0.2 K/UL (ref 0–0.5)
EOSINOPHIL NFR BLD: 1.7 % (ref 0–8)
ERYTHROCYTE [DISTWIDTH] IN BLOOD BY AUTOMATED COUNT: 25.6 % (ref 11.5–14.5)
EST. GFR  (NO RACE VARIABLE): 49.9 ML/MIN/1.73 M^2
GLUCOSE SERPL-MCNC: 95 MG/DL (ref 70–110)
HCT VFR BLD AUTO: 26.9 % (ref 40–54)
HGB BLD-MCNC: 9.1 G/DL (ref 14–18)
IMM GRANULOCYTES # BLD AUTO: 0.08 K/UL (ref 0–0.04)
IMM GRANULOCYTES NFR BLD AUTO: 0.7 % (ref 0–0.5)
LYMPHOCYTES # BLD AUTO: 1.2 K/UL (ref 1–4.8)
LYMPHOCYTES NFR BLD: 11.2 % (ref 18–48)
MAGNESIUM SERPL-MCNC: 2 MG/DL (ref 1.6–2.6)
MCH RBC QN AUTO: 34.1 PG (ref 27–31)
MCHC RBC AUTO-ENTMCNC: 33.8 G/DL (ref 32–36)
MCV RBC AUTO: 101 FL (ref 82–98)
MONOCYTES # BLD AUTO: 1.5 K/UL (ref 0.3–1)
MONOCYTES NFR BLD: 14.1 % (ref 4–15)
NEUTROPHILS # BLD AUTO: 7.7 K/UL (ref 1.8–7.7)
NEUTROPHILS NFR BLD: 71.9 % (ref 38–73)
NRBC BLD-RTO: 0 /100 WBC
PHOSPHATE SERPL-MCNC: 3.1 MG/DL (ref 2.7–4.5)
PLATELET # BLD AUTO: 82 K/UL (ref 150–450)
PLPETH BLD-MCNC: 103 NG/ML
PMV BLD AUTO: 11.4 FL (ref 9.2–12.9)
POPETH BLD-MCNC: 98 NG/ML
POTASSIUM SERPL-SCNC: 3.7 MMOL/L (ref 3.5–5.1)
PROT SERPL-MCNC: 5.2 G/DL (ref 6–8.4)
RBC # BLD AUTO: 2.67 M/UL (ref 4.6–6.2)
SMOOTH MUSCLE AB TITR SER IF: ABNORMAL {TITER}
SODIUM SERPL-SCNC: 129 MMOL/L (ref 136–145)
WBC # BLD AUTO: 10.7 K/UL (ref 3.9–12.7)

## 2024-01-10 PROCEDURE — 83735 ASSAY OF MAGNESIUM: CPT | Performed by: NURSE PRACTITIONER

## 2024-01-10 PROCEDURE — 25000003 PHARM REV CODE 250

## 2024-01-10 PROCEDURE — P9047 ALBUMIN (HUMAN), 25%, 50ML: HCPCS | Mod: JZ,JG | Performed by: STUDENT IN AN ORGANIZED HEALTH CARE EDUCATION/TRAINING PROGRAM

## 2024-01-10 PROCEDURE — 25000003 PHARM REV CODE 250: Performed by: STUDENT IN AN ORGANIZED HEALTH CARE EDUCATION/TRAINING PROGRAM

## 2024-01-10 PROCEDURE — 94660 CPAP INITIATION&MGMT: CPT

## 2024-01-10 PROCEDURE — 84100 ASSAY OF PHOSPHORUS: CPT | Performed by: NURSE PRACTITIONER

## 2024-01-10 PROCEDURE — 99900035 HC TECH TIME PER 15 MIN (STAT)

## 2024-01-10 PROCEDURE — 80053 COMPREHEN METABOLIC PANEL: CPT | Performed by: NURSE PRACTITIONER

## 2024-01-10 PROCEDURE — 21400001 HC TELEMETRY ROOM

## 2024-01-10 PROCEDURE — 63600175 PHARM REV CODE 636 W HCPCS: Mod: JZ,JG | Performed by: STUDENT IN AN ORGANIZED HEALTH CARE EDUCATION/TRAINING PROGRAM

## 2024-01-10 PROCEDURE — 25000003 PHARM REV CODE 250: Performed by: NURSE PRACTITIONER

## 2024-01-10 PROCEDURE — 36415 COLL VENOUS BLD VENIPUNCTURE: CPT | Performed by: NURSE PRACTITIONER

## 2024-01-10 PROCEDURE — 85025 COMPLETE CBC W/AUTO DIFF WBC: CPT | Performed by: STUDENT IN AN ORGANIZED HEALTH CARE EDUCATION/TRAINING PROGRAM

## 2024-01-10 RX ORDER — ALBUMIN HUMAN 250 G/1000ML
50 SOLUTION INTRAVENOUS 2 TIMES DAILY
Status: DISCONTINUED | OUTPATIENT
Start: 2024-01-10 | End: 2024-01-12

## 2024-01-10 RX ORDER — ALBUMIN HUMAN 250 G/1000ML
50 SOLUTION INTRAVENOUS 2 TIMES DAILY
Status: DISCONTINUED | OUTPATIENT
Start: 2024-01-10 | End: 2024-01-10

## 2024-01-10 RX ADMIN — PANTOPRAZOLE SODIUM 40 MG: 40 TABLET, DELAYED RELEASE ORAL at 10:01

## 2024-01-10 RX ADMIN — FOLIC ACID 1 MG: 1 TABLET ORAL at 10:01

## 2024-01-10 RX ADMIN — ESCITALOPRAM OXALATE 10 MG: 10 TABLET ORAL at 10:01

## 2024-01-10 RX ADMIN — ALBUMIN (HUMAN) 50 G: 12.5 SOLUTION INTRAVENOUS at 08:01

## 2024-01-10 RX ADMIN — Medication 100 MG: at 10:01

## 2024-01-10 RX ADMIN — ALBUMIN (HUMAN) 50 G: 12.5 SOLUTION INTRAVENOUS at 01:01

## 2024-01-10 RX ADMIN — THERA TABS 1 TABLET: TAB at 10:01

## 2024-01-10 RX ADMIN — AMLODIPINE BESYLATE 10 MG: 10 TABLET ORAL at 10:01

## 2024-01-10 RX ADMIN — LACTULOSE 20 G: 20 SOLUTION ORAL at 10:01

## 2024-01-10 RX ADMIN — HYDROCHLOROTHIAZIDE 25 MG: 25 TABLET ORAL at 10:01

## 2024-01-10 RX ADMIN — LACTULOSE 20 G: 20 SOLUTION ORAL at 03:01

## 2024-01-10 RX ADMIN — BACLOFEN 10 MG: 10 TABLET ORAL at 08:01

## 2024-01-10 RX ADMIN — BACLOFEN 10 MG: 10 TABLET ORAL at 10:01

## 2024-01-10 RX ADMIN — PANTOPRAZOLE SODIUM 40 MG: 40 TABLET, DELAYED RELEASE ORAL at 08:01

## 2024-01-10 NOTE — PLAN OF CARE
Problem: Adult Inpatient Plan of Care  Goal: Plan of Care Review  Outcome: Ongoing, Progressing  Flowsheets (Taken 1/10/2024 0538)  Plan of Care Reviewed With: patient  Goal: Patient-Specific Goal (Individualized)  Outcome: Ongoing, Progressing  Flowsheets (Taken 1/10/2024 0538)  Anxieties, Fears or Concerns: none  Individualized Care Needs: CIWA AR every 8 hours, telemetry monitoring, fall and seizure precaution, monitor hemoglobin and hematocrit  Goal: Absence of Hospital-Acquired Illness or Injury  Outcome: Ongoing, Progressing  Intervention: Identify and Manage Fall Risk  Flowsheets (Taken 1/10/2024 0540)  Safety Promotion/Fall Prevention:   assistive device/personal item within reach   bed alarm refused   lighting adjusted   medications reviewed   instructed to call staff for mobility  Intervention: Prevent Skin Injury  Flowsheets (Taken 1/10/2024 0540)  Body Position: position changed independently  Skin Protection:   adhesive use limited   drying agents applied  Intervention: Prevent and Manage VTE (Venous Thromboembolism) Risk  Flowsheets (Taken 1/10/2024 0540)  Activity Management:   Arm raise - L1   Rolling - L1  VTE Prevention/Management: ambulation promoted  Range of Motion: active ROM (range of motion) encouraged  Intervention: Prevent Infection  Flowsheets (Taken 1/10/2024 0540)  Infection Prevention:   environmental surveillance performed   rest/sleep promoted  Goal: Optimal Comfort and Wellbeing  Outcome: Ongoing, Progressing  Intervention: Monitor Pain and Promote Comfort  Flowsheets (Taken 1/10/2024 0540)  Pain Management Interventions:   care clustered   medication offered   quiet environment facilitated   relaxation techniques promoted     Problem: Fluid and Electrolyte Imbalance (Acute Kidney Injury/Impairment)  Goal: Fluid and Electrolyte Balance  Outcome: Ongoing, Progressing     Problem: Oral Intake Inadequate (Acute Kidney Injury/Impairment)  Goal: Optimal Nutrition Intake  Outcome:  Ongoing, Progressing  Intervention: Promote and Optimize Nutrition  Flowsheets (Taken 1/10/2024 0540)  Oral Nutrition Promotion: rest periods promoted     Problem: Renal Function Impairment (Acute Kidney Injury/Impairment)  Goal: Effective Renal Function  Outcome: Ongoing, Progressing  Intervention: Monitor and Support Renal Function  Flowsheets (Taken 1/10/2024 0540)  Medication Review/Management: medications reviewed     Problem: Fall Injury Risk  Goal: Absence of Fall and Fall-Related Injury  Outcome: Ongoing, Progressing  Intervention: Identify and Manage Contributors  Flowsheets (Taken 1/10/2024 0540)  Self-Care Promotion: independence encouraged  Medication Review/Management: medications reviewed  Intervention: Promote Injury-Free Environment  Flowsheets (Taken 1/10/2024 0540)  Safety Promotion/Fall Prevention:   assistive device/personal item within reach   bed alarm refused   lighting adjusted   medications reviewed   instructed to call staff for mobility     Problem: Adult Inpatient Plan of Care  Goal: Readiness for Transition of Care  Outcome: Ongoing, Not Progressing

## 2024-01-10 NOTE — SUBJECTIVE & OBJECTIVE
Interval History/Significant Events: Pt seen and examined this morning on jovana. ELEANOR DONIS bili continues to trend up. Patient started on albumin BID for 2 days for CODY. Hgb 7.9. Bowel movements brown - no sign of black/tarry or red stools. Plan to transfer to [facility] in [place]; awaiting flight logistics.      Objective:     Vital Signs (Most Recent):  Temp: 96 °F (35.6 °C) (01/10/24 1520)  Pulse: 85 (01/10/24 1520)  Resp: 18 (01/10/24 1520)  BP: (!) 146/63 (01/10/24 1520)  SpO2: 97 % (01/10/24 1520) Vital Signs (24h Range):  Temp:  [96 °F (35.6 °C)-98.1 °F (36.7 °C)] 96 °F (35.6 °C)  Pulse:  [] 85  Resp:  [14-20] 18  SpO2:  [96 %-99 %] 97 %  BP: (136-160)/(63-78) 146/63   Weight: 97.6 kg (215 lb 2.7 oz)  Body mass index is 32.72 kg/m².      Intake/Output Summary (Last 24 hours) at 1/10/2024 1628  Last data filed at 1/9/2024 1727  Gross per 24 hour   Intake 360 ml   Output --   Net 360 ml            Physical Exam  Gen: in NAD, appears stated age, jaundiced  Neuro: AAOx4, CN2-12 grossly intact BL; motor, sensory, and strength grossly intact BL  HEENT: + icteric sclera. NTNC, EOMI, PERRLA, MMM; no thyromegaly or lymphadenopathy; no JVD appreciated  CVS: RRR, no m/r/g; S1/S2 auscultated with no S3 or S4; capillary refill < 2 sec  Resp: lungs CTAB, no w/r/r; no belabored breathing or accessory muscle use appreciated   Abd: + Abdominal distension. BS+ in all 4 quadrants; NTND, soft to palpation; no organomegaly appreciated   Extrem: pulses full, equal, and regular over all 4 extremities; no UE or LE edema BL         Vents:     Lines/Drains/Airways       Peripheral Intravenous Line  Duration                  Peripheral IV - Single Lumen 01/03/24 1045 18 G;1 1/4 in Anterior;Left Upper Arm 7 days         Peripheral IV - Single Lumen 01/03/24 1400 18 G;1 1/4 in Anterior;Right Upper Arm 7 days                  Significant Labs:    CBC/Anemia Profile:  Recent Labs   Lab 01/09/24  0408 01/10/24  0301   WBC  "13.51* 10.70   HGB 9.1* 9.1*   HCT 26.8* 26.9*   PLT 93* 82*   * 101*   RDW 26.1* 25.6*          Chemistries:  Recent Labs   Lab 01/09/24  0408 01/10/24  0301   * 129*   K 3.4* 3.7    97   CO2 23 24   BUN 31* 33*   CREATININE 1.4 1.6*   CALCIUM 7.7* 7.9*   ALBUMIN 2.0* 1.9*   PROT 5.4* 5.2*   BILITOT 16.2* 16.2*   ALKPHOS 169* 203*   * 108*   * 254*   MG 1.9 2.0   PHOS 2.8 3.1         CMP:   Recent Labs   Lab 01/09/24  0408 01/10/24  0301   * 129*   K 3.4* 3.7    97   CO2 23 24    95   BUN 31* 33*   CREATININE 1.4 1.6*   CALCIUM 7.7* 7.9*   PROT 5.4* 5.2*   ALBUMIN 2.0* 1.9*   BILITOT 16.2* 16.2*   ALKPHOS 169* 203*   * 254*   * 108*   ANIONGAP 8 8       Coagulation:   No results for input(s): "PT", "INR", "APTT" in the last 48 hours.    Lactic Acid:   No results for input(s): "LACTATE" in the last 48 hours.      Significant Imaging:  I have reviewed all pertinent imaging results/findings within the past 24 hours.  "

## 2024-01-10 NOTE — PLAN OF CARE
Problem: Adult Inpatient Plan of Care  Goal: Plan of Care Review  Outcome: Ongoing, Progressing  Flowsheets (Taken 1/9/2024 1900)  Plan of Care Reviewed With:   patient   spouse   friend  Goal: Patient-Specific Goal (Individualized)  Outcome: Ongoing, Progressing  Flowsheets (Taken 1/9/2024 1900)  Anxieties, Fears or Concerns: Liver Failure  Individualized Care Needs: Wants to be transferred safely  Goal: Absence of Hospital-Acquired Illness or Injury  Outcome: Ongoing, Progressing  Intervention: Identify and Manage Fall Risk  Flowsheets (Taken 1/9/2024 1900)  Safety Promotion/Fall Prevention:   assistive device/personal item within reach   bed alarm refused   Fall Risk reviewed with patient/family   family to remain at bedside   medications reviewed   nonskid shoes/socks when out of bed   supervised activity   toileting scheduled  Intervention: Prevent Skin Injury  Flowsheets (Taken 1/9/2024 1900)  Body Position: position changed independently  Skin Protection:   adhesive use limited   skin-to-device areas padded   skin-to-skin areas padded   transparent dressing maintained   skin sealant/moisture barrier applied   tubing/devices free from skin contact  Intervention: Prevent and Manage VTE (Venous Thromboembolism) Risk  Flowsheets (Taken 1/9/2024 1900)  Activity Management:   Ambulated -L4   Ambulated to bathroom - L4   Ambulated in room - L4   Ankle pumps - L1   Arm raise - L1   Leg kicks - L2   Rolling - L1   Seated marching - L2   Sitting at edge of bed - L2   Standing - L3   Step forward and back - L3   Up in chair - L3   Walking in place - L3  VTE Prevention/Management:   ambulation promoted   bleeding precations maintained   bleeding risk assessed   dorsiflexion/plantar flexion performed   ROM (active) performed   ROM (passive) performed  Range of Motion:   active ROM (range of motion) encouraged   ROM (range of motion) performed  Goal: Optimal Comfort and Wellbeing  Outcome: Ongoing,  Progressing  Intervention: Monitor Pain and Promote Comfort  Flowsheets (Taken 1/9/2024 1900)  Pain Management Interventions:   care clustered   pain management plan reviewed with patient/caregiver   pillow support provided   position adjusted   quiet environment facilitated   relaxation techniques promoted  Intervention: Provide Person-Centered Care  Flowsheets (Taken 1/9/2024 1900)  Trust Relationship/Rapport:   care explained   choices provided   questions encouraged   questions answered   thoughts/feelings acknowledged  Goal: Readiness for Transition of Care  Outcome: Ongoing, Progressing  Intervention: Mutually Develop Transition Plan  Flowsheets (Taken 1/9/2024 1900)  Equipment Currently Used at Home: none  Transportation Anticipated: family or friend will provide  Communicated MILKA with patient/caregiver: Date not available/Unable to determine  Do you expect to return to your current living situation?: Yes  Do you have help at home or someone to help you manage your care at home?: Yes  Readmission within 30 days?: No  Do you currently have service(s) that help you manage your care at home?: No  Is the pt/caregiver preference to resume services with current agency: No     Problem: Fluid and Electrolyte Imbalance (Acute Kidney Injury/Impairment)  Goal: Fluid and Electrolyte Balance  Outcome: Ongoing, Progressing  Intervention: Monitor and Manage Fluid and Electrolyte Balance  Flowsheets (Taken 1/9/2024 1900)  Fluid/Electrolyte Management: electrolyte supplement initiated     Problem: Oral Intake Inadequate (Acute Kidney Injury/Impairment)  Goal: Optimal Nutrition Intake  Outcome: Ongoing, Progressing  Intervention: Promote and Optimize Nutrition  Flowsheets (Taken 1/9/2024 1900)  Oral Nutrition Promotion:   physical activity promoted   medicated   safe use of adaptive equipment encouraged   rest periods promoted     Problem: Renal Function Impairment (Acute Kidney Injury/Impairment)  Goal: Effective Renal  Function  Outcome: Ongoing, Progressing  Intervention: Monitor and Support Renal Function  Flowsheets (Taken 1/9/2024 1900)  Medication Review/Management: medications reviewed     Problem: Fall Injury Risk  Goal: Absence of Fall and Fall-Related Injury  Outcome: Ongoing, Progressing  Intervention: Identify and Manage Contributors  Flowsheets (Taken 1/9/2024 1900)  Self-Care Promotion:   independence encouraged   BADL personal objects within reach   BADL personal routines maintained   meal set-up provided   safe use of adaptive equipment encouraged  Medication Review/Management: medications reviewed  Intervention: Promote Injury-Free Environment  Flowsheets (Taken 1/9/2024 1900)  Safety Promotion/Fall Prevention:   assistive device/personal item within reach   bed alarm refused   Fall Risk reviewed with patient/family   family to remain at bedside   medications reviewed   nonskid shoes/socks when out of bed   supervised activity   toileting scheduled     Problem: Adjustment to Illness (Gastrointestinal Bleeding)  Goal: Optimal Coping with Acute Illness  Outcome: Met  Intervention: Optimize Psychosocial Response  Flowsheets (Taken 1/9/2024 1900)  Supportive Measures:   relaxation techniques promoted   self-care encouraged   self-reflection promoted   self-responsibility promoted   active listening utilized   verbalization of feelings encouraged

## 2024-01-10 NOTE — TREATMENT PLAN
"Hepatology Treatment Plan    Blu Deleon is a 58 y.o. male admitted to hospital 1/2/2024 (Hospital Day: 9) due to Acute upper GI bleed.     Interval History  Tbili stable, not uptrending  Na and Cr worsening  MELD 34  NAEON, no complaints this morning    Objective  Temp:  [96 °F (35.6 °C)-98.1 °F (36.7 °C)] 97.9 °F (36.6 °C) (01/10 0741)  Pulse:  [] 94 (01/10 1211)  BP: (136-160)/(65-78) 153/70 (01/10 1211)  Resp:  [14-20] 14 (01/10 1211)  SpO2:  [96 %-99 %] 96 % (01/10 1211)    Physical Exam:  Constitutional:  not in acute distress and well developed  HENT: Head: Normal, normocephalic.  Eyes:  scleral icterus  Respiratory: normal chest expansion & respiratory effort and no accessory muscle use  GI: soft, non-tender, without masses or organomegaly  Skin:  jaundiced  Neurological: alert, oriented x3.       Laboratory    Recent Labs   Lab 01/10/24  0301   WBC 10.70   RBC 2.67*   HGB 9.1*   HCT 26.9*   PLT 82*   *   MCH 34.1*   MCHC 33.8      Recent Labs   Lab 01/10/24  0301   CALCIUM 7.9*   ALBUMIN 1.9*   PROT 5.2*   *   K 3.7   CO2 24   CL 97   BUN 33*   CREATININE 1.6*   ALKPHOS 203*   *   *   BILITOT 16.2*      No results for input(s): "PT", "INR", "APTT" in the last 24 hours.     MELD 3.0: 34 at 1/10/2024  3:01 AM  MELD-Na: 33 at 1/10/2024  3:01 AM  Calculated from:  Serum Creatinine: 1.6 mg/dL at 1/10/2024  3:01 AM  Serum Sodium: 129 mmol/L at 1/10/2024  3:01 AM  Total Bilirubin: 16.2 mg/dL at 1/10/2024  3:01 AM  Serum Albumin: 1.9 g/dL at 1/10/2024  3:01 AM  INR(ratio): 2.3 at 1/8/2024  8:21 AM  Age at listing (hypothetical): 58 years  Sex: Male at 1/10/2024  3:01 AM       Assessment and Plan    Blu Deleon is a 58 y.o. male with history of alcohol use disorder, HTN, and compensated EtOH cirrhosis who was admitted to [facility] on 01/02 with melena.   After resuscitation, EGD on 1/3/24 showed esophageal ulcers, erythematous mucosa in the antrum, and normal " "duodenum.  Hepatology now consulted for worsening bilirubin.     He hails from [place], visiting from the [facility]. The patient has known about his history of "early cirrhosis" since at least  when liver ultrasound confirmed cirrhosis.  He has continued to drink in spite of knowledge of liver disease and being told to stop; has consumed alcohol regularly since the age of 21.  Last drink 24. No illicit drug use.  Sister  of EtOH cirrhosis.     Case was discussed in committee  and deemed not to be a candidate for liver transplant evaluation due to ongoing alcohol use despite knowledge of liver disease     Problem List:  Newly decompensated EtOH cirrhosis  EtOH use disorder  Esophageal ulcers        Recommendations:  - We are not going to proceed with liver tx eval at this time since patient was aware of cirrhosis but persisted drinking  - Would recommend transfer to hospital closer to home, preferably one with hepatology available. Concerned that his trend in bilirubin indicates very high mortality and that he should be closer to home if this trend continues.  - Serologic eval negative to date. Pending A1AT  - PETH pending  - Continue lactulose titrated to 2-3 BMs per day  - Noted paracentesis negative for SBP  - Hold diuretics for now, sodium downtrending.  - For worsening Cr and sodium, start albumin 50g BID  - low sodium, high protein diet  - Continue Protonix 40mg twice per day for 8 weeks. Repeat upper endoscopy in 8 weeks to check healing of his esophageal ulcers. He would like to have this done in [facility].   - Avoid sedating drugs like opiates & BZDs.   - Daily CBC, CMP, INR.       - We will continue to follow.    Thank you for involving us in the care of Blu Deleon. Please call with any additional questions, concerns or changes in the patient's clinical status.    Dimitri Francois MD  Gastroenterology and Hepatology Fellow, PGY V    "

## 2024-01-11 PROBLEM — E87.20 LACTIC ACIDOSIS: Status: RESOLVED | Noted: 2024-01-03 | Resolved: 2024-01-11

## 2024-01-11 PROBLEM — E87.29 HIGH ANION GAP METABOLIC ACIDOSIS: Status: RESOLVED | Noted: 2024-01-03 | Resolved: 2024-01-11

## 2024-01-11 LAB
ALBUMIN SERPL BCP-MCNC: 2.8 G/DL (ref 3.5–5.2)
ALP SERPL-CCNC: 156 U/L (ref 55–135)
ALT SERPL W/O P-5'-P-CCNC: 77 U/L (ref 10–44)
ANION GAP SERPL CALC-SCNC: 8 MMOL/L (ref 8–16)
AST SERPL-CCNC: 183 U/L (ref 10–40)
BACTERIA SPEC AEROBE CULT: NO GROWTH
BASOPHILS # BLD AUTO: 0.04 K/UL (ref 0–0.2)
BASOPHILS NFR BLD: 0.5 % (ref 0–1.9)
BILIRUB SERPL-MCNC: 18.4 MG/DL (ref 0.1–1)
BUN SERPL-MCNC: 34 MG/DL (ref 6–20)
CALCIUM SERPL-MCNC: 8.2 MG/DL (ref 8.7–10.5)
CHLORIDE SERPL-SCNC: 99 MMOL/L (ref 95–110)
CO2 SERPL-SCNC: 25 MMOL/L (ref 23–29)
CREAT SERPL-MCNC: 1.6 MG/DL (ref 0.5–1.4)
DIFFERENTIAL METHOD BLD: ABNORMAL
EOSINOPHIL # BLD AUTO: 0.2 K/UL (ref 0–0.5)
EOSINOPHIL NFR BLD: 2.3 % (ref 0–8)
ERYTHROCYTE [DISTWIDTH] IN BLOOD BY AUTOMATED COUNT: 25 % (ref 11.5–14.5)
EST. GFR  (NO RACE VARIABLE): 49.9 ML/MIN/1.73 M^2
GLUCOSE SERPL-MCNC: 106 MG/DL (ref 70–110)
HCT VFR BLD AUTO: 23.3 % (ref 40–54)
HGB BLD-MCNC: 7.8 G/DL (ref 14–18)
IMM GRANULOCYTES # BLD AUTO: 0.04 K/UL (ref 0–0.04)
IMM GRANULOCYTES NFR BLD AUTO: 0.5 % (ref 0–0.5)
INR PPP: 2.6 (ref 0.8–1.2)
LYMPHOCYTES # BLD AUTO: 1.3 K/UL (ref 1–4.8)
LYMPHOCYTES NFR BLD: 16.9 % (ref 18–48)
MAGNESIUM SERPL-MCNC: 2.1 MG/DL (ref 1.6–2.6)
MCH RBC QN AUTO: 33.9 PG (ref 27–31)
MCHC RBC AUTO-ENTMCNC: 33.5 G/DL (ref 32–36)
MCV RBC AUTO: 101 FL (ref 82–98)
MONOCYTES # BLD AUTO: 1 K/UL (ref 0.3–1)
MONOCYTES NFR BLD: 13.8 % (ref 4–15)
NEUTROPHILS # BLD AUTO: 4.9 K/UL (ref 1.8–7.7)
NEUTROPHILS NFR BLD: 66 % (ref 38–73)
NRBC BLD-RTO: 0 /100 WBC
PHOSPHATE SERPL-MCNC: 2.8 MG/DL (ref 2.7–4.5)
PLATELET # BLD AUTO: 79 K/UL (ref 150–450)
PMV BLD AUTO: 11.2 FL (ref 9.2–12.9)
POTASSIUM SERPL-SCNC: 3.5 MMOL/L (ref 3.5–5.1)
PROT SERPL-MCNC: 5.2 G/DL (ref 6–8.4)
PROTHROMBIN TIME: 26.6 SEC (ref 9–12.5)
RBC # BLD AUTO: 2.3 M/UL (ref 4.6–6.2)
SODIUM SERPL-SCNC: 132 MMOL/L (ref 136–145)
SODIUM UR-SCNC: <10 MMOL/L (ref 20–250)
WBC # BLD AUTO: 7.44 K/UL (ref 3.9–12.7)

## 2024-01-11 PROCEDURE — 85610 PROTHROMBIN TIME: CPT | Performed by: STUDENT IN AN ORGANIZED HEALTH CARE EDUCATION/TRAINING PROGRAM

## 2024-01-11 PROCEDURE — 84100 ASSAY OF PHOSPHORUS: CPT | Performed by: NURSE PRACTITIONER

## 2024-01-11 PROCEDURE — 21400001 HC TELEMETRY ROOM

## 2024-01-11 PROCEDURE — 36415 COLL VENOUS BLD VENIPUNCTURE: CPT | Performed by: STUDENT IN AN ORGANIZED HEALTH CARE EDUCATION/TRAINING PROGRAM

## 2024-01-11 PROCEDURE — P9047 ALBUMIN (HUMAN), 25%, 50ML: HCPCS | Mod: JZ,JG | Performed by: STUDENT IN AN ORGANIZED HEALTH CARE EDUCATION/TRAINING PROGRAM

## 2024-01-11 PROCEDURE — 94761 N-INVAS EAR/PLS OXIMETRY MLT: CPT

## 2024-01-11 PROCEDURE — 80053 COMPREHEN METABOLIC PANEL: CPT | Performed by: NURSE PRACTITIONER

## 2024-01-11 PROCEDURE — 25000003 PHARM REV CODE 250: Performed by: NURSE PRACTITIONER

## 2024-01-11 PROCEDURE — 25000003 PHARM REV CODE 250

## 2024-01-11 PROCEDURE — 85025 COMPLETE CBC W/AUTO DIFF WBC: CPT | Performed by: STUDENT IN AN ORGANIZED HEALTH CARE EDUCATION/TRAINING PROGRAM

## 2024-01-11 PROCEDURE — 94660 CPAP INITIATION&MGMT: CPT

## 2024-01-11 PROCEDURE — 27000190 HC CPAP FULL FACE MASK W/VALVE

## 2024-01-11 PROCEDURE — 83735 ASSAY OF MAGNESIUM: CPT | Performed by: NURSE PRACTITIONER

## 2024-01-11 PROCEDURE — 63600175 PHARM REV CODE 636 W HCPCS: Mod: JZ,JG | Performed by: STUDENT IN AN ORGANIZED HEALTH CARE EDUCATION/TRAINING PROGRAM

## 2024-01-11 PROCEDURE — 84300 ASSAY OF URINE SODIUM: CPT | Performed by: STUDENT IN AN ORGANIZED HEALTH CARE EDUCATION/TRAINING PROGRAM

## 2024-01-11 PROCEDURE — 99900035 HC TECH TIME PER 15 MIN (STAT)

## 2024-01-11 PROCEDURE — 25000003 PHARM REV CODE 250: Performed by: STUDENT IN AN ORGANIZED HEALTH CARE EDUCATION/TRAINING PROGRAM

## 2024-01-11 RX ORDER — AMLODIPINE BESYLATE 10 MG/1
10 TABLET ORAL DAILY
Qty: 30 TABLET | Refills: 11
Start: 2024-01-12 | End: 2025-01-11

## 2024-01-11 RX ORDER — LACTULOSE 10 G/15ML
20 SOLUTION ORAL 3 TIMES DAILY
Start: 2024-01-11

## 2024-01-11 RX ORDER — PANTOPRAZOLE SODIUM 40 MG/1
40 TABLET, DELAYED RELEASE ORAL 2 TIMES DAILY
Qty: 60 TABLET | Refills: 1
Start: 2024-01-11 | End: 2024-03-11

## 2024-01-11 RX ORDER — ALBUMIN HUMAN 250 G/1000ML
50 SOLUTION INTRAVENOUS 2 TIMES DAILY
Qty: 200 ML | Refills: 0
Start: 2024-01-11 | End: 2024-01-12

## 2024-01-11 RX ADMIN — ALBUMIN (HUMAN) 50 G: 12.5 SOLUTION INTRAVENOUS at 09:01

## 2024-01-11 RX ADMIN — ALBUMIN (HUMAN) 50 G: 12.5 SOLUTION INTRAVENOUS at 08:01

## 2024-01-11 RX ADMIN — PANTOPRAZOLE SODIUM 40 MG: 40 TABLET, DELAYED RELEASE ORAL at 08:01

## 2024-01-11 RX ADMIN — THERA TABS 1 TABLET: TAB at 08:01

## 2024-01-11 RX ADMIN — Medication 100 MG: at 08:01

## 2024-01-11 RX ADMIN — LACTULOSE 20 G: 20 SOLUTION ORAL at 02:01

## 2024-01-11 RX ADMIN — AMLODIPINE BESYLATE 10 MG: 10 TABLET ORAL at 08:01

## 2024-01-11 RX ADMIN — LACTULOSE 20 G: 20 SOLUTION ORAL at 08:01

## 2024-01-11 RX ADMIN — BACLOFEN 10 MG: 10 TABLET ORAL at 02:01

## 2024-01-11 RX ADMIN — HYDROCHLOROTHIAZIDE 25 MG: 25 TABLET ORAL at 08:01

## 2024-01-11 RX ADMIN — FOLIC ACID 1 MG: 1 TABLET ORAL at 08:01

## 2024-01-11 RX ADMIN — BACLOFEN 10 MG: 10 TABLET ORAL at 08:01

## 2024-01-11 RX ADMIN — ESCITALOPRAM OXALATE 10 MG: 10 TABLET ORAL at 08:01

## 2024-01-11 NOTE — PLAN OF CARE
SW met with pt and wife and provided update on air flight status.  Cost for air travel from [facility] to [place], [place] is $59,731.00. Trip will be for 2 days.  Pt and pt's wife inquired if wife would be able to go with pt via air flight.  SW will confirm.  In addition, pt has inquired if insurance would cover 60% of travel flight in advised.      SW spoke to [employer]  [phone] to verify if insurance will cover 60% towards flight.  The following options were provided to SW:  pt can pay in advance and then submit for reimbursement to insurance company or PA form would need to be completed with the following information, documentation of medical necessity, CPT codes for (mileage, gas, and type of aircraft).  Urgent authorization request takes up to 2 days.      GUNJAN waiting for a return call from [facility] patient flow to further discuss process.      Mirian Wells LMSW  Part-Time-  [facility]  Ext. [phone]

## 2024-01-11 NOTE — PROGRESS NOTES
[facility] Medicine  Progress Note    Patient Name: Blu Deleon  MRN: [id number]  Patient Class: IP- Inpatient   Admission Date: 1/2/2024  Length of Stay: 8 days  Attending Physician: Rik Gomes MD  Primary Care Provider: Radha, Primary Doctor        Subjective:     Principal Problem:Acute upper GI bleed        HPI:  Mr. Deleon is a 58 year old male with PMH notable for HTN, ETOH use, reported cirrhosis (has not seen a hepatologist), and depression who presented to [facility] ED with acute GI bleeding. In speaking with patient and wife at bedside, patient reports four days of nausea and vomiting with bright red blood and black tarry diarrhea. Additionally, he reports a fall yesterday after attempting to go to the restroom secondary to being lightheaded. He denies this happening previously. He was scheduled for an outpatient EGD, which has not been completed. He has not had a colonoscopy. He denies any anticoagulation or antiplatelet use.      In the emergency department he was found to be acutely anemic with a hgb of 5. He has not been hypotensive while in the emergency department, however, notably tachycardic with -140. Labs otherwise notable for WBC 13.56, Plt 107, INR 2.4, BUN 57, Cr 1.6, TB 5.4, , ALT 44. Initial lactate >12. ETOH level <10. Patient is s/p 3u pRBCs.    Admitted to MICU for UGIB.      Overview/Hospital Course:  Patient admitted for UGIB. GI consulted and EGD showed esophageal ulcers. Pt to continue PPI for 8 weeks with repeat scope to follow. Hgb stable s/p 3u PRBC and 1u FFP. HDS and tolerating clear liquid diet. CIWA ordered and no ativan required.  Patient was transferred to floors on 01/05.  Creatinine has remained stable at 1.4.  T bilirubin trending up.  Hepatology was consulted in the setting of decompensated liver cirrhosis.  Ultrasound right upper quadrant with Dopplers, PEth, AFP, serological workup, and acute hepatitis panel.  Patient underwent  paracentesis on 01/08 and ascitic fluid negative for SBP.  As patient is not a candidate for transplant and with his high MELD score he would require air transfer to [place] where he can go to the hospital close to his home. Accepted for transfer to [facility] in [place], awaiting logistics of flight transport.    Interval History/Significant Events: Pt seen and examined this morning on rounds. ANNALISE. T bili continues to trend up. Patient started on albumin BID for 2 days for CODY. Hgb 7.9. Bowel movements brown - no sign of black/tarry or red stools. Plan to transfer to [facility] in [place]; awaiting flight logistics.      Objective:     Vital Signs (Most Recent):  Temp: 96 °F (35.6 °C) (01/10/24 1520)  Pulse: 85 (01/10/24 1520)  Resp: 18 (01/10/24 1520)  BP: (!) 146/63 (01/10/24 1520)  SpO2: 97 % (01/10/24 1520) Vital Signs (24h Range):  Temp:  [96 °F (35.6 °C)-98.1 °F (36.7 °C)] 96 °F (35.6 °C)  Pulse:  [] 85  Resp:  [14-20] 18  SpO2:  [96 %-99 %] 97 %  BP: (136-160)/(63-78) 146/63   Weight: 97.6 kg (215 lb 2.7 oz)  Body mass index is 32.72 kg/m².      Intake/Output Summary (Last 24 hours) at 1/10/2024 1628  Last data filed at 1/9/2024 1727  Gross per 24 hour   Intake 360 ml   Output --   Net 360 ml            Physical Exam  Gen: in NAD, appears stated age, jaundiced  Neuro: AAOx4, CN2-12 grossly intact BL; motor, sensory, and strength grossly intact BL  HEENT: + icteric sclera. NTNC, EOMI, PERRLA, MMM; no thyromegaly or lymphadenopathy; no JVD appreciated  CVS: RRR, no m/r/g; S1/S2 auscultated with no S3 or S4; capillary refill < 2 sec  Resp: lungs CTAB, no w/r/r; no belabored breathing or accessory muscle use appreciated   Abd: + Abdominal distension. BS+ in all 4 quadrants; NTND, soft to palpation; no organomegaly appreciated   Extrem: pulses full, equal, and regular over all 4 extremities; no UE or LE edema BL         Vents:     Lines/Drains/Airways       Peripheral Intravenous Line   "Duration                  Peripheral IV - Single Lumen 01/03/24 1045 18 G;1 1/4 in Anterior;Left Upper Arm 7 days         Peripheral IV - Single Lumen 01/03/24 1400 18 G;1 1/4 in Anterior;Right Upper Arm 7 days                  Significant Labs:    CBC/Anemia Profile:  Recent Labs   Lab 01/09/24  0408 01/10/24  0301   WBC 13.51* 10.70   HGB 9.1* 9.1*   HCT 26.8* 26.9*   PLT 93* 82*   * 101*   RDW 26.1* 25.6*          Chemistries:  Recent Labs   Lab 01/09/24  0408 01/10/24  0301   * 129*   K 3.4* 3.7    97   CO2 23 24   BUN 31* 33*   CREATININE 1.4 1.6*   CALCIUM 7.7* 7.9*   ALBUMIN 2.0* 1.9*   PROT 5.4* 5.2*   BILITOT 16.2* 16.2*   ALKPHOS 169* 203*   * 108*   * 254*   MG 1.9 2.0   PHOS 2.8 3.1         CMP:   Recent Labs   Lab 01/09/24  0408 01/10/24  0301   * 129*   K 3.4* 3.7    97   CO2 23 24    95   BUN 31* 33*   CREATININE 1.4 1.6*   CALCIUM 7.7* 7.9*   PROT 5.4* 5.2*   ALBUMIN 2.0* 1.9*   BILITOT 16.2* 16.2*   ALKPHOS 169* 203*   * 254*   * 108*   ANIONGAP 8 8       Coagulation:   No results for input(s): "PT", "INR", "APTT" in the last 48 hours.    Lactic Acid:   No results for input(s): "LACTATE" in the last 48 hours.      Significant Imaging:  I have reviewed all pertinent imaging results/findings within the past 24 hours.    Assessment/Plan:      * Acute upper GI bleed    58 year old M w/ cirrhosis and ETOH use d/o presenting w/ melana and ground coffee emesis/hematemesis. Denies prior symptoms. No EGDs on file. Normotensive and tachycardic w/ HR 130s. Initial Hgb 5.0; now s/p 2u pRBCs. Initial lactate >12. Labs otherwise notable for Plt 116, INR 2.4, BUN 57, Cr 1.6, TB 5.4, , ALT 44. Admitted to MICU for management of acute upper GIB.      EGD showed esophageal ulcers. Hgb stable s/p 3u PRBC. Will need scope after 8 week course of PPI.     - Soft and bite-sized diet, advance as tolerated  - Trend Hgb and transfuse for goal Hgb > 7, " unless otherwise indicated  - Maintain IV access with 2 large bore IV's  - Intravascular resuscitation/support with IVF's   - Hold all NSAIDs and anticoagulants, unless contraindicated  - Pantoprazole 40 mg BID for a total of 8 weeks  - Correct any coagulopathy with platelets and FFP for goal of platelets >50K and INR <2.0  - CTM CBC    Transaminitis  - 2:1 pattern; likely 2/2 ETOH use d/o. Will continue to monitor        Thrombocytopenia  Etiology likely 2/2 cirrhosis  - Monitor w/ daily CBC and transfuse if Plt <10 or <50 w/ signs of active bleeding   Recent Labs   Lab 01/11/24  0356   PLT 79*           CODY (acute kidney injury)  - Cr 1.6 at admission, unclear baseline  - Initially likely pre-renal in setting of GIB.  - Now with slow elevation of creatinine  - Hepatology recommending albumin 50g BID for 48 hours  - Renally dosing all medications  - Avoiding nephrotoxins  - Will continue to monitor on daily labs      Alcohol use disorder  - Last alcoholic drink on 1/01. Alcohol level <10 on admission. Denies history of withdrawal in the past including seizures. Remains tachycardic but likely 2/2 acute GIB. Will continue to monitor for signs of withdrawal w/ CIWA protocol and start benzodiazepines if indicated.   - thiamine, folic acid, MV  - DC CIWA protocol as now out of withdrawal window       Coagulopathy  - INR 2.4; likely in setting of cirrhosis. Correct as indicated        Cirrhosis  Patient is visiting from out of town; outside records not available to review thus etiology of cirrhosis not confirmed. However, patient does have a history of ETOH use d/o so this is included in the differential. States he is currently in the process of being referred to a hepatologist and having an EGD scheduled.     MELD-Na score calculated; MELD 3.0: 33 at 1/9/2024  4:08 AM  MELD-Na: 32 at 1/9/2024  4:08 AM  Calculated from:  Serum Creatinine: 1.4 mg/dL at 1/9/2024  4:08 AM  Serum Sodium: 131 mmol/L at 1/9/2024  4:08  AM  Total Bilirubin: 16.2 mg/dL at 1/9/2024  4:08 AM  Serum Albumin: 2.0 g/dL at 1/9/2024  4:08 AM  INR(ratio): 2.3 at 1/8/2024  8:21 AM  Age at listing (hypothetical): 58 years  Sex: Male at 1/9/2024  4:08 AM    Continue chronic meds. Etiology likely ETOH. Will avoid any hepatotoxic meds, and monitor CBC/CMP/INR for synthetic function.   T bilirubin trending up. Hepatology was consulted in the setting of decompensated liver cirrhosis. Ultrasound right upper quadrant with Dopplers, PEth, AFP, serological workup, and acute hepatitis panel. Cirrhotic morphology of the liver with sequela of portal hypertension as detailed above. No discrete hepatic lesions identified. Biliary sludge with thickened gallbladder wall, likely secondary to hepatic dysfunction.  Underwent paracentesis.  As patient is not a candidate for transplant and with his high MELD score he would require air transfer to  [place] where he can go to the hospital close to his home. On lactulose and albumin per hepatology recs.   - Continue lactulose goal 3 BM's daily  - Continue albumin 50g BID for 48 hours  - Accepted to [facility] in [place]. Pending flight logistics.      VTE Risk Mitigation (From admission, onward)           Ordered     Place sequential compression device  Until discontinued         01/03/24 0256     IP VTE LOW RISK PATIENT  Once         01/03/24 0256                    Discharge Planning   MILKA: 1/12/2024     Code Status: Full Code   Is the patient medically ready for discharge?: No    Reason for patient still in hospital (select all that apply): Treatment and Pending disposition  Discharge Plan A: Home, Home with family   Discharge Delays: None known at this time              Rik Gomes MD  Department of Hospital Medicine   [facility] Surg

## 2024-01-11 NOTE — ASSESSMENT & PLAN NOTE
- Last alcoholic drink on 1/01. Alcohol level <10 on admission. Denies history of withdrawal in the past including seizures. Remains tachycardic but likely 2/2 acute GIB. Will continue to monitor for signs of withdrawal w/ CIWA protocol and start benzodiazepines if indicated.   - thiamine, folic acid, MV  - DC CIWA protocol as now out of withdrawal window

## 2024-01-11 NOTE — ASSESSMENT & PLAN NOTE
58 year old M w/ cirrhosis and ETOH use d/o presenting w/ melana and ground coffee emesis/hematemesis. Denies prior symptoms. No EGDs on file. Normotensive and tachycardic w/ HR 130s. Initial Hgb 5.0; now s/p 2u pRBCs. Initial lactate >12. Labs otherwise notable for Plt 116, INR 2.4, BUN 57, Cr 1.6, TB 5.4, , ALT 44. Admitted to MICU for management of acute upper GIB.      EGD showed esophageal ulcers. Hgb stable s/p 3u PRBC. Will need scope after 8 week course of PPI.     - Soft and bite-sized diet, advance as tolerated  - Trend Hgb and transfuse for goal Hgb > 7, unless otherwise indicated  - Maintain IV access with 2 large bore IV's  - Intravascular resuscitation/support with IVF's   - Hold all NSAIDs and anticoagulants, unless contraindicated  - Pantoprazole 40 mg BID for a total of 8 weeks  - Correct any coagulopathy with platelets and FFP for goal of platelets >50K and INR <2.0  - CTM CBC

## 2024-01-11 NOTE — ASSESSMENT & PLAN NOTE
Etiology likely 2/2 cirrhosis  - Monitor w/ daily CBC and transfuse if Plt <10 or <50 w/ signs of active bleeding   Recent Labs   Lab 01/11/24  0356   PLT 79*

## 2024-01-11 NOTE — ASSESSMENT & PLAN NOTE
- Cr 1.6 at admission, unclear baseline  - Initially likely pre-renal in setting of GIB.  - Now with slow elevation of creatinine  - Hepatology recommending albumin 50g BID for 48 hours  - Renally dosing all medications  - Avoiding nephrotoxins  - Will continue to monitor on daily labs

## 2024-01-11 NOTE — PLAN OF CARE
Problem: Adult Inpatient Plan of Care  Goal: Plan of Care Review  Outcome: Ongoing, Progressing  Flowsheets (Taken 1/11/2024 0431)  Plan of Care Reviewed With: patient  Goal: Patient-Specific Goal (Individualized)  Outcome: Ongoing, Progressing  Flowsheets (Taken 1/11/2024 0431)  Anxieties, Fears or Concerns: none  Individualized Care Needs: telemetry monitoring, fall prevention, IV Fluids hydration  Goal: Absence of Hospital-Acquired Illness or Injury  Outcome: Ongoing, Progressing  Intervention: Identify and Manage Fall Risk  Flowsheets (Taken 1/11/2024 0431)  Safety Promotion/Fall Prevention:   assistive device/personal item within reach   bed alarm set   lighting adjusted   medications reviewed   instructed to call staff for mobility  Intervention: Prevent Skin Injury  Flowsheets (Taken 1/11/2024 0431)  Body Position: position changed independently  Skin Protection: adhesive use limited  Intervention: Prevent and Manage VTE (Venous Thromboembolism) Risk  Flowsheets (Taken 1/11/2024 0431)  Activity Management:   Arm raise - L1   Rolling - L1  VTE Prevention/Management: ambulation promoted  Range of Motion: active ROM (range of motion) encouraged  Intervention: Prevent Infection  Flowsheets (Taken 1/11/2024 0431)  Infection Prevention:   environmental surveillance performed   rest/sleep promoted  Goal: Optimal Comfort and Wellbeing  Outcome: Ongoing, Progressing  Intervention: Monitor Pain and Promote Comfort  Flowsheets (Taken 1/11/2024 0431)  Pain Management Interventions:   care clustered   medication offered   relaxation techniques promoted   quiet environment facilitated  Goal: Readiness for Transition of Care  Outcome: Ongoing, Progressing     Problem: Fluid and Electrolyte Imbalance (Acute Kidney Injury/Impairment)  Goal: Fluid and Electrolyte Balance  Outcome: Ongoing, Progressing  Intervention: Monitor and Manage Fluid and Electrolyte Balance  Flowsheets (Taken 1/11/2024 0431)  Fluid/Electrolyte  Management: intravenous fluid replacement initiated     Problem: Oral Intake Inadequate (Acute Kidney Injury/Impairment)  Goal: Optimal Nutrition Intake  Outcome: Ongoing, Progressing  Intervention: Promote and Optimize Nutrition  Flowsheets (Taken 1/11/2024 0431)  Oral Nutrition Promotion: rest periods promoted     Problem: Renal Function Impairment (Acute Kidney Injury/Impairment)  Goal: Effective Renal Function  Outcome: Ongoing, Progressing  Intervention: Monitor and Support Renal Function  Flowsheets (Taken 1/11/2024 0431)  Medication Review/Management: medications reviewed     Problem: Fall Injury Risk  Goal: Absence of Fall and Fall-Related Injury  Outcome: Ongoing, Progressing  Intervention: Identify and Manage Contributors  Flowsheets (Taken 1/11/2024 0431)  Self-Care Promotion: independence encouraged  Medication Review/Management: medications reviewed  Intervention: Promote Injury-Free Environment  Flowsheets (Taken 1/11/2024 0431)  Safety Promotion/Fall Prevention:   assistive device/personal item within reach   bed alarm set   lighting adjusted   medications reviewed   instructed to call staff for mobility       No significant changes on our shift.

## 2024-01-11 NOTE — TREATMENT PLAN
Hepatology Treatment Plan    Blu Deleon is a 58 y.o. male admitted to hospital 1/2/2024 (Hospital Day: 10) due to Acute upper GI bleed.     Interval History  Tbili up to 18 this morning  Na improving  Cr stable, still elevated    MELD 34    Reports back pain, asks for heating pad  Up in chair this morning    Accepted for transfer to [facility] in [place], awaiting logistics of flight transport    Objective  Temp:  [96 °F (35.6 °C)-98.3 °F (36.8 °C)] 98.2 °F (36.8 °C) (01/11 1214)  Pulse:  [] 81 (01/11 1214)  BP: (125-151)/(56-69) 151/69 (01/11 1214)  Resp:  [14-18] 18 (01/11 1214)  SpO2:  [93 %-97 %] 95 % (01/11 1214)    Physical Exam:  Constitutional:  not in acute distress and well developed  HENT: Head: Normal, normocephalic.  Eyes:  scleral icterus  Respiratory: normal chest expansion & respiratory effort and no accessory muscle use  GI: soft, non-tender, without masses or organomegaly  Skin:  jaundiced  Neurological: alert, oriented x3.       Laboratory    Recent Labs   Lab 01/11/24  0356   WBC 7.44   RBC 2.30*   HGB 7.8*   HCT 23.3*   PLT 79*   *   MCH 33.9*   MCHC 33.5      Recent Labs   Lab 01/11/24  0356   CALCIUM 8.2*   ALBUMIN 2.8*   PROT 5.2*   *   K 3.5   CO2 25   CL 99   BUN 34*   CREATININE 1.6*   ALKPHOS 156*   ALT 77*   *   BILITOT 18.4*      Recent Labs   Lab 01/11/24  0356   INR 2.6*        MELD 3.0: 34 at 1/11/2024  3:56 AM  MELD-Na: 34 at 1/11/2024  3:56 AM  Calculated from:  Serum Creatinine: 1.6 mg/dL at 1/11/2024  3:56 AM  Serum Sodium: 132 mmol/L at 1/11/2024  3:56 AM  Total Bilirubin: 18.4 mg/dL at 1/11/2024  3:56 AM  Serum Albumin: 2.8 g/dL at 1/11/2024  3:56 AM  INR(ratio): 2.6 at 1/11/2024  3:56 AM  Age at listing (hypothetical): 58 years  Sex: Male at 1/11/2024  3:56 AM       Assessment and Plan    Blu Deleon is a 58 y.o. male with history of alcohol use disorder, HTN, and compensated EtOH cirrhosis who was admitted to [facility] on  " with melena.   After resuscitation, EGD on 1/3/24 showed esophageal ulcers, erythematous mucosa in the antrum, and normal duodenum.  Hepatology now consulted for worsening bilirubin.     He hails from [place], visiting from the [facility]. The patient has known about his history of "early cirrhosis" since at least  when liver ultrasound confirmed cirrhosis.  He has continued to drink in spite of knowledge of liver disease and being told to stop; has consumed alcohol regularly since the age of 21.  Last drink 24. No illicit drug use.  Sister  of EtOH cirrhosis.     Case was discussed in committee  and deemed not to be a candidate for liver transplant evaluation due to ongoing alcohol use despite knowledge of liver disease       Problem List:  Newly decompensated EtOH cirrhosis  EtOH use disorder  Esophageal ulcers        Recommendations:  - We are not going to proceed with liver tx eval at this time since patient was aware of cirrhosis but persisted drinking  - Would recommend transfer to hospital closer to home, preferably one with hepatology available. Concerned that his trend in bilirubin indicates very high mortality and that he should be closer to home if this trend continues. Now accepted to [facility] in [place].  - Serologic eval negative to date. Pending A1AT  - Noted PETH positive at 98  - Continue lactulose titrated to 2-3 BMs per day  - For CODY, continue albumin 50g BID to complete 48h of intravascular volume expansion  - Hold HCTZ with CODY and hyponatremia  - low sodium, high protein diet  - Continue Protonix 40mg twice per day for 8 weeks. Repeat upper endoscopy in 8 weeks to check healing of his esophageal ulcers. He would like to have this done in [facility].   - Avoid sedating drugs like opiates & BZDs.   - Daily CBC, CMP, INR.       - We will continue to follow.    Thank you for involving us in the care of Blu Deleon. Please call with any additional questions, " concerns or changes in the patient's clinical status.    Dimitri Francois MD  Gastroenterology and Hepatology Fellow, PGY V

## 2024-01-11 NOTE — PLAN OF CARE
Patient admitted for UGIB requiring multiple transfusions. No longer bleeding however now found to be with decompensated cirrhosis with CODY. It is medically necessary for patient to travel back home via flight/air lift given decompensated cirrhosis requiring and CODY which is likely secondary to cirrhosis and requiring albumin infusions.    Rik Gomes MD  Attending Physician  Department of Hospital Medicine  1/11/2024

## 2024-01-12 LAB
A1AT PHENOTYP SERPL-IMP: NORMAL BANDS
A1AT SERPL NEPH-MCNC: 114 MG/DL (ref 100–190)
ALBUMIN FLD-MCNC: 0.6 G/DL
ALBUMIN SERPL BCP-MCNC: 3.4 G/DL (ref 3.5–5.2)
ALP SERPL-CCNC: 171 U/L (ref 55–135)
ALT SERPL W/O P-5'-P-CCNC: 67 U/L (ref 10–44)
AMORPH CRY UR QL COMP ASSIST: ABNORMAL
ANION GAP SERPL CALC-SCNC: 10 MMOL/L (ref 8–16)
ANISOCYTOSIS BLD QL SMEAR: SLIGHT
APPEARANCE FLD: CLEAR
AST SERPL-CCNC: 151 U/L (ref 10–40)
BACTERIA #/AREA URNS AUTO: ABNORMAL /HPF
BASOPHILS # BLD AUTO: 0.03 K/UL (ref 0–0.2)
BASOPHILS # BLD AUTO: 0.06 K/UL (ref 0–0.2)
BASOPHILS NFR BLD: 0.6 % (ref 0–1.9)
BASOPHILS NFR BLD: 0.8 % (ref 0–1.9)
BILIRUB SERPL-MCNC: 19.9 MG/DL (ref 0.1–1)
BILIRUB UR QL STRIP: ABNORMAL
BODY FLD TYPE: NORMAL
BODY FLUID SOURCE, LDH: NORMAL
BUN SERPL-MCNC: 32 MG/DL (ref 6–20)
BURR CELLS BLD QL SMEAR: ABNORMAL
CALCIUM SERPL-MCNC: 8.7 MG/DL (ref 8.7–10.5)
CHLORIDE SERPL-SCNC: 101 MMOL/L (ref 95–110)
CLARITY UR REFRACT.AUTO: ABNORMAL
CO2 SERPL-SCNC: 24 MMOL/L (ref 23–29)
COLOR FLD: YELLOW
COLOR UR AUTO: YELLOW
CREAT SERPL-MCNC: 1.6 MG/DL (ref 0.5–1.4)
DIFFERENTIAL METHOD BLD: ABNORMAL
DIFFERENTIAL METHOD BLD: ABNORMAL
EOSINOPHIL # BLD AUTO: 0 K/UL (ref 0–0.5)
EOSINOPHIL # BLD AUTO: 0.1 K/UL (ref 0–0.5)
EOSINOPHIL NFR BLD: 0.8 % (ref 0–8)
EOSINOPHIL NFR BLD: 1.8 % (ref 0–8)
EPITH CASTS #/AREA UR COMP ASSIST: 1 /LPF
ERYTHROCYTE [DISTWIDTH] IN BLOOD BY AUTOMATED COUNT: 24.8 % (ref 11.5–14.5)
ERYTHROCYTE [DISTWIDTH] IN BLOOD BY AUTOMATED COUNT: 24.9 % (ref 11.5–14.5)
EST. GFR  (NO RACE VARIABLE): 49.9 ML/MIN/1.73 M^2
GLUCOSE SERPL-MCNC: 97 MG/DL (ref 70–110)
GLUCOSE UR QL STRIP: NEGATIVE
GRAM STN SPEC: NORMAL
GRAM STN SPEC: NORMAL
GRAN CASTS UR QL COMP ASSIST: 4 /LPF
HCT VFR BLD AUTO: 22.9 % (ref 40–54)
HCT VFR BLD AUTO: 23.7 % (ref 40–54)
HGB BLD-MCNC: 7.7 G/DL (ref 14–18)
HGB BLD-MCNC: 7.8 G/DL (ref 14–18)
HGB UR QL STRIP: NEGATIVE
HYALINE CASTS UR QL AUTO: 32 /LPF
HYPOCHROMIA BLD QL SMEAR: ABNORMAL
IMM GRANULOCYTES # BLD AUTO: 0.04 K/UL (ref 0–0.04)
IMM GRANULOCYTES # BLD AUTO: 0.07 K/UL (ref 0–0.04)
IMM GRANULOCYTES NFR BLD AUTO: 0.5 % (ref 0–0.5)
IMM GRANULOCYTES NFR BLD AUTO: 1.5 % (ref 0–0.5)
INFLUENZA A, MOLECULAR: NOT DETECTED
INFLUENZA B, MOLECULAR: NOT DETECTED
INR PPP: 2.8 (ref 0.8–1.2)
KETONES UR QL STRIP: ABNORMAL
LDH FLD L TO P-CCNC: 31 U/L
LEUKOCYTE ESTERASE UR QL STRIP: NEGATIVE
LYMPHOCYTES # BLD AUTO: 0.3 K/UL (ref 1–4.8)
LYMPHOCYTES # BLD AUTO: 1 K/UL (ref 1–4.8)
LYMPHOCYTES NFR BLD: 13.3 % (ref 18–48)
LYMPHOCYTES NFR BLD: 5.6 % (ref 18–48)
LYMPHOCYTES NFR FLD MANUAL: 56 %
MAGNESIUM SERPL-MCNC: 2.1 MG/DL (ref 1.6–2.6)
MCH RBC QN AUTO: 33.8 PG (ref 27–31)
MCH RBC QN AUTO: 33.9 PG (ref 27–31)
MCHC RBC AUTO-ENTMCNC: 32.9 G/DL (ref 32–36)
MCHC RBC AUTO-ENTMCNC: 33.6 G/DL (ref 32–36)
MCV RBC AUTO: 100 FL (ref 82–98)
MCV RBC AUTO: 103 FL (ref 82–98)
MICROSCOPIC COMMENT: ABNORMAL
MONOCYTES # BLD AUTO: 0.1 K/UL (ref 0.3–1)
MONOCYTES # BLD AUTO: 1.2 K/UL (ref 0.3–1)
MONOCYTES NFR BLD: 1.9 % (ref 4–15)
MONOCYTES NFR BLD: 15.7 % (ref 4–15)
MONOS+MACROS NFR FLD MANUAL: 36 %
NEUTROPHILS # BLD AUTO: 4.3 K/UL (ref 1.8–7.7)
NEUTROPHILS # BLD AUTO: 5.2 K/UL (ref 1.8–7.7)
NEUTROPHILS NFR BLD: 67.9 % (ref 38–73)
NEUTROPHILS NFR BLD: 89.6 % (ref 38–73)
NEUTROPHILS NFR FLD MANUAL: 8 %
NITRITE UR QL STRIP: NEGATIVE
NON-SQ EPI CELLS #/AREA URNS AUTO: 7 /HPF
NRBC BLD-RTO: 0 /100 WBC
NRBC BLD-RTO: 0 /100 WBC
PH UR STRIP: 6 [PH] (ref 5–8)
PHOSPHATE SERPL-MCNC: 2.3 MG/DL (ref 2.7–4.5)
PLATELET # BLD AUTO: 75 K/UL (ref 150–450)
PLATELET # BLD AUTO: 83 K/UL (ref 150–450)
PLATELET BLD QL SMEAR: ABNORMAL
PMV BLD AUTO: 10.9 FL (ref 9.2–12.9)
PMV BLD AUTO: 11.3 FL (ref 9.2–12.9)
POIKILOCYTOSIS BLD QL SMEAR: SLIGHT
POLYCHROMASIA BLD QL SMEAR: ABNORMAL
POTASSIUM SERPL-SCNC: 3.3 MMOL/L (ref 3.5–5.1)
PROT FLD-MCNC: <1 G/DL
PROT SERPL-MCNC: 5.6 G/DL (ref 6–8.4)
PROT UR QL STRIP: ABNORMAL
PROTHROMBIN TIME: 28.2 SEC (ref 9–12.5)
RBC # BLD AUTO: 2.28 M/UL (ref 4.6–6.2)
RBC # BLD AUTO: 2.3 M/UL (ref 4.6–6.2)
RBC #/AREA URNS AUTO: 2 /HPF (ref 0–4)
RSV AG BY MOLECULAR METHOD: NOT DETECTED
SARS-COV-2 RNA RESP QL NAA+PROBE: NOT DETECTED
SODIUM SERPL-SCNC: 135 MMOL/L (ref 136–145)
SP GR UR STRIP: 1.02 (ref 1–1.03)
SPECIMEN SOURCE: NORMAL
SPECIMEN SOURCE: NORMAL
SQUAMOUS #/AREA URNS AUTO: 1 /HPF
URN SPEC COLLECT METH UR: ABNORMAL
WBC # BLD AUTO: 4.78 K/UL (ref 3.9–12.7)
WBC # BLD AUTO: 7.72 K/UL (ref 3.9–12.7)
WBC # FLD: 48 /CU MM
WBC #/AREA URNS AUTO: 6 /HPF (ref 0–5)
WBC CLUMPS UR QL AUTO: ABNORMAL

## 2024-01-12 PROCEDURE — 83735 ASSAY OF MAGNESIUM: CPT | Performed by: NURSE PRACTITIONER

## 2024-01-12 PROCEDURE — 85025 COMPLETE CBC W/AUTO DIFF WBC: CPT | Performed by: STUDENT IN AN ORGANIZED HEALTH CARE EDUCATION/TRAINING PROGRAM

## 2024-01-12 PROCEDURE — 63600175 PHARM REV CODE 636 W HCPCS: Performed by: INTERNAL MEDICINE

## 2024-01-12 PROCEDURE — 94660 CPAP INITIATION&MGMT: CPT

## 2024-01-12 PROCEDURE — 36415 COLL VENOUS BLD VENIPUNCTURE: CPT | Performed by: STUDENT IN AN ORGANIZED HEALTH CARE EDUCATION/TRAINING PROGRAM

## 2024-01-12 PROCEDURE — 87070 CULTURE OTHR SPECIMN AEROBIC: CPT | Performed by: INTERNAL MEDICINE

## 2024-01-12 PROCEDURE — 63600175 PHARM REV CODE 636 W HCPCS: Mod: JZ,JG | Performed by: STUDENT IN AN ORGANIZED HEALTH CARE EDUCATION/TRAINING PROGRAM

## 2024-01-12 PROCEDURE — 25000003 PHARM REV CODE 250: Performed by: STUDENT IN AN ORGANIZED HEALTH CARE EDUCATION/TRAINING PROGRAM

## 2024-01-12 PROCEDURE — 84100 ASSAY OF PHOSPHORUS: CPT | Performed by: NURSE PRACTITIONER

## 2024-01-12 PROCEDURE — 25000003 PHARM REV CODE 250: Performed by: NURSE PRACTITIONER

## 2024-01-12 PROCEDURE — 87075 CULTR BACTERIA EXCEPT BLOOD: CPT | Performed by: INTERNAL MEDICINE

## 2024-01-12 PROCEDURE — 63600175 PHARM REV CODE 636 W HCPCS: Performed by: STUDENT IN AN ORGANIZED HEALTH CARE EDUCATION/TRAINING PROGRAM

## 2024-01-12 PROCEDURE — 87040 BLOOD CULTURE FOR BACTERIA: CPT | Mod: 59 | Performed by: INTERNAL MEDICINE

## 2024-01-12 PROCEDURE — 81001 URINALYSIS AUTO W/SCOPE: CPT | Performed by: INTERNAL MEDICINE

## 2024-01-12 PROCEDURE — 89051 BODY FLUID CELL COUNT: CPT | Performed by: INTERNAL MEDICINE

## 2024-01-12 PROCEDURE — 84157 ASSAY OF PROTEIN OTHER: CPT | Performed by: INTERNAL MEDICINE

## 2024-01-12 PROCEDURE — 85610 PROTHROMBIN TIME: CPT | Performed by: STUDENT IN AN ORGANIZED HEALTH CARE EDUCATION/TRAINING PROGRAM

## 2024-01-12 PROCEDURE — 21400001 HC TELEMETRY ROOM

## 2024-01-12 PROCEDURE — P9047 ALBUMIN (HUMAN), 25%, 50ML: HCPCS | Mod: JZ,JG | Performed by: STUDENT IN AN ORGANIZED HEALTH CARE EDUCATION/TRAINING PROGRAM

## 2024-01-12 PROCEDURE — 80053 COMPREHEN METABOLIC PANEL: CPT | Performed by: NURSE PRACTITIONER

## 2024-01-12 PROCEDURE — 0241U SARS-COV2 (COVID) WITH FLU/RSV BY PCR: CPT | Performed by: INTERNAL MEDICINE

## 2024-01-12 PROCEDURE — 25000003 PHARM REV CODE 250

## 2024-01-12 PROCEDURE — 0W9G3ZZ DRAINAGE OF PERITONEAL CAVITY, PERCUTANEOUS APPROACH: ICD-10-PCS | Performed by: FAMILY MEDICINE

## 2024-01-12 PROCEDURE — 27000221 HC OXYGEN, UP TO 24 HOURS

## 2024-01-12 PROCEDURE — 87205 SMEAR GRAM STAIN: CPT | Performed by: INTERNAL MEDICINE

## 2024-01-12 PROCEDURE — 85025 COMPLETE CBC W/AUTO DIFF WBC: CPT | Mod: 91 | Performed by: INTERNAL MEDICINE

## 2024-01-12 PROCEDURE — 94761 N-INVAS EAR/PLS OXIMETRY MLT: CPT

## 2024-01-12 PROCEDURE — 25000003 PHARM REV CODE 250: Performed by: INTERNAL MEDICINE

## 2024-01-12 PROCEDURE — 82042 OTHER SOURCE ALBUMIN QUAN EA: CPT | Performed by: INTERNAL MEDICINE

## 2024-01-12 PROCEDURE — 36415 COLL VENOUS BLD VENIPUNCTURE: CPT | Mod: XB | Performed by: INTERNAL MEDICINE

## 2024-01-12 PROCEDURE — 83615 LACTATE (LD) (LDH) ENZYME: CPT | Performed by: INTERNAL MEDICINE

## 2024-01-12 PROCEDURE — 99900035 HC TECH TIME PER 15 MIN (STAT)

## 2024-01-12 RX ORDER — SODIUM,POTASSIUM PHOSPHATES 280-250MG
2 POWDER IN PACKET (EA) ORAL
Status: COMPLETED | OUTPATIENT
Start: 2024-01-12 | End: 2024-01-12

## 2024-01-12 RX ORDER — LIDOCAINE HYDROCHLORIDE 10 MG/ML
INJECTION INFILTRATION; PERINEURAL
Status: COMPLETED | OUTPATIENT
Start: 2024-01-12 | End: 2024-01-12

## 2024-01-12 RX ORDER — POTASSIUM CHLORIDE 20 MEQ/1
40 TABLET, EXTENDED RELEASE ORAL ONCE
Status: COMPLETED | OUTPATIENT
Start: 2024-01-12 | End: 2024-01-12

## 2024-01-12 RX ADMIN — Medication 100 MG: at 08:01

## 2024-01-12 RX ADMIN — POTASSIUM CHLORIDE 40 MEQ: 1500 TABLET, EXTENDED RELEASE ORAL at 08:01

## 2024-01-12 RX ADMIN — PANTOPRAZOLE SODIUM 40 MG: 40 TABLET, DELAYED RELEASE ORAL at 08:01

## 2024-01-12 RX ADMIN — POTASSIUM & SODIUM PHOSPHATES POWDER PACK 280-160-250 MG 2 PACKET: 280-160-250 PACK at 06:01

## 2024-01-12 RX ADMIN — LIDOCAINE HYDROCHLORIDE 10 ML: 10 INJECTION, SOLUTION INFILTRATION; PERINEURAL at 03:01

## 2024-01-12 RX ADMIN — CEFTRIAXONE 2 G: 2 INJECTION, POWDER, FOR SOLUTION INTRAMUSCULAR; INTRAVENOUS at 12:01

## 2024-01-12 RX ADMIN — BACLOFEN 10 MG: 10 TABLET ORAL at 08:01

## 2024-01-12 RX ADMIN — FOLIC ACID 1 MG: 1 TABLET ORAL at 08:01

## 2024-01-12 RX ADMIN — ALBUMIN (HUMAN) 50 G: 12.5 SOLUTION INTRAVENOUS at 09:01

## 2024-01-12 RX ADMIN — AMLODIPINE BESYLATE 10 MG: 10 TABLET ORAL at 08:01

## 2024-01-12 RX ADMIN — LACTULOSE 20 G: 20 SOLUTION ORAL at 08:01

## 2024-01-12 RX ADMIN — ESCITALOPRAM OXALATE 10 MG: 10 TABLET ORAL at 08:01

## 2024-01-12 RX ADMIN — PHYTONADIONE 10 MG: 10 INJECTION, EMULSION INTRAMUSCULAR; INTRAVENOUS; SUBCUTANEOUS at 10:01

## 2024-01-12 RX ADMIN — THERA TABS 1 TABLET: TAB at 08:01

## 2024-01-12 RX ADMIN — POTASSIUM & SODIUM PHOSPHATES POWDER PACK 280-160-250 MG 2 PACKET: 280-160-250 PACK at 11:01

## 2024-01-12 NOTE — PROCEDURES
Radiology Post-Procedure Note    Pre Op Diagnosis: Ascites  Post Op Diagnosis: Same    Procedure: Ultrasound Guided Paracentesis    Procedure performed by: Tony SMITH, Mary     Written Informed Consent Obtained: Yes  Specimen Removed: YES serous  Estimated Blood Loss: Minimal    Findings:   Successful LLQ paracentesis.  Albumin administered PRN per protocol.    Patient tolerated procedure well.    Mary Jimenez, APRN, FNP  Interventional Radiology  [phone] clinic

## 2024-01-12 NOTE — H&P
Inpatient Radiology Pre-procedure Note    History of Present Illness:  Blu Deleon is a 58 y.o. male who presents for ultrasound guided paracentesis.  Admission H&P reviewed.  Past Medical History:   Diagnosis Date    Hypertension      Past Surgical History:   Procedure Laterality Date    ESOPHAGOGASTRODUODENOSCOPY N/A 1/3/2024    Procedure: EGD (ESOPHAGOGASTRODUODENOSCOPY);  Surgeon: Madison Hinojosa MD;  Location: [address]);  Service: Endoscopy;  Laterality: N/A;       Review of Systems:   As documented in primary team H&P    Home Meds:   Prior to Admission medications    Medication Sig Start Date End Date Taking? Authorizing Provider   EScitalopram oxalate (LEXAPRO) 10 MG tablet Take 10 mg by mouth once daily. 12/23/23  Yes Provider, Historical   hydroCHLOROthiazide (HYDRODIURIL) 25 MG tablet Take 25 mg by mouth once daily. 12/23/23  Yes Provider, Historical   traZODone (DESYREL) 50 MG tablet Take  mg by mouth every evening. 12/26/23  Yes Provider, Historical   albumin human 25% 25 % bottle Inject 200 mLs (50 g total) into the vein 2 (two) times a day. for 1 day 1/11/24 1/12/24  Rik Gomes MD   amLODIPine (NORVASC) 10 MG tablet Take 1 tablet (10 mg total) by mouth once daily. 1/12/24 1/11/25  Rik Gomes MD   folic acid (FOLVITE) 1 MG tablet Take 1 tablet (1 mg total) by mouth once daily. 1/6/24 1/5/25  Bailey Cline MD   lactulose (CHRONULAC) 20 gram/30 mL Soln Take 30 mLs (20 g total) by mouth 3 (three) times daily. 1/11/24   Rik Gomes MD   pantoprazole (PROTONIX) 40 MG tablet Take 1 tablet (40 mg total) by mouth 2 (two) times a day. 1/11/24 3/11/24  Rik Gomes MD   thiamine 100 MG tablet Take 1 tablet (100 mg total) by mouth once daily. 1/6/24   Bailey Cline MD     Scheduled Meds:    amLODIPine  10 mg Oral Daily    baclofen  10 mg Oral TID    cefTRIAXone (ROCEPHIN) IVPB  2 g Intravenous Q24H    EScitalopram oxalate  10 mg Oral Daily    folic  acid  1 mg Oral Daily    lactulose  20 g Oral TID    multivitamin  1 tablet Oral Daily    pantoprazole  40 mg Oral BID    phytonadione vitamin k (AQUA-MEPHYTON) 10 mg in dextrose 5 % (D5W) 50 mL IVPB  10 mg Intravenous Daily    potassium, sodium phosphates  2 packet Oral QID (AC & HS)    thiamine  100 mg Oral Daily     Continuous Infusions:   PRN Meds:0.9%  NaCl infusion (for blood administration), hydrALAZINE, lorazepam, sodium chloride 0.9%  Anticoagulants/Antiplatelets: no anticoagulation    Allergies: Review of patient's allergies indicates:  No Known Allergies  Sedation Hx: have not been any systemic reactions    Vitals:  Temp: 98.6 °F (37 °C) (01/12/24 1128)  Pulse: 110 (01/12/24 1128)  Resp: 16 (01/12/24 1128)  BP: (!) 157/70 (01/12/24 1128)  SpO2: 96 % (01/12/24 1128)     Physical Exam:  ASA: 3  Mallampati: n/a    General: no acute distress  Mental Status: alert and oriented to person, place and time; waxes and wanes  HEENT: normocephalic, atraumatic  Chest: unlabored breathing  Heart: regular heart rate  Abdomen: distended  Extremity: moves all extremities    Plan: ultrasound guided paracentesis  Sedation Plan: local    JACKIE Boothe, DARRINP  Interventional Radiology  [phone] [facility]

## 2024-01-12 NOTE — PLAN OF CARE
Patient tolerated paracentesis well. VSS. 4200 ml clear yellow ascites drained. Specimens collected and sent to lab as ordered. No albumin indicated. Patient going from MPU to ultrasound. Report called to Linda BORJA.

## 2024-01-12 NOTE — TREATMENT PLAN
Hepatology Treatment Plan    Blu Deleon is a 58 y.o. male admitted to hospital 1/2/2024 (Hospital Day: 11) due to Acute upper GI bleed.     Interval History  TB still uptrending  Cr stable, Na improved  INR up to 2.8      MELD 35    Later this morning, developed rigors. Ongoing lower back pain.  . Afebrile and vitals otherwise stable  Mental status intact    Objective  Temp:  [97.9 °F (36.6 °C)-98.2 °F (36.8 °C)] 98 °F (36.7 °C) (01/12 0746)  Pulse:  [] 82 (01/12 0746)  BP: (126-170)/(63-78) 126/63 (01/12 0746)  Resp:  [17-20] 17 (01/12 0746)  SpO2:  [94 %-95 %] 95 % (01/12 0746)    Physical Exam:  Constitutional:  covered in blankets, otherwise NAD  HENT: Head: Normal, normocephalic.  Eyes:  scleral icterus  Respiratory: normal chest expansion & respiratory effort and no accessory muscle use  GI: soft, non-tender, without masses or organomegaly  Skin:  jaundiced  Neurological: alert, oriented x3.       Laboratory    Recent Labs   Lab 01/12/24 0447   WBC 7.72   RBC 2.30*   HGB 7.8*   HCT 23.7*   PLT 83*   *   MCH 33.9*   MCHC 32.9        Recent Labs   Lab 01/12/24 0447   CALCIUM 8.7   ALBUMIN 3.4*   PROT 5.6*   *   K 3.3*   CO2 24      BUN 32*   CREATININE 1.6*   ALKPHOS 171*   ALT 67*   *   BILITOT 19.9*        Recent Labs   Lab 01/12/24 0447   INR 2.8*          MELD 3.0: 35 at 1/12/2024  4:47 AM  MELD-Na: 34 at 1/12/2024  4:47 AM  Calculated from:  Serum Creatinine: 1.6 mg/dL at 1/12/2024  4:47 AM  Serum Sodium: 135 mmol/L at 1/12/2024  4:47 AM  Total Bilirubin: 19.9 mg/dL at 1/12/2024  4:47 AM  Serum Albumin: 3.4 g/dL at 1/12/2024  4:47 AM  INR(ratio): 2.8 at 1/12/2024  4:47 AM  Age at listing (hypothetical): 58 years  Sex: Male at 1/12/2024  4:47 AM       Assessment and Plan    Blu Deleon is a 58 y.o. male with history of alcohol use disorder, HTN, and compensated EtOH cirrhosis who was admitted to [facility] on 01/02 with melena.   After  "resuscitation, EGD on 1/3/24 showed esophageal ulcers, erythematous mucosa in the antrum, and normal duodenum.  Hepatology now consulted for worsening bilirubin.     He hails from [place], visiting from the [facility]. The patient has known about his history of "early cirrhosis" since at least  when liver ultrasound confirmed cirrhosis.  He has continued to drink in spite of knowledge of liver disease and being told to stop; has consumed alcohol regularly since the age of 21.  Last drink 24. No illicit drug use.  Sister  of EtOH cirrhosis.     Case was discussed in committee  and deemed not to be a candidate for liver transplant evaluation due to ongoing alcohol use despite knowledge of liver disease       Problem List:  Newly decompensated EtOH cirrhosis  EtOH use disorder  Esophageal ulcers        Recommendations:  - We are not going to proceed with liver tx eval at this time since patient was aware of cirrhosis but persisted drinking  - Would recommend transfer to hospital closer to home, preferably one with hepatology available. Concerned that his trend in bilirubin indicates very high mortality and that he should be closer to home if this trend continues. Now accepted to [facility] in [place].  - For rigors and tachycardia, would start empiric ceftriaxone and obtain full infectious workup: blood cultures, UA, CXR, and repeat paracentesis  - Would repeat IV vitamin 10 mg daily x 3 days. Cholestasis may be contributing to INR elevation.  - Would avoid baclofen and other sedating meds if at all possible  - Noted PETH positive at 98  - Continue lactulose titrated to 2-3 BMs per day  - Persistent CODY but stable. Albumin nearing normal serum level. Stop IV albumin.  - Hold HCTZ with CODY and hyponatremia  - low sodium, high protein diet  - Continue Protonix 40mg twice per day for 8 weeks. Repeat upper endoscopy in 8 weeks to check healing of his esophageal ulcers. He would like to have this done " in [facility].   - Daily CBC, CMP, INR.       - We will continue to follow.    Thank you for involving us in the care of Blu Deleon. Please call with any additional questions, concerns or changes in the patient's clinical status.    Dimitri Francois MD  Gastroenterology and Hepatology Fellow, PGY V

## 2024-01-12 NOTE — PLAN OF CARE
Patient presents for US-guided paracentesis. Patient is awake and alert. See VS Flowsheet. Awaiting evaluation by provider and procedure consent.

## 2024-01-12 NOTE — PLAN OF CARE
Problem: Adult Inpatient Plan of Care  Goal: Plan of Care Review  Outcome: Ongoing, Not Progressing  Goal: Patient-Specific Goal (Individualized)  Outcome: Ongoing, Not Progressing  Goal: Absence of Hospital-Acquired Illness or Injury  Outcome: Ongoing, Not Progressing  Goal: Optimal Comfort and Wellbeing  Outcome: Ongoing, Not Progressing  Goal: Readiness for Transition of Care  Outcome: Ongoing, Not Progressing     Problem: Fluid and Electrolyte Imbalance (Acute Kidney Injury/Impairment)  Goal: Fluid and Electrolyte Balance  Outcome: Ongoing, Not Progressing     Problem: Oral Intake Inadequate (Acute Kidney Injury/Impairment)  Goal: Optimal Nutrition Intake  Outcome: Ongoing, Not Progressing     Problem: Renal Function Impairment (Acute Kidney Injury/Impairment)  Goal: Effective Renal Function  Outcome: Ongoing, Not Progressing     Problem: Fall Injury Risk  Goal: Absence of Fall and Fall-Related Injury  Outcome: Ongoing, Not Progressing     Problem: Electrolyte Imbalance  Goal: Electrolyte Balance  Outcome: Ongoing, Not Progressing     Problem: Adjustment to Illness (Liver Failure)  Goal: Optimal Coping with Liver Failure  Outcome: Ongoing, Not Progressing     Problem: Fluid and Electrolyte Imbalance (Liver Failure)  Goal: Fluid and Electrolyte Balance  Outcome: Ongoing, Not Progressing     Problem: Gastrointestinal Complications (Liver Failure)  Goal: Optimal Gastrointestinal Function  Outcome: Ongoing, Not Progressing     Problem: Impaired Coagulation (Liver Failure)  Goal: Optimal Coagulation Function  Outcome: Ongoing, Not Progressing     Problem: Infection (Liver Failure)  Goal: Absence of Infection Signs and Symptoms  Outcome: Ongoing, Not Progressing     Problem: Neurologic Function Impaired (Liver Failure)  Goal: Optimal Neurologic Function  Outcome: Ongoing, Not Progressing     Problem: Oral Intake Inadequate (Liver Failure)  Goal: Improved Oral Intake  Outcome: Ongoing, Not Progressing     Problem:  Pain (Liver Failure)  Goal: Optimal Pain Control  Outcome: Ongoing, Not Progressing     Problem: Renal Dysfunction (Liver Failure)  Goal: Optimize Renal Function  Outcome: Ongoing, Not Progressing     Problem: Respiratory Compromise (Liver Failure)  Goal: Effective Oxygenation and Ventilation  Outcome: Ongoing, Not Progressing

## 2024-01-13 PROBLEM — D72.829 LEUKOCYTOSIS: Status: ACTIVE | Noted: 2024-01-13

## 2024-01-13 LAB
ALBUMIN SERPL BCP-MCNC: 3.5 G/DL (ref 3.5–5.2)
ALP SERPL-CCNC: 178 U/L (ref 55–135)
ALT SERPL W/O P-5'-P-CCNC: 73 U/L (ref 10–44)
ANION GAP SERPL CALC-SCNC: 11 MMOL/L (ref 8–16)
AST SERPL-CCNC: 170 U/L (ref 10–40)
BASOPHILS # BLD AUTO: 0.04 K/UL (ref 0–0.2)
BASOPHILS NFR BLD: 0.3 % (ref 0–1.9)
BILIRUB SERPL-MCNC: 21.3 MG/DL (ref 0.1–1)
BUN SERPL-MCNC: 36 MG/DL (ref 6–20)
CALCIUM SERPL-MCNC: 8.7 MG/DL (ref 8.7–10.5)
CHLORIDE SERPL-SCNC: 103 MMOL/L (ref 95–110)
CO2 SERPL-SCNC: 19 MMOL/L (ref 23–29)
CREAT SERPL-MCNC: 2 MG/DL (ref 0.5–1.4)
DIFFERENTIAL METHOD BLD: ABNORMAL
EOSINOPHIL # BLD AUTO: 0.1 K/UL (ref 0–0.5)
EOSINOPHIL NFR BLD: 0.9 % (ref 0–8)
ERYTHROCYTE [DISTWIDTH] IN BLOOD BY AUTOMATED COUNT: 25.2 % (ref 11.5–14.5)
EST. GFR  (NO RACE VARIABLE): 38.2 ML/MIN/1.73 M^2
GLUCOSE SERPL-MCNC: 82 MG/DL (ref 70–110)
HCT VFR BLD AUTO: 28.5 % (ref 40–54)
HGB BLD-MCNC: 9.1 G/DL (ref 14–18)
IMM GRANULOCYTES # BLD AUTO: 0.08 K/UL (ref 0–0.04)
IMM GRANULOCYTES NFR BLD AUTO: 0.6 % (ref 0–0.5)
INR PPP: 3 (ref 0.8–1.2)
LACTATE SERPL-SCNC: 2.1 MMOL/L (ref 0.5–2.2)
LYMPHOCYTES # BLD AUTO: 0.8 K/UL (ref 1–4.8)
LYMPHOCYTES NFR BLD: 5.7 % (ref 18–48)
MAGNESIUM SERPL-MCNC: 2.1 MG/DL (ref 1.6–2.6)
MCH RBC QN AUTO: 33.5 PG (ref 27–31)
MCHC RBC AUTO-ENTMCNC: 31.9 G/DL (ref 32–36)
MCV RBC AUTO: 105 FL (ref 82–98)
MONOCYTES # BLD AUTO: 0.9 K/UL (ref 0.3–1)
MONOCYTES NFR BLD: 6.5 % (ref 4–15)
NEUTROPHILS # BLD AUTO: 12.1 K/UL (ref 1.8–7.7)
NEUTROPHILS NFR BLD: 86 % (ref 38–73)
NRBC BLD-RTO: 0 /100 WBC
PHOSPHATE SERPL-MCNC: 3.4 MG/DL (ref 2.7–4.5)
PLATELET # BLD AUTO: 89 K/UL (ref 150–450)
PMV BLD AUTO: 11.7 FL (ref 9.2–12.9)
POTASSIUM SERPL-SCNC: 4.1 MMOL/L (ref 3.5–5.1)
PROT SERPL-MCNC: 5.9 G/DL (ref 6–8.4)
PROTHROMBIN TIME: 30.5 SEC (ref 9–12.5)
RBC # BLD AUTO: 2.72 M/UL (ref 4.6–6.2)
SODIUM SERPL-SCNC: 133 MMOL/L (ref 136–145)
WBC # BLD AUTO: 14.11 K/UL (ref 3.9–12.7)

## 2024-01-13 PROCEDURE — 84300 ASSAY OF URINE SODIUM: CPT | Performed by: INTERNAL MEDICINE

## 2024-01-13 PROCEDURE — 84100 ASSAY OF PHOSPHORUS: CPT | Performed by: NURSE PRACTITIONER

## 2024-01-13 PROCEDURE — 25000003 PHARM REV CODE 250: Performed by: INTERNAL MEDICINE

## 2024-01-13 PROCEDURE — 80053 COMPREHEN METABOLIC PANEL: CPT | Performed by: NURSE PRACTITIONER

## 2024-01-13 PROCEDURE — 25000003 PHARM REV CODE 250: Performed by: STUDENT IN AN ORGANIZED HEALTH CARE EDUCATION/TRAINING PROGRAM

## 2024-01-13 PROCEDURE — 83605 ASSAY OF LACTIC ACID: CPT | Performed by: INTERNAL MEDICINE

## 2024-01-13 PROCEDURE — 25000003 PHARM REV CODE 250: Performed by: NURSE PRACTITIONER

## 2024-01-13 PROCEDURE — 85025 COMPLETE CBC W/AUTO DIFF WBC: CPT | Performed by: STUDENT IN AN ORGANIZED HEALTH CARE EDUCATION/TRAINING PROGRAM

## 2024-01-13 PROCEDURE — 36415 COLL VENOUS BLD VENIPUNCTURE: CPT | Performed by: STUDENT IN AN ORGANIZED HEALTH CARE EDUCATION/TRAINING PROGRAM

## 2024-01-13 PROCEDURE — 82570 ASSAY OF URINE CREATININE: CPT | Performed by: INTERNAL MEDICINE

## 2024-01-13 PROCEDURE — 99223 1ST HOSP IP/OBS HIGH 75: CPT | Mod: ,,, | Performed by: INTERNAL MEDICINE

## 2024-01-13 PROCEDURE — 36415 COLL VENOUS BLD VENIPUNCTURE: CPT | Mod: XB | Performed by: INTERNAL MEDICINE

## 2024-01-13 PROCEDURE — 25000003 PHARM REV CODE 250

## 2024-01-13 PROCEDURE — 94660 CPAP INITIATION&MGMT: CPT

## 2024-01-13 PROCEDURE — 63600175 PHARM REV CODE 636 W HCPCS: Performed by: INTERNAL MEDICINE

## 2024-01-13 PROCEDURE — 94761 N-INVAS EAR/PLS OXIMETRY MLT: CPT

## 2024-01-13 PROCEDURE — 21400001 HC TELEMETRY ROOM

## 2024-01-13 PROCEDURE — 85610 PROTHROMBIN TIME: CPT | Performed by: STUDENT IN AN ORGANIZED HEALTH CARE EDUCATION/TRAINING PROGRAM

## 2024-01-13 PROCEDURE — 99233 SBSQ HOSP IP/OBS HIGH 50: CPT | Mod: ,,, | Performed by: STUDENT IN AN ORGANIZED HEALTH CARE EDUCATION/TRAINING PROGRAM

## 2024-01-13 PROCEDURE — 83735 ASSAY OF MAGNESIUM: CPT | Performed by: NURSE PRACTITIONER

## 2024-01-13 PROCEDURE — 63600175 PHARM REV CODE 636 W HCPCS: Performed by: STUDENT IN AN ORGANIZED HEALTH CARE EDUCATION/TRAINING PROGRAM

## 2024-01-13 RX ORDER — SODIUM CHLORIDE 9 MG/ML
INJECTION, SOLUTION INTRAVENOUS CONTINUOUS
Status: ACTIVE | OUTPATIENT
Start: 2024-01-13 | End: 2024-01-14

## 2024-01-13 RX ADMIN — Medication 100 MG: at 09:01

## 2024-01-13 RX ADMIN — ESCITALOPRAM OXALATE 10 MG: 10 TABLET ORAL at 09:01

## 2024-01-13 RX ADMIN — VANCOMYCIN HYDROCHLORIDE 2000 MG: 500 INJECTION, POWDER, LYOPHILIZED, FOR SOLUTION INTRAVENOUS at 10:01

## 2024-01-13 RX ADMIN — PANTOPRAZOLE SODIUM 40 MG: 40 TABLET, DELAYED RELEASE ORAL at 09:01

## 2024-01-13 RX ADMIN — AMLODIPINE BESYLATE 10 MG: 10 TABLET ORAL at 09:01

## 2024-01-13 RX ADMIN — SODIUM CHLORIDE: 9 INJECTION, SOLUTION INTRAVENOUS at 08:01

## 2024-01-13 RX ADMIN — CEFEPIME 2 G: 2 INJECTION, POWDER, FOR SOLUTION INTRAVENOUS at 09:01

## 2024-01-13 RX ADMIN — RIFAXIMIN 550 MG: 550 TABLET ORAL at 10:01

## 2024-01-13 RX ADMIN — FOLIC ACID 1 MG: 1 TABLET ORAL at 09:01

## 2024-01-13 RX ADMIN — PANTOPRAZOLE SODIUM 40 MG: 40 TABLET, DELAYED RELEASE ORAL at 08:01

## 2024-01-13 RX ADMIN — LACTULOSE 20 G: 20 SOLUTION ORAL at 08:01

## 2024-01-13 RX ADMIN — THERA TABS 1 TABLET: TAB at 09:01

## 2024-01-13 RX ADMIN — LACTULOSE 20 G: 20 SOLUTION ORAL at 09:01

## 2024-01-13 RX ADMIN — LACTULOSE 20 G: 20 SOLUTION ORAL at 03:01

## 2024-01-13 RX ADMIN — PHYTONADIONE 10 MG: 10 INJECTION, EMULSION INTRAMUSCULAR; INTRAVENOUS; SUBCUTANEOUS at 09:01

## 2024-01-13 RX ADMIN — RIFAXIMIN 550 MG: 550 TABLET ORAL at 08:01

## 2024-01-13 NOTE — PLAN OF CARE
Rifaximin 550 mg was added to MAR twice daily. Nephrology was also consulted due to liver cirrhosis and worsening kidney function, creatinine is now at 2.0. Still awaiting urine sample from patient. Pt has been very lethargic today though, but when woken up, able to answer 3/4 orientation questions correctly. Ultrasound Retroperitoneal complete is pending as well as CT Chest Abdomen without contrast. Vitamin K was given this morning, antibiotics restarted. Blood cultures were retaken and sent off.    Problem: Adult Inpatient Plan of Care  Goal: Plan of Care Review  Outcome: Ongoing, Progressing  Flowsheets (Taken 1/13/2024 1441)  Plan of Care Reviewed With:   patient   spouse  Goal: Patient-Specific Goal (Individualized)  Outcome: Ongoing, Progressing  Flowsheets (Taken 1/13/2024 1441)  Anxieties, Fears or Concerns: Worsening Kidney  Goal: Absence of Hospital-Acquired Illness or Injury  Outcome: Ongoing, Progressing  Intervention: Identify and Manage Fall Risk  Flowsheets (Taken 1/13/2024 1441)  Safety Promotion/Fall Prevention:   assistive device/personal item within reach   bed alarm set   Fall Risk reviewed with patient/family   Fall Risk signage in place   family to remain at bedside   medications reviewed   nonskid shoes/socks when out of bed   /camera at bedside   supervised activity   toileting scheduled   instructed to call staff for mobility  Intervention: Prevent Skin Injury  Flowsheets (Taken 1/13/2024 1441)  Body Position: position changed independently  Skin Protection:   adhesive use limited   skin-to-device areas padded   skin-to-skin areas padded   skin sealant/moisture barrier applied   transparent dressing maintained  Intervention: Prevent and Manage VTE (Venous Thromboembolism) Risk  Flowsheets (Taken 1/13/2024 1441)  Activity Management:   Ambulated -L4   Ambulated in room - L4   Ankle pumps - L1   Arm raise - L1   Leg kicks - L2   Rolling - L1   Standing - L3   Step forward and  back - L3   Step side-to-side along edge of bed - L3   Walk with assistive devise and /or staff member - L3   Walking in place - L3  VTE Prevention/Management:   ambulation promoted   bleeding precations maintained   bleeding risk assessed   bleeding risk factor(s) identified, provider notified   dorsiflexion/plantar flexion performed   ROM (active) performed   ROM (passive) performed  Range of Motion:   active ROM (range of motion) encouraged   ROM (range of motion) performed  Goal: Optimal Comfort and Wellbeing  Outcome: Ongoing, Progressing  Intervention: Monitor Pain and Promote Comfort  Flowsheets (Taken 1/13/2024 1441)  Pain Management Interventions:   care clustered   pillow support provided   position adjusted   quiet environment facilitated   relaxation techniques promoted  Intervention: Provide Person-Centered Care  Flowsheets (Taken 1/13/2024 1441)  Trust Relationship/Rapport:   care explained   choices provided   emotional support provided   questions answered   questions encouraged   reassurance provided   thoughts/feelings acknowledged  Goal: Readiness for Transition of Care  Outcome: Ongoing, Progressing  Intervention: Mutually Develop Transition Plan  Flowsheets (Taken 1/13/2024 1441)  Equipment Currently Used at Home: none  Transportation Anticipated: family or friend will provide  Communicated MILKA with patient/caregiver: Yes  Do you expect to return to your current living situation?: Yes  Do you have help at home or someone to help you manage your care at home?: Yes  Readmission within 30 days?: No  Do you currently have service(s) that help you manage your care at home?: No  Is the pt/caregiver preference to resume services with current agency: No     Problem: Fluid and Electrolyte Imbalance (Acute Kidney Injury/Impairment)  Goal: Fluid and Electrolyte Balance  Outcome: Ongoing, Progressing     Problem: Oral Intake Inadequate (Acute Kidney Injury/Impairment)  Goal: Optimal Nutrition  Intake  Outcome: Ongoing, Progressing     Problem: Renal Function Impairment (Acute Kidney Injury/Impairment)  Goal: Effective Renal Function  Outcome: Ongoing, Progressing  Intervention: Monitor and Support Renal Function  Flowsheets (Taken 1/13/2024 1441)  Medication Review/Management: medications reviewed     Problem: Fall Injury Risk  Goal: Absence of Fall and Fall-Related Injury  Outcome: Ongoing, Progressing  Intervention: Identify and Manage Contributors  Flowsheets (Taken 1/13/2024 1441)  Self-Care Promotion:   independence encouraged   BADL personal objects within reach   BADL personal routines maintained   meal set-up provided   safe use of adaptive equipment encouraged  Medication Review/Management: medications reviewed  Intervention: Promote Injury-Free Environment  Flowsheets (Taken 1/13/2024 1441)  Safety Promotion/Fall Prevention:   assistive device/personal item within reach   bed alarm set   Fall Risk reviewed with patient/family   Fall Risk signage in place   family to remain at bedside   medications reviewed   nonskid shoes/socks when out of bed   /camera at bedside   supervised activity   toileting scheduled   instructed to call staff for mobility     Problem: Electrolyte Imbalance  Goal: Electrolyte Balance  Outcome: Ongoing, Progressing     Problem: Adjustment to Illness (Liver Failure)  Goal: Optimal Coping with Liver Failure  Outcome: Ongoing, Progressing  Intervention: Support Response to Liver Disease  Flowsheets (Taken 1/13/2024 1441)  Supportive Measures:   active listening utilized   relaxation techniques promoted   self-care encouraged   self-reflection promoted   self-responsibility promoted   verbalization of feelings encouraged  Diversional Activities:   smartphone   television  Family/Support System Care:   self-care encouraged   support provided   involvement promoted     Problem: Fluid and Electrolyte Imbalance (Liver Failure)  Goal: Fluid and Electrolyte  Balance  Outcome: Ongoing, Progressing     Problem: Gastrointestinal Complications (Liver Failure)  Goal: Optimal Gastrointestinal Function  Outcome: Ongoing, Progressing  Intervention: Monitor and Support Gastrointestinal Function  Flowsheets (Taken 1/13/2024 1441)  Body Position: position changed independently     Problem: Impaired Coagulation (Liver Failure)  Goal: Optimal Coagulation Function  Outcome: Ongoing, Progressing  Intervention: Monitor and Manage Coagulation  Flowsheets (Taken 1/13/2024 1441)  Bleeding Precautions:   blood pressure closely monitored   monitored for signs of bleeding     Problem: Infection (Liver Failure)  Goal: Absence of Infection Signs and Symptoms  Outcome: Ongoing, Progressing  Intervention: Prevent or Manage Infection  Flowsheets (Taken 1/13/2024 1441)  Infection Management: aseptic technique maintained  Isolation Precautions: protective     Problem: Neurologic Function Impaired (Liver Failure)  Goal: Optimal Neurologic Function  Outcome: Ongoing, Progressing  Intervention: Monitor and Optimize Neurologic Status  Flowsheets (Taken 1/13/2024 1441)  Seizure Precautions:   activity supervised   clutter-free environment maintained   emergency equipment at bedside  Sensory Stimulation Regulation:   quiet environment promoted   care clustered   television on  Hepatic Encephalopathy Management: airway protection maintained     Problem: Oral Intake Inadequate (Liver Failure)  Goal: Improved Oral Intake  Outcome: Ongoing, Progressing  Intervention: Promote and Optimize Nutrition  Flowsheets (Taken 1/13/2024 1441)  Oral Nutrition Promotion:   physical activity promoted   medicated   rest periods promoted   safe use of adaptive equipment encouraged  Environmental Support:   calm environment promoted   rest periods encouraged     Problem: Pain (Liver Failure)  Goal: Optimal Pain Control  Outcome: Ongoing, Progressing  Intervention: Prevent or Manage Pain  Flowsheets (Taken 1/13/2024  1441)  Sleep/Rest Enhancement:   relaxation techniques promoted   regular sleep/rest pattern promoted   noise level reduced  Pain Management Interventions:   care clustered   pillow support provided   position adjusted   quiet environment facilitated   relaxation techniques promoted     Problem: Renal Dysfunction (Liver Failure)  Goal: Optimize Renal Function  Outcome: Ongoing, Progressing     Problem: Respiratory Compromise (Liver Failure)  Goal: Effective Oxygenation and Ventilation  Outcome: Ongoing, Progressing  Intervention: Promote Airway Secretion Clearance  Flowsheets (Taken 1/13/2024 1441)  Cough And Deep Breathing: done independently per patient  Activity Management:   Ambulated -L4   Ambulated in room - L4   Ankle pumps - L1   Arm raise - L1   Leg kicks - L2   Rolling - L1   Standing - L3   Step forward and back - L3   Step side-to-side along edge of bed - L3   Walk with assistive devise and /or staff member - L3   Walking in place - L3  Intervention: Optimize Oxygenation and Ventilation  Flowsheets (Taken 1/13/2024 1441)  Airway/Ventilation Management: airway patency maintained  Head of Bed (HOB) Positioning: HOB elevated

## 2024-01-13 NOTE — ASSESSMENT & PLAN NOTE
- Cr 1.6 at admission, unclear baseline  - Initially likely pre-renal in setting of GIB.  - Now with slow elevation of creatinine  - Hepatology recommending albumin 50g BID for 48 hours  - S/p 48 hours albumin  - Cr 1.6 -> 2.0   - Nephrology consulted, urine studies sent  - Renally dosing all medications  - Avoiding nephrotoxins  - Will continue to monitor on daily labs

## 2024-01-13 NOTE — ASSESSMENT & PLAN NOTE
Etiology likely 2/2 cirrhosis  - Monitor w/ daily CBC and transfuse if Plt <10 or <50 w/ signs of active bleeding   Recent Labs   Lab 01/12/24  1345   PLT 75*

## 2024-01-13 NOTE — ASSESSMENT & PLAN NOTE
58 M with cirrhosis of unknown etiology (undergoing workup) admitted after GI bleed  On admission creatinine 1.2-1.6  At consult on 1/13: 2.0.    1/12 4.5 liter paracentesis    CODY: ddx includes previous NSAID use, large volume shift with paracentesis and HRS type 2 and biliary nephropathy     Plan/Recommendation:  -can start midodrine 5 TID, octreotide for next 72 hours and agree with albumin expansion for another 48 hours  -hold diuretics  -avoid nephrotoxic agents    -Keep MAP > 65  -Keep hemoglobin > 7  -Strict ins and outs  -Avoid nephrotoxic agents if possible and renally dose medications  -Avoid drastic hemodynamic changes if possible

## 2024-01-13 NOTE — ASSESSMENT & PLAN NOTE
- Cr 1.6 at admission, unclear baseline  - Initially likely pre-renal in setting of GIB.  - Now with slow elevation of creatinine  - Hepatology recommending albumin 50g BID for 48 hours  - S/p albumin  - Cr stable @ 1.6  - Renally dosing all medications  - Avoiding nephrotoxins  - Will continue to monitor on daily labs

## 2024-01-13 NOTE — PLAN OF CARE
GUNJAN met with patient and wife and provided updated status on air flight.  Pt will pay for air flight in advance and seek reimbursement from insurance at a later time.  GUNJAN provided pt's wife with documents with air flight expense for $59,986.36.  GUNJAN also advised pt's wife she should receive a form over the weekend to complete.  GUNJAN informed pt's wife air flight tentatively scheduled for 1/19. GUNJAN will continue to provide patient and family with updates.       GUNJAN added weekend Donal HENRANDEZ to  email and secure chat regarding patient's d/c plan.  Please notify pt and wife air flight has been changed from 1/19 to 1/22 due to holiday schedule.      Mirian Wells LMSW  Part-Time-  [facility]  Ext. [phone]

## 2024-01-13 NOTE — ASSESSMENT & PLAN NOTE
Patient is visiting from out of town; outside records not available to review thus etiology of cirrhosis not confirmed. However, patient does have a history of ETOH use d/o so this is included in the differential. States he is currently in the process of being referred to a hepatologist and having an EGD scheduled.     MELD-Na score calculated; MELD 3.0: 35 at 1/12/2024  4:47 AM  MELD-Na: 34 at 1/12/2024  4:47 AM  Calculated from:  Serum Creatinine: 1.6 mg/dL at 1/12/2024  4:47 AM  Serum Sodium: 135 mmol/L at 1/12/2024  4:47 AM  Total Bilirubin: 19.9 mg/dL at 1/12/2024  4:47 AM  Serum Albumin: 3.4 g/dL at 1/12/2024  4:47 AM  INR(ratio): 2.8 at 1/12/2024  4:47 AM  Age at listing (hypothetical): 58 years  Sex: Male at 1/12/2024  4:47 AM    Continue chronic meds. Etiology likely ETOH. Will avoid any hepatotoxic meds, and monitor CBC/CMP/INR for synthetic function.   T bilirubin trending up. Hepatology was consulted in the setting of decompensated liver cirrhosis. Ultrasound right upper quadrant with Dopplers, PEth, AFP, serological workup, and acute hepatitis panel. Cirrhotic morphology of the liver with sequela of portal hypertension as detailed above. No discrete hepatic lesions identified. Biliary sludge with thickened gallbladder wall, likely secondary to hepatic dysfunction.  Underwent paracentesis.  As patient is not a candidate for transplant and with his high MELD score he would require air transfer to  [place] where he can go to the hospital close to his home. On lactulose and albumin per hepatology recs.   - Continue lactulose goal 3 BM's daily  - Continue albumin 50g BID for 48 hours  - Accepted to [facility] in [place]. Pending flight logistics.      - 1/12: Patient was doing well the morning of 1/12, however after a walk to the bathroom he started to have chills and rigors. He was started on CTX for concern for possible SBP. CXR obtained without any signs of pneumonia however possible small amount of  edema vs viral. Repeat CBC with stable Hgb and WBC WNL. Paracentesis performed with IR on 1/12 with 4.2L out. Ascitic labs pending. Had some RUQ pain and RUQ US pending.

## 2024-01-13 NOTE — ASSESSMENT & PLAN NOTE
58 year old M w/ cirrhosis and ETOH use d/o presenting w/ melana and ground coffee emesis/hematemesis. Denies prior symptoms. No EGDs on file. Normotensive and tachycardic w/ HR 130s. Initial Hgb 5.0; now s/p 2u pRBCs. Initial lactate >12. Labs otherwise notable for Plt 116, INR 2.4, BUN 57, Cr 1.6, TB 5.4, , ALT 44. Admitted to MICU for management of acute upper GIB.      EGD showed esophageal ulcers. Hgb stable s/p 3u PRBC. Will need scope after 8 week course of PPI.     - Soft and bite-sized diet, advance as tolerated  - Trend Hgb and transfuse for goal Hgb > 7, unless otherwise indicated  - Maintain IV access with 2 large bore IV's  - Intravascular resuscitation/support with IVF's   - Hold all NSAIDs and anticoagulants, unless contraindicated  - Pantoprazole 40 mg BID for a total of 8 weeks  - Correct any coagulopathy with platelets and FFP for goal of platelets >50K and INR <2.0  - CTM CBC    - Stable, BM's without evidence of melena

## 2024-01-13 NOTE — PROGRESS NOTES
Hepatology Progress Note    Blu Deleon is a 58 y.o. male admitted to hospital 1/2/2024 (Hospital Day: 12) due to Acute upper GI bleed.     Interval History  Drowsy this morning but arousable to voice and answering questions appropriately  Previously had no encephalopathy since admission    TB uptrending  Cr worsening  MELD 38    Remains afebrile. Normotensive but previously persistently hypertensive  WBC 14    Objective  Temp:  [97.5 °F (36.4 °C)-98.6 °F (37 °C)] 98.1 °F (36.7 °C) (01/13 0822)  Pulse:  [] 81 (01/13 0822)  BP: (113-157)/(55-70) 120/59 (01/13 0822)  Resp:  [16-20] 20 (01/13 0822)  SpO2:  [93 %-98 %] 98 % (01/13 0822)    Physical Exam:  Constitutional:  drowsy, ill-appearing, arousable to voice  HENT: Head: Normal, CPAP in place  Eyes:  scleral icterus  Respiratory: normal chest expansion & respiratory effort and no accessory muscle use  GI: soft, non-tender, without masses or organomegaly  Skin:  jaundiced  Neurological: drowsy but arousable to voice, answers questions appropriately      Laboratory    Recent Labs   Lab 01/13/24  0542   WBC 14.11*   RBC 2.72*   HGB 9.1*   HCT 28.5*   PLT 89*   *   MCH 33.5*   MCHC 31.9*        Recent Labs   Lab 01/13/24  0542   CALCIUM 8.7   ALBUMIN 3.5   PROT 5.9*   *   K 4.1   CO2 19*      BUN 36*   CREATININE 2.0*   ALKPHOS 178*   ALT 73*   *   BILITOT 21.3*        Recent Labs   Lab 01/13/24  0542   INR 3.0*          MELD 3.0: 38 at 1/13/2024  5:42 AM  MELD-Na: 37 at 1/13/2024  5:42 AM  Calculated from:  Serum Creatinine: 2.0 mg/dL at 1/13/2024  5:42 AM  Serum Sodium: 133 mmol/L at 1/13/2024  5:42 AM  Total Bilirubin: 21.3 mg/dL at 1/13/2024  5:42 AM  Serum Albumin: 3.5 g/dL at 1/13/2024  5:42 AM  INR(ratio): 3.0 at 1/13/2024  5:42 AM  Age at listing (hypothetical): 58 years  Sex: Male at 1/13/2024  5:42 AM       Assessment and Plan    Blu Deleon is a 58 y.o. male with history of alcohol use disorder, HTN, and compensated  "EtOH cirrhosis who was admitted to [facility] on  with melena.   After resuscitation, EGD on 1/3/24 showed esophageal ulcers, erythematous mucosa in the antrum, and normal duodenum.  Hepatology now consulted for worsening bilirubin.     He hails from [place], visiting from the [place]. The patient has known about his history of "early cirrhosis" since at least  when liver ultrasound confirmed cirrhosis.  He has continued to drink in spite of knowledge of liver disease and being told to stop; has consumed alcohol regularly since the age of 21.  Last drink 24. No illicit drug use.  Sister  of EtOH cirrhosis. PETH positive, 98.    Case was discussed in committee  and deemed not to be a candidate for liver transplant evaluation due to ongoing alcohol use despite knowledge of liver disease       Problem List:  Newly decompensated EtOH cirrhosis  EtOH use disorder  Esophageal ulcers  Leukocytosis  Hepatic encephalopathy        Recommendations:  - We are not going to proceed with liver tx eval at this time since patient was aware of cirrhosis but persisted drinking. Discussed in transplant committee.  - Now accepted to [facility] in [place] to be closer to home. Concerned with very high mortality based on current trend in liver function.  - Follow infectious workup: noted CXR shows possible viral process. Blood cultures pending  - Would broaden antibiotics to vanc/cefepime. Low threshold to add antifungal coverage as well  - Continue IV vitamin 10 mg daily to complete 3 days  - Stop baclofen. Avoid sedating medications  - Titrate lactulose to 3-4 BMs per day  - Start rifaximin 550 mg BID (ordered)  - Consult nephrology for worsening CODY  - low sodium, high protein diet  - Continue Protonix 40mg twice per day for 8 weeks. Repeat upper endoscopy in 8 weeks to check healing of his esophageal ulcers. He would like to have this [facility] in [facility].   - Daily CBC, CMP, INR.       - " We will continue to follow.    Thank you for involving us in the care of Blu Deleon. Please call with any additional questions, concerns or changes in the patient's clinical status.    Dimitri Francois MD  Gastroenterology and Hepatology Fellow, PGY V

## 2024-01-13 NOTE — ASSESSMENT & PLAN NOTE
Patient with new onset chills/rigors 1/12. Now with leukocytosis, remains afebrile and normotensive (previously normotensive/hypertensive).  Infectious work up:  - CXR obtained without any signs of pneumonia however possible small amount of edema vs viral component. Covid/Influenza negative.   - UA non-infectious.   - He was started on CTX for concern for possible SBP.    - Paracentesis performed with IR on 1/12 with 4.2L out. Ascitic labs with no evidence of SBP, and ascitic cultures NGTD.   - Blood cultures 1/12 NGTD  - Had some RUQ discomfort 1/12 and RUQ US showed gallbladder wall thickening with possible signs of cholecystitis. No abdominal pain today and negative Sen's sign  - Broaded from CTX to Vanc/cefe with low threshold to start micafungin per hepatology  - Will obtain CT-CAP to look for source of infection

## 2024-01-13 NOTE — PROGRESS NOTES
[facility] Medicine  Progress Note    Patient Name: Blu Deleon  MRN: [id number]  Patient Class: IP- Inpatient   Admission Date: 1/2/2024  Length of Stay: 10 days  Attending Physician: Rik Gomes MD  Primary Care Provider: Radha, Primary Doctor        Subjective:     Principal Problem:Leukocytosis        HPI:  Mr. Deleon is a 58 year old male with PMH notable for HTN, ETOH use, reported cirrhosis (has not seen a hepatologist), and depression who presented to [facility] ED with acute GI bleeding. In speaking with patient and wife at bedside, patient reports four days of nausea and vomiting with bright red blood and black tarry diarrhea. Additionally, he reports a fall yesterday after attempting to go to the restroom secondary to being lightheaded. He denies this happening previously. He was scheduled for an outpatient EGD, which has not been completed. He has not had a colonoscopy. He denies any anticoagulation or antiplatelet use.      In the emergency department he was found to be acutely anemic with a hgb of 5. He has not been hypotensive while in the emergency department, however, notably tachycardic with -140. Labs otherwise notable for WBC 13.56, Plt 107, INR 2.4, BUN 57, Cr 1.6, TB 5.4, , ALT 44. Initial lactate >12. ETOH level <10. Patient is s/p 3u pRBCs.    Admitted to MICU for UGIB.      Overview/Hospital Course:  Patient admitted for UGIB. GI consulted and EGD showed esophageal ulcers. Pt to continue PPI for 8 weeks with repeat scope to follow. Hgb stable s/p 3u PRBC and 1u FFP. HDS and tolerating clear liquid diet. CIWA ordered and no ativan required.  Patient was transferred to floors on 01/05.  Creatinine has remained stable at 1.4.  T bilirubin trending up.  Hepatology was consulted in the setting of decompensated liver cirrhosis.  Ultrasound right upper quadrant with Dopplers, PEth, AFP, serological workup, and acute hepatitis panel.  Patient underwent  paracentesis on 01/08 and ascitic fluid negative for SBP.  As patient is not a candidate for transplant and with his high MELD score he would require air transfer to [place] where he can go to the hospital close to his home. Accepted for transfer to [facility] in [place], awaiting logistics of flight transport.    Patient was doing well the morning of 1/12, however after a walk to the bathroom he started to have chills and rigors. He was started on CTX for concern for possible SBP. Repeat CBC with stable Hgb and WBC WNL. Paracentesis performed with IR on 1/12 with 4.2L out. Ascitic labs with no evidence of SBP, and ascitic cultures NGTD. Blood cultures 1/12 NGTD. Had some RUQ discomfort and RUQ US ordered with gallbladder wall thickening with possible signs of cholecystitis. CXR obtained without any signs of pneumonia however possible small amount of edema vs viral. Covid/Influenza negative.     On 1/13, WBC elevated, patient remains afebrile and normotensive. Abdominal exam benign, negative Sen's sign. UA non-infectious. Antibiotics broadened to Vanc/cefe with low threshold for micafungin per hepatology. T bili continues to worsen. CT-CAP pending.    Interval History/Significant Events: Pt seen and examined this morning on rounds.     Paracentesis performed yesterday (4.2L out) - no evidence of SBP and ascitic cultures NGTD. Blood cultures 1/12 NGTD. Had some RUQ discomfort yesterday and RUQ US ordered which showed gallbladder wall thickening with possible signs of cholecystitis. CXR obtained without any signs of pneumonia however possible small amount of edema vs viral. Covid/Influenza negative.     This morning, WBC elevated from 4 -> 14, patient remains afebrile and normotensive. Abdominal exam benign, negative Sen's sign. UA non-infectious. Antibiotics broadened to Vanc/cefe. T bili continues to worsen. CT-CAP ordered.      Objective:     Vital Signs (Most Recent):  Temp: 98.1 °F (36.7 °C) (01/13/24  0822)  Pulse: 81 (01/13/24 0822)  Resp: 20 (01/13/24 0822)  BP: (!) 120/59 (01/13/24 0822)  SpO2: 98 % (01/13/24 0822) Vital Signs (24h Range):  Temp:  [97.5 °F (36.4 °C)-98.6 °F (37 °C)] 98.1 °F (36.7 °C)  Pulse:  [] 81  Resp:  [16-20] 20  SpO2:  [93 %-98 %] 98 %  BP: (113-157)/(55-70) 120/59   Weight: 97.6 kg (215 lb 2.7 oz)  Body mass index is 32.72 kg/m².      Intake/Output Summary (Last 24 hours) at 1/13/2024 1038  Last data filed at 1/13/2024 0540  Gross per 24 hour   Intake 240 ml   Output 4550 ml   Net -4310 ml          Physical Exam  Gen: in NAD, appears stated age, jaundiced, somnolent but arousable to voice and answering questions appropriately  Neuro: AAOx4, CN2-12 grossly intact BL; motor, sensory, and strength grossly intact BL  HEENT: + icteric sclera. NTNC, EOMI, PERRLA, MMM; no thyromegaly or lymphadenopathy; no JVD appreciated  CVS: RRR, no m/r/g; S1/S2 auscultated with no S3 or S4; capillary refill < 2 sec  Resp: lungs CTAB, no w/r/r; no belabored breathing or accessory muscle use appreciated   Abd: + Abdominal distension. BS+ in all 4 quadrants; NTND, soft to palpation; no organomegaly appreciated   Extrem: pulses full, equal, and regular over all 4 extremities; no UE or LE edema BL         Vents:  Oxygen Concentration (%): 21 (01/12/24 0041)  Lines/Drains/Airways       Peripheral Intravenous Line  Duration                  Peripheral IV - Single Lumen 01/03/24 1045 18 G;1 1/4 in Anterior;Left Upper Arm 9 days         Peripheral IV - Single Lumen 01/03/24 1400 18 G;1 1/4 in Anterior;Right Upper Arm 9 days                  Significant Labs:    CBC/Anemia Profile:  Recent Labs   Lab 01/12/24  0447 01/12/24  1345 01/13/24  0542   WBC 7.72 4.78 14.11*   HGB 7.8* 7.7* 9.1*   HCT 23.7* 22.9* 28.5*   PLT 83* 75* 89*   * 100* 105*   RDW 24.9* 24.8* 25.2*        Chemistries:  Recent Labs   Lab 01/12/24  0447 01/13/24  0542   * 133*   K 3.3* 4.1    103   CO2 24 19*   BUN 32* 36*  "  CREATININE 1.6* 2.0*   CALCIUM 8.7 8.7   ALBUMIN 3.4* 3.5   PROT 5.6* 5.9*   BILITOT 19.9* 21.3*   ALKPHOS 171* 178*   ALT 67* 73*   * 170*   MG 2.1 2.1   PHOS 2.3* 3.4       CMP:   Recent Labs   Lab 01/12/24  0447 01/13/24  0542   * 133*   K 3.3* 4.1    103   CO2 24 19*   GLU 97 82   BUN 32* 36*   CREATININE 1.6* 2.0*   CALCIUM 8.7 8.7   PROT 5.6* 5.9*   ALBUMIN 3.4* 3.5   BILITOT 19.9* 21.3*   ALKPHOS 171* 178*   * 170*   ALT 67* 73*   ANIONGAP 10 11     Coagulation:   Recent Labs   Lab 01/13/24  0542   INR 3.0*     Lactic Acid:   No results for input(s): "LACTATE" in the last 48 hours.      Significant Imaging:  I have reviewed all pertinent imaging results/findings within the past 24 hours.    Assessment/Plan:      * Leukocytosis  Patient with new onset chills/rigors 1/12. Now with leukocytosis, remains afebrile and normotensive (previously normotensive/hypertensive).  Infectious work up:  - CXR obtained without any signs of pneumonia however possible small amount of edema vs viral component. Covid/Influenza negative.   - UA non-infectious.   - He was started on CTX for concern for possible SBP.    - Paracentesis performed with IR on 1/12 with 4.2L out. Ascitic labs with no evidence of SBP, and ascitic cultures NGTD.   - Blood cultures 1/12 NGTD  - Had some RUQ discomfort 1/12 and RUQ US showed gallbladder wall thickening with possible signs of cholecystitis. No abdominal pain today and negative Sen's sign  - Broaded from CTX to Vanc/cefe with low threshold to start micafungin per hepatology  - Will obtain CT-CAP to look for source of infection      Cirrhosis  Patient is visiting from out of town; outside records not available to review thus etiology of cirrhosis not confirmed. However, patient does have a history of ETOH use d/o so this is included in the differential. States he is currently in the process of being referred to a hepatologist and having an EGD scheduled. "     MELD-Na score calculated; MELD 3.0: 38 at 1/13/2024  5:42 AM  MELD-Na: 37 at 1/13/2024  5:42 AM  Calculated from:  Serum Creatinine: 2.0 mg/dL at 1/13/2024  5:42 AM  Serum Sodium: 133 mmol/L at 1/13/2024  5:42 AM  Total Bilirubin: 21.3 mg/dL at 1/13/2024  5:42 AM  Serum Albumin: 3.5 g/dL at 1/13/2024  5:42 AM  INR(ratio): 3.0 at 1/13/2024  5:42 AM  Age at listing (hypothetical): 58 years  Sex: Male at 1/13/2024  5:42 AM    Continue chronic meds. Etiology likely ETOH. Will avoid any hepatotoxic meds, and monitor CBC/CMP/INR for synthetic function.   T bilirubin trending up. Hepatology was consulted in the setting of decompensated liver cirrhosis. Ultrasound right upper quadrant with Dopplers, PEth, AFP, serological workup, and acute hepatitis panel. Cirrhotic morphology of the liver with sequela of portal hypertension as detailed above. No discrete hepatic lesions identified. Biliary sludge with thickened gallbladder wall, likely secondary to hepatic dysfunction.  Underwent paracentesis.  As patient is not a candidate for transplant and with his high MELD score he would require air transfer to  [place] where he can go to the hospital close to his home. On lactulose and albumin per hepatology recs.   - Continue lactulose goal 3 BM's daily  - S/p albumin 50g BID for 48 hours  - Accepted to [facility] in [place]. Pending flight logistics.  - Start Rifaximin  - Continue IV Vit K x3 doses  - Repeat paracentesis 1/12 as detailed above    CODY (acute kidney injury)  - Cr 1.6 at admission, unclear baseline  - Initially likely pre-renal in setting of GIB.  - Now with slow elevation of creatinine  - Hepatology recommending albumin 50g BID for 48 hours  - S/p 48 hours albumin  - Cr 1.6 -> 2.0   - Nephrology consulted, urine studies sent, RP US ordered  - Renally dosing all medications  - Avoiding nephrotoxins  - Will continue to monitor on daily labs    Acute upper GI bleed    58 year old M w/ cirrhosis and ETOH use  d/o presenting w/ melana and ground coffee emesis/hematemesis. Denies prior symptoms. No EGDs on file. Normotensive and tachycardic w/ HR 130s. Initial Hgb 5.0; now s/p 2u pRBCs. Initial lactate >12. Labs otherwise notable for Plt 116, INR 2.4, BUN 57, Cr 1.6, TB 5.4, , ALT 44. Admitted to MICU for management of acute upper GIB.      EGD showed esophageal ulcers. Hgb stable s/p 3u PRBC. Will need scope after 8 week course of PPI.     - Soft and bite-sized diet, advance as tolerated  - Trend Hgb and transfuse for goal Hgb > 7, unless otherwise indicated  - Maintain IV access with 2 large bore IV's  - Intravascular resuscitation/support with IVF's   - Hold all NSAIDs and anticoagulants, unless contraindicated  - Pantoprazole 40 mg BID for a total of 8 weeks  - Correct any coagulopathy with platelets and FFP for goal of platelets >50K and INR <2.0  - CTM CBC    - Hgb stable, BM's without evidence of melena    Transaminitis  - 2:1 pattern; likely 2/2 ETOH use d/o. Will continue to monitor        Thrombocytopenia  Etiology likely 2/2 cirrhosis  - Monitor w/ daily CBC and transfuse if Plt <10 or <50 w/ signs of active bleeding   Recent Labs   Lab 01/13/24  0542   PLT 89*           Alcohol use disorder  - Last alcoholic drink on 1/01. Alcohol level <10 on admission. Denies history of withdrawal in the past including seizures. Remains tachycardic but likely 2/2 acute GIB. Will continue to monitor for signs of withdrawal w/ CIWA protocol and start benzodiazepines if indicated.   - thiamine, folic acid, MV  - DC CIWA protocol as now out of withdrawal window       Coagulopathy  - INR 2.4; likely in setting of cirrhosis         VTE Risk Mitigation (From admission, onward)           Ordered     Place sequential compression device  Until discontinued         01/03/24 0256     IP VTE LOW RISK PATIENT  Once         01/03/24 0256                    Discharge Planning   MILKA: 1/22/2024     Code Status: Full Code   Is the  patient medically ready for discharge?: No    Reason for patient still in hospital (select all that apply): Treatment  Discharge Plan A: Home, Home with family   Discharge Delays: None known at this time              Rik Gomes MD  Department of Hospital Medicine   [facility]

## 2024-01-13 NOTE — ASSESSMENT & PLAN NOTE
Etiology likely 2/2 cirrhosis  - Monitor w/ daily CBC and transfuse if Plt <10 or <50 w/ signs of active bleeding   Recent Labs   Lab 01/13/24  0542   PLT 89*

## 2024-01-13 NOTE — SUBJECTIVE & OBJECTIVE
Past Medical History:   Diagnosis Date    Hypertension        Past Surgical History:   Procedure Laterality Date    ESOPHAGOGASTRODUODENOSCOPY N/A 1/3/2024    Procedure: EGD (ESOPHAGOGASTRODUODENOSCOPY);  Surgeon: Madison Hinojosa MD;  Location: [address]);  Service: Endoscopy;  Laterality: N/A;       Review of patient's allergies indicates:  No Known Allergies  Current Facility-Administered Medications   Medication Frequency    0.9%  NaCl infusion (for blood administration) Q24H PRN    amLODIPine tablet 10 mg Daily    ceFEPIme (MAXIPIME) 2 g in dextrose 5 % in water (D5W) 100 mL IVPB (MB+) Q12H    EScitalopram oxalate tablet 10 mg Daily    folic acid tablet 1 mg Daily    hydrALAZINE injection 10 mg Q6H PRN    lactulose 20 gram/30 mL solution Soln 20 g TID    multivitamin tablet Daily    pantoprazole EC tablet 40 mg BID    phytonadione vitamin k (AQUA-MEPHYTON) 10 mg in dextrose 5 % (D5W) 50 mL IVPB Daily    rifAXIMin tablet 550 mg BID    sodium chloride 0.9% flush 10 mL PRN    thiamine tablet 100 mg Daily    vancomycin - pharmacy to dose pharmacy to manage frequency     Family History    None       Tobacco Use    Smoking status: Never    Smokeless tobacco: Never   Substance and Sexual Activity    Alcohol use: Yes    Drug use: Never    Sexual activity: Not on file     Review of Systems   Unable to perform ROS: Mental status change     Objective:     Vital Signs (Most Recent):  Temp: 98.2 °F (36.8 °C) (01/13/24 1042)  Pulse: 91 (01/13/24 1459)  Resp: 18 (01/13/24 1042)  BP: 118/64 (01/13/24 1042)  SpO2: 97 % (01/13/24 1042) Vital Signs (24h Range):  Temp:  [97.5 °F (36.4 °C)-98.2 °F (36.8 °C)] 98.2 °F (36.8 °C)  Pulse:  [] 91  Resp:  [16-20] 18  SpO2:  [93 %-98 %] 97 %  BP: (113-134)/(55-66) 118/64     Weight: 97.6 kg (215 lb 2.7 oz) (01/03/24 0855)  Body mass index is 32.72 kg/m².  Body surface area is 2.16 meters squared.    I/O last 3 completed shifts:  In: 1680 [P.O.:1680]  Out: 4550 [Urine:350;  Other:4200]     Physical Exam  Constitutional:       General: He is not in acute distress.     Appearance: He is obese. He is toxic-appearing. He is not ill-appearing.   HENT:      Head: Normocephalic and atraumatic.      Nose: Nose normal.      Mouth/Throat:      Mouth: Mucous membranes are moist.   Eyes:      General: Scleral icterus present.   Cardiovascular:      Rate and Rhythm: Normal rate.      Heart sounds: No murmur heard.  Pulmonary:      Effort: Pulmonary effort is normal.      Breath sounds: No wheezing or rales.   Abdominal:      General: Abdomen is flat. There is distension.      Tenderness: There is abdominal tenderness.   Musculoskeletal:      Right lower leg: Edema present.      Left lower leg: Edema present.   Skin:     General: Skin is warm.      Coloration: Skin is jaundiced.      Findings: Bruising present.   Neurological:      Mental Status: He is alert. He is disoriented.          Significant Labs:  All labs within the past 24 hours have been reviewed.    Significant Imaging:  Labs: Reviewed

## 2024-01-13 NOTE — PROGRESS NOTES
[facility] Medicine  Progress Note    Patient Name: Blu Deleon  MRN: [id number]  Patient Class: IP- Inpatient   Admission Date: 1/2/2024  Length of Stay: 9 days  Attending Physician: Rik Gomes MD  Primary Care Provider: Radha, Primary Doctor        Subjective:     Principal Problem:Acute upper GI bleed        HPI:  Mr. Deleon is a 58 year old male with PMH notable for HTN, ETOH use, reported cirrhosis (has not seen a hepatologist), and depression who presented to [facility] ED with acute GI bleeding. In speaking with patient and wife at bedside, patient reports four days of nausea and vomiting with bright red blood and black tarry diarrhea. Additionally, he reports a fall yesterday after attempting to go to the restroom secondary to being lightheaded. He denies this happening previously. He was scheduled for an outpatient EGD, which has not been completed. He has not had a colonoscopy. He denies any anticoagulation or antiplatelet use.      In the emergency department he was found to be acutely anemic with a hgb of 5. He has not been hypotensive while in the emergency department, however, notably tachycardic with -140. Labs otherwise notable for WBC 13.56, Plt 107, INR 2.4, BUN 57, Cr 1.6, TB 5.4, , ALT 44. Initial lactate >12. ETOH level <10. Patient is s/p 3u pRBCs.    Admitted to MICU for UGIB.      Overview/Hospital Course:  Patient admitted for UGIB. GI consulted and EGD showed esophageal ulcers. Pt to continue PPI for 8 weeks with repeat scope to follow. Hgb stable s/p 3u PRBC and 1u FFP. HDS and tolerating clear liquid diet. CIWA ordered and no ativan required.  Patient was transferred to floors on 01/05.  Creatinine has remained stable at 1.4.  T bilirubin trending up.  Hepatology was consulted in the setting of decompensated liver cirrhosis.  Ultrasound right upper quadrant with Dopplers, PEth, AFP, serological workup, and acute hepatitis panel.  Patient underwent  paracentesis on 01/08 and ascitic fluid negative for SBP.  As patient is not a candidate for transplant and with his high MELD score he would require air transfer to [place] where he can go to the hospital close to his home. Accepted for transfer to [facility] in [place], awaiting logistics of flight transport.    Patient was doing well the morning of 1/12, however after a walk to the bathroom he started to have chills and rigors. He was started on CTX for concern for possible SBP. CXR obtained without any signs of pneumonia however possible small amount of edema vs viral. Repeat CBC with stable Hgb and WBC WNL. Paracentesis performed with IR on 1/12 with 4.2L out. Ascitic labs pending. Had some RUQ pain and RUQ US pending.    Interval History/Significant Events: Pt seen and examined this morning on rell WOODY. T bili continues to trend up. Patient was doing well the morning of 1/12, however after a walk to the bathroom he started to have chills and rigors. He was started on CTX for concern for possible SBP. CXR obtained without any signs of pneumonia however possible small amount of edema vs viral component. Will obtain covid/influenza PCR. He remains afebrile and HDS. Repeat CBC with stable Hgb and WBC WNL. Paracentesis performed with IR on 1/12 with 4.2L out. Ascitic labs pending. Had some RUQ pain and RUQ US pending.      Objective:     Vital Signs (Most Recent):  Temp: 98.6 °F (37 °C) (01/12/24 1128)  Pulse: 92 (01/12/24 1629)  Resp: 16 (01/12/24 1629)  BP: 122/60 (01/12/24 1629)  SpO2: (!) 93 % (01/12/24 1629) Vital Signs (24h Range):  Temp:  [97.9 °F (36.6 °C)-98.6 °F (37 °C)] 98.6 °F (37 °C)  Pulse:  [] 92  Resp:  [16-20] 16  SpO2:  [93 %-96 %] 93 %  BP: (115-170)/(60-78) 122/60   Weight: 97.6 kg (215 lb 2.7 oz)  Body mass index is 32.72 kg/m².      Intake/Output Summary (Last 24 hours) at 1/12/2024 1819  Last data filed at 1/12/2024 1631  Gross per 24 hour   Intake 1440 ml   Output 4200 ml    Net -2760 ml          Physical Exam  Gen: in NAD, appears stated age, jaundiced  Neuro: AAOx4, CN2-12 grossly intact BL; motor, sensory, and strength grossly intact BL  HEENT: + icteric sclera. NTNC, EOMI, PERRLA, MMM; no thyromegaly or lymphadenopathy; no JVD appreciated  CVS: RRR, no m/r/g; S1/S2 auscultated with no S3 or S4; capillary refill < 2 sec  Resp: lungs CTAB, no w/r/r; no belabored breathing or accessory muscle use appreciated   Abd: + Abdominal distension. BS+ in all 4 quadrants; NTND, soft to palpation; no organomegaly appreciated   Extrem: pulses full, equal, and regular over all 4 extremities; no UE or LE edema BL         Vents:  Oxygen Concentration (%): 21 (01/12/24 0041)  Lines/Drains/Airways       Peripheral Intravenous Line  Duration                  Peripheral IV - Single Lumen 01/03/24 1045 18 G;1 1/4 in Anterior;Left Upper Arm 9 days         Peripheral IV - Single Lumen 01/03/24 1400 18 G;1 1/4 in Anterior;Right Upper Arm 9 days                  Significant Labs:    CBC/Anemia Profile:  Recent Labs   Lab 01/11/24 0356 01/12/24 0447 01/12/24  1345   WBC 7.44 7.72 4.78   HGB 7.8* 7.8* 7.7*   HCT 23.3* 23.7* 22.9*   PLT 79* 83* 75*   * 103* 100*   RDW 25.0* 24.9* 24.8*        Chemistries:  Recent Labs   Lab 01/11/24 0356 01/12/24 0447   * 135*   K 3.5 3.3*   CL 99 101   CO2 25 24   BUN 34* 32*   CREATININE 1.6* 1.6*   CALCIUM 8.2* 8.7   ALBUMIN 2.8* 3.4*   PROT 5.2* 5.6*   BILITOT 18.4* 19.9*   ALKPHOS 156* 171*   ALT 77* 67*   * 151*   MG 2.1 2.1   PHOS 2.8 2.3*       CMP:   Recent Labs   Lab 01/11/24 0356 01/12/24 0447   * 135*   K 3.5 3.3*   CL 99 101   CO2 25 24    97   BUN 34* 32*   CREATININE 1.6* 1.6*   CALCIUM 8.2* 8.7   PROT 5.2* 5.6*   ALBUMIN 2.8* 3.4*   BILITOT 18.4* 19.9*   ALKPHOS 156* 171*   * 151*   ALT 77* 67*   ANIONGAP 8 10     Coagulation:   Recent Labs   Lab 01/12/24  0447   INR 2.8*     Lactic Acid:   No results for input(s):  ""LACTATE" in the last 48 hours.      Significant Imaging:  I have reviewed all pertinent imaging results/findings within the past 24 hours.    Assessment/Plan:      * Acute upper GI bleed    58 year old M w/ cirrhosis and ETOH use d/o presenting w/ melana and ground coffee emesis/hematemesis. Denies prior symptoms. No EGDs on file. Normotensive and tachycardic w/ HR 130s. Initial Hgb 5.0; now s/p 2u pRBCs. Initial lactate >12. Labs otherwise notable for Plt 116, INR 2.4, BUN 57, Cr 1.6, TB 5.4, , ALT 44. Admitted to MICU for management of acute upper GIB.      EGD showed esophageal ulcers. Hgb stable s/p 3u PRBC. Will need scope after 8 week course of PPI.     - Soft and bite-sized diet, advance as tolerated  - Trend Hgb and transfuse for goal Hgb > 7, unless otherwise indicated  - Maintain IV access with 2 large bore IV's  - Intravascular resuscitation/support with IVF's   - Hold all NSAIDs and anticoagulants, unless contraindicated  - Pantoprazole 40 mg BID for a total of 8 weeks  - Correct any coagulopathy with platelets and FFP for goal of platelets >50K and INR <2.0  - CTM CBC    - Stable, BM's without evidence of melena    Transaminitis  - 2:1 pattern; likely 2/2 ETOH use d/o. Will continue to monitor        Thrombocytopenia  Etiology likely 2/2 cirrhosis  - Monitor w/ daily CBC and transfuse if Plt <10 or <50 w/ signs of active bleeding   Recent Labs   Lab 01/12/24  1345   PLT 75*           CODY (acute kidney injury)  - Cr 1.6 at admission, unclear baseline  - Initially likely pre-renal in setting of GIB.  - Now with slow elevation of creatinine  - Hepatology recommending albumin 50g BID for 48 hours  - S/p albumin  - Cr stable @ 1.6  - Renally dosing all medications  - Avoiding nephrotoxins  - Will continue to monitor on daily labs      Alcohol use disorder  - Last alcoholic drink on 1/01. Alcohol level <10 on admission. Denies history of withdrawal in the past including seizures. Remains tachycardic " but likely 2/2 acute GIB. Will continue to monitor for signs of withdrawal w/ CIWA protocol and start benzodiazepines if indicated.   - thiamine, folic acid, MV  - DC CIWA protocol as now out of withdrawal window       Coagulopathy  - INR 2.4; likely in setting of cirrhosis       Cirrhosis  Patient is visiting from out of town; outside records not available to review thus etiology of cirrhosis not confirmed. However, patient does have a history of ETOH use d/o so this is included in the differential. States he is currently in the process of being referred to a hepatologist and having an EGD scheduled.     MELD-Na score calculated; MELD 3.0: 35 at 1/12/2024  4:47 AM  MELD-Na: 34 at 1/12/2024  4:47 AM  Calculated from:  Serum Creatinine: 1.6 mg/dL at 1/12/2024  4:47 AM  Serum Sodium: 135 mmol/L at 1/12/2024  4:47 AM  Total Bilirubin: 19.9 mg/dL at 1/12/2024  4:47 AM  Serum Albumin: 3.4 g/dL at 1/12/2024  4:47 AM  INR(ratio): 2.8 at 1/12/2024  4:47 AM  Age at listing (hypothetical): 58 years  Sex: Male at 1/12/2024  4:47 AM    Continue chronic meds. Etiology likely ETOH. Will avoid any hepatotoxic meds, and monitor CBC/CMP/INR for synthetic function.   T bilirubin trending up. Hepatology was consulted in the setting of decompensated liver cirrhosis. Ultrasound right upper quadrant with Dopplers, PEth, AFP, serological workup, and acute hepatitis panel. Cirrhotic morphology of the liver with sequela of portal hypertension as detailed above. No discrete hepatic lesions identified. Biliary sludge with thickened gallbladder wall, likely secondary to hepatic dysfunction.  Underwent paracentesis.  As patient is not a candidate for transplant and with his high MELD score he would require air transfer to  [place] where he can go to the hospital close to his home. On lactulose and albumin per hepatology recs.   - Continue lactulose goal 3 BM's daily  - Continue albumin 50g BID for 48 hours  - Accepted to [facility] in  [place]. Pending flight logistics.      - 1/12: Patient was doing well the morning of 1/12, however after a walk to the bathroom he started to have chills and rigors. He was started on CTX for concern for possible SBP. CXR obtained without any signs of pneumonia however possible small amount of edema vs viral. Repeat CBC with stable Hgb and WBC WNL. Paracentesis performed with IR on 1/12 with 4.2L out. Ascitic labs pending. Had some RUQ pain and RUQ US pending.      VTE Risk Mitigation (From admission, onward)           Ordered     Place sequential compression device  Until discontinued         01/03/24 0256     IP VTE LOW RISK PATIENT  Once         01/03/24 0256                    Discharge Planning   MILKA: 1/12/2024     Code Status: Full Code   Is the patient medically ready for discharge?: No    Reason for patient still in hospital (select all that apply): Treatment and Pending disposition  Discharge Plan A: Home, Home with family   Discharge Delays: None known at this time              Rik Gomes MD  Department of Hospital Medicine   [facility]

## 2024-01-13 NOTE — NURSING
Pt complained of sever Rt upper back pain and he stated he feels cold and he was shivering. Pt has no fever. Notified Dr. Gomes. MD ordered chest x ray, blood cultures and urine samples. Also MD ordered abd U/S and placed an order paracentesis in IR. Chest x ray and blood cultures done. Pt received ceftriaxone IV. Still pending U/S , para and urine sample . Will continue to monitor.

## 2024-01-13 NOTE — PROGRESS NOTES
Pharmacokinetic Initial Assessment: IV Vancomycin    Assessment/Plan:    Initiate intravenous vancomycin with loading dose of 2000 mg (20 mg/kg) once with subsequent doses when random concentrations are less than 20 mcg/mL given CODY and current renal function.  Desired empiric serum trough concentration is 10 to 20 mcg/mL  Draw vancomycin random level on 1/14 at 0400 with  AM labs.  Pharmacy will continue to follow and monitor vancomycin.      Please contact pharmacy at extension [phone] with any questions regarding this assessment.     Thank you for the consult,   Kiersten BoucherD       Patient brief summary:  Blu Deleon is a 58 y.o. male initiated on antimicrobial therapy with IV Vancomycin for treatment of suspected intra-abdominal infection/sepsis    Drug Allergies:   Review of patient's allergies indicates:  No Known Allergies    Actual Body Weight:   97.6 kg    Renal Function:   Estimated Creatinine Clearance: 46.2 mL/min (A) (based on SCr of 2 mg/dL (H)).,     Dialysis Method (if applicable):  N/A    CBC (last 72 hours):  Recent Labs   Lab Result Units 01/11/24  0356 01/12/24  0447 01/12/24  1345 01/13/24  0542   WBC K/uL 7.44 7.72 4.78 14.11*   Hemoglobin g/dL 7.8* 7.8* 7.7* 9.1*   Hematocrit % 23.3* 23.7* 22.9* 28.5*   Platelets K/uL 79* 83* 75* 89*   Gran % % 66.0 67.9 89.6* 86.0*   Lymph % % 16.9* 13.3* 5.6* 5.7*   Mono % % 13.8 15.7* 1.9* 6.5   Eosinophil % % 2.3 1.8 0.8 0.9   Basophil % % 0.5 0.8 0.6 0.3   Differential Method  Automated Automated Automated Automated       Metabolic Panel (last 72 hours):  Recent Labs   Lab Result Units 01/11/24  0356 01/11/24  1612 01/12/24  0447 01/12/24  1852 01/13/24  0542   Sodium mmol/L 132*  --  135*  --  133*   Sodium, Urine mmol/L  --  <10*  --   --   --    Potassium mmol/L 3.5  --  3.3*  --  4.1   Chloride mmol/L 99  --  101  --  103   CO2 mmol/L 25  --  24  --  19*   Glucose mg/dL 106  --  97  --  82   Glucose, UA   --   --   --  Negative  --   "  BUN mg/dL 34*  --  32*  --  36*   Creatinine mg/dL 1.6*  --  1.6*  --  2.0*   Albumin g/dL 2.8*  --  3.4*  --  3.5   Total Bilirubin mg/dL 18.4*  --  19.9*  --  21.3*   Alkaline Phosphatase U/L 156*  --  171*  --  178*   AST U/L 183*  --  151*  --  170*   ALT U/L 77*  --  67*  --  73*   Magnesium mg/dL 2.1  --  2.1  --  2.1   Phosphorus mg/dL 2.8  --  2.3*  --  3.4       Drug levels (last 3 results):  No results for input(s): "VANCOMYCINRA", "VANCORANDOM", "VANCOMYCINPE", "VANCOPEAK", "VANCOMYCINTR", "VANCOTROUGH" in the last 72 hours.    Microbiologic Results:  Microbiology Results (last 7 days)       Procedure Component Value Units Date/Time    (rule out SBP) Aerobic culture [[id number]] Collected: 01/12/24 1520    Order Status: Completed Specimen: Ascites Updated: 01/13/24 0611     Aerobic Bacterial Culture No growth    Narrative:      To rule out SBP order labs: Aerobic culture [[id number]],  Culture, Anaerobic [[id number]], Gram stain [QOX987], Albumin  [ZEE436], Protein [KYE767], LDH [RJM548], WBC \T\ Dff  [AEZ7844].    (rule out SBP) Gram stain [[id number]] Collected: 01/12/24 1520    Order Status: Completed Specimen: Ascites Updated: 01/12/24 2056     Gram Stain Result Rare WBC's      No organisms seen    Narrative:      To rule out SBP order labs: Aerobic culture [SCJ328],  Culture, Anaerobic [RQM791], Gram stain [PUR275], Albumin  [LKB844], Protein [RJQ885], LDH [MZB821], WBC \T\ Dff  [ILU4666].    Blood culture [[id number]] Collected: 01/12/24 1205    Order Status: Completed Specimen: Blood Updated: 01/12/24 1915     Blood Culture, Routine No Growth to date    Blood culture [[id number]] Collected: 01/12/24 1207    Order Status: Completed Specimen: Blood from Peripheral, Right Updated: 01/12/24 1915     Blood Culture, Routine No Growth to date    (rule out SBP) Culture, Anaerobic [[id number]] Collected: 01/12/24 1520    Order Status: Sent Specimen: Ascites Updated: 01/12/24 1647    Blood culture [[id number]] " Collected: 01/09/24 1056    Order Status: Completed Specimen: Blood Updated: 01/12/24 1212     Blood Culture, Routine No Growth to date      No Growth to date      No Growth to date      No Growth to date    Blood culture [[id number]] Collected: 01/09/24 1056    Order Status: Completed Specimen: Blood Updated: 01/12/24 1212     Blood Culture, Routine No Growth to date      No Growth to date      No Growth to date      No Growth to date    Culture, Anaerobic [[id number]] Collected: 01/08/24 1259    Order Status: Completed Specimen: Ascites Updated: 01/12/24 0944     Anaerobic Culture Culture in progress    Aerobic culture [[id number]] Collected: 01/08/24 1259    Order Status: Completed Specimen: Ascites Updated: 01/11/24 1051     Aerobic Bacterial Culture No growth    (rule out SBP) Gram stain [[id number]] Collected: 01/08/24 1259    Order Status: Completed Specimen: Ascites Updated: 01/08/24 2235     Gram Stain Result No WBC's      No organisms seen    Narrative:      To rule out SBP order labs: Aerobic culture [[id number]],  Culture, Anaerobic [XGE655], Gram stain [[id number]], Albumin  [VNM186], Protein [ABR038], LDH [FJG186], WBC \T\ Dff  [KNP6089].    (rule out SBP) Aerobic culture [[id number]] Collected: 01/08/24 1259    Order Status: Canceled Specimen: Ascites     (rule out SBP) Culture, Anaerobic [[id number]] Collected: 01/08/24 1259    Order Status: Canceled Specimen: Ascites     Gram stain [[id number]] Collected: 01/08/24 1259    Order Status: Canceled Specimen: Ascites

## 2024-01-13 NOTE — SUBJECTIVE & OBJECTIVE
Interval History/Significant Events: Pt seen and examined this morning on rounds.     Paracentesis performed yesterday (4.2L out) - no evidence of SBP and ascitic cultures NGTD. Blood cultures 1/12 NGTD. Had some RUQ discomfort yesterday and RUQ US ordered which showed gallbladder wall thickening with possible signs of cholecystitis. CXR obtained without any signs of pneumonia however possible small amount of edema vs viral. Covid/Influenza negative.     This morning, WBC elevated from 4 -> 14, patient remains afebrile and normotensive. Abdominal exam benign, negative Sen's sign. UA non-infectious. Antibiotics broadened to Vanc/cefe. T bili continues to worsen. CT-CAP ordered.      Objective:     Vital Signs (Most Recent):  Temp: 98.1 °F (36.7 °C) (01/13/24 0822)  Pulse: 81 (01/13/24 0822)  Resp: 20 (01/13/24 0822)  BP: (!) 120/59 (01/13/24 0822)  SpO2: 98 % (01/13/24 0822) Vital Signs (24h Range):  Temp:  [97.5 °F (36.4 °C)-98.6 °F (37 °C)] 98.1 °F (36.7 °C)  Pulse:  [] 81  Resp:  [16-20] 20  SpO2:  [93 %-98 %] 98 %  BP: (113-157)/(55-70) 120/59   Weight: 97.6 kg (215 lb 2.7 oz)  Body mass index is 32.72 kg/m².      Intake/Output Summary (Last 24 hours) at 1/13/2024 1038  Last data filed at 1/13/2024 0540  Gross per 24 hour   Intake 240 ml   Output 4550 ml   Net -4310 ml          Physical Exam  Gen: in NAD, appears stated age, jaundiced, somnolent but arousable to voice and answering questions appropriately  Neuro: AAOx4, CN2-12 grossly intact BL; motor, sensory, and strength grossly intact BL  HEENT: + icteric sclera. NTNC, EOMI, PERRLA, MMM; no thyromegaly or lymphadenopathy; no JVD appreciated  CVS: RRR, no m/r/g; S1/S2 auscultated with no S3 or S4; capillary refill < 2 sec  Resp: lungs CTAB, no w/r/r; no belabored breathing or accessory muscle use appreciated   Abd: + Abdominal distension. BS+ in all 4 quadrants; NTND, soft to palpation; no organomegaly appreciated   Extrem: pulses full, equal, and  "regular over all 4 extremities; no UE or LE edema BL         Vents:  Oxygen Concentration (%): 21 (01/12/24 0041)  Lines/Drains/Airways       Peripheral Intravenous Line  Duration                  Peripheral IV - Single Lumen 01/03/24 1045 18 G;1 1/4 in Anterior;Left Upper Arm 9 days         Peripheral IV - Single Lumen 01/03/24 1400 18 G;1 1/4 in Anterior;Right Upper Arm 9 days                  Significant Labs:    CBC/Anemia Profile:  Recent Labs   Lab 01/12/24  0447 01/12/24  1345 01/13/24  0542   WBC 7.72 4.78 14.11*   HGB 7.8* 7.7* 9.1*   HCT 23.7* 22.9* 28.5*   PLT 83* 75* 89*   * 100* 105*   RDW 24.9* 24.8* 25.2*        Chemistries:  Recent Labs   Lab 01/12/24  0447 01/13/24  0542   * 133*   K 3.3* 4.1    103   CO2 24 19*   BUN 32* 36*   CREATININE 1.6* 2.0*   CALCIUM 8.7 8.7   ALBUMIN 3.4* 3.5   PROT 5.6* 5.9*   BILITOT 19.9* 21.3*   ALKPHOS 171* 178*   ALT 67* 73*   * 170*   MG 2.1 2.1   PHOS 2.3* 3.4       CMP:   Recent Labs   Lab 01/12/24  0447 01/13/24  0542   * 133*   K 3.3* 4.1    103   CO2 24 19*   GLU 97 82   BUN 32* 36*   CREATININE 1.6* 2.0*   CALCIUM 8.7 8.7   PROT 5.6* 5.9*   ALBUMIN 3.4* 3.5   BILITOT 19.9* 21.3*   ALKPHOS 171* 178*   * 170*   ALT 67* 73*   ANIONGAP 10 11     Coagulation:   Recent Labs   Lab 01/13/24  0542   INR 3.0*     Lactic Acid:   No results for input(s): "LACTATE" in the last 48 hours.      Significant Imaging:  I have reviewed all pertinent imaging results/findings within the past 24 hours.  "

## 2024-01-13 NOTE — SUBJECTIVE & OBJECTIVE
Interval History/Significant Events: Pt seen and examined this morning on rell WEBSTER T bili continues to trend up. Patient was doing well the morning of 1/12, however after a walk to the bathroom he started to have chills and rigors. He was started on CTX for concern for possible SBP. CXR obtained without any signs of pneumonia however possible small amount of edema vs viral component. Will obtain covid/influenza PCR. He remains afebrile and HDS. Repeat CBC with stable Hgb and WBC WNL. Paracentesis performed with IR on 1/12 with 4.2L out. Ascitic labs pending. Had some RUQ pain and RUQ US pending.      Objective:     Vital Signs (Most Recent):  Temp: 98.6 °F (37 °C) (01/12/24 1128)  Pulse: 92 (01/12/24 1629)  Resp: 16 (01/12/24 1629)  BP: 122/60 (01/12/24 1629)  SpO2: (!) 93 % (01/12/24 1629) Vital Signs (24h Range):  Temp:  [97.9 °F (36.6 °C)-98.6 °F (37 °C)] 98.6 °F (37 °C)  Pulse:  [] 92  Resp:  [16-20] 16  SpO2:  [93 %-96 %] 93 %  BP: (115-170)/(60-78) 122/60   Weight: 97.6 kg (215 lb 2.7 oz)  Body mass index is 32.72 kg/m².      Intake/Output Summary (Last 24 hours) at 1/12/2024 1819  Last data filed at 1/12/2024 1631  Gross per 24 hour   Intake 1440 ml   Output 4200 ml   Net -2760 ml          Physical Exam  Gen: in NAD, appears stated age, jaundiced  Neuro: AAOx4, CN2-12 grossly intact BL; motor, sensory, and strength grossly intact BL  HEENT: + icteric sclera. NTNC, EOMI, PERRLA, MMM; no thyromegaly or lymphadenopathy; no JVD appreciated  CVS: RRR, no m/r/g; S1/S2 auscultated with no S3 or S4; capillary refill < 2 sec  Resp: lungs CTAB, no w/r/r; no belabored breathing or accessory muscle use appreciated   Abd: + Abdominal distension. BS+ in all 4 quadrants; NTND, soft to palpation; no organomegaly appreciated   Extrem: pulses full, equal, and regular over all 4 extremities; no UE or LE edema BL         Vents:  Oxygen Concentration (%): 21 (01/12/24 0041)  Lines/Drains/Airways       Peripheral  "Intravenous Line  Duration                  Peripheral IV - Single Lumen 01/03/24 1045 18 G;1 1/4 in Anterior;Left Upper Arm 9 days         Peripheral IV - Single Lumen 01/03/24 1400 18 G;1 1/4 in Anterior;Right Upper Arm 9 days                  Significant Labs:    CBC/Anemia Profile:  Recent Labs   Lab 01/11/24  0356 01/12/24  0447 01/12/24  1345   WBC 7.44 7.72 4.78   HGB 7.8* 7.8* 7.7*   HCT 23.3* 23.7* 22.9*   PLT 79* 83* 75*   * 103* 100*   RDW 25.0* 24.9* 24.8*        Chemistries:  Recent Labs   Lab 01/11/24 0356 01/12/24 0447   * 135*   K 3.5 3.3*   CL 99 101   CO2 25 24   BUN 34* 32*   CREATININE 1.6* 1.6*   CALCIUM 8.2* 8.7   ALBUMIN 2.8* 3.4*   PROT 5.2* 5.6*   BILITOT 18.4* 19.9*   ALKPHOS 156* 171*   ALT 77* 67*   * 151*   MG 2.1 2.1   PHOS 2.8 2.3*       CMP:   Recent Labs   Lab 01/11/24 0356 01/12/24 0447   * 135*   K 3.5 3.3*   CL 99 101   CO2 25 24    97   BUN 34* 32*   CREATININE 1.6* 1.6*   CALCIUM 8.2* 8.7   PROT 5.2* 5.6*   ALBUMIN 2.8* 3.4*   BILITOT 18.4* 19.9*   ALKPHOS 156* 171*   * 151*   ALT 77* 67*   ANIONGAP 8 10     Coagulation:   Recent Labs   Lab 01/12/24 0447   INR 2.8*     Lactic Acid:   No results for input(s): "LACTATE" in the last 48 hours.      Significant Imaging:  I have reviewed all pertinent imaging results/findings within the past 24 hours.  "

## 2024-01-13 NOTE — CONSULTS
[facility] Surg  Nephrology  Consult Note    Patient Name: Blu Deleon  MRN: [id number]  Admission Date: 1/2/2024  Hospital Length of Stay: 10 days  Attending Provider: Rik Gomes MD   Primary Care Physician: Radha, Primary Doctor  Principal Problem:Leukocytosis    Inpatient consult to Nephrology  Consult performed by: Chip Chung MD  Consult ordered by: Rik Gomes MD        Subjective:     HPI: 58 year old man with a history of HTN, suspected EtOH cirrhosis (currently undergoing workup), depression admitted to [facility] for GI bleed with bright red blood per rectum with black tarry stools as well. He has never seen a neprhologyist and is visiting [place] for the sugar bowl. Wife at bedside reports that he was not feeling well and had been taking tylenol/alleve for some weeks before but at an unknown dose.     They do not know what his baseline creatinine is, however on admission to [facility], creatinine hovered around 1.3 to 1.6. Subsequently he underwent a 4.5 liter paracentesis with a resulting increase in creatinine to 2.0. Urine sodium 1/11 was <10. Blood pressures 130-160 systolics.     Nephrology consulted for CODY    Past Medical History:   Diagnosis Date    Hypertension        Past Surgical History:   Procedure Laterality Date    ESOPHAGOGASTRODUODENOSCOPY N/A 1/3/2024    Procedure: EGD (ESOPHAGOGASTRODUODENOSCOPY);  Surgeon: Madison Hinojosa MD;  Location: [address]);  Service: Endoscopy;  Laterality: N/A;       Review of patient's allergies indicates:  No Known Allergies  Current Facility-Administered Medications   Medication Frequency    0.9%  NaCl infusion (for blood administration) Q24H PRN    amLODIPine tablet 10 mg Daily    ceFEPIme (MAXIPIME) 2 g in dextrose 5 % in water (D5W) 100 mL IVPB (MB+) Q12H    EScitalopram oxalate tablet 10 mg Daily    folic acid tablet 1 mg Daily    hydrALAZINE injection 10 mg Q6H PRN    lactulose 20 gram/30 mL solution Soln 20 g TID    multivitamin  tablet Daily    pantoprazole EC tablet 40 mg BID    phytonadione vitamin k (AQUA-MEPHYTON) 10 mg in dextrose 5 % (D5W) 50 mL IVPB Daily    rifAXIMin tablet 550 mg BID    sodium chloride 0.9% flush 10 mL PRN    thiamine tablet 100 mg Daily    vancomycin - pharmacy to dose pharmacy to manage frequency     Family History    None       Tobacco Use    Smoking status: Never    Smokeless tobacco: Never   Substance and Sexual Activity    Alcohol use: Yes    Drug use: Never    Sexual activity: Not on file     Review of Systems   Unable to perform ROS: Mental status change     Objective:     Vital Signs (Most Recent):  Temp: 98.2 °F (36.8 °C) (01/13/24 1042)  Pulse: 91 (01/13/24 1459)  Resp: 18 (01/13/24 1042)  BP: 118/64 (01/13/24 1042)  SpO2: 97 % (01/13/24 1042) Vital Signs (24h Range):  Temp:  [97.5 °F (36.4 °C)-98.2 °F (36.8 °C)] 98.2 °F (36.8 °C)  Pulse:  [] 91  Resp:  [16-20] 18  SpO2:  [93 %-98 %] 97 %  BP: (113-134)/(55-66) 118/64     Weight: 97.6 kg (215 lb 2.7 oz) (01/03/24 0855)  Body mass index is 32.72 kg/m².  Body surface area is 2.16 meters squared.    I/O last 3 completed shifts:  In: 1680 [P.O.:1680]  Out: 4550 [Urine:350; Other:4200]     Physical Exam  Constitutional:       General: He is not in acute distress.     Appearance: He is obese. He is toxic-appearing. He is not ill-appearing.   HENT:      Head: Normocephalic and atraumatic.      Nose: Nose normal.      Mouth/Throat:      Mouth: Mucous membranes are moist.   Eyes:      General: Scleral icterus present.   Cardiovascular:      Rate and Rhythm: Normal rate.      Heart sounds: No murmur heard.  Pulmonary:      Effort: Pulmonary effort is normal.      Breath sounds: No wheezing or rales.   Abdominal:      General: Abdomen is flat. There is distension.      Tenderness: There is abdominal tenderness.   Musculoskeletal:      Right lower leg: Edema present.      Left lower leg: Edema present.   Skin:     General: Skin is warm.      Coloration: Skin  is jaundiced.      Findings: Bruising present.   Neurological:      Mental Status: He is alert. He is disoriented.          Significant Labs:  All labs within the past 24 hours have been reviewed.    Significant Imaging:  Labs: Reviewed  Assessment/Plan:     Renal/  CODY (acute kidney injury)  58 M with cirrhosis of unknown etiology (undergoing workup) admitted after GI bleed  On admission creatinine 1.2-1.6  At consult on 1/13: 2.0.    1/12 4.5 liter paracentesis    CODY: ddx includes previous NSAID use, large volume shift with paracentesis (resulting decreased effective volume. Hb increased from 7 to 9 without transfusion, suspect concentrating effect) and HRS type 2 and biliary nephropathy     Plan/Recommendation:  -can start midodrine 5 TID, octreotide for next 72 hours and agree with albumin expansion for another 48 hours  -hold diuretics  -avoid nephrotoxic agents    -Keep MAP > 65  -Keep hemoglobin > 7  -Strict ins and outs  -Avoid nephrotoxic agents if possible and renally dose medications  -Avoid drastic hemodynamic changes if possible        GI  Cirrhosis  -        Thank you for your consult. I will follow-up with patient. Please contact us if you have any additional questions.    Chip Chung MD  Nephrology  [facility] Surg

## 2024-01-13 NOTE — PLAN OF CARE
Problem: Adult Inpatient Plan of Care  Goal: Plan of Care Review  Outcome: Ongoing, Progressing  Goal: Patient-Specific Goal (Individualized)  Outcome: Ongoing, Not Progressing  Goal: Absence of Hospital-Acquired Illness or Injury  Outcome: Ongoing, Progressing  Goal: Optimal Comfort and Wellbeing  Outcome: Ongoing, Not Progressing  Goal: Readiness for Transition of Care  Outcome: Ongoing, Not Progressing     Problem: Fluid and Electrolyte Imbalance (Acute Kidney Injury/Impairment)  Goal: Fluid and Electrolyte Balance  Outcome: Ongoing, Not Progressing     Problem: Oral Intake Inadequate (Acute Kidney Injury/Impairment)  Goal: Optimal Nutrition Intake  Outcome: Ongoing, Not Progressing     Problem: Renal Function Impairment (Acute Kidney Injury/Impairment)  Goal: Effective Renal Function  Outcome: Ongoing, Not Progressing     Problem: Fall Injury Risk  Goal: Absence of Fall and Fall-Related Injury  Outcome: Ongoing, Progressing     Problem: Electrolyte Imbalance  Goal: Electrolyte Balance  Outcome: Ongoing, Not Progressing     Problem: Adjustment to Illness (Liver Failure)  Goal: Optimal Coping with Liver Failure  Outcome: Ongoing, Not Progressing     Problem: Infection (Liver Failure)  Goal: Absence of Infection Signs and Symptoms  Outcome: Ongoing, Not Progressing     Problem: Neurologic Function Impaired (Liver Failure)  Goal: Optimal Neurologic Function  Outcome: Ongoing, Not Progressing     Problem: Oral Intake Inadequate (Liver Failure)  Goal: Improved Oral Intake  Outcome: Ongoing, Not Progressing     Problem: Renal Dysfunction (Liver Failure)  Goal: Optimize Renal Function  Outcome: Ongoing, Not Progressing     Problem: Respiratory Compromise (Liver Failure)  Goal: Effective Oxygenation and Ventilation  Outcome: Ongoing, Progressing

## 2024-01-13 NOTE — NURSING
Pt is back to the floor. Abd U/S and paracentesis completed. Pt looks better he is more awake and V/S stable. Urine sample collected. Will continue to monitor.

## 2024-01-13 NOTE — HPI
58 year old man with a history of HTN, suspected EtOH cirrhosis (currently undergoing workup), depression admitted to [facility] for GI bleed with bright red blood per rectum with black tarry stools as well. He has never seen a neprhologyist and is visiting [place] for the sugar bowl. Wife at bedside reports that he was not feeling well and had been taking tylenol/alleve for some weeks before but at an unknown dose.     They do not know what his baseline creatinine is, however on admission to [facility], creatinine hovered around 1.3 to 1.6. Subsequently he underwent a 4.5 liter paracentesis with a resulting increase in creatinine to 2.0. Urine sodium 1/11 was <10. Blood pressures 130-160 systolics.     Nephrology consulted for CODY

## 2024-01-13 NOTE — ASSESSMENT & PLAN NOTE
Patient is visiting from out of town; outside records not available to review thus etiology of cirrhosis not confirmed. However, patient does have a history of ETOH use d/o so this is included in the differential. States he is currently in the process of being referred to a hepatologist and having an EGD scheduled.     MELD-Na score calculated; MELD 3.0: 38 at 1/13/2024  5:42 AM  MELD-Na: 37 at 1/13/2024  5:42 AM  Calculated from:  Serum Creatinine: 2.0 mg/dL at 1/13/2024  5:42 AM  Serum Sodium: 133 mmol/L at 1/13/2024  5:42 AM  Total Bilirubin: 21.3 mg/dL at 1/13/2024  5:42 AM  Serum Albumin: 3.5 g/dL at 1/13/2024  5:42 AM  INR(ratio): 3.0 at 1/13/2024  5:42 AM  Age at listing (hypothetical): 58 years  Sex: Male at 1/13/2024  5:42 AM    Continue chronic meds. Etiology likely ETOH. Will avoid any hepatotoxic meds, and monitor CBC/CMP/INR for synthetic function.   T bilirubin trending up. Hepatology was consulted in the setting of decompensated liver cirrhosis. Ultrasound right upper quadrant with Dopplers, PEth, AFP, serological workup, and acute hepatitis panel. Cirrhotic morphology of the liver with sequela of portal hypertension as detailed above. No discrete hepatic lesions identified. Biliary sludge with thickened gallbladder wall, likely secondary to hepatic dysfunction.  Underwent paracentesis.  As patient is not a candidate for transplant and with his high MELD score he would require air transfer to  [place] where he can go to the hospital close to his home. On lactulose and albumin per hepatology recs.   - Continue lactulose goal 3 BM's daily  - S/p albumin 50g BID for 48 hours  - Accepted to [facility] in [place]. Pending flight logistics.  - Start Rifaximin  - Continue IV Vit K x3 doses  - Repeat paracentesis 1/12 as detailed above

## 2024-01-14 LAB
ALBUMIN SERPL BCP-MCNC: 3 G/DL (ref 3.5–5.2)
ALP SERPL-CCNC: 141 U/L (ref 55–135)
ALT SERPL W/O P-5'-P-CCNC: 67 U/L (ref 10–44)
ANION GAP SERPL CALC-SCNC: 9 MMOL/L (ref 8–16)
AST SERPL-CCNC: 142 U/L (ref 10–40)
BACTERIA BLD CULT: NORMAL
BACTERIA BLD CULT: NORMAL
BASOPHILS # BLD AUTO: 0.05 K/UL (ref 0–0.2)
BASOPHILS NFR BLD: 0.5 % (ref 0–1.9)
BILIRUB SERPL-MCNC: 20.1 MG/DL (ref 0.1–1)
BUN SERPL-MCNC: 41 MG/DL (ref 6–20)
CALCIUM SERPL-MCNC: 8.4 MG/DL (ref 8.7–10.5)
CHLORIDE SERPL-SCNC: 101 MMOL/L (ref 95–110)
CO2 SERPL-SCNC: 23 MMOL/L (ref 23–29)
CREAT SERPL-MCNC: 2 MG/DL (ref 0.5–1.4)
CREAT UR-MCNC: 159 MG/DL (ref 23–375)
DIFFERENTIAL METHOD BLD: ABNORMAL
EOSINOPHIL # BLD AUTO: 0.4 K/UL (ref 0–0.5)
EOSINOPHIL NFR BLD: 4.3 % (ref 0–8)
ERYTHROCYTE [DISTWIDTH] IN BLOOD BY AUTOMATED COUNT: 24.4 % (ref 11.5–14.5)
EST. GFR  (NO RACE VARIABLE): 38.2 ML/MIN/1.73 M^2
GLUCOSE SERPL-MCNC: 90 MG/DL (ref 70–110)
HCT VFR BLD AUTO: 26.5 % (ref 40–54)
HGB BLD-MCNC: 8.7 G/DL (ref 14–18)
IMM GRANULOCYTES # BLD AUTO: 0.06 K/UL (ref 0–0.04)
IMM GRANULOCYTES NFR BLD AUTO: 0.6 % (ref 0–0.5)
INR PPP: 2.9 (ref 0.8–1.2)
LYMPHOCYTES # BLD AUTO: 0.6 K/UL (ref 1–4.8)
LYMPHOCYTES NFR BLD: 6.9 % (ref 18–48)
MAGNESIUM SERPL-MCNC: 2 MG/DL (ref 1.6–2.6)
MCH RBC QN AUTO: 34.3 PG (ref 27–31)
MCHC RBC AUTO-ENTMCNC: 32.8 G/DL (ref 32–36)
MCV RBC AUTO: 104 FL (ref 82–98)
MONOCYTES # BLD AUTO: 0.5 K/UL (ref 0.3–1)
MONOCYTES NFR BLD: 5.8 % (ref 4–15)
NEUTROPHILS # BLD AUTO: 7.6 K/UL (ref 1.8–7.7)
NEUTROPHILS NFR BLD: 81.9 % (ref 38–73)
NRBC BLD-RTO: 0 /100 WBC
PHOSPHATE SERPL-MCNC: 2.9 MG/DL (ref 2.7–4.5)
PLATELET # BLD AUTO: 77 K/UL (ref 150–450)
PMV BLD AUTO: 11.9 FL (ref 9.2–12.9)
POTASSIUM SERPL-SCNC: 4.1 MMOL/L (ref 3.5–5.1)
PROT SERPL-MCNC: 5.2 G/DL (ref 6–8.4)
PROTHROMBIN TIME: 28.9 SEC (ref 9–12.5)
RBC # BLD AUTO: 2.54 M/UL (ref 4.6–6.2)
SODIUM SERPL-SCNC: 133 MMOL/L (ref 136–145)
SODIUM UR-SCNC: 45 MMOL/L (ref 20–250)
VANCOMYCIN SERPL-MCNC: 4.1 UG/ML
WBC # BLD AUTO: 9.26 K/UL (ref 3.9–12.7)

## 2024-01-14 PROCEDURE — 85025 COMPLETE CBC W/AUTO DIFF WBC: CPT | Performed by: STUDENT IN AN ORGANIZED HEALTH CARE EDUCATION/TRAINING PROGRAM

## 2024-01-14 PROCEDURE — 85610 PROTHROMBIN TIME: CPT | Performed by: STUDENT IN AN ORGANIZED HEALTH CARE EDUCATION/TRAINING PROGRAM

## 2024-01-14 PROCEDURE — 25000003 PHARM REV CODE 250: Performed by: NURSE PRACTITIONER

## 2024-01-14 PROCEDURE — 25000003 PHARM REV CODE 250

## 2024-01-14 PROCEDURE — 21400001 HC TELEMETRY ROOM

## 2024-01-14 PROCEDURE — 87449 NOS EACH ORGANISM AG IA: CPT | Performed by: INTERNAL MEDICINE

## 2024-01-14 PROCEDURE — 80202 ASSAY OF VANCOMYCIN: CPT | Performed by: INTERNAL MEDICINE

## 2024-01-14 PROCEDURE — 25000003 PHARM REV CODE 250: Performed by: STUDENT IN AN ORGANIZED HEALTH CARE EDUCATION/TRAINING PROGRAM

## 2024-01-14 PROCEDURE — 63600175 PHARM REV CODE 636 W HCPCS: Performed by: INTERNAL MEDICINE

## 2024-01-14 PROCEDURE — 83735 ASSAY OF MAGNESIUM: CPT | Performed by: NURSE PRACTITIONER

## 2024-01-14 PROCEDURE — 36415 COLL VENOUS BLD VENIPUNCTURE: CPT | Performed by: INTERNAL MEDICINE

## 2024-01-14 PROCEDURE — 63600175 PHARM REV CODE 636 W HCPCS: Performed by: STUDENT IN AN ORGANIZED HEALTH CARE EDUCATION/TRAINING PROGRAM

## 2024-01-14 PROCEDURE — 25000003 PHARM REV CODE 250: Performed by: INTERNAL MEDICINE

## 2024-01-14 PROCEDURE — 84100 ASSAY OF PHOSPHORUS: CPT | Performed by: NURSE PRACTITIONER

## 2024-01-14 PROCEDURE — 80053 COMPREHEN METABOLIC PANEL: CPT | Performed by: NURSE PRACTITIONER

## 2024-01-14 PROCEDURE — 51798 US URINE CAPACITY MEASURE: CPT

## 2024-01-14 RX ADMIN — FOLIC ACID 1 MG: 1 TABLET ORAL at 09:01

## 2024-01-14 RX ADMIN — PANTOPRAZOLE SODIUM 40 MG: 40 TABLET, DELAYED RELEASE ORAL at 09:01

## 2024-01-14 RX ADMIN — LACTULOSE 20 G: 20 SOLUTION ORAL at 02:01

## 2024-01-14 RX ADMIN — PHYTONADIONE 10 MG: 10 INJECTION, EMULSION INTRAMUSCULAR; INTRAVENOUS; SUBCUTANEOUS at 09:01

## 2024-01-14 RX ADMIN — PANTOPRAZOLE SODIUM 40 MG: 40 TABLET, DELAYED RELEASE ORAL at 08:01

## 2024-01-14 RX ADMIN — CEFEPIME 2 G: 2 INJECTION, POWDER, FOR SOLUTION INTRAVENOUS at 09:01

## 2024-01-14 RX ADMIN — ESCITALOPRAM OXALATE 10 MG: 10 TABLET ORAL at 09:01

## 2024-01-14 RX ADMIN — RIFAXIMIN 550 MG: 550 TABLET ORAL at 09:01

## 2024-01-14 RX ADMIN — VANCOMYCIN HYDROCHLORIDE 1500 MG: 1.5 INJECTION, POWDER, LYOPHILIZED, FOR SOLUTION INTRAVENOUS at 11:01

## 2024-01-14 RX ADMIN — RIFAXIMIN 550 MG: 550 TABLET ORAL at 08:01

## 2024-01-14 RX ADMIN — THERA TABS 1 TABLET: TAB at 09:01

## 2024-01-14 RX ADMIN — AMLODIPINE BESYLATE 10 MG: 10 TABLET ORAL at 09:01

## 2024-01-14 RX ADMIN — LACTULOSE 20 G: 20 SOLUTION ORAL at 09:01

## 2024-01-14 RX ADMIN — MICAFUNGIN SODIUM 100 MG: 100 INJECTION, POWDER, LYOPHILIZED, FOR SOLUTION INTRAVENOUS at 02:01

## 2024-01-14 RX ADMIN — VANCOMYCIN HYDROCHLORIDE 1500 MG: 1.5 INJECTION, POWDER, LYOPHILIZED, FOR SOLUTION INTRAVENOUS at 10:01

## 2024-01-14 RX ADMIN — LACTULOSE 20 G: 20 SOLUTION ORAL at 08:01

## 2024-01-14 RX ADMIN — CEFEPIME 2 G: 2 INJECTION, POWDER, FOR SOLUTION INTRAVENOUS at 08:01

## 2024-01-14 RX ADMIN — Medication 100 MG: at 09:01

## 2024-01-14 NOTE — ASSESSMENT & PLAN NOTE
Patient is visiting from out of town; outside records not available to review thus etiology of cirrhosis not confirmed. However, patient does have a history of ETOH use d/o so this is included in the differential. States he is currently in the process of being referred to a hepatologist and having an EGD scheduled.     MELD-Na score calculated; MELD 3.0: 38 at 1/14/2024  4:43 AM  MELD-Na: 37 at 1/14/2024  4:43 AM  Calculated from:  Serum Creatinine: 2.0 mg/dL at 1/14/2024  4:43 AM  Serum Sodium: 133 mmol/L at 1/14/2024  4:43 AM  Total Bilirubin: 20.1 mg/dL at 1/14/2024  4:43 AM  Serum Albumin: 3.0 g/dL at 1/14/2024  4:43 AM  INR(ratio): 2.9 at 1/14/2024  4:43 AM  Age at listing (hypothetical): 58 years  Sex: Male at 1/14/2024  4:43 AM    Continue chronic meds. Etiology likely ETOH. Will avoid any hepatotoxic meds, and monitor CBC/CMP/INR for synthetic function.   T bilirubin trending up. Hepatology was consulted in the setting of decompensated liver cirrhosis. Ultrasound right upper quadrant with Dopplers, PEth, AFP, serological workup, and acute hepatitis panel. Cirrhotic morphology of the liver with sequela of portal hypertension as detailed above. No discrete hepatic lesions identified. Biliary sludge with thickened gallbladder wall, likely secondary to hepatic dysfunction.  Underwent paracentesis.  As patient is not a candidate for transplant and with his high MELD score he would require air transfer to  [place] where he can go to the hospital close to his home. On lactulose and albumin per hepatology recs.   - Continue lactulose goal 3 BM's daily  - S/p albumin 50g BID for 48 hours  - Accepted to [facility] in [place]. Pending flight logistics.  - Start Rifaximin  - Continue IV Vit K x3 doses  - Repeat paracentesis 1/12 as detailed above

## 2024-01-14 NOTE — NURSING
Messaged on call MD patient just informed me that he has not voided all shift he did have 3 bms bladder scan showed 183ml but he does have ascites so not sure how accurate that is. Awaiting orders.

## 2024-01-14 NOTE — ASSESSMENT & PLAN NOTE
Etiology likely 2/2 cirrhosis  - Monitor w/ daily CBC and transfuse if Plt <10 or <50 w/ signs of active bleeding   Recent Labs   Lab 01/14/24  0443   PLT 77*

## 2024-01-14 NOTE — ASSESSMENT & PLAN NOTE
Patient with new onset chills/rigors 1/12. Now with leukocytosis, remains afebrile and normotensive (previously normotensive/hypertensive).  Infectious work up:  - CXR obtained without any signs of pneumonia however possible small amount of edema vs viral component. Covid/Influenza negative.   - UA non-infectious.   - He was initially started on CTX for concern for possible SBP.    - Paracentesis performed with IR on 1/12 with 4.2L out. Ascitic labs with no evidence of SBP, and ascitic cultures NGTD.   - Blood cultures 1/12 NGTD  - Had some RUQ discomfort 1/12 and RUQ US showed gallbladder wall thickening with possible signs of cholecystitis. No abdominal pain today and negative Sen's sign  - Broaded from CTX to Vanc/cefe with low threshold to start micafungin per hepatology  - CT-CAP without evidence of infection, no concern for cholecystitis    - Had another episode of rigors and temp renetta to 100.0 on 1/14. Remains HDS.  - Will add Micafungin  - Fungitell ordered

## 2024-01-14 NOTE — PROGRESS NOTES
[facility] Medicine  Progress Note    Patient Name: Blu Deleon  MRN: [id number]  Patient Class: IP- Inpatient   Admission Date: 1/2/2024  Length of Stay: 11 days  Attending Physician: Rik Gomes MD  Primary Care Provider: Radha, Primary Doctor        Subjective:     Principal Problem:Leukocytosis        HPI:  Mr. Deleon is a 58 year old male with PMH notable for HTN, ETOH use, reported cirrhosis (has not seen a hepatologist), and depression who presented to [facility] ED with acute GI bleeding. In speaking with patient and wife at bedside, patient reports four days of nausea and vomiting with bright red blood and black tarry diarrhea. Additionally, he reports a fall yesterday after attempting to go to the restroom secondary to being lightheaded. He denies this happening previously. He was scheduled for an outpatient EGD, which has not been completed. He has not had a colonoscopy. He denies any anticoagulation or antiplatelet use.      In the emergency department he was found to be acutely anemic with a hgb of 5. He has not been hypotensive while in the emergency department, however, notably tachycardic with -140. Labs otherwise notable for WBC 13.56, Plt 107, INR 2.4, BUN 57, Cr 1.6, TB 5.4, , ALT 44. Initial lactate >12. ETOH level <10. Patient is s/p 3u pRBCs.    Admitted to MICU for UGIB.      Overview/Hospital Course:  Patient admitted for UGIB. GI consulted and EGD showed esophageal ulcers. Pt to continue PPI for 8 weeks with repeat scope to follow. Hgb stable s/p 3u PRBC and 1u FFP. HDS and tolerating clear liquid diet. CIWA ordered and no ativan required.  Patient was transferred to floors on 01/05.  Creatinine has remained stable at 1.4.  T bilirubin trending up.  Hepatology was consulted in the setting of decompensated liver cirrhosis.  Ultrasound right upper quadrant with Dopplers, PEth, AFP, serological workup, and acute hepatitis panel.  Patient underwent  paracentesis on 01/08 and ascitic fluid negative for SBP.  As patient is not a candidate for transplant and with his high MELD score he would require air transfer to [place] where he can go to the hospital close to his home. Accepted for transfer to [facility] in [place], awaiting logistics of flight transport.    Patient was doing well the morning of 1/12, however after a walk to the bathroom he started to have chills and rigors. He was started on CTX for concern for possible SBP. Repeat CBC with stable Hgb and WBC WNL. Paracentesis performed with IR on 1/12 with 4.2L out. Ascitic labs with no evidence of SBP, and ascitic cultures NGTD. Blood cultures 1/12 NGTD. Had some RUQ discomfort and RUQ US ordered with gallbladder wall thickening with possible signs of cholecystitis. CXR obtained without any signs of pneumonia however possible small amount of edema vs viral. Covid/Influenza negative.     On 1/13, WBC elevated, patient remains afebrile and normotensive. Abdominal exam benign, negative Sen's sign. UA non-infectious. Antibiotics broadened to Vanc/cefe with low threshold for micafungin per hepatology. CT-CAP without evidence of infection. Patient appeared much better morning of 1/14, however became somnolent again early this afternoon. Broadened to Micafungin and ID consulted.    Interval History/Significant Events: Pt seen and examined this morning on rounds. Appeared much improved this morning, alert and conversant. CT-CAP yesterday with no evidence of infection. WBC downtrended from 14 -> 9. T bili 21 -> 20, Cr stable at 2.0. Noted for minimal UOP despite 1L NS over 5 hours yesterday.     Unfortunately, patient had 20-30 minutes of rigors and temperature renetta to 100.0. Per wife and nursing, he appeared more lethargic. Broadened to micafungin and ID consulted. Fungitell ordered.    Objective:     Vital Signs (Most Recent):  Temp: 100 °F (37.8 °C) (01/14/24 1236)  Pulse: 108 (01/14/24 1236)  Resp: 19  (01/14/24 1236)  BP: (!) 142/64 (01/14/24 1236)  SpO2: 95 % (01/14/24 1236) Vital Signs (24h Range):  Temp:  [98.5 °F (36.9 °C)-100 °F (37.8 °C)] 100 °F (37.8 °C)  Pulse:  [] 108  Resp:  [14-19] 19  SpO2:  [94 %-98 %] 95 %  BP: (119-142)/(56-64) 142/64   Weight: 97.6 kg (215 lb 2.7 oz)  Body mass index is 32.72 kg/m².      Intake/Output Summary (Last 24 hours) at 1/14/2024 1254  Last data filed at 1/14/2024 1242  Gross per 24 hour   Intake 700 ml   Output 378 ml   Net 322 ml          Physical Exam  Gen: in NAD, appears stated age, jaundiced, lethargic  Neuro: AAOx4, CN2-12 grossly intact BL; motor, sensory, and strength grossly intact BL  HEENT: + icteric sclera. NTNC, EOMI, PERRLA, MMM; no thyromegaly or lymphadenopathy; no JVD appreciated  CVS: RRR, no m/r/g; S1/S2 auscultated with no S3 or S4; capillary refill < 2 sec  Resp: lungs CTAB, no w/r/r; no belabored breathing or accessory muscle use appreciated   Abd: + Abdominal distension. BS+ in all 4 quadrants; NTND, soft to palpation; no organomegaly appreciated   Extrem: pulses full, equal, and regular over all 4 extremities; no UE or LE edema BL         Vents:  Oxygen Concentration (%): 21 (01/12/24 0041)  Lines/Drains/Airways       Peripheral Intravenous Line  Duration                  Peripheral IV - Single Lumen 01/03/24 1045 18 G;1 1/4 in Anterior;Left Upper Arm 11 days         Peripheral IV - Single Lumen 01/03/24 1400 18 G;1 1/4 in Anterior;Right Upper Arm 10 days                  Significant Labs:    CBC/Anemia Profile:  Recent Labs   Lab 01/12/24  1345 01/13/24  0542 01/14/24  0443   WBC 4.78 14.11* 9.26   HGB 7.7* 9.1* 8.7*   HCT 22.9* 28.5* 26.5*   PLT 75* 89* 77*   * 105* 104*   RDW 24.8* 25.2* 24.4*        Chemistries:  Recent Labs   Lab 01/13/24  0542 01/14/24  0443   * 133*   K 4.1 4.1    101   CO2 19* 23   BUN 36* 41*   CREATININE 2.0* 2.0*   CALCIUM 8.7 8.4*   ALBUMIN 3.5 3.0*   PROT 5.9* 5.2*   BILITOT 21.3* 20.1*    ALKPHOS 178* 141*   ALT 73* 67*   * 142*   MG 2.1 2.0   PHOS 3.4 2.9       CMP:   Recent Labs   Lab 01/13/24  0542 01/14/24  0443   * 133*   K 4.1 4.1    101   CO2 19* 23   GLU 82 90   BUN 36* 41*   CREATININE 2.0* 2.0*   CALCIUM 8.7 8.4*   PROT 5.9* 5.2*   ALBUMIN 3.5 3.0*   BILITOT 21.3* 20.1*   ALKPHOS 178* 141*   * 142*   ALT 73* 67*   ANIONGAP 11 9     Coagulation:   Recent Labs   Lab 01/14/24  0443   INR 2.9*     Lactic Acid:   Recent Labs   Lab 01/13/24  1131   LACTATE 2.1         Significant Imaging:  I have reviewed all pertinent imaging results/findings within the past 24 hours.    Assessment/Plan:      * Leukocytosis  Patient with new onset chills/rigors 1/12. Now with leukocytosis, remains afebrile and normotensive (previously normotensive/hypertensive).  Infectious work up:  - CXR obtained without any signs of pneumonia however possible small amount of edema vs viral component. Covid/Influenza negative.   - UA non-infectious.   - He was initially started on CTX for concern for possible SBP.    - Paracentesis performed with IR on 1/12 with 4.2L out. Ascitic labs with no evidence of SBP, and ascitic cultures NGTD.   - Blood cultures 1/12 NGTD  - Had some RUQ discomfort 1/12 and RUQ US showed gallbladder wall thickening with possible signs of cholecystitis. No abdominal pain today and negative Sen's sign  - Broaded from CTX to Vanc/cefe with low threshold to start micafungin per hepatology  - CT-CAP without evidence of infection, no concern for cholecystitis    - Had another episode of rigors and temp renetta to 100.0 on 1/14. Remains HDS.  - Will add Micafungin  - Fungitell ordered    Cirrhosis  Patient is visiting from out of town; outside records not available to review thus etiology of cirrhosis not confirmed. However, patient does have a history of ETOH use d/o so this is included in the differential. States he is currently in the process of being referred to a hepatologist  and having an EGD scheduled.     MELD-Na score calculated; MELD 3.0: 38 at 1/14/2024  4:43 AM  MELD-Na: 37 at 1/14/2024  4:43 AM  Calculated from:  Serum Creatinine: 2.0 mg/dL at 1/14/2024  4:43 AM  Serum Sodium: 133 mmol/L at 1/14/2024  4:43 AM  Total Bilirubin: 20.1 mg/dL at 1/14/2024  4:43 AM  Serum Albumin: 3.0 g/dL at 1/14/2024  4:43 AM  INR(ratio): 2.9 at 1/14/2024  4:43 AM  Age at listing (hypothetical): 58 years  Sex: Male at 1/14/2024  4:43 AM    Continue chronic meds. Etiology likely ETOH. Will avoid any hepatotoxic meds, and monitor CBC/CMP/INR for synthetic function.   T bilirubin trending up. Hepatology was consulted in the setting of decompensated liver cirrhosis. Ultrasound right upper quadrant with Dopplers, PEth, AFP, serological workup, and acute hepatitis panel. Cirrhotic morphology of the liver with sequela of portal hypertension as detailed above. No discrete hepatic lesions identified. Biliary sludge with thickened gallbladder wall, likely secondary to hepatic dysfunction.  Underwent paracentesis.  As patient is not a candidate for transplant and with his high MELD score he would require air transfer to  [place] where he can go to the hospital close to his home. On lactulose and albumin per hepatology recs.   - Continue lactulose goal 3 BM's daily  - S/p albumin 50g BID for 48 hours  - Accepted to [facility] in [place]. Pending flight logistics.  - Start Rifaximin  - Continue IV Vit K x3 doses  - Repeat paracentesis 1/12 as detailed above    CODY (acute kidney injury)  - Cr 1.6 at admission, unclear baseline  - Initially likely pre-renal in setting of GIB.  - Now with slow elevation of creatinine  - Hepatology recommending albumin 50g BID for 48 hours  - S/p 48 hours albumin  - Cr 1.6 -> 2.0   - Nephrology consulted, urine studies sent  - Renally dosing all medications  - Avoiding nephrotoxins  - Will continue to monitor on daily labs    Acute upper GI bleed  58 year old M w/ cirrhosis and  ETOH use d/o presenting w/ melana and ground coffee emesis/hematemesis. Denies prior symptoms. No EGDs on file. Normotensive and tachycardic w/ HR 130s. Initial Hgb 5.0; now s/p 2u pRBCs. Initial lactate >12. Labs otherwise notable for Plt 116, INR 2.4, BUN 57, Cr 1.6, TB 5.4, , ALT 44. Admitted to MICU for management of acute upper GIB.      EGD showed esophageal ulcers. Hgb stable s/p 3u PRBC. Will need scope after 8 week course of PPI.     - Soft and bite-sized diet, advance as tolerated  - Trend Hgb and transfuse for goal Hgb > 7, unless otherwise indicated  - Maintain IV access with 2 large bore IV's  - Intravascular resuscitation/support with IVF's   - Hold all NSAIDs and anticoagulants, unless contraindicated  - Pantoprazole 40 mg BID for a total of 8 weeks  - Correct any coagulopathy with platelets and FFP for goal of platelets >50K and INR <2.0  - CTM CBC    - Hgb stable, BM's without evidence of melena    Transaminitis  - 2:1 pattern; likely 2/2 ETOH use d/o.   - Improving  - Will continue to monitor        Thrombocytopenia  Etiology likely 2/2 cirrhosis  - Monitor w/ daily CBC and transfuse if Plt <10 or <50 w/ signs of active bleeding   Recent Labs   Lab 01/14/24  0443   PLT 77*           Alcohol use disorder  - Last alcoholic drink on 1/01. Alcohol level <10 on admission. Denies history of withdrawal in the past including seizures. Remains tachycardic but likely 2/2 acute GIB. Will continue to monitor for signs of withdrawal w/ CIWA protocol and start benzodiazepines if indicated.   - thiamine, folic acid, MV  - DC CIWA protocol as now out of withdrawal window       Coagulopathy  - INR elevated in setting of cirrhosis  - S/p IV Vit K for x3 days.         VTE Risk Mitigation (From admission, onward)           Ordered     Place sequential compression device  Until discontinued         01/03/24 0256     IP VTE LOW RISK PATIENT  Once         01/03/24 0256                    Discharge Planning   MILKA:  1/22/2024     Code Status: Full Code   Is the patient medically ready for discharge?: No    Reason for patient still in hospital (select all that apply): Treatment  Discharge Plan A: Home, Home with family   Discharge Delays: None known at this time              Rik Gomes MD  Department of Hospital Medicine   [facility]

## 2024-01-14 NOTE — TREATMENT PLAN
Hepatology Treatment Plan    Blu Deleon is a 58 y.o. male admitted to hospital 1/2/2024 (Hospital Day: 13) due to Leukocytosis.     Interval History  Mental status significantly improved this morning, back to baseline, cheerful  TB slightly downtrending  Cr and INR plateau  Remains afebrile HDS  WBC normalized    No BMs yesterday but had 3 last night, still taking lactulose    MELD 38      Objective  Temp:  [98.2 °F (36.8 °C)-100 °F (37.8 °C)] 98.7 °F (37.1 °C) (01/14 0917)  Pulse:  [79-91] 89 (01/14 0917)  BP: (118-141)/(56-64) 134/60 (01/14 0917)  Resp:  [14-18] 17 (01/14 0917)  SpO2:  [94 %-98 %] 97 % (01/14 0917)    Physical Exam:  Constitutional:  awake, alert, NAD  HENT: Head: Normal, CPAP in place  Eyes:  scleral icterus  Respiratory: normal chest expansion & respiratory effort and no accessory muscle use  GI: soft, non-tender, without masses or organomegaly  Skin:  jaundiced  Neurological: oriented x3      Laboratory    Recent Labs   Lab 01/14/24  0443   WBC 9.26   RBC 2.54*   HGB 8.7*   HCT 26.5*   PLT 77*   *   MCH 34.3*   MCHC 32.8        Recent Labs   Lab 01/14/24  0443   CALCIUM 8.4*   ALBUMIN 3.0*   PROT 5.2*   *   K 4.1   CO2 23      BUN 41*   CREATININE 2.0*   ALKPHOS 141*   ALT 67*   *   BILITOT 20.1*        Recent Labs   Lab 01/14/24  0443   INR 2.9*          MELD 3.0: 38 at 1/14/2024  4:43 AM  MELD-Na: 37 at 1/14/2024  4:43 AM  Calculated from:  Serum Creatinine: 2.0 mg/dL at 1/14/2024  4:43 AM  Serum Sodium: 133 mmol/L at 1/14/2024  4:43 AM  Total Bilirubin: 20.1 mg/dL at 1/14/2024  4:43 AM  Serum Albumin: 3.0 g/dL at 1/14/2024  4:43 AM  INR(ratio): 2.9 at 1/14/2024  4:43 AM  Age at listing (hypothetical): 58 years  Sex: Male at 1/14/2024  4:43 AM     JERI negative  ASMA 1:40  IgG wnl  AMA negative  Viral hepatitis panel negative  Ceruloplasmin negative  Iron studies negative    Assessment and Plan    Blu Deleon is a 58 y.o. male with history of alcohol use  "disorder, HTN, and compensated EtOH cirrhosis who was admitted to [facility] on  with melena.   After resuscitation, EGD on 1/3/24 showed esophageal ulcers, erythematous mucosa in the antrum, and normal duodenum.  Hepatology now consulted for worsening bilirubin.     He hails from [place], visiting from the [place]. The patient has known about his history of "early cirrhosis" since at least  when liver ultrasound confirmed cirrhosis.  He has continued to drink in spite of knowledge of liver disease and being told to stop; has consumed alcohol regularly since the age of 21.  Last drink 24. No illicit drug use.  Sister  of EtOH cirrhosis. PETH positive, 98.    Case was discussed in committee  and deemed not to be a candidate for liver transplant evaluation due to ongoing alcohol use despite knowledge of liver disease       Problem List:  Newly decompensated EtOH cirrhosis  EtOH use disorder  Esophageal ulcers  Leukocytosis  Hepatic encephalopathy        Recommendations:  - We are not going to proceed with liver tx eval at this time since patient was aware of cirrhosis but persisted drinking. Discussed in transplant committee.  - Now accepted to [facility] in [place] to be closer to home. Concerned with very high mortality based on current trend in liver function.  - Follow infectious workup: noted CXR shows possible viral process. Blood cultures NGTD  - Would continue broad spectrum antibiotics given clinical improvement. Low threshold to add empiric antifungal coverage if worsening  - Finished IV vitamin K x 3 days (second course)  - Stopped baclofen. Avoid sedating medications  - Titrate lactulose to 3-4 BMs per day  - Continue rifaximin 550 mg BID  - Nephrology following for CODY, appreciate recs  - low sodium, high protein diet  - Continue Protonix 40mg twice per day for 8 weeks. Repeat upper endoscopy in 8 weeks to check healing of his esophageal ulcers. He would like to " have this done in [facility].   - Daily CBC, CMP, INR.       - We will continue to follow.    Thank you for involving us in the care of Blu Deleon. Please call with any additional questions, concerns or changes in the patient's clinical status. Plan of care discussed with attending Dr. Patterson.    Dimitri Francois MD  Gastroenterology and Hepatology Fellow, PGY V

## 2024-01-14 NOTE — PROGRESS NOTES
Pharmacokinetic Assessment Follow Up: IV Vancomycin    Vancomycin serum concentration assessment and plan:    The random level was drawn correctly and can be used to guide therapy at this time. The measurement is below the desired definitive target range of 10 to 20 mcg/mL for sepsis of unknown source.  Change regimen to Vancomycin 1500 mg IV every 12 hours given a trough of 4.1 mcg/mL after a load dose of 2000 mg given ~18 hours prior and given good clearance of vancomycin despite renal function.  Obtain a serum trough concentration measured at 2200 prior to 4th dose on 1/15 and monitor closely for accumulation of vancomycin.  If the renal function worsens tomorrow 1/15, please obtain a random concentration sooner.    Drug levels (last 3 results):  Recent Labs   Lab Result Units 01/14/24  0443   Vancomycin, Random ug/mL 4.1       Pharmacy will continue to follow and monitor vancomycin.    Please contact pharmacy at extension [phone] for questions regarding this assessment.    Thank you for the consult,   Ace Baxter, KierstenD       Patient brief summary:  Blu Deleon is a 58 y.o. male initiated on antimicrobial therapy with IV Vancomycin for treatment of sepsis of unknown source.      Drug Allergies:   Review of patient's allergies indicates:  No Known Allergies    Actual Body Weight:   97.6 kg    Renal Function:   Estimated Creatinine Clearance: 46.2 mL/min (A) (based on SCr of 2 mg/dL (H)).,     Dialysis Method (if applicable):  N/A    CBC (last 72 hours):  Recent Labs   Lab Result Units 01/12/24  0447 01/12/24  1345 01/13/24  0542 01/14/24  0443   WBC K/uL 7.72 4.78 14.11* 9.26   Hemoglobin g/dL 7.8* 7.7* 9.1* 8.7*   Hematocrit % 23.7* 22.9* 28.5* 26.5*   Platelets K/uL 83* 75* 89* 77*   Gran % % 67.9 89.6* 86.0* 81.9*   Lymph % % 13.3* 5.6* 5.7* 6.9*   Mono % % 15.7* 1.9* 6.5 5.8   Eosinophil % % 1.8 0.8 0.9 4.3   Basophil % % 0.8 0.6 0.3 0.5   Differential Method  Automated Automated Automated Automated        Metabolic Panel (last 72 hours):  Recent Labs   Lab Result Units 01/11/24  1612 01/12/24  0447 01/12/24  1852 01/13/24  0542 01/13/24  1543 01/14/24  0443   Sodium mmol/L  --  135*  --  133*  --  133*   Sodium, Urine mmol/L <10*  --   --   --  45  --    Potassium mmol/L  --  3.3*  --  4.1  --  4.1   Chloride mmol/L  --  101  --  103  --  101   CO2 mmol/L  --  24  --  19*  --  23   Glucose mg/dL  --  97  --  82  --  90   Glucose, UA   --   --  Negative  --   --   --    BUN mg/dL  --  32*  --  36*  --  41*   Creatinine mg/dL  --  1.6*  --  2.0*  --  2.0*   Creatinine, Urine mg/dL  --   --   --   --  159.0  --    Albumin g/dL  --  3.4*  --  3.5  --  3.0*   Total Bilirubin mg/dL  --  19.9*  --  21.3*  --  20.1*   Alkaline Phosphatase U/L  --  171*  --  178*  --  141*   AST U/L  --  151*  --  170*  --  142*   ALT U/L  --  67*  --  73*  --  67*   Magnesium mg/dL  --  2.1  --  2.1  --  2.0   Phosphorus mg/dL  --  2.3*  --  3.4  --  2.9       Vancomycin Administrations:  vancomycin given in the last 96 hours                     vancomycin 2 g in dextrose 5 % 500 mL IVPB (mg) 2,000 mg New Bag 01/13/24 1059                    Microbiologic Results:  Microbiology Results (last 7 days)       Procedure Component Value Units Date/Time    (rule out SBP) Culture, Anaerobic [[id number]] Collected: 01/12/24 1520    Order Status: Completed Specimen: Ascites Updated: 01/13/24 1440     Anaerobic Culture Culture in progress    Narrative:      To rule out SBP order labs: Aerobic culture [[id number]],  Culture, Anaerobic [[id number]], Gram stain [ANZ759], Albumin  [GRG456], Protein [GVB113], LDH [WZE933], WBC \T\ Dff  [HRF4099].    Blood culture [[id number]] Collected: 01/12/24 1207    Order Status: Completed Specimen: Blood from Peripheral, Right Updated: 01/13/24 1412     Blood Culture, Routine No Growth to date      No Growth to date    Blood culture [[id number]] Collected: 01/12/24 1205    Order Status: Completed Specimen: Blood Updated:  01/13/24 1412     Blood Culture, Routine No Growth to date      No Growth to date    Blood culture [[id number]] Collected: 01/09/24 1056    Order Status: Completed Specimen: Blood Updated: 01/13/24 1212     Blood Culture, Routine No Growth to date      No Growth to date      No Growth to date      No Growth to date      No Growth to date    Blood culture [[id number]] Collected: 01/09/24 1056    Order Status: Completed Specimen: Blood Updated: 01/13/24 1212     Blood Culture, Routine No Growth to date      No Growth to date      No Growth to date      No Growth to date      No Growth to date    (rule out SBP) Aerobic culture [[id number]] Collected: 01/12/24 1520    Order Status: Completed Specimen: Ascites Updated: 01/13/24 0611     Aerobic Bacterial Culture No growth    Narrative:      To rule out SBP order labs: Aerobic culture [[id number]],  Culture, Anaerobic [[id number]], Gram stain [SKF514], Albumin  [DBA296], Protein [QRD576], LDH [YJV907], WBC \T\ Dff  [HFW5808].    (rule out SBP) Gram stain [[id number]] Collected: 01/12/24 1520    Order Status: Completed Specimen: Ascites Updated: 01/12/24 2056     Gram Stain Result Rare WBC's      No organisms seen    Narrative:      To rule out SBP order labs: Aerobic culture [[id number]],  Culture, Anaerobic [GAO653], Gram stain [LEO137], Albumin  [ODO825], Protein [RPW807], LDH [LNP486], WBC \T\ Dff  [LFA5185].    Culture, Anaerobic [[id number]] Collected: 01/08/24 1259    Order Status: Completed Specimen: Ascites Updated: 01/12/24 0944     Anaerobic Culture Culture in progress    Aerobic culture [[id number]] Collected: 01/08/24 1259    Order Status: Completed Specimen: Ascites Updated: 01/11/24 1051     Aerobic Bacterial Culture No growth    (rule out SBP) Gram stain [[id number]] Collected: 01/08/24 1259    Order Status: Completed Specimen: Ascites Updated: 01/08/24 2235     Gram Stain Result No WBC's      No organisms seen    Narrative:      To rule out SBP order labs: Aerobic  culture [[id number]],  Culture, Anaerobic [WWG467], Gram stain [UAO917], Albumin  [TXH005], Protein [YWD797], LDH [VZB294], WBC \T\ Dff  [IJU7919].    (rule out SBP) Aerobic culture [[id number]] Collected: 01/08/24 1259    Order Status: Canceled Specimen: Ascites     (rule out SBP) Culture, Anaerobic [[id number]] Collected: 01/08/24 1259    Order Status: Canceled Specimen: Ascites     Gram stain [[id number]] Collected: 01/08/24 1259    Order Status: Canceled Specimen: Ascites

## 2024-01-14 NOTE — SUBJECTIVE & OBJECTIVE
Interval History/Significant Events: Pt seen and examined this morning on rounds. Appeared much improved this morning, alert and conversant. CT-CAP yesterday with no evidence of infection. WBC downtrended from 14 -> 9. T bili 21 -> 20, Cr stable at 2.0. Noted for minimal UOP despite 1L NS over 5 hours yesterday.     Unfortunately, patient had 20-30 minutes of rigors and temperature renetta to 100.0. Per wife and nursing, he appeared more lethargic. Broadened to micafungin and ID consulted. Fungitell ordered.    Objective:     Vital Signs (Most Recent):  Temp: 100 °F (37.8 °C) (01/14/24 1236)  Pulse: 108 (01/14/24 1236)  Resp: 19 (01/14/24 1236)  BP: (!) 142/64 (01/14/24 1236)  SpO2: 95 % (01/14/24 1236) Vital Signs (24h Range):  Temp:  [98.5 °F (36.9 °C)-100 °F (37.8 °C)] 100 °F (37.8 °C)  Pulse:  [] 108  Resp:  [14-19] 19  SpO2:  [94 %-98 %] 95 %  BP: (119-142)/(56-64) 142/64   Weight: 97.6 kg (215 lb 2.7 oz)  Body mass index is 32.72 kg/m².      Intake/Output Summary (Last 24 hours) at 1/14/2024 1254  Last data filed at 1/14/2024 1242  Gross per 24 hour   Intake 700 ml   Output 378 ml   Net 322 ml          Physical Exam  Gen: in NAD, appears stated age, jaundiced, lethargic  Neuro: AAOx4, CN2-12 grossly intact BL; motor, sensory, and strength grossly intact BL  HEENT: + icteric sclera. NTNC, EOMI, PERRLA, MMM; no thyromegaly or lymphadenopathy; no JVD appreciated  CVS: RRR, no m/r/g; S1/S2 auscultated with no S3 or S4; capillary refill < 2 sec  Resp: lungs CTAB, no w/r/r; no belabored breathing or accessory muscle use appreciated   Abd: + Abdominal distension. BS+ in all 4 quadrants; NTND, soft to palpation; no organomegaly appreciated   Extrem: pulses full, equal, and regular over all 4 extremities; no UE or LE edema BL         Vents:  Oxygen Concentration (%): 21 (01/12/24 0041)  Lines/Drains/Airways       Peripheral Intravenous Line  Duration                  Peripheral IV - Single Lumen 01/03/24 1045 18 G;1  1/4 in Anterior;Left Upper Arm 11 days         Peripheral IV - Single Lumen 01/03/24 1400 18 G;1 1/4 in Anterior;Right Upper Arm 10 days                  Significant Labs:    CBC/Anemia Profile:  Recent Labs   Lab 01/12/24  1345 01/13/24  0542 01/14/24  0443   WBC 4.78 14.11* 9.26   HGB 7.7* 9.1* 8.7*   HCT 22.9* 28.5* 26.5*   PLT 75* 89* 77*   * 105* 104*   RDW 24.8* 25.2* 24.4*        Chemistries:  Recent Labs   Lab 01/13/24  0542 01/14/24  0443   * 133*   K 4.1 4.1    101   CO2 19* 23   BUN 36* 41*   CREATININE 2.0* 2.0*   CALCIUM 8.7 8.4*   ALBUMIN 3.5 3.0*   PROT 5.9* 5.2*   BILITOT 21.3* 20.1*   ALKPHOS 178* 141*   ALT 73* 67*   * 142*   MG 2.1 2.0   PHOS 3.4 2.9       CMP:   Recent Labs   Lab 01/13/24  0542 01/14/24  0443   * 133*   K 4.1 4.1    101   CO2 19* 23   GLU 82 90   BUN 36* 41*   CREATININE 2.0* 2.0*   CALCIUM 8.7 8.4*   PROT 5.9* 5.2*   ALBUMIN 3.5 3.0*   BILITOT 21.3* 20.1*   ALKPHOS 178* 141*   * 142*   ALT 73* 67*   ANIONGAP 11 9     Coagulation:   Recent Labs   Lab 01/14/24  0443   INR 2.9*     Lactic Acid:   Recent Labs   Lab 01/13/24  1131   LACTATE 2.1         Significant Imaging:  I have reviewed all pertinent imaging results/findings within the past 24 hours.

## 2024-01-14 NOTE — PLAN OF CARE
Patient is not medically ready to discharge. MILKA set for 1/22/24. CM staff will continue to follow.    Stephanie Islas RN  Weekend  - [facility] – [place] [address]  Spectralink: [phone]

## 2024-01-14 NOTE — ASSESSMENT & PLAN NOTE
58 year old M w/ cirrhosis and ETOH use d/o presenting w/ melana and ground coffee emesis/hematemesis. Denies prior symptoms. No EGDs on file. Normotensive and tachycardic w/ HR 130s. Initial Hgb 5.0; now s/p 2u pRBCs. Initial lactate >12. Labs otherwise notable for Plt 116, INR 2.4, BUN 57, Cr 1.6, TB 5.4, , ALT 44. Admitted to MICU for management of acute upper GIB.      EGD showed esophageal ulcers. Hgb stable s/p 3u PRBC. Will need scope after 8 week course of PPI.     - Soft and bite-sized diet, advance as tolerated  - Trend Hgb and transfuse for goal Hgb > 7, unless otherwise indicated  - Maintain IV access with 2 large bore IV's  - Intravascular resuscitation/support with IVF's   - Hold all NSAIDs and anticoagulants, unless contraindicated  - Pantoprazole 40 mg BID for a total of 8 weeks  - Correct any coagulopathy with platelets and FFP for goal of platelets >50K and INR <2.0  - CTM CBC    - Hgb stable, BM's without evidence of melena

## 2024-01-15 LAB
ALBUMIN SERPL BCP-MCNC: 2.6 G/DL (ref 3.5–5.2)
ALP SERPL-CCNC: 134 U/L (ref 55–135)
ALT SERPL W/O P-5'-P-CCNC: 58 U/L (ref 10–44)
AMORPH CRY UR QL COMP ASSIST: ABNORMAL
ANION GAP SERPL CALC-SCNC: 8 MMOL/L (ref 8–16)
AST SERPL-CCNC: 121 U/L (ref 10–40)
BACTERIA #/AREA URNS AUTO: ABNORMAL /HPF
BACTERIA SPEC AEROBE CULT: NO GROWTH
BACTERIA SPEC ANAEROBE CULT: NORMAL
BILIRUB SERPL-MCNC: 20.5 MG/DL (ref 0.1–1)
BILIRUB UR QL STRIP: ABNORMAL
BUN SERPL-MCNC: 48 MG/DL (ref 6–20)
CALCIUM SERPL-MCNC: 8.5 MG/DL (ref 8.7–10.5)
CHLORIDE SERPL-SCNC: 101 MMOL/L (ref 95–110)
CLARITY UR REFRACT.AUTO: ABNORMAL
CO2 SERPL-SCNC: 22 MMOL/L (ref 23–29)
COLOR UR AUTO: YELLOW
CREAT SERPL-MCNC: 2.2 MG/DL (ref 0.5–1.4)
EST. GFR  (NO RACE VARIABLE): 34.1 ML/MIN/1.73 M^2
GLUCOSE SERPL-MCNC: 96 MG/DL (ref 70–110)
GLUCOSE UR QL STRIP: NEGATIVE
HGB UR QL STRIP: NEGATIVE
HYALINE CASTS UR QL AUTO: 0 /LPF
INR PPP: 2.8 (ref 0.8–1.2)
KETONES UR QL STRIP: ABNORMAL
LEUKOCYTE ESTERASE UR QL STRIP: NEGATIVE
MAGNESIUM SERPL-MCNC: 2.2 MG/DL (ref 1.6–2.6)
MICROSCOPIC COMMENT: ABNORMAL
NITRITE UR QL STRIP: NEGATIVE
PH UR STRIP: 6 [PH] (ref 5–8)
PHOSPHATE SERPL-MCNC: 3 MG/DL (ref 2.7–4.5)
POTASSIUM SERPL-SCNC: 4.2 MMOL/L (ref 3.5–5.1)
PROT SERPL-MCNC: 4.9 G/DL (ref 6–8.4)
PROT UR QL STRIP: ABNORMAL
PROTHROMBIN TIME: 28.3 SEC (ref 9–12.5)
RBC #/AREA URNS AUTO: 0 /HPF (ref 0–4)
SODIUM SERPL-SCNC: 131 MMOL/L (ref 136–145)
SODIUM UR-SCNC: <10 MMOL/L (ref 20–250)
SP GR UR STRIP: 1.02 (ref 1–1.03)
SQUAMOUS #/AREA URNS AUTO: 1 /HPF
URATE SERPL-MCNC: 10.1 MG/DL (ref 3.4–7)
URN SPEC COLLECT METH UR: ABNORMAL
WBC #/AREA URNS AUTO: 3 /HPF (ref 0–5)

## 2024-01-15 PROCEDURE — 63600175 PHARM REV CODE 636 W HCPCS: Performed by: INTERNAL MEDICINE

## 2024-01-15 PROCEDURE — 80053 COMPREHEN METABOLIC PANEL: CPT | Performed by: NURSE PRACTITIONER

## 2024-01-15 PROCEDURE — 94660 CPAP INITIATION&MGMT: CPT

## 2024-01-15 PROCEDURE — 25000003 PHARM REV CODE 250

## 2024-01-15 PROCEDURE — 99223 1ST HOSP IP/OBS HIGH 75: CPT | Mod: ,,, | Performed by: INTERNAL MEDICINE

## 2024-01-15 PROCEDURE — 84550 ASSAY OF BLOOD/URIC ACID: CPT | Performed by: INTERNAL MEDICINE

## 2024-01-15 PROCEDURE — 25000003 PHARM REV CODE 250: Performed by: NURSE PRACTITIONER

## 2024-01-15 PROCEDURE — 83735 ASSAY OF MAGNESIUM: CPT | Performed by: NURSE PRACTITIONER

## 2024-01-15 PROCEDURE — 36415 COLL VENOUS BLD VENIPUNCTURE: CPT | Performed by: STUDENT IN AN ORGANIZED HEALTH CARE EDUCATION/TRAINING PROGRAM

## 2024-01-15 PROCEDURE — 25000003 PHARM REV CODE 250: Performed by: STUDENT IN AN ORGANIZED HEALTH CARE EDUCATION/TRAINING PROGRAM

## 2024-01-15 PROCEDURE — 81001 URINALYSIS AUTO W/SCOPE: CPT | Performed by: INTERNAL MEDICINE

## 2024-01-15 PROCEDURE — 84300 ASSAY OF URINE SODIUM: CPT | Performed by: INTERNAL MEDICINE

## 2024-01-15 PROCEDURE — 21400001 HC TELEMETRY ROOM

## 2024-01-15 PROCEDURE — 99232 SBSQ HOSP IP/OBS MODERATE 35: CPT | Mod: ,,, | Performed by: INTERNAL MEDICINE

## 2024-01-15 PROCEDURE — 84100 ASSAY OF PHOSPHORUS: CPT | Performed by: NURSE PRACTITIONER

## 2024-01-15 PROCEDURE — 25000003 PHARM REV CODE 250: Performed by: INTERNAL MEDICINE

## 2024-01-15 PROCEDURE — 99233 SBSQ HOSP IP/OBS HIGH 50: CPT | Mod: ,,, | Performed by: INTERNAL MEDICINE

## 2024-01-15 PROCEDURE — 36415 COLL VENOUS BLD VENIPUNCTURE: CPT | Mod: XB | Performed by: INTERNAL MEDICINE

## 2024-01-15 PROCEDURE — 99900035 HC TECH TIME PER 15 MIN (STAT)

## 2024-01-15 PROCEDURE — 85610 PROTHROMBIN TIME: CPT | Performed by: STUDENT IN AN ORGANIZED HEALTH CARE EDUCATION/TRAINING PROGRAM

## 2024-01-15 PROCEDURE — 94761 N-INVAS EAR/PLS OXIMETRY MLT: CPT

## 2024-01-15 RX ADMIN — CEFEPIME 2 G: 2 INJECTION, POWDER, FOR SOLUTION INTRAVENOUS at 10:01

## 2024-01-15 RX ADMIN — ESCITALOPRAM OXALATE 10 MG: 10 TABLET ORAL at 09:01

## 2024-01-15 RX ADMIN — THERA TABS 1 TABLET: TAB at 09:01

## 2024-01-15 RX ADMIN — VANCOMYCIN HYDROCHLORIDE 1500 MG: 1.5 INJECTION, POWDER, LYOPHILIZED, FOR SOLUTION INTRAVENOUS at 12:01

## 2024-01-15 RX ADMIN — PANTOPRAZOLE SODIUM 40 MG: 40 TABLET, DELAYED RELEASE ORAL at 09:01

## 2024-01-15 RX ADMIN — LACTULOSE 20 G: 20 SOLUTION ORAL at 05:01

## 2024-01-15 RX ADMIN — RIFAXIMIN 550 MG: 550 TABLET ORAL at 09:01

## 2024-01-15 RX ADMIN — LACTULOSE 20 G: 20 SOLUTION ORAL at 09:01

## 2024-01-15 RX ADMIN — FOLIC ACID 1 MG: 1 TABLET ORAL at 09:01

## 2024-01-15 RX ADMIN — RIFAXIMIN 550 MG: 550 TABLET ORAL at 10:01

## 2024-01-15 RX ADMIN — Medication 100 MG: at 09:01

## 2024-01-15 RX ADMIN — PANTOPRAZOLE SODIUM 40 MG: 40 TABLET, DELAYED RELEASE ORAL at 10:01

## 2024-01-15 RX ADMIN — MICAFUNGIN SODIUM 100 MG: 100 INJECTION, POWDER, LYOPHILIZED, FOR SOLUTION INTRAVENOUS at 05:01

## 2024-01-15 NOTE — SUBJECTIVE & OBJECTIVE
Interval History/Significant Events: Pt seen and examined this morning on rounds. NAEON. Denies any symptoms of chills/rigors or lethargy. Did well yesterday evening and slept well overnight. Creatinine 2.0 -> 2.2. Nephrology recommending 1500ml fluid restriction, repeat urine studies ordered. ID recommending to [facility] wick.    Wife is speaking with insurance company about flight status. CM faxed medical records to insurance company.    Objective:     Vital Signs (Most Recent):  Temp: 97.9 °F (36.6 °C) (01/15/24 1137)  Pulse: 83 (01/15/24 1137)  Resp: 18 (01/15/24 1137)  BP: 125/60 (01/15/24 1137)  SpO2: 99 % (01/15/24 1137) Vital Signs (24h Range):  Temp:  [97.3 °F (36.3 °C)-98.7 °F (37.1 °C)] 97.9 °F (36.6 °C)  Pulse:  [73-96] 83  Resp:  [16-18] 18  SpO2:  [97 %-100 %] 99 %  BP: (119-134)/(56-60) 125/60   Weight: 97.6 kg (215 lb 2.7 oz)  Body mass index is 32.72 kg/m².      Intake/Output Summary (Last 24 hours) at 1/15/2024 1547  Last data filed at 1/15/2024 0602  Gross per 24 hour   Intake 800 ml   Output 450 ml   Net 350 ml          Physical Exam  Gen: in NAD, appears stated age, jaundiced  Neuro: AAOx4, CN2-12 grossly intact BL; motor, sensory, and strength grossly intact BL  HEENT: + icteric sclera. NTNC, EOMI, PERRLA, MMM; no thyromegaly or lymphadenopathy; no JVD appreciated  CVS: RRR, no m/r/g; S1/S2 auscultated with no S3 or S4; capillary refill < 2 sec  Resp: lungs CTAB, no w/r/r; no belabored breathing or accessory muscle use appreciated   Abd: + Abdominal distension. BS+ in all 4 quadrants; NTND, soft to palpation; no organomegaly appreciated   Extrem: pulses full, equal, and regular over all 4 extremities; no UE or LE edema BL         Vents:  Oxygen Concentration (%): 21 (01/12/24 0041)  Lines/Drains/Airways       Peripheral Intravenous Line  Duration                  Peripheral IV - Single Lumen 01/03/24 1045 18 G;1 1/4 in Anterior;Left Upper Arm 12 days         Peripheral IV - Single Lumen 01/03/24 1400  "18 G;1 1/4 in Anterior;Right Upper Arm 12 days                  Significant Labs:    CBC/Anemia Profile:  Recent Labs   Lab 01/14/24 0443   WBC 9.26   HGB 8.7*   HCT 26.5*   PLT 77*   *   RDW 24.4*        Chemistries:  Recent Labs   Lab 01/14/24  0443 01/15/24  0517   * 131*   K 4.1 4.2    101   CO2 23 22*   BUN 41* 48*   CREATININE 2.0* 2.2*   CALCIUM 8.4* 8.5*   ALBUMIN 3.0* 2.6*   PROT 5.2* 4.9*   BILITOT 20.1* 20.5*   ALKPHOS 141* 134   ALT 67* 58*   * 121*   MG 2.0 2.2   PHOS 2.9 3.0       CMP:   Recent Labs   Lab 01/14/24  0443 01/15/24  0517   * 131*   K 4.1 4.2    101   CO2 23 22*   GLU 90 96   BUN 41* 48*   CREATININE 2.0* 2.2*   CALCIUM 8.4* 8.5*   PROT 5.2* 4.9*   ALBUMIN 3.0* 2.6*   BILITOT 20.1* 20.5*   ALKPHOS 141* 134   * 121*   ALT 67* 58*   ANIONGAP 9 8     Coagulation:   Recent Labs   Lab 01/15/24  0517   INR 2.8*     Lactic Acid:   No results for input(s): "LACTATE" in the last 48 hours.        Significant Imaging:  I have reviewed all pertinent imaging results/findings within the past 24 hours.  "

## 2024-01-15 NOTE — PLAN OF CARE
Sw asked by spouse to fax clinicals to the insurance company as they were not received per spouse, Sw to faxed info to fax provided by family and follow in the am. Sw faxed to , last 3 days of labs, 7 days of the MAR, all consultant notes in the past 3 days as well as staff progress notes and latest imaging. Following with dc in the am per staff, spouse updated.

## 2024-01-15 NOTE — PROGRESS NOTES
[facility] - Med Surg  Adult Nutrition  Progress Note    SUMMARY       Recommendations    1. Discontinue Low Sodium Diet modifier. Na labs trending low x 9 days. Encourage small, frequent meals/snacks as tolerated.     2. Consider adding probiotics during abx course for loose stools.     3. Monitor I/O's, labs and weight.     4. RD following. If aggressive nutrition interventions warranted, please re-consult.    Goals: Meet % EEN/EPN by follow-up date.  Nutrition Goal Status: goal met  Communication of RD Recs: other (comment) (POC)    Assessment and Plan    Nutrition Problem  Altered nutrition-related lab values     Related to (etiology):   New onset of cirrhosis     Signs and Symptoms (as evidenced by):   Elevated BUN, AST, ALT, Total Bilirubin      Interventions/Recommendations (treatment strategy):  Collaboration of nutrition care with other providers  Diet changes     Nutrition Diagnosis Status:   Continues    Reason for Assessment    Reason For Assessment: RD follow-up  Diagnosis: other (see comments) (Acute upper GI Bleed)  Relevant Medical History: HTN, ETOH use, cirrhosis  Interdisciplinary Rounds: did not attend  General Information Comments: RD follow up. Patient reports good appetite and PO intake. Wife at bedside reports bringing in outside food such as fruit to supplement PO intake (not a significant amount). Denies N/V/C. Endorses diarrhea every half hour. Taking lactulose. On IV abx. Denies blood or streaks in loose stools. Patient not ordered Ensure Plus BID - RD recommended last visit. No new weight since 1/3.  Nutrition Discharge Planning: Pending Clinical Course, Nutrition education provided 1/8    Nutrition Risk Screen    Nutrition Risk Screen: no indicators present    Nutrition/Diet History    Patient Reported Diet/Restrictions/Preferences: low salt, general  Typical Food/Fluid Intake: small meals  Spiritual, Cultural Beliefs, Voodoo Practices, Values that Affect Care: no  Food  "Allergies: NKFA  Factors Affecting Nutritional Intake: None identified at this time, NPO    Anthropometrics    Temp: 97.7 °F (36.5 °C)  Height Method: Stated  Height: 5' 8" (172.7 cm)  Height (inches): 68 in  Weight Method: Bed Scale  Weight: 97.6 kg (215 lb 2.7 oz)  Weight (lb): 215.17 lb  Ideal Body Weight (IBW), Male: 154 lb  % Ideal Body Weight, Male (lb): 142.58 %  BMI (Calculated): 32.7       Lab/Procedures/Meds    Pertinent Labs Reviewed: reviewed  Pertinent Labs Comments: Na 132, BUN 34, Crt 1.6, GFR 49.9, Ca 8.2, , Tpro 5.2, Alb 2.8, Tbili 18.4, , ALT 77, RBC 2.54, H/H 8.7/26.5, Plt Cnt 77  Pertinent Medications Reviewed: reviewed  Pertinent Medications Comments: folic acid, lactulose, MVI, thiamine    Estimated/Assessed Needs    Weight Used For Calorie Calculations: 69.9 kg (154 lb) (IBW)  Energy Calorie Requirements (kcal): 1974 kcal/d (MSJ x 1.3)  Energy Need Method: Kcal/kg  Protein Requirements: 70-84 g/d (1.0-1.2 g/kg)  Weight Used For Protein Calculations: 69.9 kg (154 lb) (IBW)        RDA Method (mL): 1974         Nutrition Prescription Ordered    Current Diet Order: NPO    Evaluation of Received Nutrient/Fluid Intake    I/O: -3 L since admit  Comments: LBM: 1/8  % Intake of Estimated Energy Needs: 75 - 100 %  % Meal Intake: 75 - 100 %    Nutrition Risk    Level of Risk/Frequency of Follow-up: low (1x/ week)     Monitor and Evaluation    Food and Nutrient Intake: energy intake, food and beverage intake  Food and Nutrient Adminstration: diet order  Physical Activity and Function: nutrition-related ADLs and IADLs, factors affecting access to physical activity  Anthropometric Measurements: weight change, weight, body mass index  Biochemical Data, Medical Tests and Procedures: lipid profile, inflammatory profile, glucose/endocrine profile, gastrointestinal profile, electrolyte and renal panel  Nutrition-Focused Physical Findings: skin, head and eyes, extremities, muscles and bones, overall " appearance     Nutrition Follow-Up    RD Follow-up?: Yes

## 2024-01-15 NOTE — HPI
58 year old female with history of ETOH use, cirrhosis, and depression who [place] [facility] [place] with acute upper GIB, hypotension 2/2 to hemorrhagic blood loss and acute blood loss anemia, lactic acidosis, s/p PRBC transfusion,  afebrile throughout admission with no significant leukocytosis currently. EGD 1/3 showed esophageal ulcers, ceftriaxone was stopped after a brief course.      Hospital course notable for new leukocytosis on 1/12 after which patient was started on cefepime, and vancomycin. Pt underwent paracentesis 1/07 and 1/12, cell counts not c/w infection. Micafungin initiated 1/14, no significant fever or hemodynamic decompensation. Patient was interviewed while his family was present in the room. He states aroun Friday he started noticing an episode of chills and rigors, which his family states was more than just one episode and add that these symptoms also accompanied confusion.  Patient states that prior hospitalization he was not having any fevers, rigors, chills, diarrhea, dysuria, productive cough, exposure to sick contacts.  Patient reports he has known about ongoing serous workup for several months at this point but does acknowledge this is the 1st admission for decompensated sequelae, denies prior diagnosis of SBP or prior paracentesis, prior upper GI bleed. 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 Denies pets and recent travel.     CTAP 1/13 non contrast without acute pathology, US abdomen 1/12 with biliary sludge and gall bladder wall thickening, non specific. CXR without new consolidation faint b/l opacities. Bcx and ascites cx 1/12 with NG. ID consulted for 58 year old Male with newly diagnosed cirrhosis. Not a transplant candidate. Admitted initially for esophageal ulcer bleed. Had rigors/chills 2 days ago and started on Abx. Ascitic fluid w/o SBP. Gay scanned without e/o infection. Starting Micafungin

## 2024-01-15 NOTE — CONSULTS
[facility]  Infectious Disease  Consult Note    Patient Name: Blu Deleon  MRN: [id number]  Admission Date: 1/2/2024  Hospital Length of Stay: 12 days  Attending Physician: Rik Gomes MD  Primary Care Provider: Radha, Primary Doctor     Isolation Status: No active isolations    Patient information was obtained from patient and ER records.      Inpatient consult to Infectious Diseases  Consult performed by: Meir Steele MD  Consult ordered by: Rik Gomes MD        Assessment/Plan:     Oncology  * Leukocytosis  58 year old female with history of ETOH use, cirrhosis, and depression who presented to [facility] 1/2/24 with acute upper GIB, hypotension 2/2 to hemorrhagic blood loss and acute blood loss anemia, lactic acidosis, s/p PRBC transfusion,  EGD with esophageal ulcers, s/p brief course of ceftriaxone.     Patient had new rigors and chills, which prompted workup, blood cultures from 1/12 with NG, both paracentesis fluid studies form 1/7 and /112 with cell counts not consistent with infectious etiology, CTAP non contrast without any identifiable infectious foci, US with gall bladder sludge, no localizing symptoms, patient started on Vancomycin, cefepime and Micafungin , new leukocytosis and symptom appeared to improve post anitbitoic initiation.     Recommendations    DC vancomycin, no evidence of MRSA, continue cefepime for now.     Plan to de-escalate from Micafungin to fluconazole since he has no prior or current evidence of growth of C. Glabrata.       GI  Acute upper GI bleed  See as above/below        Thank you for your consult. I will follow-up with patient. Please contact us if you have any additional questions.    Meir Steele MD  Infectious Disease  [facility]    Subjective:     Principal Problem: Leukocytosis    HPI: 58 year old female with history of ETOH use, cirrhosis, and depression who presented to [facility] 1/2/24 with acute upper GIB, hypotension 2/2 to hemorrhagic blood  loss and acute blood loss anemia, lactic acidosis, s/p PRBC transfusion,  afebrile throughout admission with no significant leukocytosis currently. EGD 1/3 showed esophageal ulcers, ceftriaxone was stopped after a brief course.      Hospital course notable for new leukocytosis on 1/12 after which patient was started on cefepime, and vancomycin. Pt underwent paracentesis 1/07 and 1/12, cell counts not c/w infection. Micafungin initiated 1/14, no significant fever or hemodynamic decompensation. Patient was interviewed while his family was present in the room. He states aroun Friday he started noticing an episode of chills and rigors, which his family states was more than just one episode and add that these symptoms also accompanied confusion.  Patient states that prior hospitalization he was not having any fevers, rigors, chills, diarrhea, dysuria, productive cough, exposure to sick contacts.  Patient reports he has known about ongoing serous workup for several months at this point but does acknowledge this is the 1st admission for decompensated sequelae, denies prior diagnosis of SBP or prior paracentesis, prior upper GI bleed. 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 Denies pets and recent travel.     CTAP 1/13 non contrast without acute pathology, US abdomen 1/12 with biliary sludge and gall bladder wall thickening, non specific. CXR without new consolidation faint b/l opacities. Bcx and ascites cx 1/12 with NG. ID consulted for 58 year old Male with newly diagnosed cirrhosis. Not a transplant candidate. Admitted initially for esophageal ulcer bleed. Had rigors/chills 2 days ago and started on Abx. Ascitic fluid w/o SBP. Gay scanned without e/o infection. Starting Micafungin        Past Medical History:   Diagnosis Date    Hypertension        Past Surgical History:   Procedure Laterality Date    ESOPHAGOGASTRODUODENOSCOPY N/A 1/3/2024    Procedure: EGD (ESOPHAGOGASTRODUODENOSCOPY);  Surgeon:  Madison Hinojosa MD;  Location: [address]);  Service: Endoscopy;  Laterality: N/A;       Review of patient's allergies indicates:  No Known Allergies    Medications:  Medications Prior to Admission   Medication Sig    EScitalopram oxalate (LEXAPRO) 10 MG tablet Take 10 mg by mouth once daily.    hydroCHLOROthiazide (HYDRODIURIL) 25 MG tablet Take 25 mg by mouth once daily.    traZODone (DESYREL) 50 MG tablet Take  mg by mouth every evening.     Antibiotics (From admission, onward)      Start     Stop Route Frequency Ordered    01/14/24 0900  vancomycin 1,500 mg in dextrose 5 % (D5W) 250 mL IVPB (Vial-Mate)         -- IV Every 12 hours (non-standard times) 01/14/24 0748    01/13/24 1030  rifAXIMin tablet 550 mg         -- Oral 2 times daily 01/13/24 0916    01/13/24 0930  ceFEPIme (MAXIPIME) 2 g in dextrose 5 % in water (D5W) 100 mL IVPB (MB+)         -- IV Every 12 hours (non-standard times) 01/13/24 0816    01/13/24 0915  vancomycin - pharmacy to dose  (vancomycin IVPB (PEDS and ADULTS))        See Hyperspace for full Linked Orders Report.    -- IV pharmacy to manage frequency 01/13/24 0816          Antifungals (From admission, onward)      Start     Stop Route Frequency Ordered    01/14/24 1345  micafungin 100 mg in sodium chloride 0.9 % 100 mL IVPB (MB+)         -- IV Every 24 hours (non-standard times) 01/14/24 1244          Antivirals (From admission, onward)      None             Immunization History   Administered Date(s) Administered    COVID-19 MRNA, LN-S PF (MODERNA HALF 0.25 ML DOSE) 01/20/2022    COVID-19, MRNA, LN-S, PF (MODERNA FULL 0.5 ML DOSE) 04/13/2021, 05/12/2021       Family History    None       Social History     Socioeconomic History    Marital status:    Tobacco Use    Smoking status: Never    Smokeless tobacco: Never   Substance and Sexual Activity    Alcohol use: Yes    Drug use: Never     Social Determinants of Health     Financial Resource Strain: Low Risk   (1/3/2024)    Overall Financial Resource Strain (CARDIA)     Difficulty of Paying Living Expenses: Not very hard   Food Insecurity: No Food Insecurity (1/3/2024)    Hunger Vital Sign     Worried About Running Out of Food in the Last Year: Never true     Ran Out of Food in the Last Year: Never true   Transportation Needs: No Transportation Needs (1/3/2024)    PRAPARE - Transportation     Lack of Transportation (Medical): No     Lack of Transportation (Non-Medical): No   Physical Activity: Inactive (1/3/2024)    Exercise Vital Sign     Days of Exercise per Week: 0 days     Minutes of Exercise per Session: 0 min   Stress: Stress Concern Present (1/3/2024)    [employer] - Occupational Stress Questionnaire     Feeling of Stress : To some extent   Social Connections: Moderately Integrated (1/3/2024)    Social Connection and Isolation Panel [NHANES]     Frequency of Communication with Friends and Family: More than three times a week     Frequency of Social Gatherings with Friends and Family: More than three times a week     Attends Yarsanism Services: 1 to 4 times per year     Active Member of Clubs or Organizations: No     Attends Club or Organization Meetings: Never     Marital Status:    Housing Stability: Unknown (1/3/2024)    Housing Stability Vital Sign     Unable to Pay for Housing in the Last Year: No     Unstable Housing in the Last Year: No     Review of Systems   Constitutional:  Negative for chills and fever.   HENT:  Negative for trouble swallowing.    Respiratory:  Negative for cough and shortness of breath.    Gastrointestinal:  Negative for abdominal pain, blood in stool, diarrhea and vomiting.   Genitourinary:  Negative for dysuria and hematuria.   Musculoskeletal:  Negative for arthralgias, joint swelling and neck stiffness.   Skin:  Positive for color change.     Objective:     Vital Signs (Most Recent):  Temp: 97.7 °F (36.5 °C) (01/15/24 0758)  Pulse: 81 (01/15/24  0849)  Resp: 18 (01/15/24 0758)  BP: 124/60 (01/15/24 0758)  SpO2: 100 % (01/15/24 0758) Vital Signs (24h Range):  Temp:  [97.3 °F (36.3 °C)-100 °F (37.8 °C)] 97.7 °F (36.5 °C)  Pulse:  [] 81  Resp:  [16-19] 18  SpO2:  [95 %-100 %] 100 %  BP: (119-142)/(56-64) 124/60     Weight: 97.6 kg (215 lb 2.7 oz)  Body mass index is 32.72 kg/m².    Estimated Creatinine Clearance: 42 mL/min (A) (based on SCr of 2.2 mg/dL (H)).     Physical Exam  Constitutional:       Appearance: Normal appearance.   HENT:      Head: Normocephalic and atraumatic.      Nose: Nose normal.      Mouth/Throat:      Mouth: Mucous membranes are moist.      Pharynx: Oropharynx is clear.   Eyes:      General: Scleral icterus present.      Conjunctiva/sclera: Conjunctivae normal.   Cardiovascular:      Rate and Rhythm: Normal rate and regular rhythm.      Pulses: Normal pulses.      Heart sounds: Normal heart sounds.   Pulmonary:      Effort: Pulmonary effort is normal.      Breath sounds: Normal breath sounds.   Abdominal:      General: Bowel sounds are normal. There is distension.      Palpations: Abdomen is soft.      Tenderness: There is no abdominal tenderness. There is no guarding or rebound.   Musculoskeletal:         General: No deformity.      Cervical back: Neck supple.   Skin:     General: Skin is warm and dry.      Coloration: Skin is jaundiced.      Findings: No rash.      Comments: Bruising on R wrist from phlebotomy   Neurological:      Mental Status: He is alert and oriented to person, place, and time. Mental status is at baseline.          Significant Labs:   Microbiology Results (last 7 days)       Procedure Component Value Units Date/Time    (rule out SBP) Aerobic culture [[id number]] Collected: 01/12/24 1520    Order Status: Completed Specimen: Ascites Updated: 01/15/24 0745     Aerobic Bacterial Culture No growth    Narrative:      To rule out SBP order labs: Aerobic culture [[id number]],  Culture, Anaerobic [NXO017], Gram stain  [AFU742], Albumin  [QDJ637], Protein [YRD325], LDH [NFR512], WBC \T\ Dff  [DDD0437].    Culture, Anaerobic [[id number]] Collected: 01/08/24 1259    Order Status: Completed Specimen: Ascites Updated: 01/15/24 0636     Anaerobic Culture No anaerobes isolated    Blood culture [[id number]] Collected: 01/12/24 1205    Order Status: Completed Specimen: Blood Updated: 01/14/24 1412     Blood Culture, Routine No Growth to date      No Growth to date      No Growth to date    Blood culture [[id number]] Collected: 01/12/24 1207    Order Status: Completed Specimen: Blood from Peripheral, Right Updated: 01/14/24 1412     Blood Culture, Routine No Growth to date      No Growth to date      No Growth to date    Blood culture [[id number]] Collected: 01/09/24 1056    Order Status: Completed Specimen: Blood Updated: 01/14/24 1212     Blood Culture, Routine No growth after 5 days.    Blood culture [[id number]] Collected: 01/09/24 1056    Order Status: Completed Specimen: Blood Updated: 01/14/24 1212     Blood Culture, Routine No growth after 5 days.    (rule out SBP) Culture, Anaerobic [[id number]] Collected: 01/12/24 1520    Order Status: Completed Specimen: Ascites Updated: 01/13/24 1440     Anaerobic Culture Culture in progress    Narrative:      To rule out SBP order labs: Aerobic culture [[id number]],  Culture, Anaerobic [IHJ312], Gram stain [SVV978], Albumin  [ZVJ933], Protein [CHE386], LDH [MDO841], WBC \T\ Dff  [NDT9665].    (rule out SBP) Gram stain [[id number]] Collected: 01/12/24 1520    Order Status: Completed Specimen: Ascites Updated: 01/12/24 2056     Gram Stain Result Rare WBC's      No organisms seen    Narrative:      To rule out SBP order labs: Aerobic culture [PLP856],  Culture, Anaerobic [JJN942], Gram stain [XKA266], Albumin  [SKZ102], Protein [ROI473], LDH [RLU397], WBC \T\ Dff  [WLO0496].    Aerobic culture [[id number]] Collected: 01/08/24 1259    Order Status: Completed Specimen: Ascites Updated: 01/11/24 1050      Aerobic Bacterial Culture No growth    (rule out SBP) Gram stain [[id number]] Collected: 01/08/24 1259    Order Status: Completed Specimen: Ascites Updated: 01/08/24 2235     Gram Stain Result No WBC's      No organisms seen    Narrative:      To rule out SBP order labs: Aerobic culture [FHF294],  Culture, Anaerobic [OUY479], Gram stain [KCK857], Albumin  [ZKN624], Protein [WJQ958], LDH [ORD306], WBC \T\ Dff  [UDA0282].    (rule out SBP) Aerobic culture [[id number]] Collected: 01/08/24 1259    Order Status: Canceled Specimen: Ascites     (rule out SBP) Culture, Anaerobic [[id number]] Collected: 01/08/24 1259    Order Status: Canceled Specimen: Ascites     Gram stain [[id number]] Collected: 01/08/24 1259    Order Status: Canceled Specimen: Ascites           All pertinent labs within the past 24 hours have been reviewed.    Significant Imaging: I have reviewed all pertinent imaging results/findings within the past 24 hours.

## 2024-01-15 NOTE — ASSESSMENT & PLAN NOTE
- Cr 1.6 at admission, unclear baseline  - Initially likely pre-renal in setting of GIB.  - Now with slow elevation of creatinine  - Hepatology recommending albumin 50g BID for 48 hours  - S/p 48 hours albumin  - Cr 1.6 -> 2.0 -> 2.2  - Nephrology consulted, urine studies sent, uric acid ordered   - 1500ml fluid restriction  - Renally dosing all medications  - Avoiding nephrotoxins  - Will continue to monitor on daily labs

## 2024-01-15 NOTE — SUBJECTIVE & OBJECTIVE
Interval History: no acute events overnight    Review of patient's allergies indicates:  No Known Allergies  Current Facility-Administered Medications   Medication Frequency    0.9%  NaCl infusion (for blood administration) Q24H PRN    ceFEPIme (MAXIPIME) 2 g in dextrose 5 % in water (D5W) 100 mL IVPB (MB+) Q12H    EScitalopram oxalate tablet 10 mg Daily    folic acid tablet 1 mg Daily    hydrALAZINE injection 10 mg Q6H PRN    lactulose 20 gram/30 mL solution Soln 20 g TID    micafungin 100 mg in sodium chloride 0.9 % 100 mL IVPB (MB+) Q24H    multivitamin tablet Daily    pantoprazole EC tablet 40 mg BID    rifAXIMin tablet 550 mg BID    sodium chloride 0.9% flush 10 mL PRN    thiamine tablet 100 mg Daily    vancomycin - pharmacy to dose pharmacy to manage frequency    vancomycin 1,500 mg in dextrose 5 % (D5W) 250 mL IVPB (Vial-Mate) Q12H       Objective:     Vital Signs (Most Recent):  Temp: 97.9 °F (36.6 °C) (01/15/24 1137)  Pulse: 83 (01/15/24 1137)  Resp: 18 (01/15/24 1137)  BP: 125/60 (01/15/24 1137)  SpO2: 99 % (01/15/24 1137) Vital Signs (24h Range):  Temp:  [97.3 °F (36.3 °C)-98.7 °F (37.1 °C)] 97.9 °F (36.6 °C)  Pulse:  [73-96] 83  Resp:  [16-18] 18  SpO2:  [97 %-100 %] 99 %  BP: (119-134)/(56-60) 125/60     Weight: 97.6 kg (215 lb 2.7 oz) (01/03/24 0855)  Body mass index is 32.72 kg/m².  Body surface area is 2.16 meters squared.    I/O last 3 completed shifts:  In: 1605 [P.O.:1605]  Out: 653 [Urine:650; Stool:3]     Physical Exam  Constitutional:       General: He is not in acute distress.     Appearance: He is obese. He is toxic-appearing. He is not ill-appearing.   HENT:      Head: Normocephalic and atraumatic.      Nose: Nose normal.      Mouth/Throat:      Mouth: Mucous membranes are moist.   Eyes:      General: Scleral icterus present.   Cardiovascular:      Rate and Rhythm: Normal rate.      Heart sounds: No murmur heard.  Pulmonary:      Effort: Pulmonary effort is normal.      Breath sounds: No  wheezing or rales.   Abdominal:      General: Abdomen is flat. There is distension.      Tenderness: There is abdominal tenderness.   Musculoskeletal:      Right lower leg: Edema present.      Left lower leg: Edema present.   Skin:     General: Skin is warm.      Coloration: Skin is jaundiced.      Findings: Bruising present.   Neurological:      Mental Status: He is alert.          Significant Labs:  All labs within the past 24 hours have been reviewed.     Significant Imaging:  Labs: Reviewed

## 2024-01-15 NOTE — PLAN OF CARE
During day shift, pt did experience some rigor and low grade fever during the early afternoon prior to lunch. Infectious disease was consulted on case. Otherwise, primary and nephrology teams have been meeting on a daily basis about pt's upcoming flight transfer back home and the possibility of that. Pt also was able to ambulate in the hallways with wife at end of shift. Pt continues to receive IV antibiotics and lactulose. He had about two bowel movements during day shift and urine production still low but recorded.   Problem: Fall Injury Risk  Goal: Absence of Fall and Fall-Related Injury  Outcome: Ongoing, Progressing  Intervention: Identify and Manage Contributors  Flowsheets (Taken 1/14/2024 1900)  Self-Care Promotion:   independence encouraged   BADL personal objects within reach   BADL personal routines maintained   meal set-up provided   safe use of adaptive equipment encouraged  Medication Review/Management: medications reviewed  Intervention: Promote Injury-Free Environment  Flowsheets (Taken 1/14/2024 1900)  Safety Promotion/Fall Prevention:   assistive device/personal item within reach   bed alarm set   Fall Risk reviewed with patient/family   family to remain at bedside   medications reviewed   nonskid shoes/socks when out of bed   side rails raised x 2   supervised activity   toileting scheduled   Supervised toileting - stay within arms reach     Problem: Electrolyte Imbalance  Goal: Electrolyte Balance  Outcome: Ongoing, Progressing     Problem: Adjustment to Illness (Liver Failure)  Goal: Optimal Coping with Liver Failure  Outcome: Ongoing, Progressing  Intervention: Support Response to Liver Disease  Flowsheets (Taken 1/14/2024 1900)  Supportive Measures:   active listening utilized   positive reinforcement provided   relaxation techniques promoted   self-care encouraged   self-reflection promoted   self-responsibility promoted   verbalization of feelings encouraged  Diversional Activities:    television   smartphone  Family/Support System Care:   self-care encouraged   support provided   involvement promoted     Problem: Fluid and Electrolyte Imbalance (Liver Failure)  Goal: Fluid and Electrolyte Balance  Outcome: Ongoing, Not Progressing     Problem: Gastrointestinal Complications (Liver Failure)  Goal: Optimal Gastrointestinal Function  Outcome: Ongoing, Progressing     Problem: Impaired Coagulation (Liver Failure)  Goal: Optimal Coagulation Function  Outcome: Ongoing, Progressing  Intervention: Monitor and Manage Coagulation  Flowsheets (Taken 1/14/2024 1900)  Bleeding Precautions:   blood pressure closely monitored   monitored for signs of bleeding     Problem: Infection (Liver Failure)  Goal: Absence of Infection Signs and Symptoms  Outcome: Ongoing, Progressing     Problem: Neurologic Function Impaired (Liver Failure)  Goal: Optimal Neurologic Function  Outcome: Ongoing, Progressing  Intervention: Monitor and Optimize Neurologic Status  Flowsheets (Taken 1/14/2024 1900)  Seizure Precautions:   activity supervised   clutter-free environment maintained  Sensory Stimulation Regulation:   care clustered   quiet environment promoted   television on     Problem: Oral Intake Inadequate (Liver Failure)  Goal: Improved Oral Intake  Outcome: Ongoing, Progressing  Intervention: Promote and Optimize Nutrition  Flowsheets (Taken 1/14/2024 1900)  Oral Nutrition Promotion:   physical activity promoted   rest periods promoted   medicated  Environmental Support:   calm environment promoted   rest periods encouraged   personal routine supported   environmental consistency promoted     Problem: Pain (Liver Failure)  Goal: Optimal Pain Control  Outcome: Ongoing, Progressing  Intervention: Prevent or Manage Pain  Flowsheets (Taken 1/14/2024 1900)  Sleep/Rest Enhancement:   family presence promoted   regular sleep/rest pattern promoted   relaxation techniques promoted  Pain Management Interventions:   care clustered    pain management plan reviewed with patient/caregiver   pillow support provided   quiet environment facilitated   relaxation techniques promoted     Problem: Renal Dysfunction (Liver Failure)  Goal: Optimize Renal Function  Outcome: Ongoing, Progressing     Problem: Respiratory Compromise (Liver Failure)  Goal: Effective Oxygenation and Ventilation  Outcome: Ongoing, Progressing  Intervention: Promote Airway Secretion Clearance  Flowsheets (Taken 1/14/2024 1900)  Cough And Deep Breathing: done independently per patient  Activity Management:   Ambulated -L4   Ambulated to bathroom - L4   Ambulated in room - L4   Arm raise - L1   Standing - L3   Sitting at edge of bed - L2   Seated marching - L2   Step forward and back - L3   Up in chair - L3   Walk with assistive devise and /or staff member - L3   Step side-to-side along edge of bed - L3   Walking in place - L3  Intervention: Optimize Oxygenation and Ventilation  Flowsheets (Taken 1/14/2024 1900)  Airway/Ventilation Management: airway patency maintained  Head of Bed (HOB) Positioning: HOB elevated

## 2024-01-15 NOTE — ASSESSMENT & PLAN NOTE
Patient is visiting from out of town; outside records not available to review thus etiology of cirrhosis not confirmed. However, patient does have a history of ETOH use d/o so this is included in the differential. States he is currently in the process of being referred to a hepatologist and having an EGD scheduled.     MELD-Na score calculated; MELD 3.0: 39 at 1/15/2024  5:17 AM  MELD-Na: 38 at 1/15/2024  5:17 AM  Calculated from:  Serum Creatinine: 2.2 mg/dL at 1/15/2024  5:17 AM  Serum Sodium: 131 mmol/L at 1/15/2024  5:17 AM  Total Bilirubin: 20.5 mg/dL at 1/15/2024  5:17 AM  Serum Albumin: 2.6 g/dL at 1/15/2024  5:17 AM  INR(ratio): 2.8 at 1/15/2024  5:17 AM  Age at listing (hypothetical): 58 years  Sex: Male at 1/15/2024  5:17 AM    Continue chronic meds. Etiology likely ETOH. Will avoid any hepatotoxic meds, and monitor CBC/CMP/INR for synthetic function.   T bilirubin trending up. Hepatology was consulted in the setting of decompensated liver cirrhosis. Ultrasound right upper quadrant with Dopplers, PEth, AFP, serological workup, and acute hepatitis panel. Cirrhotic morphology of the liver with sequela of portal hypertension as detailed above. No discrete hepatic lesions identified. Biliary sludge with thickened gallbladder wall, likely secondary to hepatic dysfunction.  Underwent paracentesis.  As patient is not a candidate for transplant and with his high MELD score he would require air transfer to  [place] where he can go to the hospital close to his home. On lactulose and albumin per hepatology recs.   - Continue lactulose goal 3 BM's daily  - S/p albumin 50g BID for 48 hours  - Accepted to [facility] in [place]. Pending flight logistics.  - Start Rifaximin  - Continue IV Vit K x3 doses  - Repeat paracentesis 1/12 as detailed above

## 2024-01-15 NOTE — PROGRESS NOTES
[facility] - [facility] S[facility]  Nephrology  Progress Note    Patient Name: Blu Deleon  MRN: [id number]  Admission Date: 1/2/2024  Hospital Length of Stay: 12 days  Attending Provider: Rik Gomes MD   Primary Care Physician: Radha, Primary Doctor  Principal Problem:Leukocytosis    Subjective:     HPI: 58 year old man with a history of HTN, suspected EtOH cirrhosis (currently undergoing workup), depression [facility] bleed with bright red blood per rectum with black tarry stools as well. He has never seen a neprhologyist and is visiting [place] the [place]. Wife at bedside reports that he was not feeling well and had been taking tylenol/alleve for some weeks before but at an unknown dose.     They do not know what his baseline creatinine is, however on [facility], creatinine hovered around 1.3 to 1.6. Subsequently he underwent a 4.5 liter paracentesis with a resulting increase in creatinine to 2.0. Urine sodium 1/11 was <10. Blood pressures 130-160 systolics.     Nephrology consulted for CODY    Interval History: no acute events overnight    Review of patient's allergies indicates:  No Known Allergies  Current Facility-Administered Medications   Medication Frequency    0.9%  NaCl infusion (for blood administration) Q24H PRN    ceFEPIme (MAXIPIME) 2 g in dextrose 5 % in water (D5W) 100 mL IVPB (MB+) Q12H    EScitalopram oxalate tablet 10 mg Daily    folic acid tablet 1 mg Daily    hydrALAZINE injection 10 mg Q6H PRN    lactulose 20 gram/30 mL solution Soln 20 g TID    micafungin 100 mg in sodium chloride 0.9 % 100 mL IVPB (MB+) Q24H    multivitamin tablet Daily    pantoprazole EC tablet 40 mg BID    rifAXIMin tablet 550 mg BID    sodium chloride 0.9% flush 10 mL PRN    thiamine tablet 100 mg Daily    vancomycin - pharmacy to dose pharmacy to manage frequency    vancomycin 1,500 mg in dextrose 5 % (D5W) 250 mL IVPB (Vial-Mate) Q12H       Objective:     Vital Signs (Most Recent):  Temp: 97.9 °F  (36.6 °C) (01/15/24 1137)  Pulse: 83 (01/15/24 1137)  Resp: 18 (01/15/24 1137)  BP: 125/60 (01/15/24 1137)  SpO2: 99 % (01/15/24 1137) Vital Signs (24h Range):  Temp:  [97.3 °F (36.3 °C)-98.7 °F (37.1 °C)] 97.9 °F (36.6 °C)  Pulse:  [73-96] 83  Resp:  [16-18] 18  SpO2:  [97 %-100 %] 99 %  BP: (119-134)/(56-60) 125/60     Weight: 97.6 kg (215 lb 2.7 oz) (01/03/24 0855)  Body mass index is 32.72 kg/m².  Body surface area is 2.16 meters squared.    I/O last 3 completed shifts:  In: 1605 [P.O.:1605]  Out: 653 [Urine:650; Stool:3]     Physical Exam  Constitutional:       General: He is not in acute distress.     Appearance: He is obese. He is toxic-appearing. He is not ill-appearing.   HENT:      Head: Normocephalic and atraumatic.      Nose: Nose normal.      Mouth/Throat:      Mouth: Mucous membranes are moist.   Eyes:      General: Scleral icterus present.   Cardiovascular:      Rate and Rhythm: Normal rate.      Heart sounds: No murmur heard.  Pulmonary:      Effort: Pulmonary effort is normal.      Breath sounds: No wheezing or rales.   Abdominal:      General: Abdomen is flat. There is distension.      Tenderness: There is abdominal tenderness.   Musculoskeletal:      Right lower leg: Edema present.      Left lower leg: Edema present.   Skin:     General: Skin is warm.      Coloration: Skin is jaundiced.      Findings: Bruising present.   Neurological:      Mental Status: He is alert.          Significant Labs:  All labs within the past 24 hours have been reviewed.     Significant Imaging:  Labs: Reviewed  Assessment/Plan:     Renal/  CODY (acute kidney injury)  58 M with cirrhosis of unknown etiology (undergoing workup) admitted after GI bleed  On admission creatinine 1.2-1.6  At consult on 1/13: 2.0.    1/12 4.5 liter paracentesis    CODY: ddx includes previous NSAID use, large volume shift with paracentesis  Low suspicion for HRS given BP     Plan/Recommendation:  -Repeat urine sodium and UA  -Obtain serum uric  acid  -Fluid restriciton 1500 cc  -Keep MAP > 65  -Keep hemoglobin > 7  -Strict ins and outs  -Avoid nephrotoxic agents if possible and renally dose medications  -Avoid drastic hemodynamic changes if possible            Thank you for your consult. I will follow-up with patient. Please contact us if you have any additional questions.    Chip Chung MD  Nephrology  [facility] Surg

## 2024-01-15 NOTE — SUBJECTIVE & OBJECTIVE
Past Medical History:   Diagnosis Date    Hypertension        Past Surgical History:   Procedure Laterality Date    ESOPHAGOGASTRODUODENOSCOPY N/A 1/3/2024    Procedure: EGD (ESOPHAGOGASTRODUODENOSCOPY);  Surgeon: Madison Hinojosa MD;  Location: [address];  Service: Endoscopy;  Laterality: N/A;       Review of patient's allergies indicates:  No Known Allergies    Medications:  Medications Prior to Admission   Medication Sig    EScitalopram oxalate (LEXAPRO) 10 MG tablet Take 10 mg by mouth once daily.    hydroCHLOROthiazide (HYDRODIURIL) 25 MG tablet Take 25 mg by mouth once daily.    traZODone (DESYREL) 50 MG tablet Take  mg by mouth every evening.     Antibiotics (From admission, onward)      Start     Stop Route Frequency Ordered    01/14/24 0900  vancomycin 1,500 mg in dextrose 5 % (D5W) 250 mL IVPB (Vial-Mate)         -- IV Every 12 hours (non-standard times) 01/14/24 0748    01/13/24 1030  rifAXIMin tablet 550 mg         -- Oral 2 times daily 01/13/24 0916    01/13/24 0930  ceFEPIme (MAXIPIME) 2 g in dextrose 5 % in water (D5W) 100 mL IVPB (MB+)         -- IV Every 12 hours (non-standard times) 01/13/24 0816    01/13/24 0915  vancomycin - pharmacy to dose  (vancomycin IVPB (PEDS and ADULTS))        See Hyperspace for full Linked Orders Report.    -- IV pharmacy to manage frequency 01/13/24 0816          Antifungals (From admission, onward)      Start     Stop Route Frequency Ordered    01/14/24 1345  micafungin 100 mg in sodium chloride 0.9 % 100 mL IVPB (MB+)         -- IV Every 24 hours (non-standard times) 01/14/24 1244          Antivirals (From admission, onward)      None             Immunization History   Administered Date(s) Administered    COVID-19 MRNA, LN-S PF (MODERNA HALF 0.25 ML DOSE) 01/20/2022    COVID-19, MRNA, LN-S, PF (MODERNA FULL 0.5 ML DOSE) 04/13/2021, 05/12/2021       Family History    None       Social History     Socioeconomic History    Marital status:     Tobacco Use    Smoking status: Never    Smokeless tobacco: Never   Substance and Sexual Activity    Alcohol use: Yes    Drug use: Never     Social Determinants of Health     Financial Resource Strain: Low Risk  (1/3/2024)    Overall Financial Resource Strain (CARDIA)     Difficulty of Paying Living Expenses: Not very hard   Food Insecurity: No Food Insecurity (1/3/2024)    Hunger Vital Sign     Worried About Running Out of Food in the Last Year: Never true     Ran Out of Food in the Last Year: Never true   Transportation Needs: No Transportation Needs (1/3/2024)    PRAPARE - Transportation     Lack of Transportation (Medical): No     Lack of Transportation (Non-Medical): No   Physical Activity: Inactive (1/3/2024)    Exercise Vital Sign     Days of Exercise per Week: 0 days     Minutes of Exercise per Session: 0 min   Stress: Stress Concern Present (1/3/2024)    [place] [employer] - Occupational Stress Questionnaire     Feeling of Stress : To some extent   Social Connections: Moderately Integrated (1/3/2024)    Social Connection and Isolation Panel [NHANES]     Frequency of Communication with Friends and Family: More than three times a week     Frequency of Social Gatherings with Friends and Family: More than three times a week     Attends Baptism Services: 1 to 4 times per year     Active Member of Clubs or Organizations: No     Attends Club or Organization Meetings: Never     Marital Status:    Housing Stability: Unknown (1/3/2024)    Housing Stability Vital Sign     Unable to Pay for Housing in the Last Year: No     Unstable Housing in the Last Year: No     Review of Systems   Constitutional:  Negative for chills and fever.   HENT:  Negative for trouble swallowing.    Respiratory:  Negative for cough and shortness of breath.    Gastrointestinal:  Negative for abdominal pain, blood in stool, diarrhea and vomiting.   Genitourinary:  Negative for dysuria and hematuria.    Musculoskeletal:  Negative for arthralgias, joint swelling and neck stiffness.   Skin:  Positive for color change.     Objective:     Vital Signs (Most Recent):  Temp: 97.7 °F (36.5 °C) (01/15/24 0758)  Pulse: 81 (01/15/24 0849)  Resp: 18 (01/15/24 0758)  BP: 124/60 (01/15/24 0758)  SpO2: 100 % (01/15/24 0758) Vital Signs (24h Range):  Temp:  [97.3 °F (36.3 °C)-100 °F (37.8 °C)] 97.7 °F (36.5 °C)  Pulse:  [] 81  Resp:  [16-19] 18  SpO2:  [95 %-100 %] 100 %  BP: (119-142)/(56-64) 124/60     Weight: 97.6 kg (215 lb 2.7 oz)  Body mass index is 32.72 kg/m².    Estimated Creatinine Clearance: 42 mL/min (A) (based on SCr of 2.2 mg/dL (H)).     Physical Exam  Constitutional:       Appearance: Normal appearance.   HENT:      Head: Normocephalic and atraumatic.      Nose: Nose normal.      Mouth/Throat:      Mouth: Mucous membranes are moist.      Pharynx: Oropharynx is clear.   Eyes:      General: Scleral icterus present.      Conjunctiva/sclera: Conjunctivae normal.   Cardiovascular:      Rate and Rhythm: Normal rate and regular rhythm.      Pulses: Normal pulses.      Heart sounds: Normal heart sounds.   Pulmonary:      Effort: Pulmonary effort is normal.      Breath sounds: Normal breath sounds.   Abdominal:      General: Bowel sounds are normal. There is distension.      Palpations: Abdomen is soft.      Tenderness: There is no abdominal tenderness. There is no guarding or rebound.   Musculoskeletal:         General: No deformity.      Cervical back: Neck supple.   Skin:     General: Skin is warm and dry.      Coloration: Skin is jaundiced.      Findings: No rash.      Comments: Bruising on R wrist from phlebotomy   Neurological:      Mental Status: He is alert and oriented to person, place, and time. Mental status is at baseline.          Significant Labs:   Microbiology Results (last 7 days)       Procedure Component Value Units Date/Time    (rule out SBP) Aerobic culture [[id number]] Collected: 01/12/24 1523     Order Status: Completed Specimen: Ascites Updated: 01/15/24 0745     Aerobic Bacterial Culture No growth    Narrative:      To rule out SBP order labs: Aerobic culture [GHG717],  Culture, Anaerobic [UQX259], Gram stain [URO131], Albumin  [WPP019], Protein [LMK245], LDH [PXG784], WBC \T\ Dff  [ZIN1252].    Culture, Anaerobic [[id number]] Collected: 01/08/24 1259    Order Status: Completed Specimen: Ascites Updated: 01/15/24 0636     Anaerobic Culture No anaerobes isolated    Blood culture [[id number]] Collected: 01/12/24 1205    Order Status: Completed Specimen: Blood Updated: 01/14/24 1412     Blood Culture, Routine No Growth to date      No Growth to date      No Growth to date    Blood culture [[id number]] Collected: 01/12/24 1207    Order Status: Completed Specimen: Blood from Peripheral, Right Updated: 01/14/24 1412     Blood Culture, Routine No Growth to date      No Growth to date      No Growth to date    Blood culture [[id number]] Collected: 01/09/24 1056    Order Status: Completed Specimen: Blood Updated: 01/14/24 1212     Blood Culture, Routine No growth after 5 days.    Blood culture [[id number]] Collected: 01/09/24 1056    Order Status: Completed Specimen: Blood Updated: 01/14/24 1212     Blood Culture, Routine No growth after 5 days.    (rule out SBP) Culture, Anaerobic [[id number]] Collected: 01/12/24 1520    Order Status: Completed Specimen: Ascites Updated: 01/13/24 1440     Anaerobic Culture Culture in progress    Narrative:      To rule out SBP order labs: Aerobic culture [JMD049],  Culture, Anaerobic [EQG179], Gram stain [GOY721], Albumin  [GEX805], Protein [KEZ631], LDH [RHD407], WBC \T\ Dff  [OXG5604].    (rule out SBP) Gram stain [[id number]] Collected: 01/12/24 1520    Order Status: Completed Specimen: Ascites Updated: 01/12/24 2056     Gram Stain Result Rare WBC's      No organisms seen    Narrative:      To rule out SBP order labs: Aerobic culture [UDP475],  Culture, Anaerobic [CZN519],  Gram stain [BEE695], Albumin  [NDZ219], Protein [MNK568], LDH [ETF517], WBC \T\ Dff  [ZWM6828].    Aerobic culture [[id number]] Collected: 01/08/24 1259    Order Status: Completed Specimen: Ascites Updated: 01/11/24 1051     Aerobic Bacterial Culture No growth    (rule out SBP) Gram stain [[id number]] Collected: 01/08/24 1259    Order Status: Completed Specimen: Ascites Updated: 01/08/24 2235     Gram Stain Result No WBC's      No organisms seen    Narrative:      To rule out SBP order labs: Aerobic culture [IYI351],  Culture, Anaerobic [TVY940], Gram stain [OYE820], Albumin  [GHB345], Protein [MVJ359], LDH [EWF065], WBC \T\ Dff  [ENQ7722].    (rule out SBP) Aerobic culture [[id number]] Collected: 01/08/24 1259    Order Status: Canceled Specimen: Ascites     (rule out SBP) Culture, Anaerobic [[id number]] Collected: 01/08/24 1259    Order Status: Canceled Specimen: Ascites     Gram stain [[id number]] Collected: 01/08/24 1259    Order Status: Canceled Specimen: Ascites           All pertinent labs within the past 24 hours have been reviewed.    Significant Imaging: I have reviewed all pertinent imaging results/findings within the past 24 hours.

## 2024-01-15 NOTE — ASSESSMENT & PLAN NOTE
58 M with cirrhosis of unknown etiology (undergoing workup) admitted after GI bleed  On admission creatinine 1.2-1.6  At consult on 1/13: 2.0.    1/12 4.5 liter paracentesis    CODY: ddx includes previous NSAID use, large volume shift with paracentesis  Low suspicion for HRS given BP     Plan/Recommendation:  -Repeat urine sodium and UA  -Obtain serum uric acid  -Fluid restriciton 1500 cc  -Keep MAP > 65  -Keep hemoglobin > 7  -Strict ins and outs  -Avoid nephrotoxic agents if possible and renally dose medications  -Avoid drastic hemodynamic changes if possible

## 2024-01-15 NOTE — ASSESSMENT & PLAN NOTE
"Etiology likely 2/2 cirrhosis  - Monitor w/ daily CBC and transfuse if Plt <10 or <50 w/ signs of active bleeding   No results for input(s): "PLT" in the last 24 hours.    "

## 2024-01-15 NOTE — PLAN OF CARE
Recommendations     1. Discontinue Low Sodium Diet modifier. Na labs trending low x 9 days. Encourage small, frequent meals/snacks as tolerated.      2. Consider adding probiotics during abx course for loose stools.      3. Monitor I/O's, labs and weight.      4. RD following. If aggressive nutrition interventions warranted, please re-consult.     Goals: Meet % EEN/EPN by follow-up date.  Nutrition Goal Status: goal met  Communication of RD Recs: other (comment) (POC)

## 2024-01-15 NOTE — ASSESSMENT & PLAN NOTE
Patient with new onset chills/rigors 1/12. Now with leukocytosis, remains afebrile and normotensive (previously normotensive/hypertensive).  Infectious work up:  - CXR obtained without any signs of pneumonia however possible small amount of edema vs viral component. Covid/Influenza negative.   - UA non-infectious.   - He was initially started on CTX for concern for possible SBP.    - Paracentesis performed with IR on 1/12 with 4.2L out. Ascitic labs with no evidence of SBP, and ascitic cultures NGTD.   - Blood cultures 1/12 NGTD  - Had some RUQ discomfort 1/12 and RUQ US showed gallbladder wall thickening with possible signs of cholecystitis. No abdominal pain today and negative Sen's sign  - Broaded from CTX to Vanc/cefe with low threshold to start micafungin per hepatology  - CT-CAP without evidence of infection, no concern for cholecystitis    - Had another episode of rigors and temp renetta to 100.0 on 1/14. Remains HDS.  - Continue Micafungin  - Fungitell pending  - WBC improving  - ID consulted and recommending DC vanc, continue cefepime. Potentially switch micafungin to fluconazole

## 2024-01-15 NOTE — PROGRESS NOTES
"Hepatology Treatment Plan    Blu Deleon is a 58 y.o. male admitted to hospital 1/2/2024 (Hospital Day: 14) due to Leukocytosis.     Interval History  Patient seen this morning.  His mental status was at baseline and he was oriented x3.  Able to ambulate to the bathroom without assistance.  He reports feeling fine and denies any significant symptoms.    Family present at bedside.  Wife reported being in contact with insurance company to arrange flight back to [place].    Having 4-5 bowel movements daily on lactulose    MELD 39      Objective  Temp:  [97.3 °F (36.3 °C)-98.7 °F (37.1 °C)] 97.9 °F (36.6 °C) (01/15 1137)  Pulse:  [73-96] 83 (01/15 1137)  BP: (119-134)/(56-60) 125/60 (01/15 1137)  Resp:  [16-18] 18 (01/15 1137)  SpO2:  [97 %-100 %] 99 % (01/15 1137)    Physical Exam:  Constitutional:  awake, alert, NAD  HENT: Head: Normal, CPAP in place  Eyes:  scleral icterus  Respiratory: normal chest expansion & respiratory effort and no accessory muscle use  GI: soft, non-tender, without masses or organomegaly  Skin:  jaundiced  Neurological: oriented x3      Laboratory    No results for input(s): "WBC", "RBC", "HGB", "HCT", "PLT", "MCV", "MCH", "MCHC" in the last 24 hours.   Recent Labs   Lab 01/15/24  0517   CALCIUM 8.5*   ALBUMIN 2.6*   PROT 4.9*   *   K 4.2   CO2 22*      BUN 48*   CREATININE 2.2*   ALKPHOS 134   ALT 58*   *   BILITOT 20.5*      Recent Labs   Lab 01/15/24  0517   INR 2.8*        MELD 3.0: 39 at 1/15/2024  5:17 AM  MELD-Na: 38 at 1/15/2024  5:17 AM  Calculated from:  Serum Creatinine: 2.2 mg/dL at 1/15/2024  5:17 AM  Serum Sodium: 131 mmol/L at 1/15/2024  5:17 AM  Total Bilirubin: 20.5 mg/dL at 1/15/2024  5:17 AM  Serum Albumin: 2.6 g/dL at 1/15/2024  5:17 AM  INR(ratio): 2.8 at 1/15/2024  5:17 AM  Age at listing (hypothetical): 58 years  Sex: Male at 1/15/2024  5:17 AM     JERI negative  ASMA 1:40  IgG wnl  AMA negative  Viral hepatitis panel negative  Ceruloplasmin " "negative  Iron studies negative    Assessment and Plan    Blu Deleon is a 58 y.o. male with history of alcohol use disorder, HTN, and compensated EtOH cirrhosis who was admitted to [facility] on  with melena.   After resuscitation, EGD on 1/3/24 showed esophageal ulcers, erythematous mucosa in the antrum, and normal duodenum.  Hepatology now consulted for worsening bilirubin.     He hails from [place], visiting from the [facility]. The patient has known about his history of "early cirrhosis" since at least  when liver ultrasound confirmed cirrhosis.  He has continued to drink in spite of knowledge of liver disease and being told to stop; has consumed alcohol regularly since the age of 21.  Last drink 24. No illicit drug use.  Sister  of EtOH cirrhosis. PETH positive, 98.    Case was discussed in committee  and deemed not to be a candidate for liver transplant evaluation due to ongoing alcohol use despite knowledge of liver disease       Problem List:  Newly decompensated EtOH cirrhosis  EtOH use disorder  Esophageal ulcers  Leukocytosis  Hepatic encephalopathy        Recommendations:  - We are not going to proceed with liver tx eval at this time since patient was aware of cirrhosis but persisted drinking. Discussed in transplant committee.  - Now accepted to [facility] in [place] to be closer to home. Concerned with very high mortality based on current trend in liver function.  - Follow infectious workup: noted CXR shows possible viral process. Blood cultures NGTD  - await results of Fungitell testing.  In the meanwhile, we will continue micafungin.  - Finished IV vitamin K x 3 days (second course)  - Stopped baclofen. Avoid sedating medications  - Titrate lactulose to 3-4 BMs per day  - Continue rifaximin 550 mg BID  - Nephrology following for CODY, appreciate recs  - low sodium, high protein diet  - Continue Protonix 40mg twice per day for 8 weeks. Repeat upper endoscopy " in 8 weeks to check healing of his esophageal ulcers. He would like to have this done in [facility].   - Daily CBC, CMP, INR.       - We will continue to follow.    Thank you for involving us in the care of Blu Deleon. Please call with any additional questions, concerns or changes in the patient's clinical status. Plan of care discussed with attending Dr. He.    Lyle Thomas MD  Gastroenterology and Hepatology Fellow, PGY V

## 2024-01-15 NOTE — PROGRESS NOTES
[facility] Medicine  Progress Note    Patient Name: Blu Deleon  MRN: [id number]  Patient Class: IP- Inpatient   Admission Date: 1/2/2024  Length of Stay: 12 days  Attending Physician: Rik Gomes MD  Primary Care Provider: Radha, Primary Doctor        Subjective:     Principal Problem:Leukocytosis        HPI:  Mr. Deleon is a 58 year old male with PMH notable for HTN, ETOH use, reported cirrhosis (has not seen a hepatologist), and depression who presented to [facility] ED with acute GI bleeding. In speaking with patient and wife at bedside, patient reports four days of nausea and vomiting with bright red blood and black tarry diarrhea. Additionally, he reports a fall yesterday after attempting to go to the restroom secondary to being lightheaded. He denies this happening previously. He was scheduled for an outpatient EGD, which has not been completed. He has not had a colonoscopy. He denies any anticoagulation or antiplatelet use.      In the emergency department he was found to be acutely anemic with a hgb of 5. He has not been hypotensive while in the emergency department, however, notably tachycardic with -140. Labs otherwise notable for WBC 13.56, Plt 107, INR 2.4, BUN 57, Cr 1.6, TB 5.4, , ALT 44. Initial lactate >12. ETOH level <10. Patient is s/p 3u pRBCs.    Admitted to MICU for UGIB.      Overview/Hospital Course:  Patient admitted for UGIB. GI consulted and EGD showed esophageal ulcers. Pt to continue PPI for 8 weeks with repeat scope to follow. Hgb stable s/p 3u PRBC and 1u FFP. HDS and tolerating clear liquid diet. CIWA ordered and no ativan required.  Patient was transferred to floors on 01/05.  Creatinine has remained stable at 1.4.  T bilirubin trending up.  Hepatology was consulted in the setting of decompensated liver cirrhosis.  Ultrasound right upper quadrant with Dopplers, PEth, AFP, serological workup, and acute hepatitis panel.  Patient underwent  paracentesis on 01/08 and ascitic fluid negative for SBP.  As patient is not a candidate for transplant and with his high MELD score he would require air transfer to [place] where he can go to the hospital close to his home. Accepted for transfer to [facility] in [place], awaiting logistics of flight transport.    Patient was doing well the morning of 1/12, however after a walk to the bathroom he started to have chills and rigors. He was started on CTX for concern for possible SBP. Repeat CBC with stable Hgb and WBC WNL. Paracentesis performed with IR on 1/12 with 4.2L out. Ascitic labs with no evidence of SBP, and ascitic cultures NGTD. Blood cultures 1/12 NGTD. Had some RUQ discomfort and RUQ US ordered with gallbladder wall thickening with possible signs of cholecystitis. CXR obtained without any signs of pneumonia however possible small amount of edema vs viral. Covid/Influenza negative.     On 1/13, WBC elevated, patient remains afebrile and normotensive. Abdominal exam benign, negative Sen's sign. UA non-infectious. Antibiotics broadened to Vanc/cefe with low threshold for micafungin per hepatology. CT-CAP without evidence of infection. Patient appeared much better morning of 1/14, however became somnolent again early this afternoon. Broadened to Micafungin and ID consulted.    Interval History/Significant Events: Pt seen and examined this morning on rounds. NAEON. Denies any symptoms of chills/rigors or lethargy. Did well yesterday evening and slept well overnight. Creatinine 2.0 -> 2.2. Nephrology recommending 1500ml fluid restriction, repeat urine studies ordered. ID recommending to [place] vanc.    Wife is speaking with insurance company about flight status. CM faxed medical records to insurance company.    Objective:     Vital Signs (Most Recent):  Temp: 97.9 °F (36.6 °C) (01/15/24 1137)  Pulse: 83 (01/15/24 1137)  Resp: 18 (01/15/24 1137)  BP: 125/60 (01/15/24 1137)  SpO2: 99 % (01/15/24 1137) Vital  Signs (24h Range):  Temp:  [97.3 °F (36.3 °C)-98.7 °F (37.1 °C)] 97.9 °F (36.6 °C)  Pulse:  [73-96] 83  Resp:  [16-18] 18  SpO2:  [97 %-100 %] 99 %  BP: (119-134)/(56-60) 125/60   Weight: 97.6 kg (215 lb 2.7 oz)  Body mass index is 32.72 kg/m².      Intake/Output Summary (Last 24 hours) at 1/15/2024 1547  Last data filed at 1/15/2024 0602  Gross per 24 hour   Intake 800 ml   Output 450 ml   Net 350 ml          Physical Exam  Gen: in NAD, appears stated age, jaundiced  Neuro: AAOx4, CN2-12 grossly intact BL; motor, sensory, and strength grossly intact BL  HEENT: + icteric sclera. NTNC, EOMI, PERRLA, MMM; no thyromegaly or lymphadenopathy; no JVD appreciated  CVS: RRR, no m/r/g; S1/S2 auscultated with no S3 or S4; capillary refill < 2 sec  Resp: lungs CTAB, no w/r/r; no belabored breathing or accessory muscle use appreciated   Abd: + Abdominal distension. BS+ in all 4 quadrants; NTND, soft to palpation; no organomegaly appreciated   Extrem: pulses full, equal, and regular over all 4 extremities; no UE or LE edema BL         Vents:  Oxygen Concentration (%): 21 (01/12/24 0041)  Lines/Drains/Airways       Peripheral Intravenous Line  Duration                  Peripheral IV - Single Lumen 01/03/24 1045 18 G;1 1/4 in Anterior;Left Upper Arm 12 days         Peripheral IV - Single Lumen 01/03/24 1400 18 G;1 1/4 in Anterior;Right Upper Arm 12 days                  Significant Labs:    CBC/Anemia Profile:  Recent Labs   Lab 01/14/24 0443   WBC 9.26   HGB 8.7*   HCT 26.5*   PLT 77*   *   RDW 24.4*        Chemistries:  Recent Labs   Lab 01/14/24  0443 01/15/24  0517   * 131*   K 4.1 4.2    101   CO2 23 22*   BUN 41* 48*   CREATININE 2.0* 2.2*   CALCIUM 8.4* 8.5*   ALBUMIN 3.0* 2.6*   PROT 5.2* 4.9*   BILITOT 20.1* 20.5*   ALKPHOS 141* 134   ALT 67* 58*   * 121*   MG 2.0 2.2   PHOS 2.9 3.0       CMP:   Recent Labs   Lab 01/14/24  0443 01/15/24  0517   * 131*   K 4.1 4.2    101   CO2 23 22*  "  GLU 90 96   BUN 41* 48*   CREATININE 2.0* 2.2*   CALCIUM 8.4* 8.5*   PROT 5.2* 4.9*   ALBUMIN 3.0* 2.6*   BILITOT 20.1* 20.5*   ALKPHOS 141* 134   * 121*   ALT 67* 58*   ANIONGAP 9 8     Coagulation:   Recent Labs   Lab 01/15/24  0517   INR 2.8*     Lactic Acid:   No results for input(s): "LACTATE" in the last 48 hours.        Significant Imaging:  I have reviewed all pertinent imaging results/findings within the past 24 hours.    Assessment/Plan:      * Leukocytosis  Patient with new onset chills/rigors 1/12. Now with leukocytosis, remains afebrile and normotensive (previously normotensive/hypertensive).  Infectious work up:  - CXR obtained without any signs of pneumonia however possible small amount of edema vs viral component. Covid/Influenza negative.   - UA non-infectious.   - He was initially started on CTX for concern for possible SBP.    - Paracentesis performed with IR on 1/12 with 4.2L out. Ascitic labs with no evidence of SBP, and ascitic cultures NGTD.   - Blood cultures 1/12 NGTD  - Had some RUQ discomfort 1/12 and RUQ US showed gallbladder wall thickening with possible signs of cholecystitis. No abdominal pain today and negative Sen's sign  - Broaded from CTX to Vanc/cefe with low threshold to start micafungin per hepatology  - CT-CAP without evidence of infection, no concern for cholecystitis    - Had another episode of rigors and temp renetta to 100.0 on 1/14. Remains HDS.  - Continue Micafungin  - Fungitell pending  - WBC improving  - ID consulted and recommending DC vanc, continue cefepime. Potentially switch micafungin to fluconazole    Cirrhosis  Patient is visiting from out of town; outside records not available to review thus etiology of cirrhosis not confirmed. However, patient does have a history of ETOH use d/o so this is included in the differential. States he is currently in the process of being referred to a hepatologist and having an EGD scheduled.     MELD-Na score calculated; " MELD 3.0: 39 at 1/15/2024  5:17 AM  MELD-Na: 38 at 1/15/2024  5:17 AM  Calculated from:  Serum Creatinine: 2.2 mg/dL at 1/15/2024  5:17 AM  Serum Sodium: 131 mmol/L at 1/15/2024  5:17 AM  Total Bilirubin: 20.5 mg/dL at 1/15/2024  5:17 AM  Serum Albumin: 2.6 g/dL at 1/15/2024  5:17 AM  INR(ratio): 2.8 at 1/15/2024  5:17 AM  Age at listing (hypothetical): 58 years  Sex: Male at 1/15/2024  5:17 AM    Continue chronic meds. Etiology likely ETOH. Will avoid any hepatotoxic meds, and monitor CBC/CMP/INR for synthetic function.   T bilirubin trending up. Hepatology was consulted in the setting of decompensated liver cirrhosis. Ultrasound right upper quadrant with Dopplers, PEth, AFP, serological workup, and acute hepatitis panel. Cirrhotic morphology of the liver with sequela of portal hypertension as detailed above. No discrete hepatic lesions identified. Biliary sludge with thickened gallbladder wall, likely secondary to hepatic dysfunction.  Underwent paracentesis.  As patient is not a candidate for transplant and with his high MELD score he would require air transfer to  [place] where he can go to the hospital close to his home. On lactulose and albumin per hepatology recs.   - Continue lactulose goal 3 BM's daily  - S/p albumin 50g BID for 48 hours  - Accepted to [facility] in [place]. Pending flight logistics.  - Start Rifaximin  - Continue IV Vit K x3 doses  - Repeat paracentesis 1/12 as detailed above    CODY (acute kidney injury)  - Cr 1.6 at admission, unclear baseline  - Initially likely pre-renal in setting of GIB.  - Now with slow elevation of creatinine  - Hepatology recommending albumin 50g BID for 48 hours  - S/p 48 hours albumin  - Cr 1.6 -> 2.0 -> 2.2  - Nephrology consulted, urine studies sent, uric acid ordered   - 1500ml fluid restriction  - Renally dosing all medications  - Avoiding nephrotoxins  - Will continue to monitor on daily labs    Acute upper GI bleed  58 year old M w/ cirrhosis and ETOH  "use d/o presenting w/ melana and ground coffee emesis/hematemesis. Denies prior symptoms. No EGDs on file. Normotensive and tachycardic w/ HR 130s. Initial Hgb 5.0; now s/p 2u pRBCs. Initial lactate >12. Labs otherwise notable for Plt 116, INR 2.4, BUN 57, Cr 1.6, TB 5.4, , ALT 44. Admitted to MICU for management of acute upper GIB.      EGD showed esophageal ulcers. Hgb stable s/p 3u PRBC. Will need scope after 8 week course of PPI.     - Soft and bite-sized diet, advance as tolerated  - Trend Hgb and transfuse for goal Hgb > 7, unless otherwise indicated  - Maintain IV access with 2 large bore IV's  - Intravascular resuscitation/support with IVF's   - Hold all NSAIDs and anticoagulants, unless contraindicated  - Pantoprazole 40 mg BID for a total of 8 weeks  - Correct any coagulopathy with platelets and FFP for goal of platelets >50K and INR <2.0  - CTM CBC    - Hgb stable, BM's without evidence of melena    Transaminitis  - 2:1 pattern; likely 2/2 ETOH use d/o.   - Improving  - Will continue to monitor        Thrombocytopenia  Etiology likely 2/2 cirrhosis  - Monitor w/ daily CBC and transfuse if Plt <10 or <50 w/ signs of active bleeding   No results for input(s): "PLT" in the last 24 hours.      Alcohol use disorder  - Last alcoholic drink on 1/01. Alcohol level <10 on admission. Denies history of withdrawal in the past including seizures. Remains tachycardic but likely 2/2 acute GIB. Will continue to monitor for signs of withdrawal w/ CIWA protocol and start benzodiazepines if indicated.   - thiamine, folic acid, MV  - DC CIWA protocol as now out of withdrawal window       Coagulopathy  - INR elevated in setting of cirrhosis  - S/p IV Vit K for x3 days.         VTE Risk Mitigation (From admission, onward)           Ordered     Place sequential compression device  Until discontinued         01/03/24 0256     IP VTE LOW RISK PATIENT  Once         01/03/24 0256                    Discharge Planning   MILKA: " 1/16/2024     Code Status: Full Code   Is the patient medically ready for discharge?: No    Reason for patient still in hospital (select all that apply): Treatment  Discharge Plan A: Home, Home with family   Discharge Delays: None known at this time              Rik Gomes MD  Department of Hospital Medicine   [facility] Surg

## 2024-01-15 NOTE — ASSESSMENT & PLAN NOTE
58 year old female with history of ETOH use, cirrhosis, and depression who presented to [facility] 1/2/24 with acute upper GIB, hypotension 2/2 to hemorrhagic blood loss and acute blood loss anemia, lactic acidosis, s/p PRBC transfusion,  EGD with esophageal ulcers, s/p brief course of ceftriaxone.     Patient had new rigors and chills, which prompted workup, blood cultures from 1/12 with NG, both paracentesis fluid studies form 1/7 and /112 with cell counts not consistent with infectious etiology, CTAP non contrast without any identifiable infectious foci, US with gall bladder sludge, no localizing symptoms, patient started on Vancomycin, cefepime and Micafungin , new leukocytosis and symptom appeared to improve post anitbitoic initiation.     Recommendations    DC vancomycin, no evidence of MRSA, continue cefepime for now.     Plan to de-escalate from Micafungin to fluconazole since he has no prior or current evidence of growth of C. Glabrata.

## 2024-01-15 NOTE — PROGRESS NOTES
Pharmacy Vancomycin Consult Note    Therapy with Vancomycin complete and/or consult discontinued by provider.  Pharmacy will sign off, please re-consult as needed.    Kiersten Cobb D 1/15/2024 3:49 PM

## 2024-01-16 LAB
ALBUMIN SERPL BCP-MCNC: 2.5 G/DL (ref 3.5–5.2)
ALP SERPL-CCNC: 146 U/L (ref 55–135)
ALT SERPL W/O P-5'-P-CCNC: 60 U/L (ref 10–44)
ANION GAP SERPL CALC-SCNC: 7 MMOL/L (ref 8–16)
AST SERPL-CCNC: 128 U/L (ref 10–40)
BASOPHILS # BLD AUTO: 0.05 K/UL (ref 0–0.2)
BASOPHILS NFR BLD: 0.8 % (ref 0–1.9)
BILIRUB SERPL-MCNC: 20.6 MG/DL (ref 0.1–1)
BUN SERPL-MCNC: 52 MG/DL (ref 6–20)
C3 SERPL-MCNC: 35 MG/DL (ref 50–180)
C4 SERPL-MCNC: 4 MG/DL (ref 11–44)
CALCIUM SERPL-MCNC: 8.3 MG/DL (ref 8.7–10.5)
CHLORIDE SERPL-SCNC: 104 MMOL/L (ref 95–110)
CO2 SERPL-SCNC: 20 MMOL/L (ref 23–29)
CREAT SERPL-MCNC: 2.5 MG/DL (ref 0.5–1.4)
DIFFERENTIAL METHOD BLD: ABNORMAL
EOSINOPHIL # BLD AUTO: 0.4 K/UL (ref 0–0.5)
EOSINOPHIL NFR BLD: 5.9 % (ref 0–8)
ERYTHROCYTE [DISTWIDTH] IN BLOOD BY AUTOMATED COUNT: 23.5 % (ref 11.5–14.5)
EST. GFR  (NO RACE VARIABLE): 29.2 ML/MIN/1.73 M^2
GLUCOSE SERPL-MCNC: 104 MG/DL (ref 70–110)
HCT VFR BLD AUTO: 24.1 % (ref 40–54)
HGB BLD-MCNC: 7.9 G/DL (ref 14–18)
IMM GRANULOCYTES # BLD AUTO: 0.09 K/UL (ref 0–0.04)
IMM GRANULOCYTES NFR BLD AUTO: 1.4 % (ref 0–0.5)
INR PPP: 2.7 (ref 0.8–1.2)
LYMPHOCYTES # BLD AUTO: 1 K/UL (ref 1–4.8)
LYMPHOCYTES NFR BLD: 14.8 % (ref 18–48)
MAGNESIUM SERPL-MCNC: 2.2 MG/DL (ref 1.6–2.6)
MCH RBC QN AUTO: 33.8 PG (ref 27–31)
MCHC RBC AUTO-ENTMCNC: 32.8 G/DL (ref 32–36)
MCV RBC AUTO: 103 FL (ref 82–98)
MONOCYTES # BLD AUTO: 1.1 K/UL (ref 0.3–1)
MONOCYTES NFR BLD: 16.8 % (ref 4–15)
NEUTROPHILS # BLD AUTO: 3.9 K/UL (ref 1.8–7.7)
NEUTROPHILS NFR BLD: 60.3 % (ref 38–73)
NRBC BLD-RTO: 0 /100 WBC
PHOSPHATE SERPL-MCNC: 3.3 MG/DL (ref 2.7–4.5)
PLATELET # BLD AUTO: 52 K/UL (ref 150–450)
PMV BLD AUTO: 12.6 FL (ref 9.2–12.9)
POTASSIUM SERPL-SCNC: 3.8 MMOL/L (ref 3.5–5.1)
PROT SERPL-MCNC: 4.9 G/DL (ref 6–8.4)
PROTHROMBIN TIME: 27.5 SEC (ref 9–12.5)
RBC # BLD AUTO: 2.34 M/UL (ref 4.6–6.2)
SODIUM SERPL-SCNC: 131 MMOL/L (ref 136–145)
WBC # BLD AUTO: 6.42 K/UL (ref 3.9–12.7)

## 2024-01-16 PROCEDURE — 25000003 PHARM REV CODE 250: Performed by: STUDENT IN AN ORGANIZED HEALTH CARE EDUCATION/TRAINING PROGRAM

## 2024-01-16 PROCEDURE — 99232 SBSQ HOSP IP/OBS MODERATE 35: CPT | Mod: ,,, | Performed by: INTERNAL MEDICINE

## 2024-01-16 PROCEDURE — 25000003 PHARM REV CODE 250: Performed by: NURSE PRACTITIONER

## 2024-01-16 PROCEDURE — 83516 IMMUNOASSAY NONANTIBODY: CPT | Performed by: INTERNAL MEDICINE

## 2024-01-16 PROCEDURE — 87086 URINE CULTURE/COLONY COUNT: CPT | Performed by: STUDENT IN AN ORGANIZED HEALTH CARE EDUCATION/TRAINING PROGRAM

## 2024-01-16 PROCEDURE — 99900035 HC TECH TIME PER 15 MIN (STAT)

## 2024-01-16 PROCEDURE — 25000003 PHARM REV CODE 250: Performed by: INTERNAL MEDICINE

## 2024-01-16 PROCEDURE — 86160 COMPLEMENT ANTIGEN: CPT | Performed by: INTERNAL MEDICINE

## 2024-01-16 PROCEDURE — 83516 IMMUNOASSAY NONANTIBODY: CPT | Mod: 59 | Performed by: INTERNAL MEDICINE

## 2024-01-16 PROCEDURE — 85610 PROTHROMBIN TIME: CPT | Performed by: STUDENT IN AN ORGANIZED HEALTH CARE EDUCATION/TRAINING PROGRAM

## 2024-01-16 PROCEDURE — 86160 COMPLEMENT ANTIGEN: CPT | Mod: 59 | Performed by: INTERNAL MEDICINE

## 2024-01-16 PROCEDURE — 83735 ASSAY OF MAGNESIUM: CPT | Performed by: NURSE PRACTITIONER

## 2024-01-16 PROCEDURE — 83521 IG LIGHT CHAINS FREE EACH: CPT | Mod: 59 | Performed by: INTERNAL MEDICINE

## 2024-01-16 PROCEDURE — 36415 COLL VENOUS BLD VENIPUNCTURE: CPT | Performed by: STUDENT IN AN ORGANIZED HEALTH CARE EDUCATION/TRAINING PROGRAM

## 2024-01-16 PROCEDURE — 84165 PROTEIN E-PHORESIS SERUM: CPT | Performed by: INTERNAL MEDICINE

## 2024-01-16 PROCEDURE — 99233 SBSQ HOSP IP/OBS HIGH 50: CPT | Mod: ,,, | Performed by: INTERNAL MEDICINE

## 2024-01-16 PROCEDURE — 25000003 PHARM REV CODE 250

## 2024-01-16 PROCEDURE — 86036 ANCA SCREEN EACH ANTIBODY: CPT | Mod: 59 | Performed by: INTERNAL MEDICINE

## 2024-01-16 PROCEDURE — 86334 IMMUNOFIX E-PHORESIS SERUM: CPT | Performed by: INTERNAL MEDICINE

## 2024-01-16 PROCEDURE — 84165 PROTEIN E-PHORESIS SERUM: CPT | Mod: 26,,, | Performed by: PATHOLOGY

## 2024-01-16 PROCEDURE — 94660 CPAP INITIATION&MGMT: CPT

## 2024-01-16 PROCEDURE — 63600175 PHARM REV CODE 636 W HCPCS: Performed by: INTERNAL MEDICINE

## 2024-01-16 PROCEDURE — 94761 N-INVAS EAR/PLS OXIMETRY MLT: CPT

## 2024-01-16 PROCEDURE — 84100 ASSAY OF PHOSPHORUS: CPT | Performed by: NURSE PRACTITIONER

## 2024-01-16 PROCEDURE — 82595 ASSAY OF CRYOGLOBULIN: CPT | Performed by: INTERNAL MEDICINE

## 2024-01-16 PROCEDURE — 86334 IMMUNOFIX E-PHORESIS SERUM: CPT | Mod: 26,,, | Performed by: PATHOLOGY

## 2024-01-16 PROCEDURE — 80053 COMPREHEN METABOLIC PANEL: CPT | Performed by: NURSE PRACTITIONER

## 2024-01-16 PROCEDURE — 21400001 HC TELEMETRY ROOM

## 2024-01-16 PROCEDURE — 85025 COMPLETE CBC W/AUTO DIFF WBC: CPT | Performed by: INTERNAL MEDICINE

## 2024-01-16 RX ORDER — FLUCONAZOLE 200 MG/1
200 TABLET ORAL DAILY
Status: COMPLETED | OUTPATIENT
Start: 2024-01-17 | End: 2024-01-20

## 2024-01-16 RX ADMIN — LACTULOSE 20 G: 20 SOLUTION ORAL at 08:01

## 2024-01-16 RX ADMIN — CEFEPIME 2 G: 2 INJECTION, POWDER, FOR SOLUTION INTRAVENOUS at 09:01

## 2024-01-16 RX ADMIN — THERA TABS 1 TABLET: TAB at 08:01

## 2024-01-16 RX ADMIN — LACTULOSE 20 G: 20 SOLUTION ORAL at 02:01

## 2024-01-16 RX ADMIN — PANTOPRAZOLE SODIUM 40 MG: 40 TABLET, DELAYED RELEASE ORAL at 09:01

## 2024-01-16 RX ADMIN — RIFAXIMIN 550 MG: 550 TABLET ORAL at 09:01

## 2024-01-16 RX ADMIN — RIFAXIMIN 550 MG: 550 TABLET ORAL at 08:01

## 2024-01-16 RX ADMIN — MICAFUNGIN SODIUM 100 MG: 100 INJECTION, POWDER, LYOPHILIZED, FOR SOLUTION INTRAVENOUS at 02:01

## 2024-01-16 RX ADMIN — Medication 100 MG: at 08:01

## 2024-01-16 RX ADMIN — CEFEPIME 2 G: 2 INJECTION, POWDER, FOR SOLUTION INTRAVENOUS at 08:01

## 2024-01-16 RX ADMIN — FOLIC ACID 1 MG: 1 TABLET ORAL at 08:01

## 2024-01-16 RX ADMIN — PANTOPRAZOLE SODIUM 40 MG: 40 TABLET, DELAYED RELEASE ORAL at 08:01

## 2024-01-16 RX ADMIN — ESCITALOPRAM OXALATE 10 MG: 10 TABLET ORAL at 08:01

## 2024-01-16 NOTE — ASSESSMENT & PLAN NOTE
Patient is visiting from out of town; outside records not available to review thus etiology of cirrhosis not confirmed. However, patient does have a history of ETOH use d/o so this is included in the differential. States he is currently in the process of being referred to a hepatologist and having an EGD scheduled.     MELD-Na score calculated; MELD 3.0: 40 at 1/16/2024  4:32 AM  MELD-Na: 38 at 1/16/2024  4:32 AM  Calculated from:  Serum Creatinine: 2.5 mg/dL at 1/16/2024  4:32 AM  Serum Sodium: 131 mmol/L at 1/16/2024  4:32 AM  Total Bilirubin: 20.6 mg/dL at 1/16/2024  4:32 AM  Serum Albumin: 2.5 g/dL at 1/16/2024  4:32 AM  INR(ratio): 2.7 at 1/16/2024  4:32 AM  Age at listing (hypothetical): 58 years  Sex: Male at 1/16/2024  4:32 AM    Continue chronic meds. Etiology likely ETOH. Will avoid any hepatotoxic meds, and monitor CBC/CMP/INR for synthetic function.   T bilirubin trending up. Hepatology was consulted in the setting of decompensated liver cirrhosis. Ultrasound right upper quadrant with Dopplers, PEth, AFP, serological workup, and acute hepatitis panel. Cirrhotic morphology of the liver with sequela of portal hypertension as detailed above. No discrete hepatic lesions identified. Biliary sludge with thickened gallbladder wall, likely secondary to hepatic dysfunction.  Underwent paracentesis.  As patient is not a candidate for transplant and with his high MELD score he would require air transfer to  [place] where he can go to the hospital close to his home. On lactulose and albumin per hepatology recs.   - Continue lactulose goal 3 BM's daily  - S/p albumin 50g BID for 48 hours  - Accepted to [facility] in [place]. Pending flight logistics.  - Start Rifaximin  - Continue IV Vit K x3 doses  - Repeat paracentesis 1/12 as detailed above  - Will continue to monitor abdomen, may need another paracentesis soon. Per nephrology recs, limit to 2L off to prevent fluid shifting

## 2024-01-16 NOTE — ASSESSMENT & PLAN NOTE
58 M with cirrhosis of unknown etiology (undergoing workup) admitted after GI bleed  On admission creatinine 1.2-1.6  At consult on 1/13: 2.0.    1/12 4.5 liter paracentesis    CODY: ddx includes previous NSAID use, large volume shift with paracentesis, HRS type 2 (though unlikely given BP), pyelo (++ bacteria on UA 1/15/24), abdominal compartment syndrome (tense abdomen), bladder obstruction  Low suspicion for HRS given BP     Plan/Recommendation:  -Urine culture (ordered) - unlikely given abx, however UA with ++ bacteria that was not present before  -Place low, reports decreased UOP with intermittent flows  -Recommend ascites scan - abdomen becoming for tense - if paracentesis is done would not pull more than 2 liters  -Will discuss with staff regarding utility of HRS protocol given normal BP though cirrhosis with urine sodium  <10.  -Keep MAP > 65  -Keep hemoglobin > 7  -Strict ins and outs  -Avoid nephrotoxic agents if possible and renally dose medications  -Avoid drastic hemodynamic changes if possible

## 2024-01-16 NOTE — PROGRESS NOTES
[facility]  Progress Note    Patient Name: Blu Deleon  MRN: [id number]  Patient Class: IP- Inpatient   Admission Date: 1/2/2024  Length of Stay: 13 days  Attending Physician: Rik Gomes MD  Primary Care Provider: Radha, Primary Doctor        Subjective:     Principal Problem:Leukocytosis        HPI:  Mr. Deleon is a 58 year old male with PMH notable for HTN, ETOH use, reported cirrhosis (has not seen a hepatologist), and depression who presented to [facility] ED with acute GI bleeding. In speaking with patient and wife at bedside, patient reports four days of nausea and vomiting with bright red blood and black tarry diarrhea. Additionally, he reports a fall yesterday after attempting to go to the restroom secondary to being lightheaded. He denies this happening previously. He was scheduled for an outpatient EGD, which has not been completed. He has not had a colonoscopy. He denies any anticoagulation or antiplatelet use.      In the emergency department he was found to be acutely anemic with a hgb of 5. He has not been hypotensive while in the emergency department, however, notably tachycardic with -140. Labs otherwise notable for WBC 13.56, Plt 107, INR 2.4, BUN 57, Cr 1.6, TB 5.4, , ALT 44. Initial lactate >12. ETOH level <10. Patient is s/p 3u pRBCs.    Admitted to MICU for UGIB.      Overview/Hospital Course:  Patient admitted for UGIB. GI consulted and EGD showed esophageal ulcers. Pt to continue PPI for 8 weeks with repeat scope to follow. Hgb stable s/p 3u PRBC and 1u FFP. HDS and tolerating clear liquid diet. CIWA ordered and no ativan required.  Patient was transferred to floors on 01/05.  Creatinine has remained stable at 1.4.  T bilirubin trending up.  Hepatology was consulted in the setting of decompensated liver cirrhosis.  Ultrasound right upper quadrant with Dopplers, PEth, AFP, serological workup, and acute hepatitis panel.  Patient underwent  paracentesis on 01/08 and ascitic fluid negative for SBP.  As patient is not a candidate for transplant and with his high MELD score he would require air transfer to [place] where he can go to the hospital close to his home. Accepted for transfer to [facility] in [place], awaiting logistics of flight transport.    Patient was doing well the morning of 1/12, however after a walk to the bathroom he started to have chills and rigors. He was started on CTX for concern for possible SBP. Repeat CBC with stable Hgb and WBC WNL. Paracentesis performed with IR on 1/12 with 4.2L out. Ascitic labs with no evidence of SBP, and ascitic cultures NGTD. Blood cultures 1/12 NGTD. Had some RUQ discomfort and RUQ US ordered with gallbladder wall thickening with possible signs of cholecystitis. CXR obtained without any signs of pneumonia however possible small amount of edema vs viral. Covid/Influenza negative.     On 1/13, WBC elevated, patient remains afebrile and normotensive. Abdominal exam benign, negative Sen's sign. UA non-infectious. Antibiotics broadened to Vanc/cefe with low threshold for micafungin per hepatology. CT-CAP without evidence of infection. Patient appeared much better morning of 1/14, however became somnolent again early this afternoon. Broadened to Micafungin and ID consulted.    Interval History/Significant Events: Pt seen and examined this morning on rounds. ANNALISE. Reports feeling good this morning. Denies F/C or rigors. Id recommending 7 days cefepime and transitioning micafungin to fluconazole. Fungitell pending. Cr continues to slowly uptrend.      Objective:     Vital Signs (Most Recent):  Temp: 97.6 °F (36.4 °C) (01/16/24 0754)  Pulse: 94 (01/16/24 1046)  Resp: 16 (01/16/24 0754)  BP: (!) 141/61 (01/16/24 0754)  SpO2: 96 % (01/16/24 0754) Vital Signs (24h Range):  Temp:  [97.5 °F (36.4 °C)-98.5 °F (36.9 °C)] 97.6 °F (36.4 °C)  Pulse:  [70-94] 94  Resp:  [16-20] 16  SpO2:  [93 %-100 %] 96 %  BP:  (123-141)/(58-62) 141/61   Weight: 97.6 kg (215 lb 2.7 oz)  Body mass index is 32.72 kg/m².    No intake or output data in the 24 hours ending 01/16/24 1442         Physical Exam  Gen: in NAD, appears stated age, jaundiced  Neuro: AAOx4, CN2-12 grossly intact BL; motor, sensory, and strength grossly intact BL  HEENT: + icteric sclera. NTNC, EOMI, PERRLA, MMM; no thyromegaly or lymphadenopathy; no JVD appreciated  CVS: RRR, no m/r/g; S1/S2 auscultated with no S3 or S4; capillary refill < 2 sec  Resp: lungs CTAB, no w/r/r; no belabored breathing or accessory muscle use appreciated   Abd: + Abdominal distension. BS+ in all 4 quadrants; NTND, soft to palpation; no organomegaly appreciated   Extrem: pulses full, equal, and regular over all 4 extremities; no UE or LE edema BL         Vents:  Oxygen Concentration (%): 21 (01/12/24 0041)  Lines/Drains/Airways       Peripheral Intravenous Line  Duration                  Peripheral IV - Single Lumen 01/03/24 1045 18 G;1 1/4 in Anterior;Left Upper Arm 13 days         Peripheral IV - Single Lumen 01/03/24 1400 18 G;1 1/4 in Anterior;Right Upper Arm 13 days                  Significant Labs:    CBC/Anemia Profile:  Recent Labs   Lab 01/16/24 0432   WBC 6.42   HGB 7.9*   HCT 24.1*   PLT 52*   *   RDW 23.5*        Chemistries:  Recent Labs   Lab 01/15/24  0517 01/16/24 0432   * 131*   K 4.2 3.8    104   CO2 22* 20*   BUN 48* 52*   CREATININE 2.2* 2.5*   CALCIUM 8.5* 8.3*   ALBUMIN 2.6* 2.5*   PROT 4.9* 4.9*   BILITOT 20.5* 20.6*   ALKPHOS 134 146*   ALT 58* 60*   * 128*   MG 2.2 2.2   PHOS 3.0 3.3       CMP:   Recent Labs   Lab 01/15/24  0517 01/16/24  0432   * 131*   K 4.2 3.8    104   CO2 22* 20*   GLU 96 104   BUN 48* 52*   CREATININE 2.2* 2.5*   CALCIUM 8.5* 8.3*   PROT 4.9* 4.9*   ALBUMIN 2.6* 2.5*   BILITOT 20.5* 20.6*   ALKPHOS 134 146*   * 128*   ALT 58* 60*   ANIONGAP 8 7*     Coagulation:   Recent Labs   Lab 01/16/24  0435  "  INR 2.7*     Lactic Acid:   No results for input(s): "LACTATE" in the last 48 hours.        Significant Imaging:  I have reviewed all pertinent imaging results/findings within the past 24 hours.    Assessment/Plan:      * Leukocytosis  Patient with new onset chills/rigors 1/12. Now with leukocytosis, remains afebrile and normotensive (previously normotensive/hypertensive).  Infectious work up:  - CXR obtained without any signs of pneumonia however possible small amount of edema vs viral component. Covid/Influenza negative.   - UA non-infectious.   - He was initially started on CTX for concern for possible SBP.    - Paracentesis performed with IR on 1/12 with 4.2L out. Ascitic labs with no evidence of SBP, and ascitic cultures NGTD.   - Blood cultures 1/12 NGTD  - Had some RUQ discomfort 1/12 and RUQ US showed gallbladder wall thickening with possible signs of cholecystitis. No abdominal pain today and negative Sen's sign  - Broaded from CTX to Vanc/cefe with low threshold to start micafungin per hepatology  - CT-CAP without evidence of infection, no concern for cholecystitis    - Had another episode of rigors and temp renetta to 100.0 on 1/14. Remains HDS.  - Started on Micafungin  - Fungitell pending  - ID consulted and recommending DC vanc, continue cefepime x7 days and switching micafungin to fluconazole for 7 day treatment    Cirrhosis  Patient is visiting from out of town; outside records not available to review thus etiology of cirrhosis not confirmed. However, patient does have a history of ETOH use d/o so this is included in the differential. States he is currently in the process of being referred to a hepatologist and having an EGD scheduled.     MELD-Na score calculated; MELD 3.0: 40 at 1/16/2024  4:32 AM  MELD-Na: 38 at 1/16/2024  4:32 AM  Calculated from:  Serum Creatinine: 2.5 mg/dL at 1/16/2024  4:32 AM  Serum Sodium: 131 mmol/L at 1/16/2024  4:32 AM  Total Bilirubin: 20.6 mg/dL at 1/16/2024  4:32 " AM  Serum Albumin: 2.5 g/dL at 1/16/2024  4:32 AM  INR(ratio): 2.7 at 1/16/2024  4:32 AM  Age at listing (hypothetical): 58 years  Sex: Male at 1/16/2024  4:32 AM    Continue chronic meds. Etiology likely ETOH. Will avoid any hepatotoxic meds, and monitor CBC/CMP/INR for synthetic function.   T bilirubin trending up. Hepatology was consulted in the setting of decompensated liver cirrhosis. Ultrasound right upper quadrant with Dopplers, PEth, AFP, serological workup, and acute hepatitis panel. Cirrhotic morphology of the liver with sequela of portal hypertension as detailed above. No discrete hepatic lesions identified. Biliary sludge with thickened gallbladder wall, likely secondary to hepatic dysfunction.  Underwent paracentesis.  As patient is not a candidate for transplant and with his high MELD score he would require air transfer to  [place] where he can go to the hospital close to his home. On lactulose and albumin per hepatology recs.   - Continue lactulose goal 3 BM's daily  - S/p albumin 50g BID for 48 hours  - Accepted to [facility] in [place]. Pending flight logistics.  - Start Rifaximin  - Continue IV Vit K x3 doses  - Repeat paracentesis 1/12 as detailed above  - Will continue to monitor abdomen, may need another paracentesis soon. Per nephrology recs, limit to 2L off to prevent fluid shifting     CODY (acute kidney injury)  - Cr 1.6 at admission, unclear baseline  - Initially likely pre-renal in setting of GIB.  - Now with slow elevation of creatinine  - Hepatology recommending albumin 50g BID for 48 hours  - S/p 48 hours albumin  - Cr 1.6 -> 2.0 -> 2.2  - Nephrology consulted, urine studies sent, uric acid ordered   - 1500ml fluid restriction   - May need HRS treatment, though BP remains elevated  - Renally dosing all medications  - Avoiding nephrotoxins  - Will continue to monitor on daily labs    Acute upper GI bleed  58 year old M w/ cirrhosis and ETOH use d/o presenting w/ melana and ground  coffee emesis/hematemesis. Denies prior symptoms. No EGDs on file. Normotensive and tachycardic w/ HR 130s. Initial Hgb 5.0; now s/p 2u pRBCs. Initial lactate >12. Labs otherwise notable for Plt 116, INR 2.4, BUN 57, Cr 1.6, TB 5.4, , ALT 44. Admitted to MICU for management of acute upper GIB.      EGD showed esophageal ulcers. Hgb stable s/p 3u PRBC. Will need scope after 8 week course of PPI.     - Soft and bite-sized diet, advance as tolerated  - Trend Hgb and transfuse for goal Hgb > 7, unless otherwise indicated  - Maintain IV access with 2 large bore IV's  - Intravascular resuscitation/support with IVF's   - Hold all NSAIDs and anticoagulants, unless contraindicated  - Pantoprazole 40 mg BID for a total of 8 weeks  - Correct any coagulopathy with platelets and FFP for goal of platelets >50K and INR <2.0  - CTM CBC    - Hgb stable, BM's without evidence of melena    Transaminitis  - 2:1 pattern; likely 2/2 ETOH use d/o.   - Improving, T bili still elevated but stable  - Will continue to monitor        Thrombocytopenia  Etiology likely 2/2 cirrhosis  - Monitor w/ daily CBC and transfuse if Plt <10 or <50 w/ signs of active bleeding   Recent Labs   Lab 01/16/24  0432   PLT 52*         Alcohol use disorder  - Last alcoholic drink on 1/01. Alcohol level <10 on admission. Denies history of withdrawal in the past including seizures. Remains tachycardic but likely 2/2 acute GIB. Will continue to monitor for signs of withdrawal w/ CIWA protocol and start benzodiazepines if indicated.   - thiamine, folic acid, MV  - DC CIWA protocol as now out of withdrawal window       Coagulopathy  - INR elevated in setting of cirrhosis  - S/p IV Vit K for x3 days.         VTE Risk Mitigation (From admission, onward)           Ordered     Place sequential compression device  Until discontinued         01/03/24 0256     IP VTE LOW RISK PATIENT  Once         01/03/24 0256                    Discharge Planning   MILKA: 1/22/2024      Code Status: Full Code   Is the patient medically ready for discharge?: No    Reason for patient still in hospital (select all that apply): Treatment  Discharge Plan A: Home, Home with family   Discharge Delays: None known at this time              Rik Gomes MD  Department of Hospital Medicine   [facility]

## 2024-01-16 NOTE — SUBJECTIVE & OBJECTIVE
Interval History/Significant Events: Pt seen and examined this morning on rounds. ANNALISE. Reports feeling good this morning. Denies F/C or rigors. Id recommending 7 days cefepime and transitioning micafungin to fluconazole. Fungitell pending. Cr continues to slowly uptrend.      Objective:     Vital Signs (Most Recent):  Temp: 97.6 °F (36.4 °C) (01/16/24 0754)  Pulse: 94 (01/16/24 1046)  Resp: 16 (01/16/24 0754)  BP: (!) 141/61 (01/16/24 0754)  SpO2: 96 % (01/16/24 0754) Vital Signs (24h Range):  Temp:  [97.5 °F (36.4 °C)-98.5 °F (36.9 °C)] 97.6 °F (36.4 °C)  Pulse:  [70-94] 94  Resp:  [16-20] 16  SpO2:  [93 %-100 %] 96 %  BP: (123-141)/(58-62) 141/61   Weight: 97.6 kg (215 lb 2.7 oz)  Body mass index is 32.72 kg/m².    No intake or output data in the 24 hours ending 01/16/24 1442         Physical Exam  Gen: in NAD, appears stated age, jaundiced  Neuro: AAOx4, CN2-12 grossly intact BL; motor, sensory, and strength grossly intact BL  HEENT: + icteric sclera. NTNC, EOMI, PERRLA, MMM; no thyromegaly or lymphadenopathy; no JVD appreciated  CVS: RRR, no m/r/g; S1/S2 auscultated with no S3 or S4; capillary refill < 2 sec  Resp: lungs CTAB, no w/r/r; no belabored breathing or accessory muscle use appreciated   Abd: + Abdominal distension. BS+ in all 4 quadrants; NTND, soft to palpation; no organomegaly appreciated   Extrem: pulses full, equal, and regular over all 4 extremities; no UE or LE edema BL         Vents:  Oxygen Concentration (%): 21 (01/12/24 0041)  Lines/Drains/Airways       Peripheral Intravenous Line  Duration                  Peripheral IV - Single Lumen 01/03/24 1045 18 G;1 1/4 in Anterior;Left Upper Arm 13 days         Peripheral IV - Single Lumen 01/03/24 1400 18 G;1 1/4 in Anterior;Right Upper Arm 13 days                  Significant Labs:    CBC/Anemia Profile:  Recent Labs   Lab 01/16/24  0432   WBC 6.42   HGB 7.9*   HCT 24.1*   PLT 52*   *   RDW 23.5*        Chemistries:  Recent Labs   Lab  "01/15/24  0517 01/16/24  0432   * 131*   K 4.2 3.8    104   CO2 22* 20*   BUN 48* 52*   CREATININE 2.2* 2.5*   CALCIUM 8.5* 8.3*   ALBUMIN 2.6* 2.5*   PROT 4.9* 4.9*   BILITOT 20.5* 20.6*   ALKPHOS 134 146*   ALT 58* 60*   * 128*   MG 2.2 2.2   PHOS 3.0 3.3       CMP:   Recent Labs   Lab 01/15/24  0517 01/16/24  0432   * 131*   K 4.2 3.8    104   CO2 22* 20*   GLU 96 104   BUN 48* 52*   CREATININE 2.2* 2.5*   CALCIUM 8.5* 8.3*   PROT 4.9* 4.9*   ALBUMIN 2.6* 2.5*   BILITOT 20.5* 20.6*   ALKPHOS 134 146*   * 128*   ALT 58* 60*   ANIONGAP 8 7*     Coagulation:   Recent Labs   Lab 01/16/24 0432   INR 2.7*     Lactic Acid:   No results for input(s): "LACTATE" in the last 48 hours.        Significant Imaging:  I have reviewed all pertinent imaging results/findings within the past 24 hours.  "

## 2024-01-16 NOTE — SUBJECTIVE & OBJECTIVE
Interval History: Remained afebrile overnight. No new complaints today. Feeling well.     Review of Systems   Constitutional:  Negative for chills and fever.   HENT:  Negative for trouble swallowing.    Respiratory:  Negative for cough and shortness of breath.    Gastrointestinal:  Negative for abdominal pain, blood in stool, diarrhea and vomiting.   Genitourinary:  Negative for dysuria and hematuria.   Musculoskeletal:  Negative for arthralgias, joint swelling and neck stiffness.   Skin:  Positive for color change.     Objective:     Vital Signs (Most Recent):  Temp: 97.6 °F (36.4 °C) (01/16/24 0754)  Pulse: 94 (01/16/24 1046)  Resp: 16 (01/16/24 0754)  BP: (!) 141/61 (01/16/24 0754)  SpO2: 96 % (01/16/24 0754) Vital Signs (24h Range):  Temp:  [97.5 °F (36.4 °C)-98.5 °F (36.9 °C)] 97.6 °F (36.4 °C)  Pulse:  [70-94] 94  Resp:  [16-20] 16  SpO2:  [93 %-100 %] 96 %  BP: (123-141)/(58-62) 141/61     Weight: 97.6 kg (215 lb 2.7 oz)  Body mass index is 32.72 kg/m².    Estimated Creatinine Clearance: 36.9 mL/min (A) (based on SCr of 2.5 mg/dL (H)).     Physical Exam  Constitutional:       Appearance: Normal appearance.   HENT:      Head: Normocephalic and atraumatic.      Nose: Nose normal.      Mouth/Throat:      Mouth: Mucous membranes are moist.      Pharynx: Oropharynx is clear.   Eyes:      General: Scleral icterus present.      Conjunctiva/sclera: Conjunctivae normal.   Cardiovascular:      Rate and Rhythm: Normal rate and regular rhythm.      Pulses: Normal pulses.      Heart sounds: Normal heart sounds.   Pulmonary:      Effort: Pulmonary effort is normal.      Breath sounds: Normal breath sounds.   Abdominal:      General: Bowel sounds are normal. There is distension.      Palpations: Abdomen is soft.      Tenderness: There is no abdominal tenderness. There is no guarding or rebound.   Musculoskeletal:         General: No deformity.      Cervical back: Neck supple.   Skin:     General: Skin is warm and dry.       Coloration: Skin is jaundiced.      Findings: No rash.      Comments: Bruising on R wrist from phlebotomy   Neurological:      Mental Status: He is alert and oriented to person, place, and time. Mental status is at baseline.          Significant Labs: Blood Culture:   Recent Labs   Lab 01/09/24  1056 01/12/24  1205 01/12/24  1207   LABBLOO No growth after 5 days.  No growth after 5 days. No Growth to date  No Growth to date  No Growth to date  No Growth to date No Growth to date  No Growth to date  No Growth to date  No Growth to date     All pertinent labs within the past 24 hours have been reviewed.    Significant Imaging: I have reviewed all pertinent imaging results/findings within the past 24 hours.

## 2024-01-16 NOTE — ASSESSMENT & PLAN NOTE
- 2:1 pattern; likely 2/2 ETOH use d/o.   - Improving, T bili still elevated but stable  - Will continue to monitor

## 2024-01-16 NOTE — PROGRESS NOTES
[facility]  Nephrology  Progress Note    Patient Name: Blu Deleon  MRN: [id number]  Admission Date: 1/2/2024  Hospital Length of Stay: 13 days  Attending Provider: Rik Gomes MD   Primary Care Physician: Radha, Primary Doctor  Principal Problem:Leukocytosis    Subjective:     HPI: 58 year old man with a history of HTN, suspected EtOH cirrhosis (currently undergoing workup), depression admitted to [facility] for GI bleed with bright red blood per rectum with black tarry stools as well. He has never seen a neprhologyist and is visiting [place] for the [place]. Wife at bedside reports that he was not feeling well and had been taking tylenol/alleve for some weeks before but at an unknown dose.     They do not know what his baseline creatinine is, however on admission to [facility], creatinine hovered around 1.3 to 1.6. Subsequently he underwent a 4.5 liter paracentesis with a resulting increase in creatinine to 2.0. Urine sodium 1/11 was <10. Blood pressures 130-160 systolics.     Nephrology consulted for CODY    Interval History: no acute events overnight, however sCr worsening. UA with ++ bacteria that was not present before.     Review of patient's allergies indicates:  No Known Allergies  Current Facility-Administered Medications   Medication Frequency    0.9%  NaCl infusion (for blood administration) Q24H PRN    ceFEPIme (MAXIPIME) 2 g in dextrose 5 % in water (D5W) 100 mL IVPB (MB+) Q12H    EScitalopram oxalate tablet 10 mg Daily    folic acid tablet 1 mg Daily    hydrALAZINE injection 10 mg Q6H PRN    lactulose 20 gram/30 mL solution Soln 20 g TID    micafungin 100 mg in sodium chloride 0.9 % 100 mL IVPB (MB+) Q24H    multivitamin tablet Daily    pantoprazole EC tablet 40 mg BID    rifAXIMin tablet 550 mg BID    sodium chloride 0.9% flush 10 mL PRN    thiamine tablet 100 mg Daily       Objective:     Vital Signs (Most Recent):  Temp: 97.6 °F (36.4 °C) (01/16/24 0754)  Pulse: 84 (01/16/24 0900)  Resp: 16  (01/16/24 0754)  BP: (!) 141/61 (01/16/24 0754)  SpO2: 96 % (01/16/24 0754) Vital Signs (24h Range):  Temp:  [97.5 °F (36.4 °C)-98.5 °F (36.9 °C)] 97.6 °F (36.4 °C)  Pulse:  [70-84] 84  Resp:  [16-20] 16  SpO2:  [93 %-100 %] 96 %  BP: (123-141)/(58-62) 141/61     Weight: 97.6 kg (215 lb 2.7 oz) (01/03/24 0855)  Body mass index is 32.72 kg/m².  Body surface area is 2.16 meters squared.    I/O last 3 completed shifts:  In: 600 [P.O.:600]  Out: 300 [Urine:300]     Physical Exam  Constitutional:       General: He is not in acute distress.     Appearance: He is obese. He is ill-appearing. He is not toxic-appearing.   HENT:      Head: Normocephalic and atraumatic.      Nose: Nose normal.      Mouth/Throat:      Mouth: Mucous membranes are moist.   Eyes:      General: Scleral icterus present.   Cardiovascular:      Rate and Rhythm: Normal rate.      Heart sounds: No murmur heard.  Pulmonary:      Effort: Pulmonary effort is normal. No respiratory distress.      Breath sounds: No wheezing or rales.   Abdominal:      General: Abdomen is flat. There is distension.   Musculoskeletal:      Cervical back: Normal range of motion. No rigidity.      Right lower leg: Edema present.      Left lower leg: Edema present.   Lymphadenopathy:      Cervical: No cervical adenopathy.   Skin:     General: Skin is warm.      Coloration: Skin is jaundiced.      Findings: Bruising present. No lesion.   Neurological:      Mental Status: He is alert.          Significant Labs:  All labs within the past 24 hours have been reviewed.     Significant Imaging:  Labs: Reviewed  Assessment/Plan:     Renal/  CODY (acute kidney injury)  58 M with cirrhosis of unknown etiology (undergoing workup) admitted after GI bleed  On admission creatinine 1.2-1.6  At consult on 1/13: 2.0.    1/12 4.5 liter paracentesis    CODY: ddx includes previous NSAID use, large volume shift with paracentesis, HRS type 2 (though unlikely given BP), pyelo (++ bacteria on UA  1/15/24), abdominal compartment syndrome (tense abdomen), bladder obstruction  Low suspicion for HRS given BP     Plan/Recommendation:  -Urine culture (ordered) - unlikely given abx, however UA with ++ bacteria that was not present before  -Place low, reports decreased UOP with intermittent flows  -Recommend ascites scan - abdomen becoming for tense - if paracentesis is done would not pull more than 2 liters  -Will discuss with staff regarding utility of HRS protocol given normal BP though cirrhosis with urine sodium  <10.  -Keep MAP > 65  -Keep hemoglobin > 7  -Strict ins and outs  -Avoid nephrotoxic agents if possible and renally dose medications  -Avoid drastic hemodynamic changes if possible        GI  Cirrhosis  -        Thank you for your consult. I will follow-up with patient. Please contact us if you have any additional questions.    Chip Chung MD  Nephrology  [facility]

## 2024-01-16 NOTE — PROGRESS NOTES
[facility]  Infectious Disease  Progress Note    Patient Name: Blu Deleon  MRN: [id number]  Admission Date: 1/2/2024  Length of Stay: 13 days  Attending Physician: Rik Gomes MD  Primary Care Provider: No, Primary Doctor    Isolation Status: No active isolations  Assessment/Plan:      Oncology  * Leukocytosis  58 year old female with history of ETOH use, cirrhosis, and depression who [place] [facility] [place] with acute upper GIB, hypotension 2/2 to hemorrhagic blood loss and acute blood loss anemia, lactic acidosis, s/p PRBC transfusion,  EGD with esophageal ulcers, s/p brief course of ceftriaxone.     Patient had new rigors and chills, which prompted workup, blood cultures from 1/12 with NG, both paracentesis fluid studies form 1/7 and /112 with cell counts not consistent with infectious etiology, CTAP non contrast without any identifiable infectious foci, US with gall bladder sludge, no localizing symptoms, patient started on Vancomycin, cefepime and Micafungin , new leukocytosis and symptom appeared to improve post antibiotic initiation. Vanc stopped 1/15.    Recommendations    --ok to continue cefepime to complete a 7 day course. Can transition to po cipro on discharge if QTc <500.  --switch wes to fluconazole 200mg daily (renally dosed) to complete a 7 day course.  --if febrile please repeat BCx    ID will sign-off for now.      GI  Acute upper GI bleed  See as above/below      CODY  --renally dose antibiotics    Anticipated Disposition: tbd    Thank you for your consult. I will sign off. Please contact us if you have any additional questions.    Uzma Kim MD  Infectious Disease  [facility]    Subjective:     Principal Problem:Leukocytosis    HPI: 58 year old female with history of ETOH use, cirrhosis, and depression who [place] [facility] [place] with acute upper GIB, hypotension 2/2 to hemorrhagic blood loss and acute blood loss anemia, lactic acidosis, s/p PRBC  transfusion,  afebrile throughout admission with no significant leukocytosis currently. EGD 1/3 showed esophageal ulcers, ceftriaxone was stopped after a brief course.      Hospital course notable for new leukocytosis on 1/12 after which patient was started on cefepime, and vancomycin. Pt underwent paracentesis 1/07 and 1/12, cell counts not c/w infection. Micafungin initiated 1/14, no significant fever or hemodynamic decompensation. Patient was interviewed while his family was present in the room. He states aroun Friday he started noticing an episode of chills and rigors, which his family states was more than just one episode and add that these symptoms also accompanied confusion.  Patient states that prior hospitalization he was not having any fevers, rigors, chills, diarrhea, dysuria, productive cough, exposure to sick contacts.  Patient reports he has known about ongoing serous workup for several months at this point but does acknowledge this is the 1st admission for decompensated sequelae, denies prior diagnosis of SBP or prior paracentesis, prior upper GI bleed. 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 Denies pets and recent travel.     CTAP 1/13 non contrast without acute pathology, US abdomen 1/12 with biliary sludge and gall bladder wall thickening, non specific. CXR without new consolidation faint b/l opacities. Bcx and ascites cx 1/12 with NG. ID consulted for 58 year old Male with newly diagnosed cirrhosis. Not a transplant candidate. Admitted initially for esophageal ulcer bleed. Had rigors/chills 2 days ago and started on Abx. Ascitic fluid w/o SBP. Gay scanned without e/o infection. Starting Micafungin      Interval History: Remained afebrile overnight. No new complaints today. Feeling well.     Review of Systems   Constitutional:  Negative for chills and fever.   HENT:  Negative for trouble swallowing.    Respiratory:  Negative for cough and shortness of breath.     Gastrointestinal:  Negative for abdominal pain, blood in stool, diarrhea and vomiting.   Genitourinary:  Negative for dysuria and hematuria.   Musculoskeletal:  Negative for arthralgias, joint swelling and neck stiffness.   Skin:  Positive for color change.     Objective:     Vital Signs (Most Recent):  Temp: 97.6 °F (36.4 °C) (01/16/24 0754)  Pulse: 94 (01/16/24 1046)  Resp: 16 (01/16/24 0754)  BP: (!) 141/61 (01/16/24 0754)  SpO2: 96 % (01/16/24 0754) Vital Signs (24h Range):  Temp:  [97.5 °F (36.4 °C)-98.5 °F (36.9 °C)] 97.6 °F (36.4 °C)  Pulse:  [70-94] 94  Resp:  [16-20] 16  SpO2:  [93 %-100 %] 96 %  BP: (123-141)/(58-62) 141/61     Weight: 97.6 kg (215 lb 2.7 oz)  Body mass index is 32.72 kg/m².    Estimated Creatinine Clearance: 36.9 mL/min (A) (based on SCr of 2.5 mg/dL (H)).     Physical Exam  Constitutional:       Appearance: Normal appearance.   HENT:      Head: Normocephalic and atraumatic.      Nose: Nose normal.      Mouth/Throat:      Mouth: Mucous membranes are moist.      Pharynx: Oropharynx is clear.   Eyes:      General: Scleral icterus present.      Conjunctiva/sclera: Conjunctivae normal.   Cardiovascular:      Rate and Rhythm: Normal rate and regular rhythm.      Pulses: Normal pulses.      Heart sounds: Normal heart sounds.   Pulmonary:      Effort: Pulmonary effort is normal.      Breath sounds: Normal breath sounds.   Abdominal:      General: Bowel sounds are normal. There is distension.      Palpations: Abdomen is soft.      Tenderness: There is no abdominal tenderness. There is no guarding or rebound.   Musculoskeletal:         General: No deformity.      Cervical back: Neck supple.   Skin:     General: Skin is warm and dry.      Coloration: Skin is jaundiced.      Findings: No rash.      Comments: Bruising on R wrist from phlebotomy   Neurological:      Mental Status: He is alert and oriented to person, place, and time. Mental status is at baseline.          Significant Labs: Blood  Culture:   Recent Labs   Lab 01/09/24  1056 01/12/24  1205 01/12/24  1207   LABBLOO No growth after 5 days.  No growth after 5 days. No Growth to date  No Growth to date  No Growth to date  No Growth to date No Growth to date  No Growth to date  No Growth to date  No Growth to date     All pertinent labs within the past 24 hours have been reviewed.    Significant Imaging: I have reviewed all pertinent imaging results/findings within the past 24 hours.

## 2024-01-16 NOTE — ASSESSMENT & PLAN NOTE
Patient with new onset chills/rigors 1/12. Now with leukocytosis, remains afebrile and normotensive (previously normotensive/hypertensive).  Infectious work up:  - CXR obtained without any signs of pneumonia however possible small amount of edema vs viral component. Covid/Influenza negative.   - UA non-infectious.   - He was initially started on CTX for concern for possible SBP.    - Paracentesis performed with IR on 1/12 with 4.2L out. Ascitic labs with no evidence of SBP, and ascitic cultures NGTD.   - Blood cultures 1/12 NGTD  - Had some RUQ discomfort 1/12 and RUQ US showed gallbladder wall thickening with possible signs of cholecystitis. No abdominal pain today and negative Sen's sign  - Broaded from CTX to Vanc/cefe with low threshold to start micafungin per hepatology  - CT-CAP without evidence of infection, no concern for cholecystitis    - Had another episode of rigors and temp renetta to 100.0 on 1/14. Remains HDS.  - Started on Micafungin  - Fungitell pending  - ID consulted and recommending DC vanc, continue cefepime x7 days and switching micafungin to fluconazole for 7 day treatment

## 2024-01-16 NOTE — PROGRESS NOTES
Hepatology Treatment Plan    Blu Deleon is a 58 y.o. male admitted to hospital 1/2/2024 (Hospital Day: 15) due to Leukocytosis.     Interval History  Patient seen this morning.  His mental status was at baseline and he was oriented x3.  Able to ambulate to the bathroom without assistance.  He reports feeling fine and denies any significant symptoms.    Son present at bedside.  Wife reported being in contact with insurance company to arrange flight back to [place].    Having 4-5 bowel movements daily on lactulose    MELD 40      Objective  Temp:  [97.5 °F (36.4 °C)-98.5 °F (36.9 °C)] 97.6 °F (36.4 °C) (01/16 0754)  Pulse:  [70-94] 94 (01/16 1046)  BP: (123-141)/(58-62) 141/61 (01/16 0754)  Resp:  [16-20] 16 (01/16 0754)  SpO2:  [93 %-100 %] 96 % (01/16 0754)    Physical Exam:  Constitutional:  awake, alert, NAD. Ill appearing but non toxic.   HENT: Head: Normal, CPAP in place  Eyes:  scleral icterus +nt  Respiratory: normal chest expansion & respiratory effort and no accessory muscle use  GI: soft, non-tender, without masses or organomegaly  Skin:  jaundiced  Neurological: oriented x3      Laboratory    Recent Labs   Lab 01/16/24 0432   WBC 6.42   RBC 2.34*   HGB 7.9*   HCT 24.1*   PLT 52*   *   MCH 33.8*   MCHC 32.8      Recent Labs   Lab 01/16/24 0432   CALCIUM 8.3*   ALBUMIN 2.5*   PROT 4.9*   *   K 3.8   CO2 20*      BUN 52*   CREATININE 2.5*   ALKPHOS 146*   ALT 60*   *   BILITOT 20.6*      Recent Labs   Lab 01/16/24 0432   INR 2.7*        MELD 3.0: 40 at 1/16/2024  4:32 AM  MELD-Na: 38 at 1/16/2024  4:32 AM  Calculated from:  Serum Creatinine: 2.5 mg/dL at 1/16/2024  4:32 AM  Serum Sodium: 131 mmol/L at 1/16/2024  4:32 AM  Total Bilirubin: 20.6 mg/dL at 1/16/2024  4:32 AM  Serum Albumin: 2.5 g/dL at 1/16/2024  4:32 AM  INR(ratio): 2.7 at 1/16/2024  4:32 AM  Age at listing (hypothetical): 58 years  Sex: Male at 1/16/2024  4:32 AM     JERI negative  ASMA 1:40  IgG wnl  AMA  "negative  Viral hepatitis panel negative  Ceruloplasmin negative  Iron studies negative    Assessment and Plan    Blu Deleon is a 58 y.o. male with history of alcohol use disorder, HTN, and compensated EtOH cirrhosis who was admitted to [facility] on  with melena.   After resuscitation, EGD on 1/3/24 showed esophageal ulcers, erythematous mucosa in the antrum, and normal duodenum.  Hepatology now consulted for worsening bilirubin.     He hails from [place], visiting from the [facility]. The patient has known about his history of "early cirrhosis" since at least  when liver ultrasound confirmed cirrhosis.  He has continued to drink in spite of knowledge of liver disease and being told to stop; has consumed alcohol regularly since the age of 21.  Last drink 24. No illicit drug use.  Sister  of EtOH cirrhosis. PETH positive, 98.    Case was discussed in committee  and deemed not to be a candidate for liver transplant evaluation due to ongoing alcohol use despite knowledge of liver disease       Problem List:  Newly decompensated EtOH cirrhosis  EtOH use disorder  Esophageal ulcers  Leukocytosis  Hepatic encephalopathy        Recommendations:  - We are not going to proceed with liver tx eval at this time since patient was aware of cirrhosis but persisted drinking. Discussed in transplant committee.  - Now accepted to [facility] in [place] to be closer to home. Concerned with very high mortality based on current trend in liver function.  - Follow infectious workup: noted CXR shows possible viral process. Blood cultures NGTD  - await results of Fungitell testing.  In the meanwhile, we will continue cefepime and fluconazole per ID.   - Finished IV vitamin K x 3 days (second course)  - Stopped baclofen. Avoid sedating medications  - Titrate lactulose to 3-4 BMs per day  - Continue rifaximin 550 mg BID  - Nephrology following for CODY, appreciate recs  - low sodium, high protein " diet  - Continue Protonix 40mg twice per day for 8 weeks total. Repeat upper endoscopy in 8 weeks to check healing of his esophageal ulcers. He would like to have this done in [facility].   - Daily CBC, CMP, INR.       - We will continue to follow.    Thank you for involving us in the care of Blu Deleon. Please call with any additional questions, concerns or changes in the patient's clinical status. Plan of care discussed with attending Dr. He.    Lyle Thomas MD  Gastroenterology and Hepatology Fellow, PGY V

## 2024-01-16 NOTE — ASSESSMENT & PLAN NOTE
Etiology likely 2/2 cirrhosis  - Monitor w/ daily CBC and transfuse if Plt <10 or <50 w/ signs of active bleeding   Recent Labs   Lab 01/16/24  0432   PLT 52*

## 2024-01-16 NOTE — PLAN OF CARE
GUNJAN in communication with Jay from [employer] [phone] regarding requested clinicals and pending IP auth for [facility].  Clinicals received.  Usually takes up to 10 days to process insurance auth request.  GUNJAN request urgent process of insurance auth as air flight pending IP auth for accepting facility.  Jay placed urgent request to [facility] Nurse-Madison for expedited review.      Pt's wife updated on the following information. GUNJAN will continue to follow.       2:12 PM  IP insurance auth received for [facility].  Per Madison hartman/ [facility] [phone], insurance auth faxed to [facility] and [facility].      Mirian Wells LMSW  Part-Time-  [facility]  Ext. [phone]

## 2024-01-16 NOTE — SUBJECTIVE & OBJECTIVE
Interval History: no acute events overnight, however sCr worsening. UA with ++ bacteria that was not present before.     Review of patient's allergies indicates:  No Known Allergies  Current Facility-Administered Medications   Medication Frequency    0.9%  NaCl infusion (for blood administration) Q24H PRN    ceFEPIme (MAXIPIME) 2 g in dextrose 5 % in water (D5W) 100 mL IVPB (MB+) Q12H    EScitalopram oxalate tablet 10 mg Daily    folic acid tablet 1 mg Daily    hydrALAZINE injection 10 mg Q6H PRN    lactulose 20 gram/30 mL solution Soln 20 g TID    micafungin 100 mg in sodium chloride 0.9 % 100 mL IVPB (MB+) Q24H    multivitamin tablet Daily    pantoprazole EC tablet 40 mg BID    rifAXIMin tablet 550 mg BID    sodium chloride 0.9% flush 10 mL PRN    thiamine tablet 100 mg Daily       Objective:     Vital Signs (Most Recent):  Temp: 97.6 °F (36.4 °C) (01/16/24 0754)  Pulse: 84 (01/16/24 0900)  Resp: 16 (01/16/24 0754)  BP: (!) 141/61 (01/16/24 0754)  SpO2: 96 % (01/16/24 0754) Vital Signs (24h Range):  Temp:  [97.5 °F (36.4 °C)-98.5 °F (36.9 °C)] 97.6 °F (36.4 °C)  Pulse:  [70-84] 84  Resp:  [16-20] 16  SpO2:  [93 %-100 %] 96 %  BP: (123-141)/(58-62) 141/61     Weight: 97.6 kg (215 lb 2.7 oz) (01/03/24 0855)  Body mass index is 32.72 kg/m².  Body surface area is 2.16 meters squared.    I/O last 3 completed shifts:  In: 600 [P.O.:600]  Out: 300 [Urine:300]     Physical Exam  Constitutional:       General: He is not in acute distress.     Appearance: He is obese. He is ill-appearing. He is not toxic-appearing.   HENT:      Head: Normocephalic and atraumatic.      Nose: Nose normal.      Mouth/Throat:      Mouth: Mucous membranes are moist.   Eyes:      General: Scleral icterus present.   Cardiovascular:      Rate and Rhythm: Normal rate.      Heart sounds: No murmur heard.  Pulmonary:      Effort: Pulmonary effort is normal. No respiratory distress.      Breath sounds: No wheezing or rales.   Abdominal:      General:  Abdomen is flat. There is distension.   Musculoskeletal:      Cervical back: Normal range of motion. No rigidity.      Right lower leg: Edema present.      Left lower leg: Edema present.   Lymphadenopathy:      Cervical: No cervical adenopathy.   Skin:     General: Skin is warm.      Coloration: Skin is jaundiced.      Findings: Bruising present. No lesion.   Neurological:      Mental Status: He is alert.          Significant Labs:  All labs within the past 24 hours have been reviewed.     Significant Imaging:  Labs: Reviewed

## 2024-01-16 NOTE — ASSESSMENT & PLAN NOTE
- Cr 1.6 at admission, unclear baseline  - Initially likely pre-renal in setting of GIB.  - Now with slow elevation of creatinine  - Hepatology recommending albumin 50g BID for 48 hours  - S/p 48 hours albumin  - Cr 1.6 -> 2.0 -> 2.2  - Nephrology consulted, urine studies sent, uric acid ordered   - 1500ml fluid restriction   - May need HRS treatment, though BP remains elevated  - Renally dosing all medications  - Avoiding nephrotoxins  - Will continue to monitor on daily labs

## 2024-01-16 NOTE — ASSESSMENT & PLAN NOTE
58 year old female with history of ETOH use, cirrhosis, and depression who presented to [facility] 1/2/24 with acute upper GIB, hypotension 2/2 to hemorrhagic blood loss and acute blood loss anemia, lactic acidosis, s/p PRBC transfusion,  EGD with esophageal ulcers, s/p brief course of ceftriaxone.     Patient had new rigors and chills, which prompted workup, blood cultures from 1/12 with NG, both paracentesis fluid studies form 1/7 and /112 with cell counts not consistent with infectious etiology, CTAP non contrast without any identifiable infectious foci, US with gall bladder sludge, no localizing symptoms, patient started on Vancomycin, cefepime and Micafungin , new leukocytosis and symptom appeared to improve post antibiotic initiation. Vanc stopped 1/15.    Recommendations    --ok to continue cefepime to complete a 7 day course. Can transition to po cipro on discharge if QTc <500.  --switch wes to fluconazole 200mg daily (renally dosed) to complete a 7 day course.    ID will sign-off for now.

## 2024-01-16 NOTE — PLAN OF CARE
Problem: Adult Inpatient Plan of Care  Goal: Plan of Care Review  Outcome: Ongoing, Progressing  Flowsheets (Taken 1/16/2024 0643)  Plan of Care Reviewed With:   patient   spouse   son  Goal: Patient-Specific Goal (Individualized)  Outcome: Ongoing, Progressing  Flowsheets (Taken 1/16/2024 0643)  Anxieties, Fears or Concerns: worsening of the liver  Individualized Care Needs: telemetry monitoring, fall prevention, IV antibiotics, CPAP at night, fluid restrictions  Goal: Absence of Hospital-Acquired Illness or Injury  Outcome: Ongoing, Progressing  Intervention: Identify and Manage Fall Risk  Flowsheets (Taken 1/16/2024 0643)  Safety Promotion/Fall Prevention:   assistive device/personal item within reach   bed alarm set   lighting adjusted   medications reviewed   instructed to call staff for mobility  Intervention: Prevent Skin Injury  Flowsheets (Taken 1/16/2024 0643)  Body Position: position changed independently  Skin Protection:   adhesive use limited   drying agents applied  Intervention: Prevent and Manage VTE (Venous Thromboembolism) Risk  Flowsheets (Taken 1/16/2024 0643)  Activity Management:   Arm raise - L1   Rolling - L1  VTE Prevention/Management:   bleeding precations maintained   bleeding risk assessed  Range of Motion: active ROM (range of motion) encouraged  Intervention: Prevent Infection  Flowsheets (Taken 1/16/2024 0643)  Infection Prevention:   environmental surveillance performed   rest/sleep promoted  Goal: Optimal Comfort and Wellbeing  Outcome: Ongoing, Progressing  Intervention: Monitor Pain and Promote Comfort  Flowsheets (Taken 1/16/2024 0643)  Pain Management Interventions:   care clustered   quiet environment facilitated   relaxation techniques promoted     Problem: Fluid and Electrolyte Imbalance (Acute Kidney Injury/Impairment)  Goal: Fluid and Electrolyte Balance  Outcome: Ongoing, Progressing  Intervention: Monitor and Manage Fluid and Electrolyte Balance  Flowsheets (Taken  1/16/2024 0643)  Fluid/Electrolyte Management: fluids restricted     Problem: Oral Intake Inadequate (Acute Kidney Injury/Impairment)  Goal: Optimal Nutrition Intake  Outcome: Ongoing, Progressing  Intervention: Promote and Optimize Nutrition  Flowsheets (Taken 1/16/2024 0643)  Oral Nutrition Promotion:   rest periods promoted   physical activity promoted     Problem: Renal Function Impairment (Acute Kidney Injury/Impairment)  Goal: Effective Renal Function  Outcome: Ongoing, Progressing  Intervention: Monitor and Support Renal Function  Flowsheets (Taken 1/16/2024 0643)  Medication Review/Management: medications reviewed     Problem: Fall Injury Risk  Goal: Absence of Fall and Fall-Related Injury  Outcome: Ongoing, Progressing  Intervention: Identify and Manage Contributors  Flowsheets (Taken 1/16/2024 0643)  Self-Care Promotion:   independence encouraged   BADL personal objects within reach  Medication Review/Management: medications reviewed  Intervention: Promote Injury-Free Environment  Flowsheets (Taken 1/16/2024 0643)  Safety Promotion/Fall Prevention:   assistive device/personal item within reach   bed alarm set   lighting adjusted   medications reviewed   instructed to call staff for mobility     Problem: Electrolyte Imbalance  Goal: Electrolyte Balance  Outcome: Ongoing, Progressing  Intervention: Monitor and Manage Electrolyte Imbalance  Flowsheets (Taken 1/16/2024 0643)  Fluid/Electrolyte Management: fluids restricted     Problem: Fluid and Electrolyte Imbalance (Liver Failure)  Goal: Fluid and Electrolyte Balance  Outcome: Ongoing, Progressing  Intervention: Monitor and Manage Fluid and Electrolyte Balance  Flowsheets (Taken 1/16/2024 0643)  Fluid/Electrolyte Management: fluids restricted     Problem: Gastrointestinal Complications (Liver Failure)  Goal: Optimal Gastrointestinal Function  Outcome: Ongoing, Progressing     Problem: Infection (Liver Failure)  Goal: Absence of Infection Signs and  Symptoms  Outcome: Ongoing, Progressing  Intervention: Prevent or Manage Infection  Flowsheets (Taken 1/16/2024 0643)  Fever Reduction/Comfort Measures:   lightweight bedding   lightweight clothing  Infection Management: aseptic technique maintained     Problem: Neurologic Function Impaired (Liver Failure)  Goal: Optimal Neurologic Function  Outcome: Ongoing, Progressing  Intervention: Monitor and Optimize Neurologic Status  Flowsheets (Taken 1/16/2024 0643)  Seizure Precautions:   activity supervised   clutter-free environment maintained  Sensory Stimulation Regulation:   quiet environment promoted   care clustered     Problem: Oral Intake Inadequate (Liver Failure)  Goal: Improved Oral Intake  Outcome: Ongoing, Progressing  Intervention: Promote and Optimize Nutrition  Flowsheets (Taken 1/16/2024 0643)  Oral Nutrition Promotion:   rest periods promoted   physical activity promoted  Environmental Support: rest periods encouraged     Problem: Pain (Liver Failure)  Goal: Optimal Pain Control  Outcome: Ongoing, Progressing  Intervention: Prevent or Manage Pain  Flowsheets (Taken 1/16/2024 0643)  Sleep/Rest Enhancement: room darkened  Pain Management Interventions:   care clustered   quiet environment facilitated   relaxation techniques promoted     Problem: Renal Dysfunction (Liver Failure)  Goal: Optimize Renal Function  Outcome: Ongoing, Progressing     Problem: Respiratory Compromise (Liver Failure)  Goal: Effective Oxygenation and Ventilation  Outcome: Ongoing, Progressing  Intervention: Promote Airway Secretion Clearance  Flowsheets (Taken 1/16/2024 0643)  Cough And Deep Breathing: done independently per patient  Activity Management:   Arm raise - L1   Rolling - L1  Intervention: Optimize Oxygenation and Ventilation  Flowsheets (Taken 1/16/2024 0643)  Airway/Ventilation Management: airway patency maintained  Head of Bed (HOB) Positioning: HOB elevated     Problem: Adult Inpatient Plan of Care  Goal: Readiness for  Transition of Care  Outcome: Ongoing, Not Progressing     Problem: Adjustment to Illness (Liver Failure)  Goal: Optimal Coping with Liver Failure  Outcome: Ongoing, Not Progressing       No significant changes on our shift.

## 2024-01-17 LAB
1,3 BETA GLUCAN SER-MCNC: <31 PG/ML
ALBUMIN SERPL BCP-MCNC: 2.4 G/DL (ref 3.5–5.2)
ALBUMIN SERPL ELPH-MCNC: 2.93 G/DL (ref 3.35–5.55)
ALP SERPL-CCNC: 164 U/L (ref 55–135)
ALPHA1 GLOB SERPL ELPH-MCNC: 0.14 G/DL (ref 0.17–0.41)
ALPHA2 GLOB SERPL ELPH-MCNC: 0.26 G/DL (ref 0.43–0.99)
ALT SERPL W/O P-5'-P-CCNC: 61 U/L (ref 10–44)
ANION GAP SERPL CALC-SCNC: 9 MMOL/L (ref 8–16)
AST SERPL-CCNC: 127 U/L (ref 10–40)
B-GLOBULIN SERPL ELPH-MCNC: 0.77 G/DL (ref 0.5–1.1)
BACTERIA BLD CULT: NORMAL
BACTERIA BLD CULT: NORMAL
BASOPHILS # BLD AUTO: 0.05 K/UL (ref 0–0.2)
BASOPHILS NFR BLD: 0.7 % (ref 0–1.9)
BILIRUB SERPL-MCNC: 23 MG/DL (ref 0.1–1)
BUN SERPL-MCNC: 55 MG/DL (ref 6–20)
CALCIUM SERPL-MCNC: 8.6 MG/DL (ref 8.7–10.5)
CHLORIDE SERPL-SCNC: 104 MMOL/L (ref 95–110)
CO2 SERPL-SCNC: 19 MMOL/L (ref 23–29)
CREAT SERPL-MCNC: 3 MG/DL (ref 0.5–1.4)
DIFFERENTIAL METHOD BLD: ABNORMAL
EOSINOPHIL # BLD AUTO: 0.5 K/UL (ref 0–0.5)
EOSINOPHIL NFR BLD: 6.2 % (ref 0–8)
ERYTHROCYTE [DISTWIDTH] IN BLOOD BY AUTOMATED COUNT: 22.8 % (ref 11.5–14.5)
EST. GFR  (NO RACE VARIABLE): 23.5 ML/MIN/1.73 M^2
FUNGITELL COMMENTS: NEGATIVE
GAMMA GLOB SERPL ELPH-MCNC: 1.29 G/DL (ref 0.67–1.58)
GLUCOSE SERPL-MCNC: 97 MG/DL (ref 70–110)
HCT VFR BLD AUTO: 23.9 % (ref 40–54)
HGB BLD-MCNC: 8 G/DL (ref 14–18)
IGA SERPL-MCNC: 767 MG/DL (ref 40–350)
IMM GRANULOCYTES # BLD AUTO: 0.18 K/UL (ref 0–0.04)
IMM GRANULOCYTES NFR BLD AUTO: 2.4 % (ref 0–0.5)
INR PPP: 2.7 (ref 0.8–1.2)
INTERPRETATION SERPL IFE-IMP: NORMAL
KAPPA LC SER QL IA: 16.21 MG/DL (ref 0.33–1.94)
KAPPA LC/LAMBDA SER IA: 1.21 (ref 0.26–1.65)
LAMBDA LC SER QL IA: 13.45 MG/DL (ref 0.57–2.63)
LYMPHOCYTES # BLD AUTO: 1.1 K/UL (ref 1–4.8)
LYMPHOCYTES NFR BLD: 14.7 % (ref 18–48)
MAGNESIUM SERPL-MCNC: 2.3 MG/DL (ref 1.6–2.6)
MCH RBC QN AUTO: 33.9 PG (ref 27–31)
MCHC RBC AUTO-ENTMCNC: 33.5 G/DL (ref 32–36)
MCV RBC AUTO: 101 FL (ref 82–98)
MONOCYTES # BLD AUTO: 1.3 K/UL (ref 0.3–1)
MONOCYTES NFR BLD: 17.4 % (ref 4–15)
NEUTROPHILS # BLD AUTO: 4.3 K/UL (ref 1.8–7.7)
NEUTROPHILS NFR BLD: 58.6 % (ref 38–73)
NRBC BLD-RTO: 0 /100 WBC
PHOSPHATE SERPL-MCNC: 3.8 MG/DL (ref 2.7–4.5)
PLATELET # BLD AUTO: 40 K/UL (ref 150–450)
PMV BLD AUTO: 12.6 FL (ref 9.2–12.9)
POTASSIUM SERPL-SCNC: 4.1 MMOL/L (ref 3.5–5.1)
PROT SERPL-MCNC: 5.1 G/DL (ref 6–8.4)
PROT SERPL-MCNC: 5.4 G/DL (ref 6–8.4)
PROTHROMBIN TIME: 27.6 SEC (ref 9–12.5)
RBC # BLD AUTO: 2.36 M/UL (ref 4.6–6.2)
SODIUM SERPL-SCNC: 132 MMOL/L (ref 136–145)
WBC # BLD AUTO: 7.37 K/UL (ref 3.9–12.7)

## 2024-01-17 PROCEDURE — 25000003 PHARM REV CODE 250: Performed by: STUDENT IN AN ORGANIZED HEALTH CARE EDUCATION/TRAINING PROGRAM

## 2024-01-17 PROCEDURE — 85025 COMPLETE CBC W/AUTO DIFF WBC: CPT | Performed by: INTERNAL MEDICINE

## 2024-01-17 PROCEDURE — P9047 ALBUMIN (HUMAN), 25%, 50ML: HCPCS | Mod: JZ,JG | Performed by: INTERNAL MEDICINE

## 2024-01-17 PROCEDURE — 36415 COLL VENOUS BLD VENIPUNCTURE: CPT | Performed by: STUDENT IN AN ORGANIZED HEALTH CARE EDUCATION/TRAINING PROGRAM

## 2024-01-17 PROCEDURE — 25000003 PHARM REV CODE 250: Performed by: INTERNAL MEDICINE

## 2024-01-17 PROCEDURE — 0W9G3ZZ DRAINAGE OF PERITONEAL CAVITY, PERCUTANEOUS APPROACH: ICD-10-PCS | Performed by: STUDENT IN AN ORGANIZED HEALTH CARE EDUCATION/TRAINING PROGRAM

## 2024-01-17 PROCEDURE — 99233 SBSQ HOSP IP/OBS HIGH 50: CPT | Mod: ,,, | Performed by: HOSPITALIST

## 2024-01-17 PROCEDURE — 85610 PROTHROMBIN TIME: CPT | Performed by: STUDENT IN AN ORGANIZED HEALTH CARE EDUCATION/TRAINING PROGRAM

## 2024-01-17 PROCEDURE — 36415 COLL VENOUS BLD VENIPUNCTURE: CPT | Mod: XB | Performed by: STUDENT IN AN ORGANIZED HEALTH CARE EDUCATION/TRAINING PROGRAM

## 2024-01-17 PROCEDURE — 94761 N-INVAS EAR/PLS OXIMETRY MLT: CPT

## 2024-01-17 PROCEDURE — 84100 ASSAY OF PHOSPHORUS: CPT | Performed by: NURSE PRACTITIONER

## 2024-01-17 PROCEDURE — 80053 COMPREHEN METABOLIC PANEL: CPT | Performed by: NURSE PRACTITIONER

## 2024-01-17 PROCEDURE — 83735 ASSAY OF MAGNESIUM: CPT | Performed by: NURSE PRACTITIONER

## 2024-01-17 PROCEDURE — 63600175 PHARM REV CODE 636 W HCPCS: Performed by: INTERNAL MEDICINE

## 2024-01-17 PROCEDURE — 25000003 PHARM REV CODE 250

## 2024-01-17 PROCEDURE — 82784 ASSAY IGA/IGD/IGG/IGM EACH: CPT | Performed by: STUDENT IN AN ORGANIZED HEALTH CARE EDUCATION/TRAINING PROGRAM

## 2024-01-17 PROCEDURE — 21400001 HC TELEMETRY ROOM

## 2024-01-17 PROCEDURE — 25000003 PHARM REV CODE 250: Performed by: NURSE PRACTITIONER

## 2024-01-17 PROCEDURE — 99900035 HC TECH TIME PER 15 MIN (STAT)

## 2024-01-17 RX ORDER — ALBUMIN HUMAN 250 G/1000ML
SOLUTION INTRAVENOUS
Status: COMPLETED
Start: 2024-01-17 | End: 2024-01-19

## 2024-01-17 RX ORDER — ALBUMIN HUMAN 250 G/1000ML
SOLUTION INTRAVENOUS
Status: COMPLETED | OUTPATIENT
Start: 2024-01-17 | End: 2024-01-17

## 2024-01-17 RX ORDER — LIDOCAINE HYDROCHLORIDE 10 MG/ML
INJECTION INFILTRATION; PERINEURAL
Status: COMPLETED | OUTPATIENT
Start: 2024-01-17 | End: 2024-01-17

## 2024-01-17 RX ADMIN — ESCITALOPRAM OXALATE 10 MG: 10 TABLET ORAL at 08:01

## 2024-01-17 RX ADMIN — RIFAXIMIN 550 MG: 550 TABLET ORAL at 08:01

## 2024-01-17 RX ADMIN — FOLIC ACID 1 MG: 1 TABLET ORAL at 08:01

## 2024-01-17 RX ADMIN — ALBUMIN (HUMAN) 25 G: 12.5 SOLUTION INTRAVENOUS at 02:01

## 2024-01-17 RX ADMIN — LACTULOSE 20 G: 20 SOLUTION ORAL at 04:01

## 2024-01-17 RX ADMIN — PANTOPRAZOLE SODIUM 40 MG: 40 TABLET, DELAYED RELEASE ORAL at 08:01

## 2024-01-17 RX ADMIN — FLUCONAZOLE 200 MG: 200 TABLET ORAL at 09:01

## 2024-01-17 RX ADMIN — CEFEPIME 2 G: 2 INJECTION, POWDER, FOR SOLUTION INTRAVENOUS at 10:01

## 2024-01-17 RX ADMIN — LACTULOSE 20 G: 20 SOLUTION ORAL at 10:01

## 2024-01-17 RX ADMIN — LIDOCAINE HYDROCHLORIDE 10 ML: 10 INJECTION, SOLUTION INFILTRATION; PERINEURAL at 01:01

## 2024-01-17 RX ADMIN — PANTOPRAZOLE SODIUM 40 MG: 40 TABLET, DELAYED RELEASE ORAL at 10:01

## 2024-01-17 RX ADMIN — CEFEPIME 2 G: 2 INJECTION, POWDER, FOR SOLUTION INTRAVENOUS at 09:01

## 2024-01-17 RX ADMIN — LACTULOSE 20 G: 20 SOLUTION ORAL at 08:01

## 2024-01-17 RX ADMIN — THERA TABS 1 TABLET: TAB at 08:01

## 2024-01-17 RX ADMIN — Medication 100 MG: at 08:01

## 2024-01-17 RX ADMIN — RIFAXIMIN 550 MG: 550 TABLET ORAL at 10:01

## 2024-01-17 NOTE — PLAN OF CARE
Problem: Adult Inpatient Plan of Care  Goal: Plan of Care Review  Outcome: Ongoing, Progressing  Flowsheets (Taken 1/17/2024 0555)  Plan of Care Reviewed With:   patient   spouse   son  Goal: Patient-Specific Goal (Individualized)  Outcome: Ongoing, Progressing  Flowsheets (Taken 1/17/2024 0555)  Anxieties, Fears or Concerns: liver condition  Individualized Care Needs: telemetry monitoring, fall prevention, IV antibiotics, CPAP at night, fluid restrictions  Goal: Absence of Hospital-Acquired Illness or Injury  Outcome: Ongoing, Progressing  Intervention: Identify and Manage Fall Risk  Flowsheets (Taken 1/17/2024 0555)  Safety Promotion/Fall Prevention:   assistive device/personal item within reach   bed alarm refused   lighting adjusted   medications reviewed   instructed to call staff for mobility  Intervention: Prevent Skin Injury  Flowsheets (Taken 1/17/2024 0555)  Body Position: position changed independently  Skin Protection:   adhesive use limited   drying agents applied  Intervention: Prevent and Manage VTE (Venous Thromboembolism) Risk  Flowsheets (Taken 1/17/2024 0555)  Activity Management:   Arm raise - L1   Rolling - L1  VTE Prevention/Management:   bleeding risk assessed   bleeding precations maintained  Range of Motion: active ROM (range of motion) encouraged  Intervention: Prevent Infection  Flowsheets (Taken 1/17/2024 0555)  Infection Prevention:   environmental surveillance performed   hand hygiene promoted   rest/sleep promoted  Goal: Optimal Comfort and Wellbeing  Outcome: Ongoing, Progressing  Intervention: Monitor Pain and Promote Comfort  Flowsheets (Taken 1/17/2024 0555)  Pain Management Interventions:   care clustered   quiet environment facilitated   relaxation techniques promoted     Problem: Oral Intake Inadequate (Acute Kidney Injury/Impairment)  Goal: Optimal Nutrition Intake  Outcome: Ongoing, Progressing     Problem: Fall Injury Risk  Goal: Absence of Fall and Fall-Related  Injury  Outcome: Ongoing, Progressing  Intervention: Identify and Manage Contributors  Flowsheets (Taken 1/17/2024 0555)  Self-Care Promotion: independence encouraged  Medication Review/Management: medications reviewed  Intervention: Promote Injury-Free Environment  Flowsheets (Taken 1/17/2024 0555)  Safety Promotion/Fall Prevention:   assistive device/personal item within reach   bed alarm refused   lighting adjusted   medications reviewed   instructed to call staff for mobility     Problem: Gastrointestinal Complications (Liver Failure)  Goal: Optimal Gastrointestinal Function  Outcome: Ongoing, Progressing  Intervention: Monitor and Support Gastrointestinal Function  Flowsheets (Taken 1/17/2024 0555)  Body Position: position changed independently     Problem: Pain (Liver Failure)  Goal: Optimal Pain Control  Outcome: Ongoing, Progressing     Problem: Respiratory Compromise (Liver Failure)  Goal: Effective Oxygenation and Ventilation  Outcome: Ongoing, Progressing  Intervention: Promote Airway Secretion Clearance  Flowsheets (Taken 1/17/2024 0555)  Breathing Techniques/Airway Clearance: deep/controlled cough encouraged  Cough And Deep Breathing: done independently per patient  Activity Management:   Arm raise - L1   Rolling - L1     Problem: Adult Inpatient Plan of Care  Goal: Readiness for Transition of Care  Outcome: Ongoing, Not Progressing     Problem: Fluid and Electrolyte Imbalance (Acute Kidney Injury/Impairment)  Goal: Fluid and Electrolyte Balance  Outcome: Ongoing, Not Progressing     Problem: Adjustment to Illness (Liver Failure)  Goal: Optimal Coping with Liver Failure  Outcome: Ongoing, Not Progressing  Intervention: Support Response to Liver Disease  Flowsheets (Taken 1/17/2024 0555)  Supportive Measures:   active listening utilized   relaxation techniques promoted   verbalization of feelings encouraged  Family/Support System Care:   self-care encouraged   involvement promoted   presence  promoted    No significant changes on our shift.

## 2024-01-17 NOTE — PROGRESS NOTES
[facility] Medicine  Progress Note    Patient Name: Blu Deleon  MRN: [id number]  Patient Class: IP- Inpatient   Admission Date: 1/2/2024  Length of Stay: 14 days  Attending Physician: Rik Gomes MD  Primary Care Provider: Radha, Primary Doctor        Subjective:     Principal Problem:Leukocytosis        HPI:  Mr. Deleon is a 58 year old male with PMH notable for HTN, ETOH use, reported cirrhosis (has not seen a hepatologist), and depression who presented to [facility] ED with acute GI bleeding. In speaking with patient and wife at bedside, patient reports four days of nausea and vomiting with bright red blood and black tarry diarrhea. Additionally, he reports a fall yesterday after attempting to go to the restroom secondary to being lightheaded. He denies this happening previously. He was scheduled for an outpatient EGD, which has not been completed. He has not had a colonoscopy. He denies any anticoagulation or antiplatelet use.      In the emergency department he was found to be acutely anemic with a hgb of 5. He has not been hypotensive while in the emergency department, however, notably tachycardic with -140. Labs otherwise notable for WBC 13.56, Plt 107, INR 2.4, BUN 57, Cr 1.6, TB 5.4, , ALT 44. Initial lactate >12. ETOH level <10. Patient is s/p 3u pRBCs.    Admitted to MICU for UGIB.      Overview/Hospital Course:  Patient admitted for UGIB. GI consulted and EGD showed esophageal ulcers. Pt to continue PPI for 8 weeks with repeat scope to follow. Hgb stable s/p 3u PRBC and 1u FFP. HDS and tolerating clear liquid diet. CIWA ordered and no ativan required.  Patient was transferred to floors on 01/05.  Creatinine has remained stable at 1.4.  T bilirubin trending up.  Hepatology was consulted in the setting of decompensated liver cirrhosis.  Ultrasound right upper quadrant with Dopplers, PEth, AFP, serological workup, and acute hepatitis panel.  Patient underwent  paracentesis on 01/08 and ascitic fluid negative for SBP.  As patient is not a candidate for transplant and with his high MELD score he would require air transfer to [place] where he can go to the hospital close to his home. Accepted for transfer to [facility] in [place], awaiting logistics of flight transport.    Patient was doing well the morning of 1/12, however after a walk to the bathroom he started to have chills and rigors. He was started on CTX for concern for possible SBP. Repeat CBC with stable Hgb and WBC WNL. Paracentesis performed with IR on 1/12 with 4.2L out. Ascitic labs with no evidence of SBP, and ascitic cultures NGTD. Blood cultures 1/12 NGTD. Had some RUQ discomfort and RUQ US ordered with gallbladder wall thickening with possible signs of cholecystitis. CXR obtained without any signs of pneumonia however possible small amount of edema vs viral. Covid/Influenza negative.     On 1/13, WBC elevated, patient remains afebrile and normotensive. Abdominal exam benign, negative Sen's sign. UA non-infectious. Antibiotics broadened to Vanc/cefe with low threshold for micafungin per hepatology. CT-CAP without evidence of infection. Patient appeared much better morning of 1/14, however became somnolent and Broadened to Micafungin and ID consulted. Patient improved and ID consulted. Vanc discontinued and cultures/infectious work up remained inconclusive. ID recommending 7 days of cefepime and 7 days of fluconazole. Unfortunately, kidney function continues to worsen. UA/urine sediment as well as elevated/normotensive blood pressures not in line with HRS. Unclear if would need a biopsy and discussed with patient that if continued poor renal function, may need dialysis. S/p therapeutic LP on 1/17. At this time patients stable for transfer to [place] via flight. Flight tentatively scheduled for 1/22.    Interval History/Significant Events: Pt seen and examined this morning on rell WOODY. Patient  continues to report feeling well with no complaints. He reports small urine output. Creatinine continues to worsen.  Fungitell negative.      Objective:     Vital Signs (Most Recent):  Temp: 97.4 °F (36.3 °C) (01/17/24 0813)  Pulse: 81 (01/17/24 1331)  Resp: 16 (01/17/24 1331)  BP: 139/65 (01/17/24 1331)  SpO2: 99 % (01/17/24 1331) Vital Signs (24h Range):  Temp:  [97.4 °F (36.3 °C)-98.4 °F (36.9 °C)] 97.4 °F (36.3 °C)  Pulse:  [77-86] 81  Resp:  [16-18] 16  SpO2:  [94 %-100 %] 99 %  BP: (122-146)/(65-75) 139/65   Weight: 97.6 kg (215 lb 2.7 oz)  Body mass index is 32.72 kg/m².    No intake or output data in the 24 hours ending 01/17/24 1346         Physical Exam  Gen: in NAD, appears stated age, jaundiced  Neuro: AAOx4, CN2-12 grossly intact BL; motor, sensory, and strength grossly intact BL  HEENT: + icteric sclera. NTNC, EOMI, PERRLA, MMM; no thyromegaly or lymphadenopathy; no JVD appreciated  CVS: RRR, no m/r/g; S1/S2 auscultated with no S3 or S4; capillary refill < 2 sec  Resp: lungs CTAB, no w/r/r; no belabored breathing or accessory muscle use appreciated   Abd: + Abdominal distension. BS+ in all 4 quadrants; NTND, soft to palpation; no organomegaly appreciated   Extrem: pulses full, equal, and regular over all 4 extremities; no UE or LE edema BL         Vents:  Oxygen Concentration (%): 21 (01/12/24 0041)  Lines/Drains/Airways       Peripheral Intravenous Line  Duration                  Peripheral IV - Single Lumen 01/03/24 1045 18 G;1 1/4 in Anterior;Left Upper Arm 14 days         Peripheral IV - Single Lumen 01/03/24 1400 18 G;1 1/4 in Anterior;Right Upper Arm 13 days                  Significant Labs:    CBC/Anemia Profile:  Recent Labs   Lab 01/16/24 0432 01/17/24 0313   WBC 6.42 7.37   HGB 7.9* 8.0*   HCT 24.1* 23.9*   PLT 52* 40*   * 101*   RDW 23.5* 22.8*        Chemistries:  Recent Labs   Lab 01/16/24 0432 01/17/24 0314   * 132*   K 3.8 4.1    104   CO2 20* 19*   BUN 52* 55*  "  CREATININE 2.5* 3.0*   CALCIUM 8.3* 8.6*   ALBUMIN 2.5* 2.4*   PROT 4.9* 5.1*   BILITOT 20.6* 23.0*   ALKPHOS 146* 164*   ALT 60* 61*   * 127*   MG 2.2 2.3   PHOS 3.3 3.8       CMP:   Recent Labs   Lab 01/16/24  0432 01/17/24  0314   * 132*   K 3.8 4.1    104   CO2 20* 19*    97   BUN 52* 55*   CREATININE 2.5* 3.0*   CALCIUM 8.3* 8.6*   PROT 4.9* 5.1*   ALBUMIN 2.5* 2.4*   BILITOT 20.6* 23.0*   ALKPHOS 146* 164*   * 127*   ALT 60* 61*   ANIONGAP 7* 9     Coagulation:   Recent Labs   Lab 01/17/24  0314   INR 2.7*     Lactic Acid:   No results for input(s): "LACTATE" in the last 48 hours.        Significant Imaging:  I have reviewed all pertinent imaging results/findings within the past 24 hours.    Assessment/Plan:      * Leukocytosis  Patient with new onset chills/rigors 1/12. Now with leukocytosis, remains afebrile and normotensive (previously normotensive/hypertensive).  Infectious work up:  - CXR obtained without any signs of pneumonia however possible small amount of edema vs viral component. Covid/Influenza negative.   - UA non-infectious.   - He was initially started on CTX for concern for possible SBP.    - Paracentesis performed with IR on 1/12 with 4.2L out. Ascitic labs with no evidence of SBP, and ascitic cultures NGTD.   - Blood cultures 1/12 NGTD  - Had some RUQ discomfort 1/12 and RUQ US showed gallbladder wall thickening with possible signs of cholecystitis. No abdominal pain today and negative Sen's sign  - Broaded from CTX to Vanc/cefe with low threshold to start micafungin per hepatology  - CT-CAP without evidence of infection, no concern for cholecystitis    - Had another episode of rigors and temp renetta to 100.0 on 1/14. Remains HDS.  - Started on Micafungin  - ID consulted and recommending DC vanc, continue cefepime x7 days and switching micafungin to fluconazole for 7 day treatment  - Fungitell assay negative    Cirrhosis  Patient is visiting from out of " [facility]; outside records not available to review thus etiology of cirrhosis not confirmed. However, patient does have a history of ETOH use d/o so this is included in the differential. States he is currently in the process of being referred to a hepatologist and having an EGD scheduled.     MELD-Na score calculated; MELD 3.0: 42 at 1/17/2024  3:14 AM  MELD-Na: 40 at 1/17/2024  3:14 AM  Calculated from:  Serum Creatinine: 3.0 mg/dL at 1/17/2024  3:14 AM  Serum Sodium: 132 mmol/L at 1/17/2024  3:14 AM  Total Bilirubin: 23.0 mg/dL at 1/17/2024  3:14 AM  Serum Albumin: 2.4 g/dL at 1/17/2024  3:14 AM  INR(ratio): 2.7 at 1/17/2024  3:14 AM  Age at listing (hypothetical): 58 years  Sex: Male at 1/17/2024  3:14 AM    Continue chronic meds. Etiology likely ETOH. Will avoid any hepatotoxic meds, and monitor CBC/CMP/INR for synthetic function.   T bilirubin trending up. Hepatology was consulted in the setting of decompensated liver cirrhosis. Ultrasound right upper quadrant with Dopplers, PEth, AFP, serological workup, and acute hepatitis panel. Cirrhotic morphology of the liver with sequela of portal hypertension as detailed above. No discrete hepatic lesions identified. Biliary sludge with thickened gallbladder wall, likely secondary to hepatic dysfunction.  Underwent paracentesis.  As patient is not a candidate for transplant and with his high MELD score he would require air transfer to  [place] where he can go to the hospital close to his home. On lactulose and albumin per hepatology recs.   - Continue lactulose goal 3 BM's daily  - S/p albumin 50g BID for 48 hours  - Accepted to [facility] in [place]. Pending flight logistics.  - Start Rifaximin  - S/p IV Vit K x3 doses x2  - Repeat paracentesis 1/12 as detailed above  - Plan for LVP with IR today with albumin    CODY (acute kidney injury)  - Cr 1.6 at admission, unclear baseline  - Initially likely pre-renal in setting of GIB.  - Now with slow elevation of  creatinine  - Hepatology recommending albumin 50g BID for 48 hours  - S/p 48 hours albumin  - Cr 1.6 -> 2.0 -> 2.2 -> 2.5 -> 3.0  - Nephrology consulted, urine studies sent, uric acid ordered   - 1500ml fluid restriction   - May need HRS treatment, though BP remains elevated and urine sediment does not appear to be inline with HRS   - Autoimmune panel sent and pending  - Discussed with family that if continued worsening renal function, dialysis may need to be initiated  - Renally dosing all medications  - Avoiding nephrotoxins  - Will continue to monitor on daily labs    Acute upper GI bleed  58 year old M w/ cirrhosis and ETOH use d/o presenting w/ melana and ground coffee emesis/hematemesis. Denies prior symptoms. No EGDs on file. Normotensive and tachycardic w/ HR 130s. Initial Hgb 5.0; now s/p 2u pRBCs. Initial lactate >12. Labs otherwise notable for Plt 116, INR 2.4, BUN 57, Cr 1.6, TB 5.4, , ALT 44. Admitted to MICU for management of acute upper GIB.      EGD showed esophageal ulcers. Hgb stable s/p 3u PRBC. Will need scope after 8 week course of PPI.     - Soft and bite-sized diet, advance as tolerated  - Trend Hgb and transfuse for goal Hgb > 7, unless otherwise indicated  - Maintain IV access with 2 large bore IV's  - Intravascular resuscitation/support with IVF's   - Hold all NSAIDs and anticoagulants, unless contraindicated  - Pantoprazole 40 mg BID for a total of 8 weeks  - Correct any coagulopathy with platelets and FFP for goal of platelets >50K and INR <2.0  - CTM CBC    - Hgb stable, BM's without evidence of melena    Transaminitis  - 2:1 pattern; likely 2/2 ETOH use d/o.   - LFT's stable/improving  - T bili worsening  - Will continue to monitor        Thrombocytopenia  Etiology likely 2/2 cirrhosis  - Monitor w/ daily CBC and transfuse if Plt <10 or <50 w/ signs of active bleeding   Recent Labs   Lab 01/17/24  0313   PLT 40*         Alcohol use disorder  - Last alcoholic drink on 1/01.  Alcohol level <10 on admission. Denies history of withdrawal in the past including seizures. Remains tachycardic but likely 2/2 acute GIB. Will continue to monitor for signs of withdrawal w/ CIWA protocol and start benzodiazepines if indicated.   - thiamine, folic acid, MV  - DC CIWA protocol as now out of withdrawal window       Coagulopathy  - INR elevated in setting of cirrhosis  - S/p IV Vit K for x3 days.         VTE Risk Mitigation (From admission, onward)           Ordered     Place sequential compression device  Until discontinued         01/03/24 0256     IP VTE LOW RISK PATIENT  Once         01/03/24 0256                    Discharge Planning   MILKA: 1/22/2024     Code Status: Full Code   Is the patient medically ready for discharge?: No    Reason for patient still in hospital (select all that apply): Treatment  Discharge Plan A: Home, Home with family   Discharge Delays: None known at this time              Rik Gomes MD  Department of Hospital Medicine   [facility] Surg

## 2024-01-17 NOTE — SUBJECTIVE & OBJECTIVE
Interval History: No acute events overnight. sCr still increasing    Review of patient's allergies indicates:  No Known Allergies  Current Facility-Administered Medications   Medication Frequency    0.9%  NaCl infusion (for blood administration) Q24H PRN    ceFEPIme (MAXIPIME) 2 g in dextrose 5 % in water (D5W) 100 mL IVPB (MB+) Q12H    EScitalopram oxalate tablet 10 mg Daily    fluconazole tablet 200 mg Daily    folic acid tablet 1 mg Daily    hydrALAZINE injection 10 mg Q6H PRN    lactulose 20 gram/30 mL solution Soln 20 g TID    multivitamin tablet Daily    pantoprazole EC tablet 40 mg BID    rifAXIMin tablet 550 mg BID    sodium chloride 0.9% flush 10 mL PRN    thiamine tablet 100 mg Daily       Objective:     Vital Signs (Most Recent):  Temp: 97.4 °F (36.3 °C) (01/17/24 0813)  Pulse: 79 (01/17/24 0813)  Resp: 18 (01/17/24 0813)  BP: 126/75 (01/17/24 0813)  SpO2: 99 % (01/17/24 0813) Vital Signs (24h Range):  Temp:  [97.4 °F (36.3 °C)-98.4 °F (36.9 °C)] 97.4 °F (36.3 °C)  Pulse:  [77-94] 79  Resp:  [18] 18  SpO2:  [94 %-100 %] 99 %  BP: (122-146)/(65-75) 126/75     Weight: 97.6 kg (215 lb 2.7 oz) (01/03/24 0855)  Body mass index is 32.72 kg/m².  Body surface area is 2.16 meters squared.    No intake/output data recorded.     Physical Exam  Constitutional:       General: He is not in acute distress.     Appearance: He is obese. He is ill-appearing. He is not toxic-appearing.   HENT:      Head: Normocephalic and atraumatic.      Nose: Nose normal.      Mouth/Throat:      Mouth: Mucous membranes are moist.   Eyes:      General: Scleral icterus present.   Cardiovascular:      Rate and Rhythm: Normal rate.      Heart sounds: No murmur heard.  Pulmonary:      Effort: Pulmonary effort is normal. No respiratory distress.      Breath sounds: No wheezing or rales.   Abdominal:      General: Abdomen is flat. There is distension.   Musculoskeletal:      Cervical back: Normal range of motion. No rigidity.      Right lower  leg: Edema present.      Left lower leg: Edema present.   Lymphadenopathy:      Cervical: No cervical adenopathy.   Skin:     General: Skin is warm.      Coloration: Skin is jaundiced.      Findings: Bruising present. No lesion.   Neurological:      Mental Status: He is alert.          Significant Labs:  All labs within the past 24 hours have been reviewed.     Significant Imaging:  Labs: Reviewed

## 2024-01-17 NOTE — PROGRESS NOTES
[facility] Surg  Nephrology  Progress Note    Patient Name: Blu Deleon  MRN: [id number]  Admission Date: 1/2/2024  Hospital Length of Stay: 14 days  Attending Provider: Rik Gomes MD   Primary Care Physician: Radha, Primary Doctor  Principal Problem:Leukocytosis    Subjective:     HPI: 58 year old man with a history of HTN, suspected EtOH cirrhosis (currently undergoing workup), depression [place] [facility] [place] with bright red blood per rectum with black tarry stools as well. He has never seen a neprhologyist and is visiting [place] the [place]. Wife at bedside reports that he was not feeling well and had been taking tylenol/alleve for some weeks before but at an unknown dose.     They do not know what his baseline creatinine is, however [place] [facility] [place] creatinine hovered around 1.3 to 1.6. Subsequently he underwent a 4.5 liter paracentesis with a resulting increase in creatinine to 2.0. Urine sodium 1/11 was <10. Blood pressures 130-160 systolics.     Nephrology consulted for CODY    Interval History: No acute events overnight. sCr still increasing    Review of patient's allergies indicates:  No Known Allergies  Current Facility-Administered Medications   Medication Frequency    0.9%  NaCl infusion (for blood administration) Q24H PRN    ceFEPIme (MAXIPIME) 2 g in dextrose 5 % in water (D5W) 100 mL IVPB (MB+) Q12H    EScitalopram oxalate tablet 10 mg Daily    fluconazole tablet 200 mg Daily    folic acid tablet 1 mg Daily    hydrALAZINE injection 10 mg Q6H PRN    lactulose 20 gram/30 mL solution Soln 20 g TID    multivitamin tablet Daily    pantoprazole EC tablet 40 mg BID    rifAXIMin tablet 550 mg BID    sodium chloride 0.9% flush 10 mL PRN    thiamine tablet 100 mg Daily       Objective:     Vital Signs (Most Recent):  Temp: 97.4 °F (36.3 °C) (01/17/24 0813)  Pulse: 79 (01/17/24 0813)  Resp: 18 (01/17/24 0813)  BP: 126/75 (01/17/24 0813)  SpO2: 99 % (01/17/24 0813) Vital Signs  See previous encounter. Pt has an appt today    (24h Range):  Temp:  [97.4 °F (36.3 °C)-98.4 °F (36.9 °C)] 97.4 °F (36.3 °C)  Pulse:  [77-94] 79  Resp:  [18] 18  SpO2:  [94 %-100 %] 99 %  BP: (122-146)/(65-75) 126/75     Weight: 97.6 kg (215 lb 2.7 oz) (01/03/24 0855)  Body mass index is 32.72 kg/m².  Body surface area is 2.16 meters squared.    No intake/output data recorded.    Physical Exam  Constitutional:       General: He is not in acute distress.     Appearance: He is obese. He is ill-appearing. He is not toxic-appearing.   HENT:      Head: Normocephalic and atraumatic.      Nose: Nose normal.      Mouth/Throat:      Mouth: Mucous membranes are moist.   Eyes:      General: Scleral icterus present.   Cardiovascular:      Rate and Rhythm: Normal rate.      Heart sounds: No murmur heard.  Pulmonary:      Effort: Pulmonary effort is normal. No respiratory distress.      Breath sounds: No wheezing or rales.   Abdominal:      General: Abdomen is flat. There is distension.   Musculoskeletal:      Cervical back: Normal range of motion. No rigidity.      Right lower leg: Edema present.      Left lower leg: Edema present.   Lymphadenopathy:      Cervical: No cervical adenopathy.   Skin:     General: Skin is warm.      Coloration: Skin is jaundiced.      Findings: Bruising present. No lesion.   Neurological:      Mental Status: He is alert.          Significant Labs:  All labs within the past 24 hours have been reviewed.     Significant Imaging:  Labs: Reviewed  Assessment/Plan:     Renal/  CODY (acute kidney injury)  58 M with cirrhosis of unknown etiology (undergoing workup) admitted after GI bleed  On admission creatinine 1.2-1.6  At consult on 1/13: 2.0.    1/12 4.5 liter paracentesis    CODY: ddx includes previous NSAID use, large volume shift with paracentesis, HRS type 2 (though unlikely given BP), pyelo (++ bacteria on UA 1/15/24), abdominal compartment syndrome (tense abdomen), bladder obstruction  Low suspicion for HRS given BP     1/17/2024: Urine micro  with RBC, some dysmorphic. No red cell casts or acanthocytes. Some granular and tubular cell casts.     Plan/Recommendation:  -Urine culture pending  -Recommend ascites scan - possible need for paracentesis  -Keep MAP > 65  -Keep hemoglobin > 7  -Strict ins and outs  -Avoid nephrotoxic agents if possible and renally dose medications  -Avoid drastic hemodynamic changes if possible            Thank you for your consult. I will follow-up with patient. Please contact us if you have any additional questions.    Chip Chung MD  Nephrology  [facility] - Med Surg

## 2024-01-17 NOTE — ASSESSMENT & PLAN NOTE
- Cr 1.6 at admission, unclear baseline  - Initially likely pre-renal in setting of GIB.  - Now with slow elevation of creatinine  - Hepatology recommending albumin 50g BID for 48 hours  - S/p 48 hours albumin  - Cr 1.6 -> 2.0 -> 2.2 -> 2.5 -> 3.0  - Nephrology consulted, urine studies sent, uric acid ordered   - 1500ml fluid restriction   - May need HRS treatment, though BP remains elevated and urine sediment does not appear to be inline with HRS   - Autoimmune panel sent and pending  - Discussed with family that if continued worsening renal function, dialysis may need to be initiated  - Renally dosing all medications  - Avoiding nephrotoxins  - Will continue to monitor on daily labs

## 2024-01-17 NOTE — ASSESSMENT & PLAN NOTE
Etiology likely 2/2 cirrhosis  - Monitor w/ daily CBC and transfuse if Plt <10 or <50 w/ signs of active bleeding   Recent Labs   Lab 01/17/24  0313   PLT 40*

## 2024-01-17 NOTE — PLAN OF CARE
Patient tolerated paracentesis well. VSS. 3800 ml clear yellow ascites drained. No specimens ordered. Albumin 25g administered per protocol. Report called to Nino BORJA.

## 2024-01-17 NOTE — PROCEDURES
Radiology Post-Procedure Note    Pre Op Diagnosis: Ascites  Post Op Diagnosis: Same    Procedure: Ultrasound Guided Paracentesis    Procedure performed by: Uday Saini MD    Written Informed Consent Obtained: Yes  Specimen Removed: YES   Estimated Blood Loss: Minimal    Findings:   Successful LLQ paracentesis.  Albumin administered PRN per protocol.    Patient tolerated procedure well.    Uday Saini MD  R[facility]

## 2024-01-17 NOTE — PLAN OF CARE
No significant changes this shift. Continue with POC and monitor. Pt left lying semi fowlers bed in lowest position, with call light in reach, and all wheels locked X3 rails up.     Problem: Adult Inpatient Plan of Care  Goal: Plan of Care Review  Outcome: Ongoing, Progressing  Goal: Patient-Specific Goal (Individualized)  Outcome: Ongoing, Progressing  Goal: Absence of Hospital-Acquired Illness or Injury  Outcome: Ongoing, Progressing  Goal: Optimal Comfort and Wellbeing  Outcome: Ongoing, Progressing  Goal: Readiness for Transition of Care  Outcome: Ongoing, Progressing     Problem: Fluid and Electrolyte Imbalance (Acute Kidney Injury/Impairment)  Goal: Fluid and Electrolyte Balance  Outcome: Ongoing, Progressing     Problem: Oral Intake Inadequate (Acute Kidney Injury/Impairment)  Goal: Optimal Nutrition Intake  Outcome: Ongoing, Progressing     Problem: Renal Function Impairment (Acute Kidney Injury/Impairment)  Goal: Effective Renal Function  Outcome: Ongoing, Progressing     Problem: Fall Injury Risk  Goal: Absence of Fall and Fall-Related Injury  Outcome: Ongoing, Progressing     Problem: Electrolyte Imbalance  Goal: Electrolyte Balance  Outcome: Ongoing, Progressing     Problem: Adjustment to Illness (Liver Failure)  Goal: Optimal Coping with Liver Failure  Outcome: Ongoing, Progressing     Problem: Fluid and Electrolyte Imbalance (Liver Failure)  Goal: Fluid and Electrolyte Balance  Outcome: Ongoing, Progressing     Problem: Gastrointestinal Complications (Liver Failure)  Goal: Optimal Gastrointestinal Function  Outcome: Ongoing, Progressing     Problem: Impaired Coagulation (Liver Failure)  Goal: Optimal Coagulation Function  Outcome: Ongoing, Progressing     Problem: Infection (Liver Failure)  Goal: Absence of Infection Signs and Symptoms  Outcome: Ongoing, Progressing     Problem: Neurologic Function Impaired (Liver Failure)  Goal: Optimal Neurologic Function  Outcome: Ongoing, Progressing     Problem:  Oral Intake Inadequate (Liver Failure)  Goal: Improved Oral Intake  Outcome: Ongoing, Progressing     Problem: Pain (Liver Failure)  Goal: Optimal Pain Control  Outcome: Ongoing, Progressing     Problem: Renal Dysfunction (Liver Failure)  Goal: Optimize Renal Function  Outcome: Ongoing, Progressing     Problem: Respiratory Compromise (Liver Failure)  Goal: Effective Oxygenation and Ventilation  Outcome: Ongoing, Progressing

## 2024-01-17 NOTE — ASSESSMENT & PLAN NOTE
58 M with cirrhosis of unknown etiology (undergoing workup) admitted after GI bleed  On admission creatinine 1.2-1.6  At consult on 1/13: 2.0.    1/12 4.5 liter paracentesis    CODY: ddx includes previous NSAID use, large volume shift with paracentesis, HRS type 2 (though unlikely given BP), pyelo (++ bacteria on UA 1/15/24), abdominal compartment syndrome (tense abdomen), bladder obstruction  Low suspicion for HRS given BP     1/17/2024: Urine micro with RBC, some dysmorphic. No red cell casts or acanthocytes. Some granular and tubular cell casts.     Plan/Recommendation:  -Urine culture pending  -Recommend ascites scan - possible need for paracentesis  -Keep MAP > 65  -Keep hemoglobin > 7  -Strict ins and outs  -Avoid nephrotoxic agents if possible and renally dose medications  -Avoid drastic hemodynamic changes if possible

## 2024-01-17 NOTE — PROGRESS NOTES
Hepatology Treatment Plan    Blu Deleon is a 58 y.o. male admitted to hospital 1/2/2024 (Hospital Day: 16) due to Leukocytosis.     Interval History  Patient seen this morning.  His mental status was at baseline and he was oriented x3.  Able to ambulate to the bathroom without assistance.  He reports feeling fine and denies any significant symptoms. Able to ambulate.     Son present at bedside.  Med flight is tentatively arranged for Monday.     Having 4-5 bowel movements daily on lactulose    MELD 40. Cr & T bili continue to worsen.       Objective  Temp:  [97.4 °F (36.3 °C)-98.4 °F (36.9 °C)] 97.4 °F (36.3 °C) (01/17 0813)  Pulse:  [77-86] 86 (01/17 1145)  BP: (122-146)/(65-75) 126/75 (01/17 0813)  Resp:  [18] 18 (01/17 0813)  SpO2:  [94 %-100 %] 99 % (01/17 0813)    Physical Exam:  Constitutional:  awake, alert, NAD. Ill appearing but non toxic.   HENT: Head: Normal, CPAP in place  Eyes:  scleral icterus +nt  Respiratory: normal chest expansion & respiratory effort and no accessory muscle use  GI: soft, non-tender, without masses or organomegaly  Skin:  jaundiced  Neurological: oriented x3      Laboratory    Recent Labs   Lab 01/17/24 0313   WBC 7.37   RBC 2.36*   HGB 8.0*   HCT 23.9*   PLT 40*   *   MCH 33.9*   MCHC 33.5      Recent Labs   Lab 01/17/24 0314   CALCIUM 8.6*   ALBUMIN 2.4*   PROT 5.1*   *   K 4.1   CO2 19*      BUN 55*   CREATININE 3.0*   ALKPHOS 164*   ALT 61*   *   BILITOT 23.0*      Recent Labs   Lab 01/17/24 0314   INR 2.7*        MELD 3.0: 42 at 1/17/2024  3:14 AM  MELD-Na: 40 at 1/17/2024  3:14 AM  Calculated from:  Serum Creatinine: 3.0 mg/dL at 1/17/2024  3:14 AM  Serum Sodium: 132 mmol/L at 1/17/2024  3:14 AM  Total Bilirubin: 23.0 mg/dL at 1/17/2024  3:14 AM  Serum Albumin: 2.4 g/dL at 1/17/2024  3:14 AM  INR(ratio): 2.7 at 1/17/2024  3:14 AM  Age at listing (hypothetical): 58 years  Sex: Male at 1/17/2024  3:14 AM     JERI negative  ASMA 1:40  IgG  "wnl  AMA negative  Viral hepatitis panel negative  Ceruloplasmin negative  Iron studies negative    Assessment and Plan    Blu Deleon is a 58 y.o. male with history of alcohol use disorder, HTN, and compensated EtOH cirrhosis who was admitted to [facility] on  with melena.   After resuscitation, EGD on 1/3/24 showed esophageal ulcers, erythematous mucosa in the antrum, and normal duodenum.  Hepatology now consulted for worsening bilirubin.     He hails from [place], visiting from the [facility]. The patient has known about his history of "early cirrhosis" since at least  when liver ultrasound confirmed cirrhosis.  He has continued to drink in spite of knowledge of liver disease and being told to stop; has consumed alcohol regularly since the age of 21.  Last drink 24. No illicit drug use.  Sister  of EtOH cirrhosis. PETH positive, 98.    Case was discussed in committee  and deemed not to be a candidate for liver transplant evaluation due to ongoing alcohol use despite knowledge of liver disease       Problem List:  Newly decompensated EtOH cirrhosis  EtOH use disorder  Esophageal ulcers  Leukocytosis  Hepatic encephalopathy        Recommendations:  - We are not going to proceed with liver tx eval at this time since patient was aware of cirrhosis but persisted drinking. Discussed in transplant committee.  - Now accepted to [facility] in [place] to be closer to home. Concerned with very high mortality based on current trend in liver function.  - Follow infectious workup: noted CXR shows possible viral process. Blood cultures NGTD  - await results of Fungitell testing.  In the meanwhile, we will continue cefepime and fluconazole per ID.   - Stopped baclofen. Avoid sedating medications  - Titrate lactulose to 3-4 BMs per day  - Continue rifaximin 550 mg BID  - Nephrology following for CODY, appreciate recs. Have reached out to nephrology about HRS protocol.   - low sodium, high " protein diet  - Continue Protonix 40mg twice per day for 8 weeks total. Repeat upper endoscopy in 8 weeks to check healing of his esophageal ulcers. He would like to have this done in [facility].   - Daily CBC, CMP, INR.       - We will continue to follow.    Thank you for involving us in the care of Blu Deleon. Please call with any additional questions, concerns or changes in the patient's clinical status. Plan of care discussed with attending Dr. He.    Lyle Thomas MD  Gastroenterology and Hepatology Fellow, PGY V

## 2024-01-17 NOTE — SUBJECTIVE & OBJECTIVE
Interval History/Significant Events: Pt seen and examined this morning on rell WOODY. Patient continues to report feeling well with no complaints. He reports small urine output. Creatinine continues to worsen.  Fungitell negative.      Objective:     Vital Signs (Most Recent):  Temp: 97.4 °F (36.3 °C) (01/17/24 0813)  Pulse: 81 (01/17/24 1331)  Resp: 16 (01/17/24 1331)  BP: 139/65 (01/17/24 1331)  SpO2: 99 % (01/17/24 1331) Vital Signs (24h Range):  Temp:  [97.4 °F (36.3 °C)-98.4 °F (36.9 °C)] 97.4 °F (36.3 °C)  Pulse:  [77-86] 81  Resp:  [16-18] 16  SpO2:  [94 %-100 %] 99 %  BP: (122-146)/(65-75) 139/65   Weight: 97.6 kg (215 lb 2.7 oz)  Body mass index is 32.72 kg/m².    No intake or output data in the 24 hours ending 01/17/24 1346         Physical Exam  Gen: in NAD, appears stated age, jaundiced  Neuro: AAOx4, CN2-12 grossly intact BL; motor, sensory, and strength grossly intact BL  HEENT: + icteric sclera. NTNC, EOMI, PERRLA, MMM; no thyromegaly or lymphadenopathy; no JVD appreciated  CVS: RRR, no m/r/g; S1/S2 auscultated with no S3 or S4; capillary refill < 2 sec  Resp: lungs CTAB, no w/r/r; no belabored breathing or accessory muscle use appreciated   Abd: + Abdominal distension. BS+ in all 4 quadrants; NTND, soft to palpation; no organomegaly appreciated   Extrem: pulses full, equal, and regular over all 4 extremities; no UE or LE edema BL         Vents:  Oxygen Concentration (%): 21 (01/12/24 0041)  Lines/Drains/Airways       Peripheral Intravenous Line  Duration                  Peripheral IV - Single Lumen 01/03/24 1045 18 G;1 1/4 in Anterior;Left Upper Arm 14 days         Peripheral IV - Single Lumen 01/03/24 1400 18 G;1 1/4 in Anterior;Right Upper Arm 13 days                  Significant Labs:    CBC/Anemia Profile:  Recent Labs   Lab 01/16/24  0432 01/17/24  0313   WBC 6.42 7.37   HGB 7.9* 8.0*   HCT 24.1* 23.9*   PLT 52* 40*   * 101*   RDW 23.5* 22.8*        Chemistries:  Recent Labs   Lab  "01/16/24 0432 01/17/24 0314   * 132*   K 3.8 4.1    104   CO2 20* 19*   BUN 52* 55*   CREATININE 2.5* 3.0*   CALCIUM 8.3* 8.6*   ALBUMIN 2.5* 2.4*   PROT 4.9* 5.1*   BILITOT 20.6* 23.0*   ALKPHOS 146* 164*   ALT 60* 61*   * 127*   MG 2.2 2.3   PHOS 3.3 3.8       CMP:   Recent Labs   Lab 01/16/24 0432 01/17/24 0314   * 132*   K 3.8 4.1    104   CO2 20* 19*    97   BUN 52* 55*   CREATININE 2.5* 3.0*   CALCIUM 8.3* 8.6*   PROT 4.9* 5.1*   ALBUMIN 2.5* 2.4*   BILITOT 20.6* 23.0*   ALKPHOS 146* 164*   * 127*   ALT 60* 61*   ANIONGAP 7* 9     Coagulation:   Recent Labs   Lab 01/17/24 0314   INR 2.7*     Lactic Acid:   No results for input(s): "LACTATE" in the last 48 hours.        Significant Imaging:  I have reviewed all pertinent imaging results/findings within the past 24 hours.  "

## 2024-01-17 NOTE — ASSESSMENT & PLAN NOTE
- 2:1 pattern; likely 2/2 ETOH use d/o.   - LFT's stable/improving  - T bili worsening  - Will continue to monitor

## 2024-01-17 NOTE — ASSESSMENT & PLAN NOTE
Patient is visiting from out of town; outside records not available to review thus etiology of cirrhosis not confirmed. However, patient does have a history of ETOH use d/o so this is included in the differential. States he is currently in the process of being referred to a hepatologist and having an EGD scheduled.     MELD-Na score calculated; MELD 3.0: 42 at 1/17/2024  3:14 AM  MELD-Na: 40 at 1/17/2024  3:14 AM  Calculated from:  Serum Creatinine: 3.0 mg/dL at 1/17/2024  3:14 AM  Serum Sodium: 132 mmol/L at 1/17/2024  3:14 AM  Total Bilirubin: 23.0 mg/dL at 1/17/2024  3:14 AM  Serum Albumin: 2.4 g/dL at 1/17/2024  3:14 AM  INR(ratio): 2.7 at 1/17/2024  3:14 AM  Age at listing (hypothetical): 58 years  Sex: Male at 1/17/2024  3:14 AM    Continue chronic meds. Etiology likely ETOH. Will avoid any hepatotoxic meds, and monitor CBC/CMP/INR for synthetic function.   T bilirubin trending up. Hepatology was consulted in the setting of decompensated liver cirrhosis. Ultrasound right upper quadrant with Dopplers, PEth, AFP, serological workup, and acute hepatitis panel. Cirrhotic morphology of the liver with sequela of portal hypertension as detailed above. No discrete hepatic lesions identified. Biliary sludge with thickened gallbladder wall, likely secondary to hepatic dysfunction.  Underwent paracentesis.  As patient is not a candidate for transplant and with his high MELD score he would require air transfer to  [place] where he can go to the hospital close to his home. On lactulose and albumin per hepatology recs.   - Continue lactulose goal 3 BM's daily  - S/p albumin 50g BID for 48 hours  - Accepted to [facility] in [place]. Pending flight logistics.  - Start Rifaximin  - S/p IV Vit K x3 doses x2  - Repeat paracentesis 1/12 as detailed above  - Plan for LVP with IR today with albumin

## 2024-01-17 NOTE — H&P
Inpatient Radiology Pre-procedure Note    History of Present Illness:  Blu Deleon is a 58 y.o. male who presents for ultrasound guided paracentesis.  Admission H&P reviewed.  Past Medical History:   Diagnosis Date    Hypertension      Past Surgical History:   Procedure Laterality Date    ESOPHAGOGASTRODUODENOSCOPY N/A 1/3/2024    Procedure: EGD (ESOPHAGOGASTRODUODENOSCOPY);  Surgeon: Madison Hinojosa MD;  Location: [address]);  Service: Endoscopy;  Laterality: N/A;       Review of Systems:   As documented in primary team H&P    Home Meds:   Prior to Admission medications    Medication Sig Start Date End Date Taking? Authorizing Provider   EScitalopram oxalate (LEXAPRO) 10 MG tablet Take 10 mg by mouth once daily. 12/23/23  Yes Provider, Historical   hydroCHLOROthiazide (HYDRODIURIL) 25 MG tablet Take 25 mg by mouth once daily. 12/23/23  Yes Provider, Historical   traZODone (DESYREL) 50 MG tablet Take  mg by mouth every evening. 12/26/23  Yes Provider, Historical   amLODIPine (NORVASC) 10 MG tablet Take 1 tablet (10 mg total) by mouth once daily. 1/12/24 1/11/25  Rik Gomes MD   folic acid (FOLVITE) 1 MG tablet Take 1 tablet (1 mg total) by mouth once daily. 1/6/24 1/5/25  Bailey Cline MD   lactulose (CHRONULAC) 20 gram/30 mL Soln Take 30 mLs (20 g total) by mouth 3 (three) times daily. 1/11/24   Rik Gomes MD   pantoprazole (PROTONIX) 40 MG tablet Take 1 tablet (40 mg total) by mouth 2 (two) times a day. 1/11/24 3/11/24  Rik Gomes MD   thiamine 100 MG tablet Take 1 tablet (100 mg total) by mouth once daily. 1/6/24   Bailey Cline MD     Scheduled Meds:    ceFEPime IV (PEDS and ADULTS)  2 g Intravenous Q12H    EScitalopram oxalate  10 mg Oral Daily    fluconazole  200 mg Oral Daily    folic acid  1 mg Oral Daily    lactulose  20 g Oral TID    multivitamin  1 tablet Oral Daily    pantoprazole  40 mg Oral BID    rifAXImin  550 mg Oral BID    thiamine  100  mg Oral Daily     Continuous Infusions:   PRN Meds:0.9%  NaCl infusion (for blood administration), hydrALAZINE, sodium chloride 0.9%  Anticoagulants/Antiplatelets: no anticoagulation    Allergies: Review of patient's allergies indicates:  No Known Allergies  Sedation Hx: have not been any systemic reactions    Vitals:  Temp: 97.4 °F (36.3 °C) (01/17/24 0813)  Pulse: 86 (01/17/24 1145)  Resp: 18 (01/17/24 0813)  BP: 126/75 (01/17/24 0813)  SpO2: 99 % (01/17/24 0813)     Physical Exam:  ASA: 3  Mallampati: n/a    General: no acute distress  Mental Status: alert and oriented to person, place and time  HEENT: normocephalic, atraumatic  Chest: unlabored breathing  Heart: regular heart rate  Abdomen: distended  Extremity: moves all extremities    Plan: ultrasound guided paracentesis  Sedation Plan: local    Gina Perea PA-C  Interventional Radiology   Spectra: [phone]

## 2024-01-17 NOTE — ASSESSMENT & PLAN NOTE
Patient with new onset chills/rigors 1/12. Now with leukocytosis, remains afebrile and normotensive (previously normotensive/hypertensive).  Infectious work up:  - CXR obtained without any signs of pneumonia however possible small amount of edema vs viral component. Covid/Influenza negative.   - UA non-infectious.   - He was initially started on CTX for concern for possible SBP.    - Paracentesis performed with IR on 1/12 with 4.2L out. Ascitic labs with no evidence of SBP, and ascitic cultures NGTD.   - Blood cultures 1/12 NGTD  - Had some RUQ discomfort 1/12 and RUQ US showed gallbladder wall thickening with possible signs of cholecystitis. No abdominal pain today and negative Sen's sign  - Broaded from CTX to Vanc/cefe with low threshold to start micafungin per hepatology  - CT-CAP without evidence of infection, no concern for cholecystitis    - Had another episode of rigors and temp renetta to 100.0 on 1/14. Remains HDS.  - Started on Micafungin  - ID consulted and recommending DC vanc, continue cefepime x7 days and switching micafungin to fluconazole for 7 day treatment  - Fungitell assay negative

## 2024-01-18 LAB
ALBUMIN SERPL BCP-MCNC: 2.5 G/DL (ref 3.5–5.2)
ALP SERPL-CCNC: 160 U/L (ref 55–135)
ALT SERPL W/O P-5'-P-CCNC: 57 U/L (ref 10–44)
ANION GAP SERPL CALC-SCNC: 5 MMOL/L (ref 8–16)
ANISOCYTOSIS BLD QL SMEAR: SLIGHT
AST SERPL-CCNC: 126 U/L (ref 10–40)
BACTERIA UR CULT: NO GROWTH
BASOPHILS # BLD AUTO: 0.07 K/UL (ref 0–0.2)
BASOPHILS NFR BLD: 1 % (ref 0–1.9)
BILIRUB SERPL-MCNC: 22.6 MG/DL (ref 0.1–1)
BM IGG SER-ACNC: <0.2 U
BUN SERPL-MCNC: 59 MG/DL (ref 6–20)
BURR CELLS BLD QL SMEAR: ABNORMAL
CALCIUM SERPL-MCNC: 8.4 MG/DL (ref 8.7–10.5)
CHLORIDE SERPL-SCNC: 102 MMOL/L (ref 95–110)
CK SERPL-CCNC: 27 U/L (ref 20–200)
CO2 SERPL-SCNC: 20 MMOL/L (ref 23–29)
CREAT SERPL-MCNC: 3.7 MG/DL (ref 0.5–1.4)
CREAT UR-MCNC: 180 MG/DL (ref 23–375)
DIFFERENTIAL METHOD BLD: ABNORMAL
EOSINOPHIL # BLD AUTO: 0.4 K/UL (ref 0–0.5)
EOSINOPHIL NFR BLD: 5.6 % (ref 0–8)
ERYTHROCYTE [DISTWIDTH] IN BLOOD BY AUTOMATED COUNT: 22.5 % (ref 11.5–14.5)
EST. GFR  (NO RACE VARIABLE): 18.3 ML/MIN/1.73 M^2
GLUCOSE SERPL-MCNC: 103 MG/DL (ref 70–110)
HCT VFR BLD AUTO: 23.5 % (ref 40–54)
HGB BLD-MCNC: 7.8 G/DL (ref 14–18)
HYPOCHROMIA BLD QL SMEAR: ABNORMAL
IMM GRANULOCYTES # BLD AUTO: 0.2 K/UL (ref 0–0.04)
IMM GRANULOCYTES NFR BLD AUTO: 2.9 % (ref 0–0.5)
INR PPP: 2.9 (ref 0.8–1.2)
LYMPHOCYTES # BLD AUTO: 0.9 K/UL (ref 1–4.8)
LYMPHOCYTES NFR BLD: 13.1 % (ref 18–48)
MAGNESIUM SERPL-MCNC: 2.4 MG/DL (ref 1.6–2.6)
MCH RBC QN AUTO: 33.6 PG (ref 27–31)
MCHC RBC AUTO-ENTMCNC: 33.2 G/DL (ref 32–36)
MCV RBC AUTO: 101 FL (ref 82–98)
MONOCYTES # BLD AUTO: 1 K/UL (ref 0.3–1)
MONOCYTES NFR BLD: 14.3 % (ref 4–15)
NEUTROPHILS # BLD AUTO: 4.4 K/UL (ref 1.8–7.7)
NEUTROPHILS NFR BLD: 63.1 % (ref 38–73)
NRBC BLD-RTO: 0 /100 WBC
OVALOCYTES BLD QL SMEAR: ABNORMAL
PATHOLOGIST INTERPRETATION IFE: NORMAL
PATHOLOGIST INTERPRETATION SPE: NORMAL
PHOSPHATE SERPL-MCNC: 3.9 MG/DL (ref 2.7–4.5)
PLATELET # BLD AUTO: 39 K/UL (ref 150–450)
PMV BLD AUTO: 13.4 FL (ref 9.2–12.9)
POIKILOCYTOSIS BLD QL SMEAR: SLIGHT
POLYCHROMASIA BLD QL SMEAR: ABNORMAL
POTASSIUM SERPL-SCNC: 3.9 MMOL/L (ref 3.5–5.1)
PROT SERPL-MCNC: 5.1 G/DL (ref 6–8.4)
PROT UR-MCNC: 67 MG/DL (ref 0–15)
PROT/CREAT UR: 0.37 MG/G{CREAT} (ref 0–0.2)
PROTEINASE3 IGG SER-ACNC: 0.2 U
PROTHROMBIN TIME: 28.8 SEC (ref 9–12.5)
RBC # BLD AUTO: 2.32 M/UL (ref 4.6–6.2)
SCHISTOCYTES BLD QL SMEAR: ABNORMAL
SODIUM SERPL-SCNC: 127 MMOL/L (ref 136–145)
SODIUM UR-SCNC: <10 MMOL/L (ref 20–250)
SODIUM UR-SCNC: <10 MMOL/L (ref 20–250)
SPHEROCYTES BLD QL SMEAR: ABNORMAL
TOXIC GRANULES BLD QL SMEAR: PRESENT
WBC # BLD AUTO: 7 K/UL (ref 3.9–12.7)

## 2024-01-18 PROCEDURE — 21400001 HC TELEMETRY ROOM

## 2024-01-18 PROCEDURE — 87086 URINE CULTURE/COLONY COUNT: CPT | Performed by: INTERNAL MEDICINE

## 2024-01-18 PROCEDURE — 63600175 PHARM REV CODE 636 W HCPCS: Mod: JG | Performed by: STUDENT IN AN ORGANIZED HEALTH CARE EDUCATION/TRAINING PROGRAM

## 2024-01-18 PROCEDURE — 84300 ASSAY OF URINE SODIUM: CPT | Performed by: HOSPITALIST

## 2024-01-18 PROCEDURE — 25000003 PHARM REV CODE 250: Performed by: NURSE PRACTITIONER

## 2024-01-18 PROCEDURE — 85610 PROTHROMBIN TIME: CPT | Performed by: STUDENT IN AN ORGANIZED HEALTH CARE EDUCATION/TRAINING PROGRAM

## 2024-01-18 PROCEDURE — 99900035 HC TECH TIME PER 15 MIN (STAT)

## 2024-01-18 PROCEDURE — 36415 COLL VENOUS BLD VENIPUNCTURE: CPT | Mod: XB | Performed by: STUDENT IN AN ORGANIZED HEALTH CARE EDUCATION/TRAINING PROGRAM

## 2024-01-18 PROCEDURE — 99233 SBSQ HOSP IP/OBS HIGH 50: CPT | Mod: ,,, | Performed by: HOSPITALIST

## 2024-01-18 PROCEDURE — 94761 N-INVAS EAR/PLS OXIMETRY MLT: CPT

## 2024-01-18 PROCEDURE — 25000003 PHARM REV CODE 250: Performed by: STUDENT IN AN ORGANIZED HEALTH CARE EDUCATION/TRAINING PROGRAM

## 2024-01-18 PROCEDURE — P9047 ALBUMIN (HUMAN), 25%, 50ML: HCPCS | Mod: JZ,JG

## 2024-01-18 PROCEDURE — 82550 ASSAY OF CK (CPK): CPT | Performed by: HOSPITALIST

## 2024-01-18 PROCEDURE — 25000003 PHARM REV CODE 250

## 2024-01-18 PROCEDURE — 36415 COLL VENOUS BLD VENIPUNCTURE: CPT | Performed by: HOSPITALIST

## 2024-01-18 PROCEDURE — 94660 CPAP INITIATION&MGMT: CPT

## 2024-01-18 PROCEDURE — 63600175 PHARM REV CODE 636 W HCPCS: Performed by: STUDENT IN AN ORGANIZED HEALTH CARE EDUCATION/TRAINING PROGRAM

## 2024-01-18 PROCEDURE — 63600175 PHARM REV CODE 636 W HCPCS: Mod: JZ,JG

## 2024-01-18 PROCEDURE — P9047 ALBUMIN (HUMAN), 25%, 50ML: HCPCS | Mod: JZ,JG | Performed by: HOSPITALIST

## 2024-01-18 PROCEDURE — 85025 COMPLETE CBC W/AUTO DIFF WBC: CPT | Performed by: INTERNAL MEDICINE

## 2024-01-18 PROCEDURE — 80053 COMPREHEN METABOLIC PANEL: CPT | Performed by: NURSE PRACTITIONER

## 2024-01-18 PROCEDURE — P9047 ALBUMIN (HUMAN), 25%, 50ML: HCPCS | Mod: JG | Performed by: STUDENT IN AN ORGANIZED HEALTH CARE EDUCATION/TRAINING PROGRAM

## 2024-01-18 PROCEDURE — 63600175 PHARM REV CODE 636 W HCPCS: Mod: JZ,JG | Performed by: HOSPITALIST

## 2024-01-18 PROCEDURE — 84156 ASSAY OF PROTEIN URINE: CPT | Performed by: INTERNAL MEDICINE

## 2024-01-18 PROCEDURE — 99232 SBSQ HOSP IP/OBS MODERATE 35: CPT | Mod: ,,, | Performed by: INTERNAL MEDICINE

## 2024-01-18 PROCEDURE — 25000003 PHARM REV CODE 250: Performed by: INTERNAL MEDICINE

## 2024-01-18 PROCEDURE — 84100 ASSAY OF PHOSPHORUS: CPT | Performed by: NURSE PRACTITIONER

## 2024-01-18 PROCEDURE — 83735 ASSAY OF MAGNESIUM: CPT | Performed by: NURSE PRACTITIONER

## 2024-01-18 RX ORDER — ALBUMIN HUMAN 250 G/1000ML
12.5 SOLUTION INTRAVENOUS ONCE
Status: COMPLETED | OUTPATIENT
Start: 2024-01-18 | End: 2024-01-18

## 2024-01-18 RX ORDER — ALBUMIN HUMAN 250 G/1000ML
50 SOLUTION INTRAVENOUS EVERY 8 HOURS
Status: COMPLETED | OUTPATIENT
Start: 2024-01-18 | End: 2024-01-19

## 2024-01-18 RX ADMIN — ALBUMIN (HUMAN) 50 G: 12.5 SOLUTION INTRAVENOUS at 04:01

## 2024-01-18 RX ADMIN — FLUCONAZOLE 200 MG: 200 TABLET ORAL at 10:01

## 2024-01-18 RX ADMIN — ALBUMIN (HUMAN) 12.5 G: 12.5 SOLUTION INTRAVENOUS at 10:01

## 2024-01-18 RX ADMIN — LACTULOSE 20 G: 20 SOLUTION ORAL at 08:01

## 2024-01-18 RX ADMIN — ALBUMIN (HUMAN) 50 G: 12.5 SOLUTION INTRAVENOUS at 08:01

## 2024-01-18 RX ADMIN — ESCITALOPRAM OXALATE 10 MG: 10 TABLET ORAL at 10:01

## 2024-01-18 RX ADMIN — RIFAXIMIN 550 MG: 550 TABLET ORAL at 10:01

## 2024-01-18 RX ADMIN — CEFEPIME 2 G: 2 INJECTION, POWDER, FOR SOLUTION INTRAVENOUS at 09:01

## 2024-01-18 RX ADMIN — LACTULOSE 20 G: 20 SOLUTION ORAL at 10:01

## 2024-01-18 RX ADMIN — RIFAXIMIN 550 MG: 550 TABLET ORAL at 08:01

## 2024-01-18 RX ADMIN — THERA TABS 1 TABLET: TAB at 10:01

## 2024-01-18 RX ADMIN — PANTOPRAZOLE SODIUM 40 MG: 40 TABLET, DELAYED RELEASE ORAL at 10:01

## 2024-01-18 RX ADMIN — PANTOPRAZOLE SODIUM 40 MG: 40 TABLET, DELAYED RELEASE ORAL at 08:01

## 2024-01-18 RX ADMIN — FOLIC ACID 1 MG: 1 TABLET ORAL at 10:01

## 2024-01-18 RX ADMIN — LACTULOSE 20 G: 20 SOLUTION ORAL at 04:01

## 2024-01-18 RX ADMIN — Medication 100 MG: at 10:01

## 2024-01-18 NOTE — PROGRESS NOTES
Hepatology Treatment Plan    Blu Deleon is a 58 y.o. male admitted to hospital 1/2/2024 (Hospital Day: 17) due to Leukocytosis.     Interval History  Patient seen this afternoon & he was sleeping.  Per wife, his mental status was at baseline and he was oriented x3.  Able to ambulate to the bathroom without assistance.      Med flight is  arranged for Monday.     Having 4-5 bowel movements daily on lactulose    MELD 40. Cr & T bili continue to worsen. Nephrology planning on dialysis tomorrow.       Objective  Temp:  [97.6 °F (36.4 °C)-98.6 °F (37 °C)] 97.6 °F (36.4 °C) (01/18 1016)  Pulse:  [69-84] 74 (01/18 1504)  BP: (112-137)/(58-74) 136/70 (01/18 1016)  Resp:  [16-18] 18 (01/18 1016)  SpO2:  [99 %-100 %] 100 % (01/18 1016)    Physical Exam:  Constitutional:  awake, alert, NAD. Ill appearing but non toxic.   HENT: Head: Normal, CPAP in place  Eyes:  scleral icterus +nt  Respiratory: normal chest expansion & respiratory effort and no accessory muscle use  GI: soft, non-tender, without masses or organomegaly  Skin:  jaundiced  Neurological: oriented x3      Laboratory    Recent Labs   Lab 01/18/24  0408   WBC 7.00   RBC 2.32*   HGB 7.8*   HCT 23.5*   PLT 39*   *   MCH 33.6*   MCHC 33.2      Recent Labs   Lab 01/18/24  0408   CALCIUM 8.4*   ALBUMIN 2.5*   PROT 5.1*   *   K 3.9   CO2 20*      BUN 59*   CREATININE 3.7*   ALKPHOS 160*   ALT 57*   *   BILITOT 22.6*      Recent Labs   Lab 01/18/24  0408   INR 2.9*        MELD 3.0: 43 at 1/18/2024  4:08 AM  MELD-Na: 42 at 1/18/2024  4:08 AM  Calculated from:  Serum Creatinine: 3.7 mg/dL (Using max of 3 mg/dL) at 1/18/2024  4:08 AM  Serum Sodium: 127 mmol/L at 1/18/2024  4:08 AM  Total Bilirubin: 22.6 mg/dL at 1/18/2024  4:08 AM  Serum Albumin: 2.5 g/dL at 1/18/2024  4:08 AM  INR(ratio): 2.9 at 1/18/2024  4:08 AM  Age at listing (hypothetical): 58 years  Sex: Male at 1/18/2024  4:08 AM     JERI negative  ASMA 1:40  IgG wnl  AMA negative  Viral  "hepatitis panel negative  Ceruloplasmin negative  Iron studies negative    Assessment and Plan    Blu Deleon is a 58 y.o. male with history of alcohol use disorder, HTN, and compensated EtOH cirrhosis who was admitted to [facility] on  with melena.   After resuscitation, EGD on 1/3/24 showed esophageal ulcers, erythematous mucosa in the antrum, and normal duodenum.  Hepatology now consulted for worsening bilirubin.     He hails from [place], visiting from the [facility]. The patient has known about his history of "early cirrhosis" since at least  when liver ultrasound confirmed cirrhosis.  He has continued to drink in spite of knowledge of liver disease and being told to stop; has consumed alcohol regularly since the age of 21.  Last drink 24. No illicit drug use.  Sister  of EtOH cirrhosis. PETH positive, 98. Fungitell -ve.     Case was discussed in committee  and deemed not to be a candidate for liver transplant evaluation due to ongoing alcohol use despite knowledge of liver disease       Problem List:  Newly decompensated EtOH cirrhosis  EtOH use disorder  Esophageal ulcers  Leukocytosis  Hepatic encephalopathy        Recommendations:  - We are not going to proceed with liver tx eval at this time since patient was aware of cirrhosis but persisted drinking. Discussed in transplant committee.  - Now accepted to [facility] in [place] to be closer to home. Concerned with very high mortality based on current trend in liver function. Med Flight on 24.  - Follow infectious workup: noted CXR shows possible viral process. Blood cultures NGTD  - continue cefepime and fluconazole per ID.   - Stopped baclofen. Avoid sedating medications  - Titrate lactulose to 3-4 BMs per day  - Continue rifaximin 550 mg BID  - Nephrology following for CODY, appreciate recs.They plan on HD tomorrow.   - low sodium, high protein diet  - Continue Protonix 40mg twice per day for 8 weeks total. " Repeat upper endoscopy in 8 weeks to check healing of his esophageal ulcers. He would like to have this done in [facility].   - Daily CBC, CMP, INR.       - We will continue to follow.    Thank you for involving us in the care of Blu Deleon. Please call with any additional questions, concerns or changes in the patient's clinical status. Plan of care discussed with attending Dr. He.    Lyle Thomas MD  Gastroenterology and Hepatology Fellow, PGY V

## 2024-01-18 NOTE — ASSESSMENT & PLAN NOTE
58 M with cirrhosis of unknown etiology (undergoing workup) admitted after GI bleed  On admission creatinine 1.2-1.6  At consult on 1/13: 2.0.    1/12 4.5 liter paracentesis    CODY: ddx includes previous NSAID use, large volume shift with paracentesis, HRS type 2 (though unlikely given BP), pyelo (++ bacteria on UA 1/15/24), abdominal compartment syndrome (tense abdomen), bladder obstruction  Low suspicion for HRS given BP     1/17/2024: Urine micro with RBC, some dysmorphic. No red cell casts or acanthocytes. Some granular and tubular cell casts.   Urine culture negative for growth      Plan/Recommendation:  -25 g albumin today  -will obtain dialysis consent today  -low placement  -Keep MAP > 65  -Keep hemoglobin > 7  -Strict ins and outs  -Avoid nephrotoxic agents if possible and renally dose medications  -Avoid drastic hemodynamic changes if possible

## 2024-01-18 NOTE — PLAN OF CARE
Problem: Adult Inpatient Plan of Care  Goal: Plan of Care Review  Outcome: Ongoing, Progressing  Goal: Patient-Specific Goal (Individualized)  Outcome: Ongoing, Progressing  Goal: Absence of Hospital-Acquired Illness or Injury  Outcome: Ongoing, Progressing  Goal: Optimal Comfort and Wellbeing  Outcome: Ongoing, Progressing  Goal: Readiness for Transition of Care  Outcome: Ongoing, Progressing     Problem: Fluid and Electrolyte Imbalance (Acute Kidney Injury/Impairment)  Goal: Fluid and Electrolyte Balance  Outcome: Ongoing, Progressing     Problem: Oral Intake Inadequate (Acute Kidney Injury/Impairment)  Goal: Optimal Nutrition Intake  Outcome: Ongoing, Progressing     Problem: Renal Function Impairment (Acute Kidney Injury/Impairment)  Goal: Effective Renal Function  Outcome: Ongoing, Progressing     Problem: Fall Injury Risk  Goal: Absence of Fall and Fall-Related Injury  Outcome: Ongoing, Progressing     Problem: Electrolyte Imbalance  Goal: Electrolyte Balance  Outcome: Ongoing, Progressing     Problem: Adjustment to Illness (Liver Failure)  Goal: Optimal Coping with Liver Failure  Outcome: Ongoing, Progressing     Problem: Fluid and Electrolyte Imbalance (Liver Failure)  Goal: Fluid and Electrolyte Balance  Outcome: Ongoing, Progressing     Problem: Gastrointestinal Complications (Liver Failure)  Goal: Optimal Gastrointestinal Function  Outcome: Ongoing, Progressing     Problem: Impaired Coagulation (Liver Failure)  Goal: Optimal Coagulation Function  Outcome: Ongoing, Progressing     Problem: Infection (Liver Failure)  Goal: Absence of Infection Signs and Symptoms  Outcome: Ongoing, Progressing     Problem: Neurologic Function Impaired (Liver Failure)  Goal: Optimal Neurologic Function  Outcome: Ongoing, Progressing     Problem: Oral Intake Inadequate (Liver Failure)  Goal: Improved Oral Intake  Outcome: Ongoing, Progressing     Problem: Pain (Liver Failure)  Goal: Optimal Pain Control  Outcome: Ongoing,  Progressing     Problem: Renal Dysfunction (Liver Failure)  Goal: Optimize Renal Function  Outcome: Ongoing, Progressing     Problem: Respiratory Compromise (Liver Failure)  Goal: Effective Oxygenation and Ventilation  Outcome: Ongoing, Progressing

## 2024-01-18 NOTE — SUBJECTIVE & OBJECTIVE
Interval History/Significant Events: Pt seen and examined this morning on rell WOODY. Patient continues to report feeling well with no complaints. He reports small urine output. Creatinine continues to worsen.  Concent for dialysis obtained.    Objective:     Vital Signs (Most Recent):  Temp: 97.2 °F (36.2 °C) (01/18/24 1555)  Pulse: 79 (01/18/24 1555)  Resp: 18 (01/18/24 1016)  BP: 122/70 (01/18/24 1555)  SpO2: 99 % (01/18/24 1555) Vital Signs (24h Range):  Temp:  [97.2 °F (36.2 °C)-98.6 °F (37 °C)] 97.2 °F (36.2 °C)  Pulse:  [69-84] 79  Resp:  [18] 18  SpO2:  [99 %-100 %] 99 %  BP: (112-136)/(58-70) 122/70   Weight: 97.6 kg (215 lb 2.7 oz)  Body mass index is 32.72 kg/m².    No intake or output data in the 24 hours ending 01/18/24 1643         Physical Exam  Gen: in NAD, appears stated age, jaundiced  Neuro: AAOx4, CN2-12 grossly intact BL; motor, sensory, and strength grossly intact BL  HEENT: + icteric sclera. NTNC, EOMI, PERRLA, MMM; no thyromegaly or lymphadenopathy; no JVD appreciated  CVS: RRR, no m/r/g; S1/S2 auscultated with no S3 or S4; capillary refill < 2 sec  Resp: lungs CTAB, no w/r/r; no belabored breathing or accessory muscle use appreciated   Abd: + Abdominal distension. BS+ in all 4 quadrants; NTND, soft to palpation; no organomegaly appreciated   Extrem: pulses full, equal, and regular over all 4 extremities; no UE or LE edema BL         Vents:  Oxygen Concentration (%): 21 (01/12/24 0041)  Lines/Drains/Airways       Peripheral Intravenous Line  Duration                  Peripheral IV - Single Lumen 01/03/24 1045 18 G;1 1/4 in Anterior;Left Upper Arm 15 days         Peripheral IV - Single Lumen 01/03/24 1400 18 G;1 1/4 in Anterior;Right Upper Arm 15 days                  Significant Labs:    CBC/Anemia Profile:  Recent Labs   Lab 01/17/24  0313 01/18/24  0408   WBC 7.37 7.00   HGB 8.0* 7.8*   HCT 23.9* 23.5*   PLT 40* 39*   * 101*   RDW 22.8* 22.5*        Chemistries:  Recent Labs   Lab  "01/17/24 0314 01/18/24 0408   * 127*   K 4.1 3.9    102   CO2 19* 20*   BUN 55* 59*   CREATININE 3.0* 3.7*   CALCIUM 8.6* 8.4*   ALBUMIN 2.4* 2.5*   PROT 5.1* 5.1*   BILITOT 23.0* 22.6*   ALKPHOS 164* 160*   ALT 61* 57*   * 126*   MG 2.3 2.4   PHOS 3.8 3.9       CMP:   Recent Labs   Lab 01/17/24 0314 01/18/24 0408   * 127*   K 4.1 3.9    102   CO2 19* 20*   GLU 97 103   BUN 55* 59*   CREATININE 3.0* 3.7*   CALCIUM 8.6* 8.4*   PROT 5.1* 5.1*   ALBUMIN 2.4* 2.5*   BILITOT 23.0* 22.6*   ALKPHOS 164* 160*   * 126*   ALT 61* 57*   ANIONGAP 9 5*     Coagulation:   Recent Labs   Lab 01/18/24 0408   INR 2.9*     Lactic Acid:   No results for input(s): "LACTATE" in the last 48 hours.        Significant Imaging:  I have reviewed all pertinent imaging results/findings within the past 24 hours.      "

## 2024-01-18 NOTE — PLAN OF CARE
[facility] - Med Surg  Discharge Reassessment    Primary Care Provider: Radha, Primary Doctor    Expected Discharge Date: 1/22/2024    GUNJAN in communication with pt and family regarding discharge plan.  Provided update. Family will have  wire funds today.  Pt's wife has information for wire transfer.      GUNJAN confirmed with [facility] and Dr. Rodriguez (Medical Director, [facility]) dialysis cath does not pose any special risk in flying.  Information communicated to Dr. Chung, Nephrology.     GUNJAN provided the names, weight, and number of luggage to [facility] Team via email.      SW will continue to follow and provide feedback.     Wire transfer completed.  Transfer number# [id number].      Discharge Plan A and Plan B have been determined by review of patient's clinical status, future medical and therapeutic needs, and coverage/benefits for post-acute care in coordination with multidisciplinary team members.         Reassessment (most recent)       Discharge Reassessment - 01/18/24 1141          Discharge Reassessment    Assessment Type Discharge Planning Reassessment     Did the patient's condition or plan change since previous assessment? Yes     Discharge Plan discussed with: Patient;Adult children;Spouse/sig other     Name(s) and Number(s) Thuy Deleon-Spouse [phone]     Communicated MILKA with patient/caregiver Yes     Discharge Plan A Hospital Transfer     Discharge Plan B Hospital Transfer     DME Needed Upon Discharge  none     Transition of Care Barriers None     Why the patient remains in the hospital Requires continued medical care        Post-Acute Status    Post-Acute Authorization Other     Coverage Generic [employer]     Other Status See Comments   Pt will transfer to [facility].    Discharge Delays None known at this time                   Mirian Wells LMSW  Part-Time-  [facility]  Ext. [phone]

## 2024-01-18 NOTE — PROGRESS NOTES
[facility] Medicine  Progress Note    Patient Name: Blu Deleon  MRN: [id number]  Patient Class: IP- Inpatient   Admission Date: 1/2/2024  Length of Stay: 15 days  Attending Physician: Bailey Cline MD  Primary Care Provider: Radha, Primary Doctor        Subjective:     Principal Problem:Leukocytosis        HPI:  Mr. Deleon is a 58 year old male with PMH notable for HTN, ETOH use, reported cirrhosis (has not seen a hepatologist), and depression who presented to [facility] ED with acute GI bleeding. In speaking with patient and wife at bedside, patient reports four days of nausea and vomiting with bright red blood and black tarry diarrhea. Additionally, he reports a fall yesterday after attempting to go to the restroom secondary to being lightheaded. He denies this happening previously. He was scheduled for an outpatient EGD, which has not been completed. He has not had a colonoscopy. He denies any anticoagulation or antiplatelet use.      In the emergency department he was found to be acutely anemic with a hgb of 5. He has not been hypotensive while in the emergency department, however, notably tachycardic with -140. Labs otherwise notable for WBC 13.56, Plt 107, INR 2.4, BUN 57, Cr 1.6, TB 5.4, , ALT 44. Initial lactate >12. ETOH level <10. Patient is s/p 3u pRBCs.    Admitted to MICU for UGIB.      Overview/Hospital Course:  Patient admitted for UGIB. GI consulted and EGD showed esophageal ulcers. Pt to continue PPI for 8 weeks with repeat scope to follow. Hgb stable s/p 3u PRBC and 1u FFP. HDS and tolerating clear liquid diet. CIWA ordered and no ativan required.  Patient was transferred to floors on 01/05.  Creatinine has remained stable at 1.4.  T bilirubin trending up.  Hepatology was consulted in the setting of decompensated liver cirrhosis.  Ultrasound right upper quadrant with Dopplers, PEth, AFP, serological workup, and acute hepatitis panel.  Patient underwent  paracentesis on 01/08 and ascitic fluid negative for SBP.  As patient is not a candidate for transplant and with his high MELD score he would require air transfer to [place] where he can go to the hospital close to his home. Accepted for transfer to [facility] in [place], awaiting logistics of flight transport.    Patient was doing well the morning of 1/12, however after a walk to the bathroom he started to have chills and rigors. He was started on CTX for concern for possible SBP. Repeat CBC with stable Hgb and WBC WNL. Paracentesis performed with IR on 1/12 with 4.2L out. Ascitic labs with no evidence of SBP, and ascitic cultures NGTD. Blood cultures 1/12 NGTD. Had some RUQ discomfort and RUQ US ordered with gallbladder wall thickening with possible signs of cholecystitis. CXR obtained without any signs of pneumonia however possible small amount of edema vs viral. Covid/Influenza negative.     On 1/13, WBC elevated, patient remains afebrile and normotensive. Abdominal exam benign, negative Sen's sign. UA non-infectious. Antibiotics broadened to Vanc/cefe with low threshold for micafungin per hepatology. CT-CAP without evidence of infection. Patient appeared much better morning of 1/14, however became somnolent and Broadened to Micafungin and ID consulted. Patient improved and ID consulted. Vanc discontinued and cultures/infectious work up remained inconclusive. ID recommending 7 days of cefepime and 7 days of fluconazole. Unfortunately, kidney function continues to worsen. UA/urine sediment as well as elevated/normotensive blood pressures not in line with HRS. Unclear if would need a biopsy , nephrology planning for dialysis in the future if kidney function does not improve.. S/p therapeutic LP on 1/17. At this time patients stable for transfer to [place] via flight.  But unfortunately patient was declined by [facility] at [place].    Interval History/Significant Events: Pt seen and examined this  morning on rounds. ANNALISE. Patient continues to report feeling well with no complaints. He reports small urine output. Creatinine continues to worsen.  Concent for dialysis obtained.    Objective:     Vital Signs (Most Recent):  Temp: 97.2 °F (36.2 °C) (01/18/24 1555)  Pulse: 79 (01/18/24 1555)  Resp: 18 (01/18/24 1016)  BP: 122/70 (01/18/24 1555)  SpO2: 99 % (01/18/24 1555) Vital Signs (24h Range):  Temp:  [97.2 °F (36.2 °C)-98.6 °F (37 °C)] 97.2 °F (36.2 °C)  Pulse:  [69-84] 79  Resp:  [18] 18  SpO2:  [99 %-100 %] 99 %  BP: (112-136)/(58-70) 122/70   Weight: 97.6 kg (215 lb 2.7 oz)  Body mass index is 32.72 kg/m².    No intake or output data in the 24 hours ending 01/18/24 1643         Physical Exam  Gen: in NAD, appears stated age, jaundiced  Neuro: AAOx4, CN2-12 grossly intact BL; motor, sensory, and strength grossly intact BL  HEENT: + icteric sclera. NTNC, EOMI, PERRLA, MMM; no thyromegaly or lymphadenopathy; no JVD appreciated  CVS: RRR, no m/r/g; S1/S2 auscultated with no S3 or S4; capillary refill < 2 sec  Resp: lungs CTAB, no w/r/r; no belabored breathing or accessory muscle use appreciated   Abd: + Abdominal distension. BS+ in all 4 quadrants; NTND, soft to palpation; no organomegaly appreciated   Extrem: pulses full, equal, and regular over all 4 extremities; no UE or LE edema BL         Vents:  Oxygen Concentration (%): 21 (01/12/24 0041)  Lines/Drains/Airways       Peripheral Intravenous Line  Duration                  Peripheral IV - Single Lumen 01/03/24 1045 18 G;1 1/4 in Anterior;Left Upper Arm 15 days         Peripheral IV - Single Lumen 01/03/24 1400 18 G;1 1/4 in Anterior;Right Upper Arm 15 days                  Significant Labs:    CBC/Anemia Profile:  Recent Labs   Lab 01/17/24  0313 01/18/24  0408   WBC 7.37 7.00   HGB 8.0* 7.8*   HCT 23.9* 23.5*   PLT 40* 39*   * 101*   RDW 22.8* 22.5*        Chemistries:  Recent Labs   Lab 01/17/24 0314 01/18/24  0408   * 127*   K 4.1 3.9   CL  "104 102   CO2 19* 20*   BUN 55* 59*   CREATININE 3.0* 3.7*   CALCIUM 8.6* 8.4*   ALBUMIN 2.4* 2.5*   PROT 5.1* 5.1*   BILITOT 23.0* 22.6*   ALKPHOS 164* 160*   ALT 61* 57*   * 126*   MG 2.3 2.4   PHOS 3.8 3.9       CMP:   Recent Labs   Lab 01/17/24  0314 01/18/24  0408   * 127*   K 4.1 3.9    102   CO2 19* 20*   GLU 97 103   BUN 55* 59*   CREATININE 3.0* 3.7*   CALCIUM 8.6* 8.4*   PROT 5.1* 5.1*   ALBUMIN 2.4* 2.5*   BILITOT 23.0* 22.6*   ALKPHOS 164* 160*   * 126*   ALT 61* 57*   ANIONGAP 9 5*     Coagulation:   Recent Labs   Lab 01/18/24  0408   INR 2.9*     Lactic Acid:   No results for input(s): "LACTATE" in the last 48 hours.        Significant Imaging:  I have reviewed all pertinent imaging results/findings within the past 24 hours.        Assessment/Plan:      * Leukocytosis  Patient with new onset chills/rigors 1/12. Now with leukocytosis, remains afebrile and normotensive (previously normotensive/hypertensive).  Infectious work up:  - CXR obtained without any signs of pneumonia however possible small amount of edema vs viral component. Covid/Influenza negative.   - UA non-infectious.   - He was initially started on CTX for concern for possible SBP.    - Paracentesis performed with IR on 1/12 with 4.2L out. Ascitic labs with no evidence of SBP, and ascitic cultures NGTD.   - Blood cultures 1/12 NGTD  - Had some RUQ discomfort 1/12 and RUQ US showed gallbladder wall thickening with possible signs of cholecystitis. No abdominal pain today and negative Sen's sign  - Broaded from CTX to Vanc/cefe with low threshold to start micafungin per hepatology  - CT-CAP without evidence of infection, no concern for cholecystitis    - Had another episode of rigors and temp renetta to 100.0 on 1/14. Remains HDS.  - Started on Micafungin  - ID consulted and recommending DC vanc, continue cefepime x7 days and switching micafungin to fluconazole for 7 day treatment  - Fungitell assay " negative    Transaminitis  - 2:1 pattern; likely 2/2 ETOH use d/o.   - LFT's stable/improving  - T bili worsening  - Will continue to monitor        Thrombocytopenia  Etiology likely 2/2 cirrhosis  - Monitor w/ daily CBC and transfuse if Plt <10 or <50 w/ signs of active bleeding   Recent Labs   Lab 01/18/24  0408   PLT 39*         CODY (acute kidney injury)  - Cr 1.6 at admission, unclear baseline  - Initially likely pre-renal in setting of GIB.  - Now with slow elevation of creatinine  - on albumin 50 g t.i.d..  - S/p 48 hours albumin  - Cr 1.6 -> 2.0 -> 2.2 -> 2.5 -> 3.0-->3.7  - Nephrology consulted, urine studies sent, uric acid ordered   - 1500ml fluid restriction   - May need HRS treatment, though BP remains elevated and urine sediment does not appear to be inline with HRS   - Autoimmune panel sent and pending  - Discussed with family that if continued worsening renal function, dialysis may need to be initiated  - Renally dosing all medications  - Avoiding nephrotoxins  - Will continue to monitor on daily labs    Alcohol use disorder  - Last alcoholic drink on 1/01. Alcohol level <10 on admission. Denies history of withdrawal in the past including seizures. Remains tachycardic but likely 2/2 acute GIB. Will continue to monitor for signs of withdrawal w/ CIWA protocol and start benzodiazepines if indicated.   - thiamine, folic acid, MV  - DC CIWA protocol as now out of withdrawal window       Coagulopathy  - INR elevated in setting of cirrhosis  - S/p IV Vit K for x3 days.       Cirrhosis  Patient is visiting from out of town; outside records not available to review thus etiology of cirrhosis not confirmed. However, patient does have a history of ETOH use d/o so this is included in the differential. States he is currently in the process of being referred to a hepatologist and having an EGD scheduled.     MELD-Na score calculated; MELD 3.0: 43 at 1/18/2024  4:08 AM  MELD-Na: 42 at 1/18/2024  4:08 AM  Calculated  from:  Serum Creatinine: 3.7 mg/dL (Using max of 3 mg/dL) at 1/18/2024  4:08 AM  Serum Sodium: 127 mmol/L at 1/18/2024  4:08 AM  Total Bilirubin: 22.6 mg/dL at 1/18/2024  4:08 AM  Serum Albumin: 2.5 g/dL at 1/18/2024  4:08 AM  INR(ratio): 2.9 at 1/18/2024  4:08 AM  Age at listing (hypothetical): 58 years  Sex: Male at 1/18/2024  4:08 AM    Continue chronic meds. Etiology likely ETOH. Will avoid any hepatotoxic meds, and monitor CBC/CMP/INR for synthetic function.   T bilirubin trending up. Hepatology was consulted in the setting of decompensated liver cirrhosis. Ultrasound right upper quadrant with Dopplers, PEth, AFP, serological workup, and acute hepatitis panel. Cirrhotic morphology of the liver with sequela of portal hypertension as detailed above. No discrete hepatic lesions identified. Biliary sludge with thickened gallbladder wall, likely secondary to hepatic dysfunction.  Underwent paracentesis.  As patient is not a candidate for transplant and with his high MELD score he would require air transfer to  [place] where he can go to the hospital close to his home. On lactulose and albumin per hepatology recs.   - Continue lactulose goal 3 BM's daily  - S/p albumin 50g BID for 48 hours  - on Rifaximin  - S/p IV Vit K x3 doses x2  - Repeat paracentesis 1/12 as detailed above  - underwent  LVP with IR on1/17    Acute upper GI bleed  58 year old M w/ cirrhosis and ETOH use d/o presenting w/ melana and ground coffee emesis/hematemesis. Denies prior symptoms. No EGDs on file. Normotensive and tachycardic w/ HR 130s. Initial Hgb 5.0; now s/p 2u pRBCs. Initial lactate >12. Labs otherwise notable for Plt 116, INR 2.4, BUN 57, Cr 1.6, TB 5.4, , ALT 44. Admitted to MICU for management of acute upper GIB.      EGD showed esophageal ulcers. Hgb stable s/p 3u PRBC. Will need scope after 8 week course of PPI.     - Soft and bite-sized diet, advance as tolerated  - Trend Hgb and transfuse for goal Hgb > 7, unless  otherwise indicated  - Maintain IV access with 2 large bore IV's  - Intravascular resuscitation/support with IVF's   - Hold all NSAIDs and anticoagulants, unless contraindicated  - Pantoprazole 40 mg BID for a total of 8 weeks  - Correct any coagulopathy with platelets and FFP for goal of platelets >50K and INR <2.0  - CTM CBC    - Hgb stable, BM's without evidence of melena      VTE Risk Mitigation (From admission, onward)           Ordered     Place sequential compression device  Until discontinued         01/03/24 0256     IP VTE LOW RISK PATIENT  Once         01/03/24 0256                    Discharge Planning   MILKA: 1/22/2024     Code Status: Full Code   Is the patient medically ready for discharge?: No    Reason for patient still in hospital (select all that apply): Patient trending condition, Laboratory test, Treatment, Consult recommendations, and Pending disposition  Discharge Plan A: Hospital Transfer   Discharge Delays: None known at this time              Bailey Cline MD  Department of Hospital Medicine   [facility]

## 2024-01-18 NOTE — ASSESSMENT & PLAN NOTE
Etiology likely 2/2 cirrhosis  - Monitor w/ daily CBC and transfuse if Plt <10 or <50 w/ signs of active bleeding   Recent Labs   Lab 01/18/24  0408   PLT 39*        Laparoscopic Cholecystectomy D/C instructions:    1. DIET: Upon discharge from the hospital you may resume your normal preoperative diet. Depending on how you are feeling and whether you have nausea or not, you may wish to stay with a bland diet for the first few days. However, you can advance this as quickly as you feel ready.    2. ACTIVITIES: After discharge from the hospital, you may resume full routine activities. However, there should be no heavy lifting (greater than 15 pounds) and no strenuous activities until after your follow-up visit. Otherwise, routine activities of daily living are acceptable.    3. DRIVING: You may drive whenever you are off pain medications and are able to perform the activities needed to drive, i.e. turning, bending, twisting, etc.    4. BATHING: You may get the wound wet at any time after leaving the hospital. You may shower, but do not submerge in a bath for at least a week. Dressings may come off after 48 hours.    5. BOWEL FUNCTION: A few patients, after this operation, will develop either frequent or loose stools after meals. This usually corrects itself after a few days, to a few weeks. If this occurs, do not worry; it is not unusual and will resolve. Much more common than loose stools, is constipation. The combination of pain medication and decreased activity level can cause constipation in otherwise normal patients. If you feel this is occurring, take a laxative (Milk of Magnesia, Ex-Lax, Senokot, etc.) until the problem has resolved.    6. PAIN MEDICATION: You will be given a prescription for pain medication at discharge. Please take these as directed. It is important to remember not to take medications on an empty stomach as this may cause nausea.     It is also helpful to take other non-narcotic over the counter medications to help with pain control and to decrease the need for narcotic medications. I recommend ibuprofen, taken every 8 hours, dosing as directed on the  medication bottle.    It is useful to slowly decrease the number of narcotic pain medications you take starting the first day after surgery, as you are able. If you need a refill, Dr. Enamorado's office will provide you with one refill at your post-operative visit. After this, no more narcotic pain medications will be given.     7.CALL IF YOU HAVE: (1) Fevers to more than 1010 F, (2) Unusual chest or leg pain, (3) Drainage or fluid from incision that may be foul smelling, increased tenderness or soreness at the wound or the wound edges are no longer together, redness or swelling at the incision site. Please do not hesitate to call with any other questions.     8. APPOINTMENT: Contact our office at [phone] for a follow-up appointment in 1 to 2 weeks following your procedure.    If you have any additional questions, please do not hesitate to call the office and speak to either myself or the physician on call.    Office address:  [address] [place] [address] [address]  [place] [id number]    Astrid Enamorado M.D.  [facility]  [phone]

## 2024-01-18 NOTE — PROGRESS NOTES
[facility]  Nephrology  Progress Note    Patient Name: Blu Deleon  MRN: [id number]  Admission Date: 1/2/2024  Hospital Length of Stay: 15 days  Attending Provider: Bailey Cline MD   Primary Care Physician: Radha, Primary Doctor  Principal Problem:Leukocytosis    Subjective:     HPI: 58 year old man with a history of HTN, suspected EtOH cirrhosis (currently undergoing workup), depression admitted to [facility] for GI bleed with bright red blood per rectum with black tarry stools as well. He has never seen a neprhologyist and is visiting [place] the [place]. Wife at bedside reports that he was not feeling well and had been taking tylenol/alleve for some weeks before but at an unknown dose.     They do not know what his baseline creatinine is, however on admission to [facility], creatinine hovered around 1.3 to 1.6. Subsequently he underwent a 4.5 liter paracentesis with a resulting increase in creatinine to 2.0. Urine sodium 1/11 was <10. Blood pressures 130-160 systolics.     Nephrology consulted for CODY    Interval History: no acute events overnight; however 4 liter para without albumin administered. Scr worsening again today    Review of patient's allergies indicates:  No Known Allergies  Current Facility-Administered Medications   Medication Frequency    0.9%  NaCl infusion (for blood administration) Q24H PRN    albumin human 25% bottle 12.5 g Once    ceFEPIme (MAXIPIME) 2 g in dextrose 5 % in water (D5W) 100 mL IVPB (MB+) Q24H    EScitalopram oxalate tablet 10 mg Daily    fluconazole tablet 200 mg Daily    folic acid tablet 1 mg Daily    hydrALAZINE injection 10 mg Q6H PRN    lactulose 20 gram/30 mL solution Soln 20 g TID    multivitamin tablet Daily    pantoprazole EC tablet 40 mg BID    rifAXIMin tablet 550 mg BID    sodium chloride 0.9% flush 10 mL PRN    thiamine tablet 100 mg Daily       Objective:     Vital Signs (Most Recent):  Temp: 98.6 °F (37 °C) (01/18/24 0536)  Pulse: 72 (01/18/24  0536)  Resp: 18 (01/18/24 0536)  BP: (!) 130/59 (01/18/24 0536)  SpO2: 100 % (01/18/24 0536) Vital Signs (24h Range):  Temp:  [97.6 °F (36.4 °C)-98.6 °F (37 °C)] 98.6 °F (37 °C)  Pulse:  [69-86] 72  Resp:  [16-18] 18  SpO2:  [99 %-100 %] 100 %  BP: (112-139)/(58-74) 130/59     Weight: 97.6 kg (215 lb 2.7 oz) (01/03/24 0855)  Body mass index is 32.72 kg/m².  Body surface area is 2.16 meters squared.    I/O last 3 completed shifts:  In: -   Out: 3800 [Other:3800]     Physical Exam  Constitutional:       General: He is not in acute distress.     Appearance: He is obese. He is ill-appearing. He is not toxic-appearing.   HENT:      Head: Normocephalic and atraumatic.      Nose: Nose normal.      Mouth/Throat:      Mouth: Mucous membranes are moist.   Eyes:      General: Scleral icterus present.   Cardiovascular:      Rate and Rhythm: Normal rate.      Heart sounds: No murmur heard.  Pulmonary:      Effort: Pulmonary effort is normal. No respiratory distress.      Breath sounds: No wheezing or rales.   Abdominal:      General: Abdomen is flat. There is distension.   Musculoskeletal:      Cervical back: Normal range of motion. No rigidity.      Right lower leg: Edema present.      Left lower leg: Edema present.   Lymphadenopathy:      Cervical: No cervical adenopathy.   Skin:     General: Skin is warm.      Coloration: Skin is jaundiced.      Findings: Bruising present. No lesion.   Neurological:      Mental Status: He is alert.          Significant Labs:  All labs within the past 24 hours have been reviewed.     Significant Imaging:  Labs: Reviewed  Assessment/Plan:     Renal/  CODY (acute kidney injury)  58 M with cirrhosis of unknown etiology (undergoing workup) admitted after GI bleed  On admission creatinine 1.2-1.6  At consult on 1/13: 2.0.    1/12 4.5 liter paracentesis    CODY: ddx includes previous NSAID use, large volume shift with paracentesis, HRS type 2 (though unlikely given BP), pyelo (++ bacteria on UA  1/15/24), abdominal compartment syndrome (tense abdomen), bladder obstruction  Low suspicion for HRS given BP     1/17/2024: Urine micro with RBC, some dysmorphic. No red cell casts or acanthocytes. Some granular and tubular cell casts.   Urine culture negative for growth      Plan/Recommendation:  -25 g albumin today  -will obtain dialysis consent today  -low placement  -Keep MAP > 65  -Keep hemoglobin > 7  -Strict ins and outs  -Avoid nephrotoxic agents if possible and renally dose medications  -Avoid drastic hemodynamic changes if possible            Thank you for your consult. I will follow-up with patient. Please contact us if you have any additional questions.    Chip Chung MD  Nephrology  [facility]

## 2024-01-18 NOTE — PROGRESS NOTES
Pharmacist Renal Dose Adjustment Note    Blu Deleon is a 58 y.o. male being treated with the medication cefepime    Patient Data:    Vital Signs (Most Recent):  Temp: 98.6 °F (37 °C) (01/18/24 0536)  Pulse: 72 (01/18/24 0536)  Resp: 18 (01/18/24 0536)  BP: (!) 130/59 (01/18/24 0536)  SpO2: 100 % (01/18/24 0536) Vital Signs (72h Range):  Temp:  [97.4 °F (36.3 °C)-98.6 °F (37 °C)]   Pulse:  [69-94]   Resp:  [16-20]   BP: (112-146)/(58-75)   SpO2:  [93 %-100 %]      Recent Labs   Lab 01/16/24  0432 01/17/24  0314 01/18/24  0408   CREATININE 2.5* 3.0* 3.7*     Serum creatinine: 3.7 mg/dL (H) 01/18/24 0408  Estimated creatinine clearance: 25 mL/min (A)    Cefepime 2 g IV q12h will be changed to 2 g IV q24h with the same end date    Pharmacist's Name: Wilson Liu, PharmD, BCPS   Pharmacist's Extension: [phone]

## 2024-01-18 NOTE — ASSESSMENT & PLAN NOTE
Patient is visiting from out of town; outside records not available to review thus etiology of cirrhosis not confirmed. However, patient does have a history of ETOH use d/o so this is included in the differential. States he is currently in the process of being referred to a hepatologist and having an EGD scheduled.     MELD-Na score calculated; MELD 3.0: 43 at 1/18/2024  4:08 AM  MELD-Na: 42 at 1/18/2024  4:08 AM  Calculated from:  Serum Creatinine: 3.7 mg/dL (Using max of 3 mg/dL) at 1/18/2024  4:08 AM  Serum Sodium: 127 mmol/L at 1/18/2024  4:08 AM  Total Bilirubin: 22.6 mg/dL at 1/18/2024  4:08 AM  Serum Albumin: 2.5 g/dL at 1/18/2024  4:08 AM  INR(ratio): 2.9 at 1/18/2024  4:08 AM  Age at listing (hypothetical): 58 years  Sex: Male at 1/18/2024  4:08 AM    Continue chronic meds. Etiology likely ETOH. Will avoid any hepatotoxic meds, and monitor CBC/CMP/INR for synthetic function.   T bilirubin trending up. Hepatology was consulted in the setting of decompensated liver cirrhosis. Ultrasound right upper quadrant with Dopplers, PEth, AFP, serological workup, and acute hepatitis panel. Cirrhotic morphology of the liver with sequela of portal hypertension as detailed above. No discrete hepatic lesions identified. Biliary sludge with thickened gallbladder wall, likely secondary to hepatic dysfunction.  Underwent paracentesis.  As patient is not a candidate for transplant and with his high MELD score he would require air transfer to  [place] where he can go to the hospital close to his home. On lactulose and albumin per hepatology recs.   - Continue lactulose goal 3 BM's daily  - S/p albumin 50g BID for 48 hours  - on Rifaximin  - S/p IV Vit K x3 doses x2  - Repeat paracentesis 1/12 as detailed above  - underwent  LVP with IR on1/17

## 2024-01-18 NOTE — SUBJECTIVE & OBJECTIVE
Interval History: no acute events overnight; however 4 liter para without albumin administered. Scr worsening again today    Review of patient's allergies indicates:  No Known Allergies  Current Facility-Administered Medications   Medication Frequency    0.9%  NaCl infusion (for blood administration) Q24H PRN    albumin human 25% bottle 12.5 g Once    ceFEPIme (MAXIPIME) 2 g in dextrose 5 % in water (D5W) 100 mL IVPB (MB+) Q24H    EScitalopram oxalate tablet 10 mg Daily    fluconazole tablet 200 mg Daily    folic acid tablet 1 mg Daily    hydrALAZINE injection 10 mg Q6H PRN    lactulose 20 gram/30 mL solution Soln 20 g TID    multivitamin tablet Daily    pantoprazole EC tablet 40 mg BID    rifAXIMin tablet 550 mg BID    sodium chloride 0.9% flush 10 mL PRN    thiamine tablet 100 mg Daily       Objective:     Vital Signs (Most Recent):  Temp: 98.6 °F (37 °C) (01/18/24 0536)  Pulse: 72 (01/18/24 0536)  Resp: 18 (01/18/24 0536)  BP: (!) 130/59 (01/18/24 0536)  SpO2: 100 % (01/18/24 0536) Vital Signs (24h Range):  Temp:  [97.6 °F (36.4 °C)-98.6 °F (37 °C)] 98.6 °F (37 °C)  Pulse:  [69-86] 72  Resp:  [16-18] 18  SpO2:  [99 %-100 %] 100 %  BP: (112-139)/(58-74) 130/59     Weight: 97.6 kg (215 lb 2.7 oz) (01/03/24 0855)  Body mass index is 32.72 kg/m².  Body surface area is 2.16 meters squared.    I/O last 3 completed shifts:  In: -   Out: 3800 [Other:3800]     Physical Exam  Constitutional:       General: He is not in acute distress.     Appearance: He is obese. He is ill-appearing. He is not toxic-appearing.   HENT:      Head: Normocephalic and atraumatic.      Nose: Nose normal.      Mouth/Throat:      Mouth: Mucous membranes are moist.   Eyes:      General: Scleral icterus present.   Cardiovascular:      Rate and Rhythm: Normal rate.      Heart sounds: No murmur heard.  Pulmonary:      Effort: Pulmonary effort is normal. No respiratory distress.      Breath sounds: No wheezing or rales.   Abdominal:      General:  Abdomen is flat. There is distension.   Musculoskeletal:      Cervical back: Normal range of motion. No rigidity.      Right lower leg: Edema present.      Left lower leg: Edema present.   Lymphadenopathy:      Cervical: No cervical adenopathy.   Skin:     General: Skin is warm.      Coloration: Skin is jaundiced.      Findings: Bruising present. No lesion.   Neurological:      Mental Status: He is alert.          Significant Labs:  All labs within the past 24 hours have been reviewed.     Significant Imaging:  Labs: Reviewed

## 2024-01-18 NOTE — ASSESSMENT & PLAN NOTE
- Cr 1.6 at admission, unclear baseline  - Initially likely pre-renal in setting of GIB.  - Now with slow elevation of creatinine  - on albumin 50 g t.i.d..  - S/p 48 hours albumin  - Cr 1.6 -> 2.0 -> 2.2 -> 2.5 -> 3.0-->3.7  - Nephrology consulted, urine studies sent, uric acid ordered   - 1500ml fluid restriction   - May need HRS treatment, though BP remains elevated and urine sediment does not appear to be inline with HRS   - Autoimmune panel sent and pending  - Discussed with family that if continued worsening renal function, dialysis may need to be initiated  - Renally dosing all medications  - Avoiding nephrotoxins  - Will continue to monitor on daily labs

## 2024-01-19 PROBLEM — Z51.5 PALLIATIVE CARE ENCOUNTER: Status: ACTIVE | Noted: 2024-01-19

## 2024-01-19 PROBLEM — Z71.89 ACP (ADVANCE CARE PLANNING): Status: ACTIVE | Noted: 2024-01-19

## 2024-01-19 LAB
ALBUMIN SERPL BCP-MCNC: 3.2 G/DL (ref 3.5–5.2)
ALP SERPL-CCNC: 159 U/L (ref 55–135)
ALT SERPL W/O P-5'-P-CCNC: 45 U/L (ref 10–44)
ANION GAP SERPL CALC-SCNC: 9 MMOL/L (ref 8–16)
ANISOCYTOSIS BLD QL SMEAR: SLIGHT
AST SERPL-CCNC: 106 U/L (ref 10–40)
BACTERIA SPEC ANAEROBE CULT: NORMAL
BACTERIA UR CULT: NO GROWTH
BASOPHILS # BLD AUTO: ABNORMAL K/UL (ref 0–0.2)
BASOPHILS NFR BLD: 0 % (ref 0–1.9)
BILIRUB SERPL-MCNC: 23.6 MG/DL (ref 0.1–1)
BUN SERPL-MCNC: 69 MG/DL (ref 6–20)
BURR CELLS BLD QL SMEAR: ABNORMAL
CALCIUM SERPL-MCNC: 9.1 MG/DL (ref 8.7–10.5)
CHLORIDE SERPL-SCNC: 104 MMOL/L (ref 95–110)
CO2 SERPL-SCNC: 19 MMOL/L (ref 23–29)
CREAT SERPL-MCNC: 4 MG/DL (ref 0.5–1.4)
DIFFERENTIAL METHOD BLD: ABNORMAL
EOSINOPHIL # BLD AUTO: ABNORMAL K/UL (ref 0–0.5)
EOSINOPHIL NFR BLD: 3 % (ref 0–8)
ERYTHROCYTE [DISTWIDTH] IN BLOOD BY AUTOMATED COUNT: 22.5 % (ref 11.5–14.5)
EST. GFR  (NO RACE VARIABLE): 16.6 ML/MIN/1.73 M^2
GLUCOSE SERPL-MCNC: 92 MG/DL (ref 70–110)
HBV SURFACE AB SER-ACNC: <3 MIU/ML
HBV SURFACE AB SER-ACNC: NORMAL M[IU]/ML
HCT VFR BLD AUTO: 22.7 % (ref 40–54)
HGB BLD-MCNC: 7.5 G/DL (ref 14–18)
HYPOCHROMIA BLD QL SMEAR: ABNORMAL
IMM GRANULOCYTES # BLD AUTO: ABNORMAL K/UL (ref 0–0.04)
IMM GRANULOCYTES NFR BLD AUTO: ABNORMAL % (ref 0–0.5)
INR PPP: 3.1 (ref 0.8–1.2)
LYMPHOCYTES # BLD AUTO: ABNORMAL K/UL (ref 1–4.8)
LYMPHOCYTES NFR BLD: 10 % (ref 18–48)
MAGNESIUM SERPL-MCNC: 2.3 MG/DL (ref 1.6–2.6)
MCH RBC QN AUTO: 33.5 PG (ref 27–31)
MCHC RBC AUTO-ENTMCNC: 33 G/DL (ref 32–36)
MCV RBC AUTO: 101 FL (ref 82–98)
MONOCYTES # BLD AUTO: ABNORMAL K/UL (ref 0.3–1)
MONOCYTES NFR BLD: 5 % (ref 4–15)
MYELOCYTES NFR BLD MANUAL: 1 %
NEUTROPHILS NFR BLD: 81 % (ref 38–73)
NRBC BLD-RTO: 0 /100 WBC
OVALOCYTES BLD QL SMEAR: ABNORMAL
PHOSPHATE SERPL-MCNC: 4.4 MG/DL (ref 2.7–4.5)
PLATELET # BLD AUTO: 27 K/UL (ref 150–450)
PMV BLD AUTO: 12.6 FL (ref 9.2–12.9)
POIKILOCYTOSIS BLD QL SMEAR: SLIGHT
POLYCHROMASIA BLD QL SMEAR: ABNORMAL
POTASSIUM SERPL-SCNC: 4.1 MMOL/L (ref 3.5–5.1)
PROT SERPL-MCNC: 5.6 G/DL (ref 6–8.4)
PROTHROMBIN TIME: 31.1 SEC (ref 9–12.5)
RBC # BLD AUTO: 2.24 M/UL (ref 4.6–6.2)
SODIUM SERPL-SCNC: 132 MMOL/L (ref 136–145)
SPHEROCYTES BLD QL SMEAR: ABNORMAL
WBC # BLD AUTO: 7.21 K/UL (ref 3.9–12.7)

## 2024-01-19 PROCEDURE — 94660 CPAP INITIATION&MGMT: CPT

## 2024-01-19 PROCEDURE — 36415 COLL VENOUS BLD VENIPUNCTURE: CPT | Performed by: STUDENT IN AN ORGANIZED HEALTH CARE EDUCATION/TRAINING PROGRAM

## 2024-01-19 PROCEDURE — 25000003 PHARM REV CODE 250: Performed by: INTERNAL MEDICINE

## 2024-01-19 PROCEDURE — 94761 N-INVAS EAR/PLS OXIMETRY MLT: CPT

## 2024-01-19 PROCEDURE — 36415 COLL VENOUS BLD VENIPUNCTURE: CPT | Mod: XB | Performed by: STUDENT IN AN ORGANIZED HEALTH CARE EDUCATION/TRAINING PROGRAM

## 2024-01-19 PROCEDURE — 86480 TB TEST CELL IMMUN MEASURE: CPT | Performed by: STUDENT IN AN ORGANIZED HEALTH CARE EDUCATION/TRAINING PROGRAM

## 2024-01-19 PROCEDURE — 99232 SBSQ HOSP IP/OBS MODERATE 35: CPT | Mod: ,,, | Performed by: INTERNAL MEDICINE

## 2024-01-19 PROCEDURE — 27100171 HC OXYGEN HIGH FLOW UP TO 24 HOURS

## 2024-01-19 PROCEDURE — 80053 COMPREHEN METABOLIC PANEL: CPT | Performed by: NURSE PRACTITIONER

## 2024-01-19 PROCEDURE — 25000003 PHARM REV CODE 250: Performed by: STUDENT IN AN ORGANIZED HEALTH CARE EDUCATION/TRAINING PROGRAM

## 2024-01-19 PROCEDURE — 99900035 HC TECH TIME PER 15 MIN (STAT)

## 2024-01-19 PROCEDURE — 86580 TB INTRADERMAL TEST: CPT | Performed by: STUDENT IN AN ORGANIZED HEALTH CARE EDUCATION/TRAINING PROGRAM

## 2024-01-19 PROCEDURE — P9047 ALBUMIN (HUMAN), 25%, 50ML: HCPCS | Mod: JZ,JG

## 2024-01-19 PROCEDURE — 99233 SBSQ HOSP IP/OBS HIGH 50: CPT | Mod: ,,, | Performed by: HOSPITALIST

## 2024-01-19 PROCEDURE — 86706 HEP B SURFACE ANTIBODY: CPT | Performed by: STUDENT IN AN ORGANIZED HEALTH CARE EDUCATION/TRAINING PROGRAM

## 2024-01-19 PROCEDURE — 83735 ASSAY OF MAGNESIUM: CPT | Performed by: NURSE PRACTITIONER

## 2024-01-19 PROCEDURE — 84100 ASSAY OF PHOSPHORUS: CPT | Performed by: NURSE PRACTITIONER

## 2024-01-19 PROCEDURE — 21400001 HC TELEMETRY ROOM

## 2024-01-19 PROCEDURE — 30200315 PPD INTRADERMAL TEST REV CODE 302: Performed by: STUDENT IN AN ORGANIZED HEALTH CARE EDUCATION/TRAINING PROGRAM

## 2024-01-19 PROCEDURE — 85610 PROTHROMBIN TIME: CPT | Performed by: STUDENT IN AN ORGANIZED HEALTH CARE EDUCATION/TRAINING PROGRAM

## 2024-01-19 PROCEDURE — 99223 1ST HOSP IP/OBS HIGH 75: CPT | Mod: ,,,

## 2024-01-19 PROCEDURE — 63600175 PHARM REV CODE 636 W HCPCS: Mod: JZ,JG

## 2024-01-19 PROCEDURE — 85025 COMPLETE CBC W/AUTO DIFF WBC: CPT | Performed by: INTERNAL MEDICINE

## 2024-01-19 PROCEDURE — 25000003 PHARM REV CODE 250

## 2024-01-19 PROCEDURE — 25000003 PHARM REV CODE 250: Performed by: NURSE PRACTITIONER

## 2024-01-19 RX ADMIN — FOLIC ACID 1 MG: 1 TABLET ORAL at 10:01

## 2024-01-19 RX ADMIN — FLUCONAZOLE 200 MG: 200 TABLET ORAL at 10:01

## 2024-01-19 RX ADMIN — PANTOPRAZOLE SODIUM 40 MG: 40 TABLET, DELAYED RELEASE ORAL at 08:01

## 2024-01-19 RX ADMIN — PANTOPRAZOLE SODIUM 40 MG: 40 TABLET, DELAYED RELEASE ORAL at 10:01

## 2024-01-19 RX ADMIN — RIFAXIMIN 550 MG: 550 TABLET ORAL at 08:01

## 2024-01-19 RX ADMIN — TUBERCULIN PURIFIED PROTEIN DERIVATIVE 5 UNITS: 5 INJECTION, SOLUTION INTRADERMAL at 06:01

## 2024-01-19 RX ADMIN — LACTULOSE 20 G: 20 SOLUTION ORAL at 06:01

## 2024-01-19 RX ADMIN — Medication 100 MG: at 10:01

## 2024-01-19 RX ADMIN — ESCITALOPRAM OXALATE 10 MG: 10 TABLET ORAL at 10:01

## 2024-01-19 RX ADMIN — RIFAXIMIN 550 MG: 550 TABLET ORAL at 10:01

## 2024-01-19 RX ADMIN — ALBUMIN (HUMAN) 50 G: 12.5 SOLUTION INTRAVENOUS at 06:01

## 2024-01-19 RX ADMIN — LACTULOSE 20 G: 20 SOLUTION ORAL at 10:01

## 2024-01-19 RX ADMIN — THERA TABS 1 TABLET: TAB at 10:01

## 2024-01-19 NOTE — ASSESSMENT & PLAN NOTE
58 M with cirrhosis of unknown etiology (undergoing workup) admitted after GI bleed  On admission creatinine 1.2-1.6  At consult on 1/13: 2.0.    1/12 4.5 liter paracentesis    CODY: ddx includes previous NSAID use, large volume shift with paracentesis, HRS type 2 (though unlikely given BP), pyelo (++ bacteria on UA 1/15/24), abdominal compartment syndrome (tense abdomen), bladder obstruction  Low suspicion for HRS given BP     1/17/2024: Urine micro with RBC, some dysmorphic. No red cell casts or acanthocytes. Some granular and tubular cell casts.   Urine culture negative for growth      Plan/Recommendation:  -Continue to monitor for dialysis needs and creatinine improvement; may start to improve in coming days  -From a nephrology stand point, he is a dialysis candidate in the outpatient setting if required; though suspect that if he requires it - it may be for a short time (meaning he may recover renal function after this initial insult)  -Keep MAP > 65  -Keep hemoglobin > 7  -Strict ins and outs  -Avoid nephrotoxic agents if possible and renally dose medications  -Avoid drastic hemodynamic changes if possible

## 2024-01-19 NOTE — PLAN OF CARE
Problem: Adult Inpatient Plan of Care  Goal: Plan of Care Review  Outcome: Ongoing, Not Progressing  Goal: Patient-Specific Goal (Individualized)  Outcome: Ongoing, Not Progressing  Goal: Absence of Hospital-Acquired Illness or Injury  Outcome: Ongoing, Not Progressing  Goal: Optimal Comfort and Wellbeing  Outcome: Ongoing, Not Progressing  Goal: Readiness for Transition of Care  Outcome: Ongoing, Not Progressing     Problem: Fluid and Electrolyte Imbalance (Acute Kidney Injury/Impairment)  Goal: Fluid and Electrolyte Balance  Outcome: Ongoing, Not Progressing     Problem: Oral Intake Inadequate (Acute Kidney Injury/Impairment)  Goal: Optimal Nutrition Intake  Outcome: Ongoing, Not Progressing     Problem: Renal Function Impairment (Acute Kidney Injury/Impairment)  Goal: Effective Renal Function  Outcome: Ongoing, Not Progressing     Problem: Fall Injury Risk  Goal: Absence of Fall and Fall-Related Injury  Outcome: Ongoing, Not Progressing     Problem: Electrolyte Imbalance  Goal: Electrolyte Balance  Outcome: Ongoing, Not Progressing     Problem: Adjustment to Illness (Liver Failure)  Goal: Optimal Coping with Liver Failure  Outcome: Ongoing, Not Progressing     Problem: Fluid and Electrolyte Imbalance (Liver Failure)  Goal: Fluid and Electrolyte Balance  Outcome: Ongoing, Not Progressing     Problem: Gastrointestinal Complications (Liver Failure)  Goal: Optimal Gastrointestinal Function  Outcome: Ongoing, Not Progressing     Problem: Impaired Coagulation (Liver Failure)  Goal: Optimal Coagulation Function  Outcome: Ongoing, Not Progressing     Problem: Infection (Liver Failure)  Goal: Absence of Infection Signs and Symptoms  Outcome: Ongoing, Not Progressing     Problem: Neurologic Function Impaired (Liver Failure)  Goal: Optimal Neurologic Function  Outcome: Ongoing, Not Progressing     Problem: Oral Intake Inadequate (Liver Failure)  Goal: Improved Oral Intake  Outcome: Ongoing, Not Progressing     Problem:  Pain (Liver Failure)  Goal: Optimal Pain Control  Outcome: Ongoing, Not Progressing     Problem: Renal Dysfunction (Liver Failure)  Goal: Optimize Renal Function  Outcome: Ongoing, Not Progressing     Problem: Respiratory Compromise (Liver Failure)  Goal: Effective Oxygenation and Ventilation  Outcome: Ongoing, Not Progressing     Problem: Infection  Goal: Absence of Infection Signs and Symptoms  Outcome: Ongoing, Not Progressing

## 2024-01-19 NOTE — SUBJECTIVE & OBJECTIVE
Interval History: no acute events overnight. Delta creatinine improving, still overall creatinine worsening however    Review of patient's allergies indicates:  No Known Allergies  Current Facility-Administered Medications   Medication Frequency    0.9%  NaCl infusion (for blood administration) Q24H PRN    ceFEPIme (MAXIPIME) 2 g in dextrose 5 % in water (D5W) 100 mL IVPB (MB+) Q24H    EScitalopram oxalate tablet 10 mg Daily    fluconazole tablet 200 mg Daily    folic acid tablet 1 mg Daily    hydrALAZINE injection 10 mg Q6H PRN    lactulose 20 gram/30 mL solution Soln 20 g TID    multivitamin tablet Daily    pantoprazole EC tablet 40 mg BID    rifAXIMin tablet 550 mg BID    sodium chloride 0.9% flush 10 mL PRN    thiamine tablet 100 mg Daily       Objective:     Vital Signs (Most Recent):  Temp: 98.1 °F (36.7 °C) (01/19/24 0814)  Pulse: 79 (01/19/24 1053)  Resp: 18 (01/19/24 0814)  BP: (!) 145/69 (01/19/24 0814)  SpO2: 99 % (01/19/24 0814) Vital Signs (24h Range):  Temp:  [97.2 °F (36.2 °C)-98.1 °F (36.7 °C)] 98.1 °F (36.7 °C)  Pulse:  [74-84] 79  Resp:  [18] 18  SpO2:  [97 %-100 %] 99 %  BP: (113-145)/(55-70) 145/69     Weight: 97.6 kg (215 lb 2.7 oz) (01/03/24 0855)  Body mass index is 32.72 kg/m².  Body surface area is 2.16 meters squared.    I/O last 3 completed shifts:  In: -   Out: 750 [Urine:750]     Physical Exam  Constitutional:       General: He is not in acute distress.     Appearance: He is obese. He is ill-appearing. He is not toxic-appearing.   HENT:      Head: Normocephalic and atraumatic.      Nose: Nose normal.      Mouth/Throat:      Mouth: Mucous membranes are moist.   Eyes:      General: Scleral icterus present.   Cardiovascular:      Rate and Rhythm: Normal rate.      Heart sounds: No murmur heard.  Pulmonary:      Effort: Pulmonary effort is normal. No respiratory distress.      Breath sounds: No wheezing or rales.   Abdominal:      General: Abdomen is flat. There is distension.    Musculoskeletal:      Cervical back: Normal range of motion. No rigidity.      Right lower leg: Edema present.      Left lower leg: Edema present.   Lymphadenopathy:      Cervical: No cervical adenopathy.   Skin:     General: Skin is warm.      Coloration: Skin is jaundiced.      Findings: Bruising present. No lesion.   Neurological:      Mental Status: He is alert.          Significant Labs:  All labs within the past 24 hours have been reviewed.     Significant Imaging:  Labs: Reviewed

## 2024-01-19 NOTE — PROGRESS NOTES
Hepatology Treatment Plan    lBu Deleon is a 58 y.o. male admitted to hospital 1/2/2024 (Hospital Day: 18) due to Leukocytosis.     Interval History  Patient seen this morning. He has been declined by [facility] for a multitude of reasons, including lack of capacity. We explained his poor prognosis and recommended palliative care consult for GOC discussions/hospice planning. Recommended code status be changed to DNR.     Nephrology decided not to initiate HD today.           Objective  Temp:  [97.2 °F (36.2 °C)-98.1 °F (36.7 °C)] 98.1 °F (36.7 °C) (01/19 0814)  Pulse:  [73-84] 79 (01/19 1053)  BP: (113-145)/(55-70) 145/69 (01/19 0814)  Resp:  [18] 18 (01/19 0814)  SpO2:  [97 %-100 %] 99 % (01/19 0814)    Physical Exam:  Constitutional:  awake, alert, NAD. Ill appearing but non toxic.   HENT: Head: Normal, CPAP in place  Eyes:  scleral icterus +nt  Respiratory: normal chest expansion & respiratory effort and no accessory muscle use  GI: soft, non-tender, without masses or organomegaly  Skin:  jaundiced  Neurological: oriented x3      Laboratory    Recent Labs   Lab 01/19/24  0542   WBC 7.21   RBC 2.24*   HGB 7.5*   HCT 22.7*   PLT 27*   *   MCH 33.5*   MCHC 33.0      Recent Labs   Lab 01/19/24  0542   CALCIUM 9.1   ALBUMIN 3.2*   PROT 5.6*   *   K 4.1   CO2 19*      BUN 69*   CREATININE 4.0*   ALKPHOS 159*   ALT 45*   *   BILITOT 23.6*      Recent Labs   Lab 01/19/24  0542   INR 3.1*        MELD 3.0: 43 at 1/19/2024  5:42 AM  MELD-Na: 43 at 1/19/2024  5:42 AM  Calculated from:  Serum Creatinine: 4.0 mg/dL (Using max of 3 mg/dL) at 1/19/2024  5:42 AM  Serum Sodium: 132 mmol/L at 1/19/2024  5:42 AM  Total Bilirubin: 23.6 mg/dL at 1/19/2024  5:42 AM  Serum Albumin: 3.2 g/dL at 1/19/2024  5:42 AM  INR(ratio): 3.1 at 1/19/2024  5:42 AM  Age at listing (hypothetical): 58 years  Sex: Male at 1/19/2024  5:42 AM     JERI negative  ASMA 1:40  IgG wnl  AMA negative  Viral hepatitis panel  "negative  Ceruloplasmin negative  Iron studies negative    Assessment and Plan    Blu Deleon is a 58 y.o. male with history of alcohol use disorder, HTN, and compensated EtOH cirrhosis who was admitted to [facility] on  with melena.   After resuscitation, EGD on 1/3/24 showed esophageal ulcers, erythematous mucosa in the antrum, and normal duodenum.  Hepatology now consulted for worsening bilirubin.     He hails from [place], visiting from the [facility]. The patient has known about his history of "early cirrhosis" since at least  when liver ultrasound confirmed cirrhosis.  He has continued to drink in spite of knowledge of liver disease and being told to stop; has consumed alcohol regularly since the age of 21.  Last drink 24. No illicit drug use.  Sister  of EtOH cirrhosis. PETH positive, 98. Fungitell -ve.     Case was discussed in committee  and deemed not to be a candidate for liver transplant evaluation due to ongoing alcohol use despite knowledge of liver disease. Liver decompensation worsening.        Problem List:  Newly decompensated EtOH cirrhosis  EtOH use disorder  Esophageal ulcers  Leukocytosis  Hepatic encephalopathy        Recommendations:  - We are not going to proceed with liver tx eval at this time since patient was aware of cirrhosis but persisted drinking. Discussed in transplant committee.  - Declined by [facility] now. Consult Palliative care. Discussed poor prognosis with patient, family,  & Dr. Cline.   - continue cefepime and fluconazole per ID.   - Stopped baclofen. Avoid sedating medications  - Titrate lactulose to 3-4 BMs per day  - Continue rifaximin 550 mg BID  - Nephrology following for CODY, appreciate recs.  - low sodium, high protein diet  - Continue Protonix 40mg twice per day for 8 weeks total. Repeat upper endoscopy in 8 weeks to check healing of his esophageal ulcers. He would like to have this done in [place] state.   - Daily CBC, CMP, INR.   - We will continue to follow.    Thank you for involving us in the care of Blu Deleon. Please call with any additional questions, concerns or changes in the patient's clinical status. Plan of care discussed with attending Dr. He.    Lyle Thomas MD  Gastroenterology and Hepatology Fellow, PGY V

## 2024-01-19 NOTE — PROGRESS NOTES
[facility] Surg  Nephrology  Progress Note    Patient Name: Blu Deleon  MRN: [id number]  Admission Date: 1/2/2024  Hospital Length of Stay: 16 days  Attending Provider: Bailey Cline MD   Primary Care Physician: Radha, Primary Doctor  Principal Problem:Leukocytosis    Subjective:     HPI: 58 year old man with a history of HTN, suspected EtOH cirrhosis (currently undergoing workup), depression [place] to [facility] for GI bleed with bright red blood per rectum with black tarry stools as well. He has never seen a neprhologyist and is visiting [place]. Wife at bedside reports that he was not feeling well and had been taking tylenol/alleve for some weeks before but at an unknown dose.     They do not know what his baseline creatinine is, however on admission to [facility], creatinine hovered around 1.3 to 1.6. Subsequently he underwent a 4.5 liter paracentesis with a resulting increase in creatinine to 2.0. Urine sodium 1/11 was <10. Blood pressures 130-160 systolics.     Nephrology consulted for CODY    Interval History: no acute events overnight. Delta creatinine improving, still overall creatinine worsening however    Review of patient's allergies indicates:  No Known Allergies  Current Facility-Administered Medications   Medication Frequency    0.9%  NaCl infusion (for blood administration) Q24H PRN    ceFEPIme (MAXIPIME) 2 g in dextrose 5 % in water (D5W) 100 mL IVPB (MB+) Q24H    EScitalopram oxalate tablet 10 mg Daily    fluconazole tablet 200 mg Daily    folic acid tablet 1 mg Daily    hydrALAZINE injection 10 mg Q6H PRN    lactulose 20 gram/30 mL solution Soln 20 g TID    multivitamin tablet Daily    pantoprazole EC tablet 40 mg BID    rifAXIMin tablet 550 mg BID    sodium chloride 0.9% flush 10 mL PRN    thiamine tablet 100 mg Daily       Objective:     Vital Signs (Most Recent):  Temp: 98.1 °F (36.7 °C) (01/19/24 0814)  Pulse: 79 (01/19/24 1053)  Resp: 18 (01/19/24 0814)  BP: (!) 145/69  (01/19/24 0814)  SpO2: 99 % (01/19/24 0814) Vital Signs (24h Range):  Temp:  [97.2 °F (36.2 °C)-98.1 °F (36.7 °C)] 98.1 °F (36.7 °C)  Pulse:  [74-84] 79  Resp:  [18] 18  SpO2:  [97 %-100 %] 99 %  BP: (113-145)/(55-70) 145/69     Weight: 97.6 kg (215 lb 2.7 oz) (01/03/24 0855)  Body mass index is 32.72 kg/m².  Body surface area is 2.16 meters squared.    I/O last 3 completed shifts:  In: -   Out: 750 [Urine:750]     Physical Exam  Constitutional:       General: He is not in acute distress.     Appearance: He is obese. He is ill-appearing. He is not toxic-appearing.   HENT:      Head: Normocephalic and atraumatic.      Nose: Nose normal.      Mouth/Throat:      Mouth: Mucous membranes are moist.   Eyes:      General: Scleral icterus present.   Cardiovascular:      Rate and Rhythm: Normal rate.      Heart sounds: No murmur heard.  Pulmonary:      Effort: Pulmonary effort is normal. No respiratory distress.      Breath sounds: No wheezing or rales.   Abdominal:      General: Abdomen is flat. There is distension.   Musculoskeletal:      Cervical back: Normal range of motion. No rigidity.      Right lower leg: Edema present.      Left lower leg: Edema present.   Lymphadenopathy:      Cervical: No cervical adenopathy.   Skin:     General: Skin is warm.      Coloration: Skin is jaundiced.      Findings: Bruising present. No lesion.   Neurological:      Mental Status: He is alert.          Significant Labs:  All labs within the past 24 hours have been reviewed.     Significant Imaging:  Labs: Reviewed  Assessment/Plan:     Renal/  CODY (acute kidney injury)  58 M with cirrhosis of unknown etiology (undergoing workup) admitted after GI bleed  On admission creatinine 1.2-1.6  At consult on 1/13: 2.0.    1/12 4.5 liter paracentesis    CODY: ddx includes previous NSAID use, large volume shift with paracentesis, HRS type 2 (though unlikely given BP), pyelo (++ bacteria on UA 1/15/24), abdominal compartment syndrome (tense  abdomen), bladder obstruction  Low suspicion for HRS given BP     1/17/2024: Urine micro with RBC, some dysmorphic. No red cell casts or acanthocytes. Some granular and tubular cell casts.   Urine culture negative for growth      Plan/Recommendation:  -Continue to monitor for dialysis needs and creatinine improvement; may start to improve in coming days  -From a nephrology stand point, he is a dialysis candidate in the outpatient setting if required; though suspect that if he requires it - it may be for a short time (meaning he may recover renal function after this initial insult)  -Keep MAP > 65  -Keep hemoglobin > 7  -Strict ins and outs  -Avoid nephrotoxic agents if possible and renally dose medications  -Avoid drastic hemodynamic changes if possible            Thank you for your consult. I will follow-up with patient. Please contact us if you have any additional questions.    Chip Chung MD  Nephrology  [facility] - Med Surg

## 2024-01-19 NOTE — PLAN OF CARE
ERIN, along with JACKIE COKER, met with pt and wife at bedside. Pt AAOx3, in good spirits. Pt states that he is aware that he is in liver failure and what that entails. Pt has very good insight into his disease and disease process, and states that he knows he is not a liver transplant candidate currently and understands that he will continue to decline. Pt hopes that he can return home to [place], and has tentative plans to so on Monday. Pt is appreciative of all of the care he has received here at [facility]. Pt encouraged to discuss end of life wishes with his wife and family. Hospice mentioned briefly; pt states that he understands what hospice entails. No questions or concerns at this time.    Chelsie Lopez, ERIN  Palliative Medicine

## 2024-01-20 LAB
ALBUMIN SERPL BCP-MCNC: 3.3 G/DL (ref 3.5–5.2)
ALP SERPL-CCNC: 154 U/L (ref 55–135)
ALT SERPL W/O P-5'-P-CCNC: 45 U/L (ref 10–44)
ANION GAP SERPL CALC-SCNC: 7 MMOL/L (ref 8–16)
AST SERPL-CCNC: 106 U/L (ref 10–40)
BASOPHILS # BLD AUTO: 0.05 K/UL (ref 0–0.2)
BASOPHILS NFR BLD: 0.6 % (ref 0–1.9)
BILIRUB SERPL-MCNC: 24.4 MG/DL (ref 0.1–1)
BUN SERPL-MCNC: 72 MG/DL (ref 6–20)
CALCIUM SERPL-MCNC: 9.2 MG/DL (ref 8.7–10.5)
CHLORIDE SERPL-SCNC: 104 MMOL/L (ref 95–110)
CO2 SERPL-SCNC: 19 MMOL/L (ref 23–29)
CREAT SERPL-MCNC: 4.2 MG/DL (ref 0.5–1.4)
DIFFERENTIAL METHOD BLD: ABNORMAL
EOSINOPHIL # BLD AUTO: 0.3 K/UL (ref 0–0.5)
EOSINOPHIL NFR BLD: 3.7 % (ref 0–8)
ERYTHROCYTE [DISTWIDTH] IN BLOOD BY AUTOMATED COUNT: 22.5 % (ref 11.5–14.5)
EST. GFR  (NO RACE VARIABLE): 15.7 ML/MIN/1.73 M^2
GLUCOSE SERPL-MCNC: 100 MG/DL (ref 70–110)
HCT VFR BLD AUTO: 22 % (ref 40–54)
HGB BLD-MCNC: 7.1 G/DL (ref 14–18)
HYPOCHROMIA BLD QL SMEAR: ABNORMAL
IMM GRANULOCYTES # BLD AUTO: 0.14 K/UL (ref 0–0.04)
IMM GRANULOCYTES NFR BLD AUTO: 1.7 % (ref 0–0.5)
INR PPP: 3 (ref 0.8–1.2)
LYMPHOCYTES # BLD AUTO: 1.1 K/UL (ref 1–4.8)
LYMPHOCYTES NFR BLD: 13.2 % (ref 18–48)
MAGNESIUM SERPL-MCNC: 2.4 MG/DL (ref 1.6–2.6)
MCH RBC QN AUTO: 33.6 PG (ref 27–31)
MCHC RBC AUTO-ENTMCNC: 32.3 G/DL (ref 32–36)
MCV RBC AUTO: 104 FL (ref 82–98)
MONOCYTES # BLD AUTO: 1.1 K/UL (ref 0.3–1)
MONOCYTES NFR BLD: 13.4 % (ref 4–15)
NEUTROPHILS # BLD AUTO: 5.5 K/UL (ref 1.8–7.7)
NEUTROPHILS NFR BLD: 67.4 % (ref 38–73)
NRBC BLD-RTO: 0 /100 WBC
PHOSPHATE SERPL-MCNC: 4.5 MG/DL (ref 2.7–4.5)
PLATELET # BLD AUTO: 25 K/UL (ref 150–450)
PLATELET BLD QL SMEAR: ABNORMAL
PMV BLD AUTO: 13.1 FL (ref 9.2–12.9)
POTASSIUM SERPL-SCNC: 4.1 MMOL/L (ref 3.5–5.1)
PROT SERPL-MCNC: 5.7 G/DL (ref 6–8.4)
PROTHROMBIN TIME: 30.3 SEC (ref 9–12.5)
RBC # BLD AUTO: 2.11 M/UL (ref 4.6–6.2)
SODIUM SERPL-SCNC: 130 MMOL/L (ref 136–145)
WBC # BLD AUTO: 8.11 K/UL (ref 3.9–12.7)

## 2024-01-20 PROCEDURE — 36415 COLL VENOUS BLD VENIPUNCTURE: CPT | Performed by: STUDENT IN AN ORGANIZED HEALTH CARE EDUCATION/TRAINING PROGRAM

## 2024-01-20 PROCEDURE — 25000003 PHARM REV CODE 250

## 2024-01-20 PROCEDURE — 25000003 PHARM REV CODE 250: Performed by: NURSE PRACTITIONER

## 2024-01-20 PROCEDURE — 21400001 HC TELEMETRY ROOM

## 2024-01-20 PROCEDURE — 99233 SBSQ HOSP IP/OBS HIGH 50: CPT | Mod: ,,, | Performed by: INTERNAL MEDICINE

## 2024-01-20 PROCEDURE — 94660 CPAP INITIATION&MGMT: CPT

## 2024-01-20 PROCEDURE — 85610 PROTHROMBIN TIME: CPT | Performed by: STUDENT IN AN ORGANIZED HEALTH CARE EDUCATION/TRAINING PROGRAM

## 2024-01-20 PROCEDURE — 83735 ASSAY OF MAGNESIUM: CPT | Performed by: NURSE PRACTITIONER

## 2024-01-20 PROCEDURE — 63600175 PHARM REV CODE 636 W HCPCS: Performed by: STUDENT IN AN ORGANIZED HEALTH CARE EDUCATION/TRAINING PROGRAM

## 2024-01-20 PROCEDURE — 80053 COMPREHEN METABOLIC PANEL: CPT | Performed by: NURSE PRACTITIONER

## 2024-01-20 PROCEDURE — 84100 ASSAY OF PHOSPHORUS: CPT | Performed by: NURSE PRACTITIONER

## 2024-01-20 PROCEDURE — 25000003 PHARM REV CODE 250: Performed by: INTERNAL MEDICINE

## 2024-01-20 PROCEDURE — 99900035 HC TECH TIME PER 15 MIN (STAT)

## 2024-01-20 PROCEDURE — 85025 COMPLETE CBC W/AUTO DIFF WBC: CPT | Performed by: INTERNAL MEDICINE

## 2024-01-20 PROCEDURE — 25000003 PHARM REV CODE 250: Performed by: STUDENT IN AN ORGANIZED HEALTH CARE EDUCATION/TRAINING PROGRAM

## 2024-01-20 RX ADMIN — RIFAXIMIN 550 MG: 550 TABLET ORAL at 09:01

## 2024-01-20 RX ADMIN — RIFAXIMIN 550 MG: 550 TABLET ORAL at 10:01

## 2024-01-20 RX ADMIN — PANTOPRAZOLE SODIUM 40 MG: 40 TABLET, DELAYED RELEASE ORAL at 09:01

## 2024-01-20 RX ADMIN — FLUCONAZOLE 200 MG: 200 TABLET ORAL at 10:01

## 2024-01-20 RX ADMIN — LACTULOSE 20 G: 20 SOLUTION ORAL at 10:01

## 2024-01-20 RX ADMIN — LACTULOSE 20 G: 20 SOLUTION ORAL at 01:01

## 2024-01-20 RX ADMIN — ESCITALOPRAM OXALATE 10 MG: 10 TABLET ORAL at 10:01

## 2024-01-20 RX ADMIN — LACTULOSE 20 G: 20 SOLUTION ORAL at 03:01

## 2024-01-20 RX ADMIN — CEFEPIME 2 G: 2 INJECTION, POWDER, FOR SOLUTION INTRAVENOUS at 11:01

## 2024-01-20 RX ADMIN — PANTOPRAZOLE SODIUM 40 MG: 40 TABLET, DELAYED RELEASE ORAL at 10:01

## 2024-01-20 RX ADMIN — THERA TABS 1 TABLET: TAB at 10:01

## 2024-01-20 RX ADMIN — Medication 100 MG: at 10:01

## 2024-01-20 RX ADMIN — LACTULOSE 20 G: 20 SOLUTION ORAL at 09:01

## 2024-01-20 RX ADMIN — CEFEPIME 2 G: 2 INJECTION, POWDER, FOR SOLUTION INTRAVENOUS at 01:01

## 2024-01-20 RX ADMIN — FOLIC ACID 1 MG: 1 TABLET ORAL at 10:01

## 2024-01-20 NOTE — ASSESSMENT & PLAN NOTE
Etiology likely 2/2 cirrhosis  - Monitor w/ daily CBC and transfuse if Plt <10 or <50 w/ signs of active bleeding   Recent Labs   Lab 01/20/24  0439   PLT 25*

## 2024-01-20 NOTE — SUBJECTIVE & OBJECTIVE
Past Medical History:   Diagnosis Date    Hypertension        Past Surgical History:   Procedure Laterality Date    ESOPHAGOGASTRODUODENOSCOPY N/A 1/3/2024    Procedure: EGD (ESOPHAGOGASTRODUODENOSCOPY);  Surgeon: Madison Hinojosa MD;  Location: [address]);  Service: Endoscopy;  Laterality: N/A;       Review of patient's allergies indicates:  No Known Allergies    Medications:  Continuous Infusions:  Scheduled Meds:   ceFEPime IV (PEDS and ADULTS)  2 g Intravenous Q24H    EScitalopram oxalate  10 mg Oral Daily    fluconazole  200 mg Oral Daily    folic acid  1 mg Oral Daily    lactulose  20 g Oral TID    multivitamin  1 tablet Oral Daily    pantoprazole  40 mg Oral BID    rifAXImin  550 mg Oral BID    thiamine  100 mg Oral Daily     PRN Meds:0.9%  NaCl infusion (for blood administration), hydrALAZINE, sodium chloride 0.9%    Family History    None       Tobacco Use    Smoking status: Never    Smokeless tobacco: Never   Substance and Sexual Activity    Alcohol use: Yes    Drug use: Never    Sexual activity: Not on file       Review of Systems   Constitutional:  Positive for activity change and appetite change.   Gastrointestinal:  Positive for abdominal distention and abdominal pain.     Objective:     Vital Signs (Most Recent):  Temp: 98.5 °F (36.9 °C) (01/19/24 1514)  Pulse: 85 (01/19/24 1514)  Resp: 18 (01/19/24 1514)  BP: 136/60 (01/19/24 1514)  SpO2: 98 % (01/19/24 1514) Vital Signs (24h Range):  Temp:  [97.4 °F (36.3 °C)-98.5 °F (36.9 °C)] 98.5 °F (36.9 °C)  Pulse:  [74-85] 85  Resp:  [18] 18  SpO2:  [97 %-100 %] 98 %  BP: (113-145)/(55-69) 136/60     Weight: 97.6 kg (215 lb 2.7 oz)  Body mass index is 32.72 kg/m².       Physical Exam  Skin:     Coloration: Skin is jaundiced.            Review of Symptoms      Symptom Assessment (ESAS 0-10 Scale)  Pain:  0  Dyspnea:  0  Anxiety:  0  Nausea:  0  Depression:  0  Anorexia:  0  Fatigue:  0  Insomnia:  0  Restlessness:  0  Agitation:  0          Performance Status:  90    Living Arrangements:  Lives with spouse    Psychosocial/Cultural:   See Palliative Psychosocial Note: Yes  **Primary  to Follow**  Palliative Care  Consult: Yes        Advance Care Planning   Advance Directives:   Living Will: No    Do Not Resuscitate Status: No    Medical Power of : wife (Thuy).      Decision Making:  Family answered questions and Patient answered questions  Goals of Care: The patient and family endorses that what is most important right now is to focus on curative/life-prolongation.    Accordingly, we have decided that the best plan to meet the patient's goals includes continuing with treatment.          Significant Labs: CBC:   Recent Labs   Lab 01/18/24  0408 01/19/24  0542   WBC 7.00 7.21   HGB 7.8* 7.5*   HCT 23.5* 22.7*   PLT 39* 27*     CMP:   Recent Labs   Lab 01/18/24  0408 01/19/24  0542   * 132*   K 3.9 4.1    104   CO2 20* 19*    92   BUN 59* 69*   CREATININE 3.7* 4.0*   CALCIUM 8.4* 9.1   PROT 5.1* 5.6*   ALBUMIN 2.5* 3.2*   BILITOT 22.6* 23.6*   ALKPHOS 160* 159*   * 106*   ALT 57* 45*   ANIONGAP 5* 9     CBC:   Recent Labs   Lab 01/19/24  0542   WBC 7.21   HGB 7.5*   HCT 22.7*   *   PLT 27*     BMP:  Recent Labs   Lab 01/19/24  0542   GLU 92   *   K 4.1      CO2 19*   BUN 69*   CREATININE 4.0*   CALCIUM 9.1   MG 2.3     LFT:  Lab Results   Component Value Date     (H) 01/19/2024    ALKPHOS 159 (H) 01/19/2024    BILITOT 23.6 (H) 01/19/2024     Albumin:   Albumin   Date Value Ref Range Status   01/19/2024 3.2 (L) 3.5 - 5.2 g/dL Final     Protein:   Total Protein   Date Value Ref Range Status   01/19/2024 5.6 (L) 6.0 - 8.4 g/dL Final     Lactic acid:   Lab Results   Component Value Date    LACTATE 2.1 01/13/2024    LACTATE 1.0 01/04/2024       Significant Imaging: I have reviewed all pertinent imaging results/findings within the past 24 hours.

## 2024-01-20 NOTE — PROGRESS NOTES
[facility] Medicine  Progress Note    Patient Name: Blu Deleon  MRN: [id number]  Patient Class: IP- Inpatient   Admission Date: 1/2/2024  Length of Stay: 17 days  Attending Physician: Bailey Cline MD  Primary Care Provider: Radha, Primary Doctor        Subjective:     Principal Problem:Leukocytosis        HPI:  Mr. Deleon is a 58 year old male with PMH notable for HTN, ETOH use, reported cirrhosis (has not seen a hepatologist), and depression who presented to [facility] ED with acute GI bleeding. In speaking with patient and wife at bedside, patient reports four days of nausea and vomiting with bright red blood and black tarry diarrhea. Additionally, he reports a fall yesterday after attempting to go to the restroom secondary to being lightheaded. He denies this happening previously. He was scheduled for an outpatient EGD, which has not been completed. He has not had a colonoscopy. He denies any anticoagulation or antiplatelet use.      In the emergency department he was found to be acutely anemic with a hgb of 5. He has not been hypotensive while in the emergency department, however, notably tachycardic with -140. Labs otherwise notable for WBC 13.56, Plt 107, INR 2.4, BUN 57, Cr 1.6, TB 5.4, , ALT 44. Initial lactate >12. ETOH level <10. Patient is s/p 3u pRBCs.    Admitted to MICU for UGIB.      Overview/Hospital Course:  Patient admitted for UGIB. GI consulted and EGD showed esophageal ulcers. Pt to continue PPI for 8 weeks with repeat scope to follow. Hgb stable s/p 3u PRBC and 1u FFP. HDS and tolerating clear liquid diet. CIWA ordered and no ativan required.  Patient was transferred to floors on 01/05.  Creatinine has remained stable at 1.4.  T bilirubin trending up.  Hepatology was consulted in the setting of decompensated liver cirrhosis.  Ultrasound right upper quadrant with Dopplers, PEth, AFP, serological workup, and acute hepatitis panel.  Patient underwent  paracentesis on 01/08 and ascitic fluid negative for SBP.  As patient is not a candidate for transplant and with his high MELD score he would require air transfer to [place] where he can go to the hospital close to his home. Accepted for transfer to [facility] in [place], awaiting logistics of flight transport.    Patient was doing well the morning of 1/12, however after a walk to the bathroom he started to have chills and rigors. He was started on CTX for concern for possible SBP. Repeat CBC with stable Hgb and WBC WNL. Paracentesis performed with IR on 1/12 with 4.2L out. Ascitic labs with no evidence of SBP, and ascitic cultures NGTD. Blood cultures 1/12 NGTD. Had some RUQ discomfort and RUQ US ordered with gallbladder wall thickening with possible signs of cholecystitis. CXR obtained without any signs of pneumonia however possible small amount of edema vs viral. Covid/Influenza negative.     On 1/13, WBC elevated, patient remains afebrile and normotensive. Abdominal exam benign, negative Sen's sign. UA non-infectious. Antibiotics broadened to Vanc/cefe with low threshold for micafungin per hepatology. CT-CAP without evidence of infection. Patient appeared much better morning of 1/14, however became somnolent and Broadened to Micafungin and ID consulted. Patient improved and ID consulted. Vanc discontinued and cultures/infectious work up remained inconclusive. ID recommending 7 days of cefepime and 7 days of fluconazole. Unfortunately, kidney function continues to worsen. UA/urine sediment as well as elevated/normotensive blood pressures not in line with HRS. Unclear if would need a biopsy , nephrology planning for dialysis in the future if kidney function does not improve.. S/p therapeutic LP on 1/17. At this time patients stable for transfer to [place] via flight.  Patient has been accepted by [facility] in [place].  Flight scheduled on Monday 1/22.    Interval History/Significant Events: Pt seen  and examined this morning on rell WOODY. .  Patient has been accepted by [facility].  Scheduled for transfer on 01/22.  Has no active complaints.  Hemoglobin trending down 7.5--->7.1     Review of Systems   Constitutional:  Negative for chills, diaphoresis, fever and malaise/fatigue.   HENT:  Negative for sore throat.    Eyes:  Negative for double vision.   Respiratory:  Negative for cough, shortness of breath and wheezing.    Cardiovascular:  Negative for chest pain, palpitations, orthopnea, leg swelling and PND.   Gastrointestinal:  Negative for abdominal pain, blood in stool, constipation, heartburn, nausea and vomiting.   Genitourinary:  Negative for hematuria.   Musculoskeletal:  Negative for falls and myalgias.   Neurological:  Negative for dizziness, loss of consciousness, weakness and headaches.   Psychiatric/Behavioral:  The patient is not nervous/anxious.          Vital Signs (Most Recent):  Temp: 97.7 °F (36.5 °C) (01/20/24 1100)  Pulse: 78 (01/20/24 1100)  Resp: 16 (01/20/24 1100)  BP: (!) 142/65 (01/20/24 1100)  SpO2: 97 % (01/20/24 1100) Vital Signs (24h Range):  Temp:  [97.6 °F (36.4 °C)-98.5 °F (36.9 °C)] 97.7 °F (36.5 °C)  Pulse:  [77-85] 78  Resp:  [12-18] 16  SpO2:  [97 %-99 %] 97 %  BP: (135-146)/(60-74) 142/65   Weight: 97.6 kg (215 lb 2.7 oz)  Body mass index is 32.72 kg/m².      Intake/Output Summary (Last 24 hours) at 1/20/2024 1205  Last data filed at 1/20/2024 1119  Gross per 24 hour   Intake 360 ml   Output 175 ml   Net 185 ml              Physical Exam  Gen: in NAD, appears stated age, jaundiced  Neuro: AAOx4, CN2-12 grossly intact BL; motor, sensory, and strength grossly intact BL  HEENT: + icteric sclera. NTNC, EOMI, PERRLA, MMM; no thyromegaly or lymphadenopathy; no JVD appreciated  CVS: RRR, no m/r/g; S1/S2 auscultated with no S3 or S4; capillary refill < 2 sec  Resp: lungs CTAB, no w/r/r; no belabored breathing or accessory muscle use appreciated   Abd: + Abdominal  "distension. BS+ in all 4 quadrants; NTND, soft to palpation; no organomegaly appreciated   Extrem: pulses full, equal, and regular over all 4 extremities; no UE or LE edema BL         Vents:  Oxygen Concentration (%): 21 (01/12/24 0041)  Lines/Drains/Airways       Peripheral Intravenous Line  Duration                  Peripheral IV - Single Lumen 01/03/24 1045 18 G;1 1/4 in Anterior;Left Upper Arm 17 days         Peripheral IV - Single Lumen 01/03/24 1400 18 G;1 1/4 in Anterior;Right Upper Arm 16 days                  Significant Labs:    CBC/Anemia Profile:  Recent Labs   Lab 01/19/24  0542 01/20/24  0439   WBC 7.21 8.11   HGB 7.5* 7.1*   HCT 22.7* 22.0*   PLT 27* 25*   * 104*   RDW 22.5* 22.5*          Chemistries:  Recent Labs   Lab 01/19/24  0542 01/20/24  0439   * 130*   K 4.1 4.1    104   CO2 19* 19*   BUN 69* 72*   CREATININE 4.0* 4.2*   CALCIUM 9.1 9.2   ALBUMIN 3.2* 3.3*   PROT 5.6* 5.7*   BILITOT 23.6* 24.4*   ALKPHOS 159* 154*   ALT 45* 45*   * 106*   MG 2.3 2.4   PHOS 4.4 4.5         CMP:   Recent Labs   Lab 01/19/24  0542 01/20/24  0439   * 130*   K 4.1 4.1    104   CO2 19* 19*   GLU 92 100   BUN 69* 72*   CREATININE 4.0* 4.2*   CALCIUM 9.1 9.2   PROT 5.6* 5.7*   ALBUMIN 3.2* 3.3*   BILITOT 23.6* 24.4*   ALKPHOS 159* 154*   * 106*   ALT 45* 45*   ANIONGAP 9 7*       Coagulation:   Recent Labs   Lab 01/20/24 0439   INR 3.0*       Lactic Acid:   No results for input(s): "LACTATE" in the last 48 hours.        Significant Imaging:  I have reviewed all pertinent imaging results/findings within the past 24 hours.            Assessment/Plan:      * Leukocytosis  Patient with new onset chills/rigors 1/12. Now with leukocytosis, remains afebrile and normotensive (previously normotensive/hypertensive).  Infectious work up:  - CXR obtained without any signs of pneumonia however possible small amount of edema vs viral component. Covid/Influenza negative.   - UA " non-infectious.   - He was initially started on CTX for concern for possible SBP.    - Paracentesis performed with IR on 1/12 with 4.2L out. Ascitic labs with no evidence of SBP, and ascitic cultures NGTD.   - Blood cultures 1/12 NGTD  - Had some RUQ discomfort 1/12 and RUQ US showed gallbladder wall thickening with possible signs of cholecystitis. No abdominal pain today and negative Sen's sign  - Broaded from CTX to Vanc/cefe with low threshold to start micafungin per hepatology  - CT-CAP without evidence of infection, no concern for cholecystitis    - Had another episode of rigors and temp renetta to 100.0 on 1/14. Remains HDS.  - Started on Micafungin  - ID consulted and recommending DC vanc, continue cefepime x7 days and switching micafungin to fluconazole for 7 day treatment  - Fungitell assay negative    Transaminitis  - 2:1 pattern; likely 2/2 ETOH use d/o.   - LFT's stable/improving  - T bili worsening  - Will continue to monitor        Thrombocytopenia  Etiology likely 2/2 cirrhosis  - Monitor w/ daily CBC and transfuse if Plt <10 or <50 w/ signs of active bleeding   Recent Labs   Lab 01/20/24  0439   PLT 25*         CODY (acute kidney injury)  - Cr 1.6 at admission, unclear baseline  - Initially likely pre-renal in setting of GIB.  - Now with slow elevation of creatinine  - on albumin 50 g t.i.d..  - S/p 48 hours albumin  - Cr 1.6 -> 2.0 -> 2.2 -> 2.5 -> 3.0-->3.7  - Nephrology consulted, urine studies sent, uric acid ordered   - 1500ml fluid restriction   - May need HRS treatment, though BP remains elevated and urine sediment does not appear to be inline with HRS   - Autoimmune panel sent and pending  - Discussed with family that if continued worsening renal function, dialysis may need to be initiated  - Renally dosing all medications  - Avoiding nephrotoxins  - Will continue to monitor on daily labs    Alcohol use disorder  - Last alcoholic drink on 1/01. Alcohol level <10 on admission. Denies history of  withdrawal in the past including seizures. Remains tachycardic but likely 2/2 acute GIB. Will continue to monitor for signs of withdrawal w/ CIWA protocol and start benzodiazepines if indicated.   - thiamine, folic acid, MV  - DC CIWA protocol as now out of withdrawal window       Coagulopathy  - INR elevated in setting of cirrhosis  - S/p IV Vit K for x3 days.       Cirrhosis  Patient is visiting from out of town; outside records not available to review thus etiology of cirrhosis not confirmed. However, patient does have a history of ETOH use d/o so this is included in the differential. States he is currently in the process of being referred to a hepatologist and having an EGD scheduled.     MELD-Na score calculated; MELD 3.0: 43 at 1/20/2024  4:39 AM  MELD-Na: 43 at 1/20/2024  4:39 AM  Calculated from:  Serum Creatinine: 4.2 mg/dL (Using max of 3 mg/dL) at 1/20/2024  4:39 AM  Serum Sodium: 130 mmol/L at 1/20/2024  4:39 AM  Total Bilirubin: 24.4 mg/dL at 1/20/2024  4:39 AM  Serum Albumin: 3.3 g/dL at 1/20/2024  4:39 AM  INR(ratio): 3.0 at 1/20/2024  4:39 AM  Age at listing (hypothetical): 58 years  Sex: Male at 1/20/2024  4:39 AM    Continue chronic meds. Etiology likely ETOH. Will avoid any hepatotoxic meds, and monitor CBC/CMP/INR for synthetic function.   T bilirubin trending up. Hepatology was consulted in the setting of decompensated liver cirrhosis. Ultrasound right upper quadrant with Dopplers, PEth, AFP, serological workup, and acute hepatitis panel. Cirrhotic morphology of the liver with sequela of portal hypertension as detailed above. No discrete hepatic lesions identified. Biliary sludge with thickened gallbladder wall, likely secondary to hepatic dysfunction.  Underwent paracentesis.  As patient is not a candidate for transplant and with his high MELD score he would require air transfer to  [place] where he can go to the hospital close to his home. On lactulose and albumin per hepatology recs.   -  Continue lactulose goal 3 BM's daily  - S/p albumin 50g BID for 48 hours  - on Rifaximin  - S/p IV Vit K x3 doses x2  - Repeat paracentesis 1/12 as detailed above  - underwent  LVP with IR on1/17    Acute upper GI bleed  58 year old M w/ cirrhosis and ETOH use d/o presenting w/ melana and ground coffee emesis/hematemesis. Denies prior symptoms. No EGDs on file. Normotensive and tachycardic w/ HR 130s. Initial Hgb 5.0; now s/p 2u pRBCs. Initial lactate >12. Labs otherwise notable for Plt 116, INR 2.4, BUN 57, Cr 1.6, TB 5.4, , ALT 44. Admitted to MICU for management of acute upper GIB.      EGD showed esophageal ulcers. Hgb stable s/p 3u PRBC. Will need scope after 8 week course of PPI.     - Soft and bite-sized diet, advance as tolerated  - Trend Hgb and transfuse for goal Hgb > 7, unless otherwise indicated  - Maintain IV access with 2 large bore IV's  - Intravascular resuscitation/support with IVF's   - Hold all NSAIDs and anticoagulants, unless contraindicated  - Pantoprazole 40 mg BID for a total of 8 weeks  - Correct any coagulopathy with platelets and FFP for goal of platelets >50K and INR <2.0  - CTM CBC    - Hgb stable, BM's without evidence of melena      VTE Risk Mitigation (From admission, onward)           Ordered     Place sequential compression device  Until discontinued         01/03/24 0256     IP VTE LOW RISK PATIENT  Once         01/03/24 0256                    Discharge Planning   MILKA: 1/22/2024     Code Status: Full Code   Is the patient medically ready for discharge?: No    Reason for patient still in hospital (select all that apply): Patient trending condition, Laboratory test, Treatment, and Consult recommendations  Discharge Plan A: Hospital Transfer   Discharge Delays: None known at this time              Bailey Cline MD  Department of Hospital Medicine   [facility]

## 2024-01-20 NOTE — ASSESSMENT & PLAN NOTE
"Impression Blu Deleon is a 58 y.o. male with history of alcohol use disorder, HTN, and compensated EtOH cirrhosis who was admitted to [facility] on 01/02 with melena.  After resuscitation, EGD on 1/3/24 showed esophageal ulcers, erythematous mucosa in the antrum, and normal duodenum. The patient was diagnosed w/ "early cirrhosis" in 2021 with  liver ultrasound, and he has continued to drink. He has consumed alcohol regularly since the age of 21. This afternoon, pt is sitting up in bed and AAOX3.     Reason for Consult  Per communication with Dr. ORACIO Thomas and Dr. Cline, GOC/ACP needed.     ACP/GOC Myself and Chelsie () met with pt and wife this afternoon at . He and wife understand that he is not a transplant candidate, and that there is no other "cure" for liver cirrhosis. I discussed with them the "natural deterioration" associated with cirrhosis and evidence of current disease process. We discussed what a transition to hospice would look like and importance of discussing his "goals" of his dying process while he was still awake and able. We discussed importance of lactulose to help keep HE at bay, so that he may maintain cognition as long as possible. They were emotional but appreciative of direct conversation. They both express that their main goal at this time is to get home to [place] on Monday. Once home, they are open to hospice "when appropriate".     MPOAWife- Thuyvijay Deleon     Symptoms  Denies any s/s of discomfort at this time.     Recommendations  -Continue to monitor for s/s of acute deterioration.  -Goal to assist pt in transfer home to [place].     "

## 2024-01-20 NOTE — ASSESSMENT & PLAN NOTE
Patient is visiting from out of town; outside records not available to review thus etiology of cirrhosis not confirmed. However, patient does have a history of ETOH use d/o so this is included in the differential. States he is currently in the process of being referred to a hepatologist and having an EGD scheduled.     MELD-Na score calculated; MELD 3.0: 43 at 1/20/2024  4:39 AM  MELD-Na: 43 at 1/20/2024  4:39 AM  Calculated from:  Serum Creatinine: 4.2 mg/dL (Using max of 3 mg/dL) at 1/20/2024  4:39 AM  Serum Sodium: 130 mmol/L at 1/20/2024  4:39 AM  Total Bilirubin: 24.4 mg/dL at 1/20/2024  4:39 AM  Serum Albumin: 3.3 g/dL at 1/20/2024  4:39 AM  INR(ratio): 3.0 at 1/20/2024  4:39 AM  Age at listing (hypothetical): 58 years  Sex: Male at 1/20/2024  4:39 AM    Continue chronic meds. Etiology likely ETOH. Will avoid any hepatotoxic meds, and monitor CBC/CMP/INR for synthetic function.   T bilirubin trending up. Hepatology was consulted in the setting of decompensated liver cirrhosis. Ultrasound right upper quadrant with Dopplers, PEth, AFP, serological workup, and acute hepatitis panel. Cirrhotic morphology of the liver with sequela of portal hypertension as detailed above. No discrete hepatic lesions identified. Biliary sludge with thickened gallbladder wall, likely secondary to hepatic dysfunction.  Underwent paracentesis.  As patient is not a candidate for transplant and with his high MELD score he would require air transfer to  [place] where he can go to the hospital close to his home. On lactulose and albumin per hepatology recs.   - Continue lactulose goal 3 BM's daily  - S/p albumin 50g BID for 48 hours  - on Rifaximin  - S/p IV Vit K x3 doses x2  - Repeat paracentesis 1/12 as detailed above  - underwent  LVP with IR on1/17

## 2024-01-20 NOTE — ASSESSMENT & PLAN NOTE
Patient is visiting from out of town; outside records not available to review thus etiology of cirrhosis not confirmed. However, patient does have a history of ETOH use d/o so this is included in the differential. States he is currently in the process of being referred to a hepatologist and having an EGD scheduled.     MELD-Na score calculated; MELD 3.0: 43 at 1/19/2024  5:42 AM  MELD-Na: 43 at 1/19/2024  5:42 AM  Calculated from:  Serum Creatinine: 4.0 mg/dL (Using max of 3 mg/dL) at 1/19/2024  5:42 AM  Serum Sodium: 132 mmol/L at 1/19/2024  5:42 AM  Total Bilirubin: 23.6 mg/dL at 1/19/2024  5:42 AM  Serum Albumin: 3.2 g/dL at 1/19/2024  5:42 AM  INR(ratio): 3.1 at 1/19/2024  5:42 AM  Age at listing (hypothetical): 58 years  Sex: Male at 1/19/2024  5:42 AM    Continue chronic meds. Etiology likely ETOH. Will avoid any hepatotoxic meds, and monitor CBC/CMP/INR for synthetic function.   T bilirubin trending up. Hepatology was consulted in the setting of decompensated liver cirrhosis. Ultrasound right upper quadrant with Dopplers, PEth, AFP, serological workup, and acute hepatitis panel. Cirrhotic morphology of the liver with sequela of portal hypertension as detailed above. No discrete hepatic lesions identified. Biliary sludge with thickened gallbladder wall, likely secondary to hepatic dysfunction.  Underwent paracentesis.  As patient is not a candidate for transplant and with his high MELD score he would require air transfer to  [place] where he can go to the hospital close to his home. On lactulose and albumin per hepatology recs.   - Continue lactulose goal 3 BM's daily  - S/p albumin 50g BID for 48 hours  - on Rifaximin  - S/p IV Vit K x3 doses x2  - Repeat paracentesis 1/12 as detailed above  - underwent  LVP with IR on1/17

## 2024-01-20 NOTE — SUBJECTIVE & OBJECTIVE
Interval History/Significant Events: Pt seen and examined this morning on rounds. ANNALISE. .  Patient was rejected by [facility].  Discussed with the family regarding poor prognosis, wife and son present at bedside.  Palliative was involved to give family an introduction to hospice care.    Review of Systems   Constitutional:  Negative for chills, diaphoresis, fever and malaise/fatigue.   HENT:  Negative for sore throat.    Eyes:  Negative for double vision.   Respiratory:  Negative for cough, shortness of breath and wheezing.    Cardiovascular:  Negative for chest pain, palpitations, orthopnea, leg swelling and PND.   Gastrointestinal:  Negative for abdominal pain, blood in stool, constipation, heartburn, nausea and vomiting.   Genitourinary:  Negative for hematuria.   Musculoskeletal:  Negative for falls and myalgias.   Neurological:  Negative for dizziness, loss of consciousness, weakness and headaches.   Psychiatric/Behavioral:  The patient is not nervous/anxious.          Vital Signs (Most Recent):  Temp: 98 °F (36.7 °C) (01/19/24 1959)  Pulse: 80 (01/19/24 1959)  Resp: 18 (01/19/24 1514)  BP: (!) 146/74 (01/19/24 1959)  SpO2: 97 % (01/19/24 1959) Vital Signs (24h Range):  Temp:  [97.4 °F (36.3 °C)-98.5 °F (36.9 °C)] 98 °F (36.7 °C)  Pulse:  [74-85] 80  Resp:  [18] 18  SpO2:  [97 %-99 %] 97 %  BP: (113-146)/(55-74) 146/74   Weight: 97.6 kg (215 lb 2.7 oz)  Body mass index is 32.72 kg/m².      Intake/Output Summary (Last 24 hours) at 1/19/2024 2147  Last data filed at 1/18/2024 2244  Gross per 24 hour   Intake --   Output 750 ml   Net -750 ml            Physical Exam  Gen: in NAD, appears stated age, jaundiced  Neuro: AAOx4, CN2-12 grossly intact BL; motor, sensory, and strength grossly intact BL  HEENT: + icteric sclera. NTNC, EOMI, PERRLA, MMM; no thyromegaly or lymphadenopathy; no JVD appreciated  CVS: RRR, no m/r/g; S1/S2 auscultated with no S3 or S4; capillary refill < 2 sec  Resp: lungs CTAB, no w/r/r;  "no belabored breathing or accessory muscle use appreciated   Abd: + Abdominal distension. BS+ in all 4 quadrants; NTND, soft to palpation; no organomegaly appreciated   Extrem: pulses full, equal, and regular over all 4 extremities; no UE or LE edema BL         Vents:  Oxygen Concentration (%): 21 (01/12/24 0041)  Lines/Drains/Airways       Peripheral Intravenous Line  Duration                  Peripheral IV - Single Lumen 01/03/24 1045 18 G;1 1/4 in Anterior;Left Upper Arm 16 days         Peripheral IV - Single Lumen 01/03/24 1400 18 G;1 1/4 in Anterior;Right Upper Arm 16 days                  Significant Labs:    CBC/Anemia Profile:  Recent Labs   Lab 01/18/24  0408 01/19/24  0542   WBC 7.00 7.21   HGB 7.8* 7.5*   HCT 23.5* 22.7*   PLT 39* 27*   * 101*   RDW 22.5* 22.5*        Chemistries:  Recent Labs   Lab 01/18/24  0408 01/19/24  0542   * 132*   K 3.9 4.1    104   CO2 20* 19*   BUN 59* 69*   CREATININE 3.7* 4.0*   CALCIUM 8.4* 9.1   ALBUMIN 2.5* 3.2*   PROT 5.1* 5.6*   BILITOT 22.6* 23.6*   ALKPHOS 160* 159*   ALT 57* 45*   * 106*   MG 2.4 2.3   PHOS 3.9 4.4       CMP:   Recent Labs   Lab 01/18/24  0408 01/19/24  0542   * 132*   K 3.9 4.1    104   CO2 20* 19*    92   BUN 59* 69*   CREATININE 3.7* 4.0*   CALCIUM 8.4* 9.1   PROT 5.1* 5.6*   ALBUMIN 2.5* 3.2*   BILITOT 22.6* 23.6*   ALKPHOS 160* 159*   * 106*   ALT 57* 45*   ANIONGAP 5* 9     Coagulation:   Recent Labs   Lab 01/19/24  0542   INR 3.1*     Lactic Acid:   No results for input(s): "LACTATE" in the last 48 hours.        Significant Imaging:  I have reviewed all pertinent imaging results/findings within the past 24 hours.      "

## 2024-01-20 NOTE — PROGRESS NOTES
"Nephrology progress note    Assessment and plan  CODY on CKD (baseline 1.2-1.3) suspected iATN 2/2 hemodynamic instability, bilirubin cast nephropathy vs HRS. High risk. Given no evidence of esophageal varices on EGD(signs of prolonged high mentioned) and blood pressure at a higher side, the HRS is low on my differential diagnosis list.  Ongoing worsening creatinine but without urgent indication for dialysis.  We will suggest to give sodium bicarbonate 650 b.i.d. for metabolic acidosis.  Antibiotics per the team.  We will continue to monitor the indication for RRT.    Subjective  No specific complaint  /69 mmHg   cc (likely not documented correctly)    Objective  Vitals:    01/20/24 0341 01/20/24 0800 01/20/24 0917 01/20/24 1052   BP: (!) 146/70 135/69     BP Location:       Patient Position:       Pulse: 80 78 77 79   Resp: 12 16     Temp: 97.8 °F (36.6 °C) 97.6 °F (36.4 °C)     TempSrc: Oral Oral     SpO2: 99% 97%     Weight:       Height:        PHYSICAL EXAMINATION:  Blood pressure 135/69, pulse 79, temperature 97.6 °F (36.4 °C), temperature source Oral, resp. rate 16, height 5' 8" (1.727 m), weight 97.6 kg (215 lb 2.7 oz), SpO2 97 %.  Constitutional - No acute distress  HEENT - Grossly normal, icteric sclera  Neck - supple.   Cardiovascular - JVP normal  Respiratory - Clear  Musculoskeletal - Peripheral edema 1+  Dermatologic/Skin - Skin warm and dry.  No rashes.    Neurologic - No acute neurological deficit  Psychiatric - AAOx3   "

## 2024-01-20 NOTE — PROGRESS NOTES
Hepatology Treatment Plan    Blu Deleon is a 58 y.o. male admitted to hospital 1/2/2024 (Hospital Day: 19) due to Leukocytosis.     Interval History  Pt has been accepted by [facility] in [place]. Flight scheduled for Monday 1/22 at 8:00a.m.  Pt's wife and adult son will travel via air flight.       ANNALISE. DESMOND.           Objective  Temp:  [97.8 °F (36.6 °C)-98.5 °F (36.9 °C)] 97.8 °F (36.6 °C) (01/20 0341)  Pulse:  [78-85] 80 (01/20 0341)  BP: (136-146)/(60-74) 146/70 (01/20 0341)  Resp:  [12-18] 12 (01/20 0341)  SpO2:  [97 %-99 %] 99 % (01/20 0341)    Physical Exam:  Constitutional:  awake, alert, NAD. Ill appearing but non toxic.   HENT: Head: Normal, CPAP in place  Eyes:  scleral icterus +nt  Respiratory: normal chest expansion & respiratory effort and no accessory muscle use  GI: soft, non-tender, without masses or organomegaly  Skin:  jaundiced  Neurological: oriented x3      Laboratory    Recent Labs   Lab 01/20/24 0439   WBC 8.11   RBC 2.11*   HGB 7.1*   HCT 22.0*   *   MCH 33.6*   MCHC 32.3      Recent Labs   Lab 01/20/24 0439   CALCIUM 9.2   ALBUMIN 3.3*   PROT 5.7*   *   K 4.1   CO2 19*      BUN 72*   CREATININE 4.2*   ALKPHOS 154*   ALT 45*   *   BILITOT 24.4*      Recent Labs   Lab 01/20/24 0439   INR 3.0*        MELD 3.0: 43 at 1/20/2024  4:39 AM  MELD-Na: 43 at 1/20/2024  4:39 AM  Calculated from:  Serum Creatinine: 4.2 mg/dL (Using max of 3 mg/dL) at 1/20/2024  4:39 AM  Serum Sodium: 130 mmol/L at 1/20/2024  4:39 AM  Total Bilirubin: 24.4 mg/dL at 1/20/2024  4:39 AM  Serum Albumin: 3.3 g/dL at 1/20/2024  4:39 AM  INR(ratio): 3.0 at 1/20/2024  4:39 AM  Age at listing (hypothetical): 58 years  Sex: Male at 1/20/2024  4:39 AM     JERI negative  ASMA 1:40  IgG wnl  AMA negative  Viral hepatitis panel negative  Ceruloplasmin negative  Iron studies negative    Assessment and Plan    Blu Deleon is a 58 y.o. male with history of alcohol use disorder, HTN, and  "compensated EtOH cirrhosis who was admitted to [facility] on  with melena.   After resuscitation, EGD on 1/3/24 showed esophageal ulcers, erythematous mucosa in the antrum, and normal duodenum.  Hepatology now consulted for worsening bilirubin.     He hails from [place], visiting from the [place]. The patient has known about his history of "early cirrhosis" since at least  when liver ultrasound confirmed cirrhosis.  He has continued to drink in spite of knowledge of liver disease and being told to stop; has consumed alcohol regularly since the age of 21.  Last drink 24. No illicit drug use.  Sister  of EtOH cirrhosis. PETH positive, 98. Fungitell -ve.     Case was discussed in committee  and deemed not to be a candidate for liver transplant evaluation due to ongoing alcohol use despite knowledge of liver disease. Liver decompensation worsening.        Problem List:  Newly decompensated EtOH cirrhosis  EtOH use disorder  Esophageal ulcers  Leukocytosis  Hepatic encephalopathy        Recommendations:  - We are not going to proceed with liver tx eval at this time since patient was aware of cirrhosis but persisted drinking. Discussed in transplant committee.    - Med flight scheduled for Monday to [place]. Accepted to [facility]. Patient and family made aware of poor prognosis. Appreciate assistance from all team members.     - continue cefepime to ensure patient can reach [place] without hopefully katie infection.     - Avoid sedating medications    - Titrate lactulose to 3-4 BMs per day    - Continue rifaximin 550 mg BID    - Nephrology following for CODY, appreciate recs.    - low sodium, high protein diet    - Continue Protonix 40mg twice per day for 8 weeks total. Repeat upper endoscopy in 8 weeks to check healing of his esophageal ulcers. He would like to have this done in [place] [facility].     - Daily CBC, CMP, INR.     - We will continue to follow.    Thank you for " involving us in the care of Blu Deleon. Please call with any additional questions, concerns or changes in the patient's clinical status. Plan of care discussed with attending Dr. He.    Lyle Thomas MD  Gastroenterology and Hepatology Fellow, PGY V

## 2024-01-20 NOTE — HPI
"Per chart review:" Mr. Deleon is a 58 year old male with PMH notable for HTN, ETOH use, reported cirrhosis (has not seen a hepatologist), and depression who presented to [facility] ED with acute GI bleeding. In speaking with patient and wife at bedside, patient reports four days of nausea and vomiting with bright red blood and black tarry diarrhea. Additionally, he reports a fall yesterday after attempting to go to the restroom secondary to being lightheaded. He denies this happening previously. He was scheduled for an outpatient EGD, which has not been completed. He has not had a colonoscopy. He denies any anticoagulation or antiplatelet use.      In the emergency department he was found to be acutely anemic with a hgb of 5. He has not been hypotensive while in the emergency department, however, notably tachycardic with -140. Labs otherwise notable for WBC 13.56, Plt 107, INR 2.4, BUN 57, Cr 1.6, TB 5.4, , ALT 44. Initial lactate >12. ETOH level <10. Patient is s/p 3u pRBCs.     Admitted to MICU for UGIB.       Overview/Hospital Course:  Patient admitted for UGIB. GI consulted and EGD showed esophageal ulcers. Pt to continue PPI for 8 weeks with repeat scope to follow. Hgb stable s/p 3u PRBC and 1u FFP. HDS and tolerating clear liquid diet. CIWA ordered and no ativan required.  Patient was transferred to floors on 01/05.  Creatinine has remained stable at 1.4.  T bilirubin trending up.  Hepatology was consulted in the setting of decompensated liver cirrhosis.  Ultrasound right upper quadrant with Dopplers, PEth, AFP, serological workup, and acute hepatitis panel.  Patient underwent paracentesis on 01/08 and ascitic fluid negative for SBP.  As patient is not a candidate for transplant and with his high MELD score he would require air transfer to [place] where he can go to the hospital close to his home."  "

## 2024-01-20 NOTE — PROGRESS NOTES
[facility] Medicine  Progress Note    Patient Name: Blu Deleon  MRN: [id number]  Patient Class: IP- Inpatient   Admission Date: 1/2/2024  Length of Stay: 16 days  Attending Physician: Bailey Cline MD  Primary Care Provider: Radha, Primary Doctor        Subjective:     Principal Problem:Leukocytosis        HPI:  Mr. Deleon is a 58 year old male with PMH notable for HTN, ETOH use, reported cirrhosis (has not seen a hepatologist), and depression who presented to [facility] ED with acute GI bleeding. In speaking with patient and wife at bedside, patient reports four days of nausea and vomiting with bright red blood and black tarry diarrhea. Additionally, he reports a fall yesterday after attempting to go to the restroom secondary to being lightheaded. He denies this happening previously. He was scheduled for an outpatient EGD, which has not been completed. He has not had a colonoscopy. He denies any anticoagulation or antiplatelet use.      In the emergency department he was found to be acutely anemic with a hgb of 5. He has not been hypotensive while in the emergency department, however, notably tachycardic with -140. Labs otherwise notable for WBC 13.56, Plt 107, INR 2.4, BUN 57, Cr 1.6, TB 5.4, , ALT 44. Initial lactate >12. ETOH level <10. Patient is s/p 3u pRBCs.    Admitted to MICU for UGIB.      Overview/Hospital Course:  Patient admitted for UGIB. GI consulted and EGD showed esophageal ulcers. Pt to continue PPI for 8 weeks with repeat scope to follow. Hgb stable s/p 3u PRBC and 1u FFP. HDS and tolerating clear liquid diet. CIWA ordered and no ativan required.  Patient was transferred to floors on 01/05.  Creatinine has remained stable at 1.4.  T bilirubin trending up.  Hepatology was consulted in the setting of decompensated liver cirrhosis.  Ultrasound right upper quadrant with Dopplers, PEth, AFP, serological workup, and acute hepatitis panel.  Patient underwent  paracentesis on 01/08 and ascitic fluid negative for SBP.  As patient is not a candidate for transplant and with his high MELD score he would require air transfer to [place] where he can go to the hospital close to his home. Accepted for transfer to [facility] in [place], awaiting logistics of flight transport.    Patient was doing well the morning of 1/12, however after a walk to the bathroom he started to have chills and rigors. He was started on CTX for concern for possible SBP. Repeat CBC with stable Hgb and WBC WNL. Paracentesis performed with IR on 1/12 with 4.2L out. Ascitic labs with no evidence of SBP, and ascitic cultures NGTD. Blood cultures 1/12 NGTD. Had some RUQ discomfort and RUQ US ordered with gallbladder wall thickening with possible signs of cholecystitis. CXR obtained without any signs of pneumonia however possible small amount of edema vs viral. Covid/Influenza negative.     On 1/13, WBC elevated, patient remains afebrile and normotensive. Abdominal exam benign, negative Sen's sign. UA non-infectious. Antibiotics broadened to Vanc/cefe with low threshold for micafungin per hepatology. CT-CAP without evidence of infection. Patient appeared much better morning of 1/14, however became somnolent and Broadened to Micafungin and ID consulted. Patient improved and ID consulted. Vanc discontinued and cultures/infectious work up remained inconclusive. ID recommending 7 days of cefepime and 7 days of fluconazole. Unfortunately, kidney function continues to worsen. UA/urine sediment as well as elevated/normotensive blood pressures not in line with HRS. Unclear if would need a biopsy , nephrology planning for dialysis in the future if kidney function does not improve.. S/p therapeutic LP on 1/17. At this time patients stable for transfer to [place] via flight.  But unfortunately patient was declined by [facility] [place].    Interval History/Significant Events: Pt seen and examined this  morning on rounds. ANNALISE. .  Patient was rejected by [facility].  Discussed with the family regarding poor prognosis, wife and son present at bedside.  Palliative was involved to give family an introduction to hospice care.    Review of Systems   Constitutional:  Negative for chills, diaphoresis, fever and malaise/fatigue.   HENT:  Negative for sore throat.    Eyes:  Negative for double vision.   Respiratory:  Negative for cough, shortness of breath and wheezing.    Cardiovascular:  Negative for chest pain, palpitations, orthopnea, leg swelling and PND.   Gastrointestinal:  Negative for abdominal pain, blood in stool, constipation, heartburn, nausea and vomiting.   Genitourinary:  Negative for hematuria.   Musculoskeletal:  Negative for falls and myalgias.   Neurological:  Negative for dizziness, loss of consciousness, weakness and headaches.   Psychiatric/Behavioral:  The patient is not nervous/anxious.          Vital Signs (Most Recent):  Temp: 98 °F (36.7 °C) (01/19/24 1959)  Pulse: 80 (01/19/24 1959)  Resp: 18 (01/19/24 1514)  BP: (!) 146/74 (01/19/24 1959)  SpO2: 97 % (01/19/24 1959) Vital Signs (24h Range):  Temp:  [97.4 °F (36.3 °C)-98.5 °F (36.9 °C)] 98 °F (36.7 °C)  Pulse:  [74-85] 80  Resp:  [18] 18  SpO2:  [97 %-99 %] 97 %  BP: (113-146)/(55-74) 146/74   Weight: 97.6 kg (215 lb 2.7 oz)  Body mass index is 32.72 kg/m².      Intake/Output Summary (Last 24 hours) at 1/19/2024 2147  Last data filed at 1/18/2024 2244  Gross per 24 hour   Intake --   Output 750 ml   Net -750 ml            Physical Exam  Gen: in NAD, appears stated age, jaundiced  Neuro: AAOx4, CN2-12 grossly intact BL; motor, sensory, and strength grossly intact BL  HEENT: + icteric sclera. NTNC, EOMI, PERRLA, MMM; no thyromegaly or lymphadenopathy; no JVD appreciated  CVS: RRR, no m/r/g; S1/S2 auscultated with no S3 or S4; capillary refill < 2 sec  Resp: lungs CTAB, no w/r/r; no belabored breathing or accessory muscle use appreciated  "  Abd: + Abdominal distension. BS+ in all 4 quadrants; NTND, soft to palpation; no organomegaly appreciated   Extrem: pulses full, equal, and regular over all 4 extremities; no UE or LE edema BL         Vents:  Oxygen Concentration (%): 21 (01/12/24 0041)  Lines/Drains/Airways       Peripheral Intravenous Line  Duration                  Peripheral IV - Single Lumen 01/03/24 1045 18 G;1 1/4 in Anterior;Left Upper Arm 16 days         Peripheral IV - Single Lumen 01/03/24 1400 18 G;1 1/4 in Anterior;Right Upper Arm 16 days                  Significant Labs:    CBC/Anemia Profile:  Recent Labs   Lab 01/18/24  0408 01/19/24  0542   WBC 7.00 7.21   HGB 7.8* 7.5*   HCT 23.5* 22.7*   PLT 39* 27*   * 101*   RDW 22.5* 22.5*        Chemistries:  Recent Labs   Lab 01/18/24  0408 01/19/24  0542   * 132*   K 3.9 4.1    104   CO2 20* 19*   BUN 59* 69*   CREATININE 3.7* 4.0*   CALCIUM 8.4* 9.1   ALBUMIN 2.5* 3.2*   PROT 5.1* 5.6*   BILITOT 22.6* 23.6*   ALKPHOS 160* 159*   ALT 57* 45*   * 106*   MG 2.4 2.3   PHOS 3.9 4.4       CMP:   Recent Labs   Lab 01/18/24  0408 01/19/24  0542   * 132*   K 3.9 4.1    104   CO2 20* 19*    92   BUN 59* 69*   CREATININE 3.7* 4.0*   CALCIUM 8.4* 9.1   PROT 5.1* 5.6*   ALBUMIN 2.5* 3.2*   BILITOT 22.6* 23.6*   ALKPHOS 160* 159*   * 106*   ALT 57* 45*   ANIONGAP 5* 9     Coagulation:   Recent Labs   Lab 01/19/24  0542   INR 3.1*     Lactic Acid:   No results for input(s): "LACTATE" in the last 48 hours.        Significant Imaging:  I have reviewed all pertinent imaging results/findings within the past 24 hours.        Assessment/Plan:      * Leukocytosis  Patient with new onset chills/rigors 1/12. Now with leukocytosis, remains afebrile and normotensive (previously normotensive/hypertensive).  Infectious work up:  - CXR obtained without any signs of pneumonia however possible small amount of edema vs viral component. Covid/Influenza negative.   - UA " non-infectious.   - He was initially started on CTX for concern for possible SBP.    - Paracentesis performed with IR on 1/12 with 4.2L out. Ascitic labs with no evidence of SBP, and ascitic cultures NGTD.   - Blood cultures 1/12 NGTD  - Had some RUQ discomfort 1/12 and RUQ US showed gallbladder wall thickening with possible signs of cholecystitis. No abdominal pain today and negative Sen's sign  - Broaded from CTX to Vanc/cefe with low threshold to start micafungin per hepatology  - CT-CAP without evidence of infection, no concern for cholecystitis    - Had another episode of rigors and temp renetta to 100.0 on 1/14. Remains HDS.  - Started on Micafungin  - ID consulted and recommending DC vanc, continue cefepime x7 days and switching micafungin to fluconazole for 7 day treatment  - Fungitell assay negative    Transaminitis  - 2:1 pattern; likely 2/2 ETOH use d/o.   - LFT's stable/improving  - T bili worsening  - Will continue to monitor        Thrombocytopenia  Etiology likely 2/2 cirrhosis  - Monitor w/ daily CBC and transfuse if Plt <10 or <50 w/ signs of active bleeding   Recent Labs   Lab 01/18/24  0408   PLT 39*         CODY (acute kidney injury)  - Cr 1.6 at admission, unclear baseline  - Initially likely pre-renal in setting of GIB.  - Now with slow elevation of creatinine  - on albumin 50 g t.i.d..  - S/p 48 hours albumin  - Cr 1.6 -> 2.0 -> 2.2 -> 2.5 -> 3.0-->3.7  - Nephrology consulted, urine studies sent, uric acid ordered   - 1500ml fluid restriction   - May need HRS treatment, though BP remains elevated and urine sediment does not appear to be inline with HRS   - Autoimmune panel sent and pending  - Discussed with family that if continued worsening renal function, dialysis may need to be initiated  - Renally dosing all medications  - Avoiding nephrotoxins  - Will continue to monitor on daily labs    Alcohol use disorder  - Last alcoholic drink on 1/01. Alcohol level <10 on admission. Denies history of  withdrawal in the past including seizures. Remains tachycardic but likely 2/2 acute GIB. Will continue to monitor for signs of withdrawal w/ CIWA protocol and start benzodiazepines if indicated.   - thiamine, folic acid, MV  - DC CIWA protocol as now out of withdrawal window       Coagulopathy  - INR elevated in setting of cirrhosis  - S/p IV Vit K for x3 days.       Cirrhosis  Patient is visiting from out of town; outside records not available to review thus etiology of cirrhosis not confirmed. However, patient does have a history of ETOH use d/o so this is included in the differential. States he is currently in the process of being referred to a hepatologist and having an EGD scheduled.     MELD-Na score calculated; MELD 3.0: 43 at 1/19/2024  5:42 AM  MELD-Na: 43 at 1/19/2024  5:42 AM  Calculated from:  Serum Creatinine: 4.0 mg/dL (Using max of 3 mg/dL) at 1/19/2024  5:42 AM  Serum Sodium: 132 mmol/L at 1/19/2024  5:42 AM  Total Bilirubin: 23.6 mg/dL at 1/19/2024  5:42 AM  Serum Albumin: 3.2 g/dL at 1/19/2024  5:42 AM  INR(ratio): 3.1 at 1/19/2024  5:42 AM  Age at listing (hypothetical): 58 years  Sex: Male at 1/19/2024  5:42 AM    Continue chronic meds. Etiology likely ETOH. Will avoid any hepatotoxic meds, and monitor CBC/CMP/INR for synthetic function.   T bilirubin trending up. Hepatology was consulted in the setting of decompensated liver cirrhosis. Ultrasound right upper quadrant with Dopplers, PEth, AFP, serological workup, and acute hepatitis panel. Cirrhotic morphology of the liver with sequela of portal hypertension as detailed above. No discrete hepatic lesions identified. Biliary sludge with thickened gallbladder wall, likely secondary to hepatic dysfunction.  Underwent paracentesis.  As patient is not a candidate for transplant and with his high MELD score he would require air transfer to  [place] where he can go to the hospital close to his home. On lactulose and albumin per hepatology recs.   -  Continue lactulose goal 3 BM's daily  - S/p albumin 50g BID for 48 hours  - on Rifaximin  - S/p IV Vit K x3 doses x2  - Repeat paracentesis 1/12 as detailed above  - underwent  LVP with IR on1/17    Acute upper GI bleed  58 year old M w/ cirrhosis and ETOH use d/o presenting w/ melana and ground coffee emesis/hematemesis. Denies prior symptoms. No EGDs on file. Normotensive and tachycardic w/ HR 130s. Initial Hgb 5.0; now s/p 2u pRBCs. Initial lactate >12. Labs otherwise notable for Plt 116, INR 2.4, BUN 57, Cr 1.6, TB 5.4, , ALT 44. Admitted to MICU for management of acute upper GIB.      EGD showed esophageal ulcers. Hgb stable s/p 3u PRBC. Will need scope after 8 week course of PPI.     - Soft and bite-sized diet, advance as tolerated  - Trend Hgb and transfuse for goal Hgb > 7, unless otherwise indicated  - Maintain IV access with 2 large bore IV's  - Intravascular resuscitation/support with IVF's   - Hold all NSAIDs and anticoagulants, unless contraindicated  - Pantoprazole 40 mg BID for a total of 8 weeks  - Correct any coagulopathy with platelets and FFP for goal of platelets >50K and INR <2.0  - CTM CBC    - Hgb stable, BM's without evidence of melena      VTE Risk Mitigation (From admission, onward)           Ordered     Place sequential compression device  Until discontinued         01/03/24 0256     IP VTE LOW RISK PATIENT  Once         01/03/24 0256                    Discharge Planning   MILKA: 1/22/2024     Code Status: Full Code   Is the patient medically ready for discharge?: No    Reason for patient still in hospital (select all that apply): Patient trending condition, Laboratory test, Treatment, Consult recommendations, and Pending disposition  Discharge Plan A: Hospital Transfer   Discharge Delays: None known at this time              Bailey Cline MD  Department of Hospital Medicine   [facility]

## 2024-01-20 NOTE — PLAN OF CARE
SW met with pt and family to provide update on case status and transfer.  Pt has been accepted by [facility] w/ the agreement to pay out of pocket; [facility] is not in network with pt's insurance.      [facility] team has finalized Air flight to [facility] scheduled for Monday 1/22 at 8:00a.m.  Pt's wife and adult son will travel via air flight.  Family notified once they arrive in [place], they will need to obtain their own transportation to the hospital.      Discharge packet with imaging disc at nurse's station. Case Management Team will obtain additional clinicals over the weekend for discharge packet.      SW will continue to follow and assist with discharge.     Discharge Plan A and Plan B have been determined by review of patient's clinical status, future medical and therapeutic needs, and coverage/benefits for post-acute care in coordination with multidisciplinary team members.    Mirian Wells LMSW  Part-Time-  [facility]  Ext. [phone]

## 2024-01-20 NOTE — ASSESSMENT & PLAN NOTE
58 year old M w/ cirrhosis and ETOH use d/o presenting w/ melana and ground coffee emesis/hematemesis. Denies prior symptoms. No EGDs on file. Normotensive and tachycardic w/ HR 130s. Initial Hgb 5.0; now s/p 2u pRBCs. Initial lactate >12. Labs otherwise notable for Plt 116, INR 2.4, BUN 57, Cr 1.6, TB 5.4, , ALT 44. Admitted to MICU for management of acute upper GIB.      EGD showed esophageal ulcers. Hgb stable s/p 3u PRBC. Will need scope after 8 week course of PPI.     - Soft and bite-sized diet, advance as tolerated  - Trend Hgb and transfuse for goal Hgb > 7, unless otherwise indicated  - Maintain IV access with 2 large bore IV's  - Intravascular resuscitation/support with IVF's   - Hold all NSAIDs and anticoagulants, unless contraindicated  - Pantoprazole 40 mg BID for a total of 8 weeks  - Correct any coagulopathy with platelets and FFP for goal of platelets >50K and INR <2.0  - CTM CBC    - Hgb stable, BM's without evidence of melena   Pt can get 100% coverage of [facility] meds thru [facility]-please fax new rx to [phone] Dr. Danielle Duran for :  Metoprolol Succinate  MG Oral Tablet 24 Hr  Losartan Potassium 25 MG Oral Tab  0 5/19/2020    Sig:   Take 2 tablets (50 mg total)        atorvastatin

## 2024-01-20 NOTE — SUBJECTIVE & OBJECTIVE
Interval History/Significant Events: Pt seen and examined this morning on rell WEBSTER .  Patient has been accepted by [facility].  Scheduled for transfer on 01/22.  Has no active complaints.  Hemoglobin trending down 7.5--->7.1     Review of Systems   Constitutional:  Negative for chills, diaphoresis, fever and malaise/fatigue.   HENT:  Negative for sore throat.    Eyes:  Negative for double vision.   Respiratory:  Negative for cough, shortness of breath and wheezing.    Cardiovascular:  Negative for chest pain, palpitations, orthopnea, leg swelling and PND.   Gastrointestinal:  Negative for abdominal pain, blood in stool, constipation, heartburn, nausea and vomiting.   Genitourinary:  Negative for hematuria.   Musculoskeletal:  Negative for falls and myalgias.   Neurological:  Negative for dizziness, loss of consciousness, weakness and headaches.   Psychiatric/Behavioral:  The patient is not nervous/anxious.          Vital Signs (Most Recent):  Temp: 97.7 °F (36.5 °C) (01/20/24 1100)  Pulse: 78 (01/20/24 1100)  Resp: 16 (01/20/24 1100)  BP: (!) 142/65 (01/20/24 1100)  SpO2: 97 % (01/20/24 1100) Vital Signs (24h Range):  Temp:  [97.6 °F (36.4 °C)-98.5 °F (36.9 °C)] 97.7 °F (36.5 °C)  Pulse:  [77-85] 78  Resp:  [12-18] 16  SpO2:  [97 %-99 %] 97 %  BP: (135-146)/(60-74) 142/65   Weight: 97.6 kg (215 lb 2.7 oz)  Body mass index is 32.72 kg/m².      Intake/Output Summary (Last 24 hours) at 1/20/2024 1205  Last data filed at 1/20/2024 1119  Gross per 24 hour   Intake 360 ml   Output 175 ml   Net 185 ml              Physical Exam  Gen: in NAD, appears stated age, jaundiced  Neuro: AAOx4, CN2-12 grossly intact BL; motor, sensory, and strength grossly intact BL  HEENT: + icteric sclera. NTNC, EOMI, PERRLA, MMM; no thyromegaly or lymphadenopathy; no JVD appreciated  CVS: RRR, no m/r/g; S1/S2 auscultated with no S3 or S4; capillary refill < 2 sec  Resp: lungs CTAB, no w/r/r; no belabored breathing or accessory  "muscle use appreciated   Abd: + Abdominal distension. BS+ in all 4 quadrants; NTND, soft to palpation; no organomegaly appreciated   Extrem: pulses full, equal, and regular over all 4 extremities; no UE or LE edema BL         Vents:  Oxygen Concentration (%): 21 (01/12/24 0041)  Lines/Drains/Airways       Peripheral Intravenous Line  Duration                  Peripheral IV - Single Lumen 01/03/24 1045 18 G;1 1/4 in Anterior;Left Upper Arm 17 days         Peripheral IV - Single Lumen 01/03/24 1400 18 G;1 1/4 in Anterior;Right Upper Arm 16 days                  Significant Labs:    CBC/Anemia Profile:  Recent Labs   Lab 01/19/24  0542 01/20/24  0439   WBC 7.21 8.11   HGB 7.5* 7.1*   HCT 22.7* 22.0*   PLT 27* 25*   * 104*   RDW 22.5* 22.5*          Chemistries:  Recent Labs   Lab 01/19/24 0542 01/20/24 0439   * 130*   K 4.1 4.1    104   CO2 19* 19*   BUN 69* 72*   CREATININE 4.0* 4.2*   CALCIUM 9.1 9.2   ALBUMIN 3.2* 3.3*   PROT 5.6* 5.7*   BILITOT 23.6* 24.4*   ALKPHOS 159* 154*   ALT 45* 45*   * 106*   MG 2.3 2.4   PHOS 4.4 4.5         CMP:   Recent Labs   Lab 01/19/24  0542 01/20/24 0439   * 130*   K 4.1 4.1    104   CO2 19* 19*   GLU 92 100   BUN 69* 72*   CREATININE 4.0* 4.2*   CALCIUM 9.1 9.2   PROT 5.6* 5.7*   ALBUMIN 3.2* 3.3*   BILITOT 23.6* 24.4*   ALKPHOS 159* 154*   * 106*   ALT 45* 45*   ANIONGAP 9 7*       Coagulation:   Recent Labs   Lab 01/20/24  0439   INR 3.0*       Lactic Acid:   No results for input(s): "LACTATE" in the last 48 hours.        Significant Imaging:  I have reviewed all pertinent imaging results/findings within the past 24 hours.          "

## 2024-01-20 NOTE — CONSULTS
"[facility]  Palliative Medicine  Consult Note    Patient Name: Blu Deleon  MRN: [id number]  Admission Date: 1/2/2024  Hospital Length of Stay: 16 days  Code Status: Full Code   Attending Provider: Bailey Cline MD  Consulting Provider: IRASEMA Mata  Primary Care Physician: Radha, Primary Doctor  Principal Problem:Leukocytosis    Patient information was obtained from patient, spouse/SO, and primary team.      Inpatient consult to Palliative Care  Consult performed by: Dorinda Rushing CNS  Consult ordered by: Bailey Cline MD        Assessment/Plan:     Palliative Care  Palliative care encounter  Impression Blu Deleon is a 58 y.o. male with history of alcohol use disorder, HTN, and compensated EtOH cirrhosis who was admitted to [facility] on 01/02 with melena.  After resuscitation, EGD on 1/3/24 showed esophageal ulcers, erythematous mucosa in the antrum, and normal duodenum. The patient was diagnosed w/ "early cirrhosis" in 2021 with  liver ultrasound, and he has continued to drink. He has consumed alcohol regularly since the age of 21. This afternoon, pt is sitting up in bed and AAOX3.     Reason for Consult  Per communication with Dr. ORACIO Thomas and Dr. Cline, GOC/ACP needed.     ACP/GOC Myself and Chelsie (GUNJAN) met with pt and wife this afternoon at . He and wife understand that he is not a transplant candidate, and that there is no other "cure" for liver cirrhosis. I discussed with them the "natural deterioration" associated with cirrhosis and evidence of current disease process. We discussed what a transition to hospice would look like and importance of discussing his "goals" of his dying process while he was still awake and able. We discussed importance of lactulose to help keep HE at bay, so that he may maintain cognition as long as possible. They were emotional but appreciative of direct conversation. They both express that their main goal at this time is " "to get home to [place] on Monday. Once home, they are open to hospice "when appropriate".     MPOAWife- Thuy Deleon     Symptoms  Denies any s/s of discomfort at this time.     Recommendations  -Continue to monitor for s/s of acute deterioration.  -Goal to assist pt in transfer home to [place].           Thank you for your consult. I will follow-up with patient. Please contact us if you have any additional questions.    Subjective:     HPI:   Per chart review:" Mr. Deleon is a 58 year old male with PMH notable for HTN, ETOH use, reported cirrhosis (has not seen a hepatologist), and depression who presented to [facility] ED with acute GI bleeding. In speaking with patient and wife at bedside, patient reports four days of nausea and vomiting with bright red blood and black tarry diarrhea. Additionally, he reports a fall yesterday after attempting to go to the restroom secondary to being lightheaded. He denies this happening previously. He was scheduled for an outpatient EGD, which has not been completed. He has not had a colonoscopy. He denies any anticoagulation or antiplatelet use.      In the emergency department he was found to be acutely anemic with a hgb of 5. He has not been hypotensive while in the emergency department, however, notably tachycardic with -140. Labs otherwise notable for WBC 13.56, Plt 107, INR 2.4, BUN 57, Cr 1.6, TB 5.4, , ALT 44. Initial lactate >12. ETOH level <10. Patient is s/p 3u pRBCs.     Admitted to MICU for UGIB.       Overview/Hospital Course:  Patient admitted for UGIB. GI consulted and EGD showed esophageal ulcers. Pt to continue PPI for 8 weeks with repeat scope to follow. Hgb stable s/p 3u PRBC and 1u FFP. HDS and tolerating clear liquid diet. CIWA ordered and no ativan required.  Patient was transferred to floors on 01/05.  Creatinine has remained stable at 1.4.  T bilirubin trending up.  Hepatology was consulted in the setting of decompensated liver " "cirrhosis.  Ultrasound right upper quadrant with Dopplers, PEth, AFP, serological workup, and acute hepatitis panel.  Patient underwent paracentesis on 01/08 and ascitic fluid negative for SBP.  As patient is not a candidate for transplant and with his high MELD score he would require air transfer to [place] where he can go to the hospital close to his home."    Hospital Course:  No notes on file      Past Medical History:   Diagnosis Date    Hypertension        Past Surgical History:   Procedure Laterality Date    ESOPHAGOGASTRODUODENOSCOPY N/A 1/3/2024    Procedure: EGD (ESOPHAGOGASTRODUODENOSCOPY);  Surgeon: Madison Hinojosa MD;  Location: [address]);  Service: Endoscopy;  Laterality: N/A;       Review of patient's allergies indicates:  No Known Allergies    Medications:  Continuous Infusions:  Scheduled Meds:   ceFEPime IV (PEDS and ADULTS)  2 g Intravenous Q24H    EScitalopram oxalate  10 mg Oral Daily    fluconazole  200 mg Oral Daily    folic acid  1 mg Oral Daily    lactulose  20 g Oral TID    multivitamin  1 tablet Oral Daily    pantoprazole  40 mg Oral BID    rifAXImin  550 mg Oral BID    thiamine  100 mg Oral Daily     PRN Meds:0.9%  NaCl infusion (for blood administration), hydrALAZINE, sodium chloride 0.9%    Family History    None       Tobacco Use    Smoking status: Never    Smokeless tobacco: Never   Substance and Sexual Activity    Alcohol use: Yes    Drug use: Never    Sexual activity: Not on file       Review of Systems   Constitutional:  Positive for activity change and appetite change.   Gastrointestinal:  Positive for abdominal distention and abdominal pain.     Objective:     Vital Signs (Most Recent):  Temp: 98.5 °F (36.9 °C) (01/19/24 1514)  Pulse: 85 (01/19/24 1514)  Resp: 18 (01/19/24 1514)  BP: 136/60 (01/19/24 1514)  SpO2: 98 % (01/19/24 1514) Vital Signs (24h Range):  Temp:  [97.4 °F (36.3 °C)-98.5 °F (36.9 °C)] 98.5 °F (36.9 °C)  Pulse:  [74-85] 85  Resp:  [18] 18  SpO2:  " [97 %-100 %] 98 %  BP: (113-145)/(55-69) 136/60     Weight: 97.6 kg (215 lb 2.7 oz)  Body mass index is 32.72 kg/m².       Physical Exam  Skin:     Coloration: Skin is jaundiced.            Review of Symptoms      Symptom Assessment (ESAS 0-10 Scale)  Pain:  0  Dyspnea:  0  Anxiety:  0  Nausea:  0  Depression:  0  Anorexia:  0  Fatigue:  0  Insomnia:  0  Restlessness:  0  Agitation:  0         Performance Status:  90    Living Arrangements:  Lives with spouse    Psychosocial/Cultural:   See Palliative Psychosocial Note: Yes  **Primary  to Follow**  Palliative Care  Consult: Yes        Advance Care Planning  Advance Directives:   Living Will: No    Do Not Resuscitate Status: No    Medical Power of : wife (Thuy).      Decision Making:  Family answered questions and Patient answered questions  Goals of Care: The patient and family endorses that what is most important right now is to focus on curative/life-prolongation.    Accordingly, we have decided that the best plan to meet the patient's goals includes continuing with treatment.          Significant Labs: CBC:   Recent Labs   Lab 01/18/24  0408 01/19/24  0542   WBC 7.00 7.21   HGB 7.8* 7.5*   HCT 23.5* 22.7*   PLT 39* 27*     CMP:   Recent Labs   Lab 01/18/24  0408 01/19/24  0542   * 132*   K 3.9 4.1    104   CO2 20* 19*    92   BUN 59* 69*   CREATININE 3.7* 4.0*   CALCIUM 8.4* 9.1   PROT 5.1* 5.6*   ALBUMIN 2.5* 3.2*   BILITOT 22.6* 23.6*   ALKPHOS 160* 159*   * 106*   ALT 57* 45*   ANIONGAP 5* 9     CBC:   Recent Labs   Lab 01/19/24  0542   WBC 7.21   HGB 7.5*   HCT 22.7*   *   PLT 27*     BMP:  Recent Labs   Lab 01/19/24  0542   GLU 92   *   K 4.1      CO2 19*   BUN 69*   CREATININE 4.0*   CALCIUM 9.1   MG 2.3     LFT:  Lab Results   Component Value Date     (H) 01/19/2024    ALKPHOS 159 (H) 01/19/2024    BILITOT 23.6 (H) 01/19/2024     Albumin:   Albumin   Date Value Ref  Range Status   01/19/2024 3.2 (L) 3.5 - 5.2 g/dL Final     Protein:   Total Protein   Date Value Ref Range Status   01/19/2024 5.6 (L) 6.0 - 8.4 g/dL Final     Lactic acid:   Lab Results   Component Value Date    LACTATE 2.1 01/13/2024    LACTATE 1.0 01/04/2024       Significant Imaging: I have reviewed all pertinent imaging results/findings within the past 24 hours.      I spent a total of 75 minutes on the day of the visit. This includes face to face time in discussion of goals of care, symptom assessment, coordination of care and emotional support.  This also includes non-face to face time preparing to see the patient (eg, review of tests/imaging), obtaining and/or reviewing separately obtained history, documenting clinical information in the electronic or other health record, independently interpreting results and communicating results to the patient/family/caregiver, or care coordinator.    Dorinda Rushing, CNS  Palliative Medicine  [facility]

## 2024-01-21 LAB
ALBUMIN SERPL BCP-MCNC: 3.1 G/DL (ref 3.5–5.2)
ALP SERPL-CCNC: 166 U/L (ref 55–135)
ALT SERPL W/O P-5'-P-CCNC: 46 U/L (ref 10–44)
ANION GAP SERPL CALC-SCNC: 9 MMOL/L (ref 8–16)
AST SERPL-CCNC: 104 U/L (ref 10–40)
BASOPHILS # BLD AUTO: 0.07 K/UL (ref 0–0.2)
BASOPHILS NFR BLD: 0.7 % (ref 0–1.9)
BILIRUB SERPL-MCNC: 24.9 MG/DL (ref 0.1–1)
BUN SERPL-MCNC: 80 MG/DL (ref 6–20)
CALCIUM SERPL-MCNC: 9.4 MG/DL (ref 8.7–10.5)
CHLORIDE SERPL-SCNC: 104 MMOL/L (ref 95–110)
CO2 SERPL-SCNC: 19 MMOL/L (ref 23–29)
CREAT SERPL-MCNC: 4.5 MG/DL (ref 0.5–1.4)
DIFFERENTIAL METHOD BLD: ABNORMAL
EOSINOPHIL # BLD AUTO: 0.4 K/UL (ref 0–0.5)
EOSINOPHIL NFR BLD: 3.9 % (ref 0–8)
ERYTHROCYTE [DISTWIDTH] IN BLOOD BY AUTOMATED COUNT: 22.4 % (ref 11.5–14.5)
EST. GFR  (NO RACE VARIABLE): 14.4 ML/MIN/1.73 M^2
GAMMA INTERFERON BACKGROUND BLD IA-ACNC: 0 IU/ML
GLUCOSE SERPL-MCNC: 90 MG/DL (ref 70–110)
HCT VFR BLD AUTO: 22.2 % (ref 40–54)
HGB BLD-MCNC: 7.1 G/DL (ref 14–18)
IMM GRANULOCYTES # BLD AUTO: 0.13 K/UL (ref 0–0.04)
IMM GRANULOCYTES NFR BLD AUTO: 1.3 % (ref 0–0.5)
INR PPP: 3 (ref 0.8–1.2)
LYMPHOCYTES # BLD AUTO: 1.1 K/UL (ref 1–4.8)
LYMPHOCYTES NFR BLD: 11 % (ref 18–48)
M TB IFN-G CD4+ BCKGRND COR BLD-ACNC: 0 IU/ML
M TB IFN-G CD4+ BCKGRND COR BLD-ACNC: 0 IU/ML
MAGNESIUM SERPL-MCNC: 2.4 MG/DL (ref 1.6–2.6)
MCH RBC QN AUTO: 33.6 PG (ref 27–31)
MCHC RBC AUTO-ENTMCNC: 32 G/DL (ref 32–36)
MCV RBC AUTO: 105 FL (ref 82–98)
MITOGEN IGNF BCKGRD COR BLD-ACNC: 4.54 IU/ML
MONOCYTES # BLD AUTO: 1 K/UL (ref 0.3–1)
MONOCYTES NFR BLD: 10.1 % (ref 4–15)
NEUTROPHILS # BLD AUTO: 7.1 K/UL (ref 1.8–7.7)
NEUTROPHILS NFR BLD: 73 % (ref 38–73)
NRBC BLD-RTO: 0 /100 WBC
PHOSPHATE SERPL-MCNC: 4.6 MG/DL (ref 2.7–4.5)
PLATELET # BLD AUTO: 24 K/UL (ref 150–450)
PLATELET BLD QL SMEAR: ABNORMAL
PMV BLD AUTO: 13.6 FL (ref 9.2–12.9)
POTASSIUM SERPL-SCNC: 4.3 MMOL/L (ref 3.5–5.1)
PROT SERPL-MCNC: 5.8 G/DL (ref 6–8.4)
PROTHROMBIN TIME: 30.3 SEC (ref 9–12.5)
RBC # BLD AUTO: 2.11 M/UL (ref 4.6–6.2)
SODIUM SERPL-SCNC: 132 MMOL/L (ref 136–145)
TB GOLD PLUS: NEGATIVE
TB INDURATION 48 - 72 HR READ: 0 MM
WBC # BLD AUTO: 9.75 K/UL (ref 3.9–12.7)

## 2024-01-21 PROCEDURE — 83735 ASSAY OF MAGNESIUM: CPT | Performed by: NURSE PRACTITIONER

## 2024-01-21 PROCEDURE — 36415 COLL VENOUS BLD VENIPUNCTURE: CPT | Performed by: STUDENT IN AN ORGANIZED HEALTH CARE EDUCATION/TRAINING PROGRAM

## 2024-01-21 PROCEDURE — 25000003 PHARM REV CODE 250: Performed by: STUDENT IN AN ORGANIZED HEALTH CARE EDUCATION/TRAINING PROGRAM

## 2024-01-21 PROCEDURE — 84100 ASSAY OF PHOSPHORUS: CPT | Performed by: NURSE PRACTITIONER

## 2024-01-21 PROCEDURE — 99233 SBSQ HOSP IP/OBS HIGH 50: CPT | Mod: ,,, | Performed by: INTERNAL MEDICINE

## 2024-01-21 PROCEDURE — 85025 COMPLETE CBC W/AUTO DIFF WBC: CPT | Performed by: INTERNAL MEDICINE

## 2024-01-21 PROCEDURE — 63600175 PHARM REV CODE 636 W HCPCS: Performed by: STUDENT IN AN ORGANIZED HEALTH CARE EDUCATION/TRAINING PROGRAM

## 2024-01-21 PROCEDURE — 80053 COMPREHEN METABOLIC PANEL: CPT | Performed by: NURSE PRACTITIONER

## 2024-01-21 PROCEDURE — 94761 N-INVAS EAR/PLS OXIMETRY MLT: CPT

## 2024-01-21 PROCEDURE — 25000003 PHARM REV CODE 250

## 2024-01-21 PROCEDURE — 85610 PROTHROMBIN TIME: CPT | Performed by: STUDENT IN AN ORGANIZED HEALTH CARE EDUCATION/TRAINING PROGRAM

## 2024-01-21 PROCEDURE — 21400001 HC TELEMETRY ROOM

## 2024-01-21 PROCEDURE — 99900035 HC TECH TIME PER 15 MIN (STAT)

## 2024-01-21 PROCEDURE — 25000003 PHARM REV CODE 250: Performed by: NURSE PRACTITIONER

## 2024-01-21 PROCEDURE — 94660 CPAP INITIATION&MGMT: CPT

## 2024-01-21 RX ORDER — AMLODIPINE BESYLATE 10 MG/1
10 TABLET ORAL DAILY
Status: DISCONTINUED | OUTPATIENT
Start: 2024-01-21 | End: 2024-01-22 | Stop reason: HOSPADM

## 2024-01-21 RX ADMIN — LACTULOSE 20 G: 20 SOLUTION ORAL at 03:01

## 2024-01-21 RX ADMIN — Medication 100 MG: at 09:01

## 2024-01-21 RX ADMIN — RIFAXIMIN 550 MG: 550 TABLET ORAL at 09:01

## 2024-01-21 RX ADMIN — THERA TABS 1 TABLET: TAB at 09:01

## 2024-01-21 RX ADMIN — ESCITALOPRAM OXALATE 10 MG: 10 TABLET ORAL at 09:01

## 2024-01-21 RX ADMIN — CEFEPIME 2 G: 2 INJECTION, POWDER, FOR SOLUTION INTRAVENOUS at 10:01

## 2024-01-21 RX ADMIN — AMLODIPINE BESYLATE 10 MG: 10 TABLET ORAL at 09:01

## 2024-01-21 RX ADMIN — PANTOPRAZOLE SODIUM 40 MG: 40 TABLET, DELAYED RELEASE ORAL at 08:01

## 2024-01-21 RX ADMIN — LACTULOSE 20 G: 20 SOLUTION ORAL at 08:01

## 2024-01-21 RX ADMIN — LACTULOSE 20 G: 20 SOLUTION ORAL at 09:01

## 2024-01-21 RX ADMIN — FOLIC ACID 1 MG: 1 TABLET ORAL at 09:01

## 2024-01-21 RX ADMIN — RIFAXIMIN 550 MG: 550 TABLET ORAL at 08:01

## 2024-01-21 RX ADMIN — PANTOPRAZOLE SODIUM 40 MG: 40 TABLET, DELAYED RELEASE ORAL at 09:01

## 2024-01-21 NOTE — ASSESSMENT & PLAN NOTE
58 year old M w/ cirrhosis and ETOH use d/o presenting w/ melana and ground coffee emesis/hematemesis. Denies prior symptoms. No EGDs on file. Normotensive and tachycardic w/ HR 130s. Initial Hgb 5.0; now s/p 2u pRBCs. Initial lactate >12. Labs otherwise notable for Plt 116, INR 2.4, BUN 57, Cr 1.6, TB 5.4, , ALT 44. Admitted to MICU for management of acute upper GIB.      EGD showed esophageal ulcers. Hgb stable s/p 3u PRBC. Will need scope after 8 week course of PPI.     - Soft and bite-sized diet, advance as tolerated  - Trend Hgb and transfuse for goal Hgb > 7, unless otherwise indicated  - Maintain IV access with 2 large bore IV's  - Intravascular resuscitation/support with IVF's   - Hold all NSAIDs and anticoagulants, unless contraindicated  - Pantoprazole 40 mg BID for a total of 8 weeks  - CTM CBC    - Hgb stable, BM's without evidence of melena

## 2024-01-21 NOTE — PROGRESS NOTES
Hepatology Treatment Plan    Blu Deleon is a 58 y.o. male admitted to hospital 1/2/2024 (Hospital Day: 20) due to Leukocytosis.     Interval History  Pt has been accepted by [facility] in [place]. Flight scheduled for Monday 1/22 at 8:00a.m.  Pt's wife and adult son will travel via air flight.       ELEANOR LOGAN. Patient's family made a ghassan poster thanking the medical team.           Objective  Temp:  [97.6 °F (36.4 °C)-98.3 °F (36.8 °C)] 97.6 °F (36.4 °C) (01/21 0803)  Pulse:  [68-82] 82 (01/21 1043)  BP: (120-163)/(58-77) 143/73 (01/21 0803)  Resp:  [14-20] 18 (01/21 0803)  SpO2:  [97 %-99 %] 99 % (01/21 0803)    Physical Exam:  Constitutional:  awake, alert, NAD. Ill appearing but non toxic.   HENT: Head: Normal, CPAP in place  Eyes:  scleral icterus +nt  Respiratory: normal chest expansion & respiratory effort and no accessory muscle use  GI: soft, non-tender, without masses or organomegaly  Skin:  jaundiced  Neurological: oriented x3      Laboratory    Recent Labs   Lab 01/21/24  0600   WBC 9.75   RBC 2.11*   HGB 7.1*   HCT 22.2*   PLT 24*   *   MCH 33.6*   MCHC 32.0      Recent Labs   Lab 01/21/24  0600   CALCIUM 9.4   ALBUMIN 3.1*   PROT 5.8*   *   K 4.3   CO2 19*      BUN 80*   CREATININE 4.5*   ALKPHOS 166*   ALT 46*   *   BILITOT 24.9*      Recent Labs   Lab 01/21/24  0600   INR 3.0*        MELD 3.0: 43 at 1/21/2024  6:00 AM  MELD-Na: 43 at 1/21/2024  6:00 AM  Calculated from:  Serum Creatinine: 4.5 mg/dL (Using max of 3 mg/dL) at 1/21/2024  6:00 AM  Serum Sodium: 132 mmol/L at 1/21/2024  6:00 AM  Total Bilirubin: 24.9 mg/dL at 1/21/2024  6:00 AM  Serum Albumin: 3.1 g/dL at 1/21/2024  6:00 AM  INR(ratio): 3.0 at 1/21/2024  6:00 AM  Age at listing (hypothetical): 58 years  Sex: Male at 1/21/2024  6:00 AM     JERI negative  ASMA 1:40  IgG wnl  AMA negative  Viral hepatitis panel negative  Ceruloplasmin negative  Iron studies negative    Assessment and Plan    Blu  "Adrienne is a 58 y.o. male with history of alcohol use disorder, HTN, and compensated EtOH cirrhosis who was admitted to [facility] on  with melena.   After resuscitation, EGD on 1/3/24 showed esophageal ulcers, erythematous mucosa in the antrum, and normal duodenum.  Hepatology now consulted for worsening bilirubin.     He hails from [place], visiting from [facility]. The patient has known about his history of "early cirrhosis" since at least  when liver ultrasound confirmed cirrhosis.  He has continued to drink in spite of knowledge of liver disease and being told to stop; has consumed alcohol regularly since the age of 21.  Last drink 24. No illicit drug use.  Sister  of EtOH cirrhosis. PETH positive, 98. Fungitell -ve.     Case was discussed in committee  and deemed not to be a candidate for liver transplant evaluation due to ongoing alcohol use despite knowledge of liver disease. Liver decompensation worsening.        Problem List:  Newly decompensated EtOH cirrhosis  EtOH use disorder  Esophageal ulcers  Leukocytosis  Hepatic encephalopathy        Recommendations:  - We are not going to proceed with liver tx eval at this time since patient was aware of cirrhosis but persisted drinking. Discussed in transplant committee.    - Med flight scheduled for Monday to [place]. Accepted to [facility]. Patient and family made aware of poor prognosis. Appreciate assistance from all team members.     - continue cefepime to ensure patient can reach [place] without hopefully katie infection.     - Avoid sedating medications    - Titrate lactulose to 3-4 BMs per day    - Continue rifaximin 550 mg BID    - Nephrology following for CODY, appreciate recs.    - low sodium, high protein diet    - Continue Protonix 40mg twice per day for 8 weeks total. Repeat upper endoscopy in 8 weeks to check healing of his esophageal ulcers. He would like to have this done in [facility]. "     - Daily CBC, CMP, INR.     - We will sign off. It has been a pleasure taking care of Mr. Deleon.     Thank you for involving us in the care of Blu Deleon. Please call with any additional questions, concerns or changes in the patient's clinical status. Plan of care discussed with attending Dr. He.    Lyle Thomas MD  Gastroenterology and Hepatology Fellow, PGY V

## 2024-01-21 NOTE — SUBJECTIVE & OBJECTIVE
Interval History/Significant Events: Pt seen and examined this morning on rell [place] .  Patient has been accepted by [facility].  Scheduled for transfer on 01/22.  Has no active complaints.  Hemoglobin trending down 7.5--->7.1     Review of Systems   Constitutional:  Negative for chills, diaphoresis, fever and malaise/fatigue.   HENT:  Negative for sore throat.    Eyes:  Negative for double vision.   Respiratory:  Negative for cough, shortness of breath and wheezing.    Cardiovascular:  Negative for chest pain, palpitations, orthopnea, leg swelling and PND.   Gastrointestinal:  Negative for abdominal pain, blood in stool, constipation, heartburn, nausea and vomiting.   Genitourinary:  Negative for hematuria.   Musculoskeletal:  Negative for falls and myalgias.   Neurological:  Negative for dizziness, loss of consciousness, weakness and headaches.   Psychiatric/Behavioral:  The patient is not nervous/anxious.          Vital Signs (Most Recent):  Temp: 97.6 °F (36.4 °C) (01/21/24 1527)  Pulse: 80 (01/21/24 1527)  Resp: 18 (01/21/24 0803)  BP: 137/73 (01/21/24 1527)  SpO2: 99 % (01/21/24 1527) Vital Signs (24h Range):  Temp:  [97.6 °F (36.4 °C)-98.3 °F (36.8 °C)] 97.6 °F (36.4 °C)  Pulse:  [68-82] 80  Resp:  [14-20] 18  SpO2:  [97 %-99 %] 99 %  BP: (120-163)/(58-77) 137/73   Weight: 97.6 kg (215 lb 2.7 oz)  Body mass index is 32.72 kg/m².      Intake/Output Summary (Last 24 hours) at 1/21/2024 1528  Last data filed at 1/21/2024 0514  Gross per 24 hour   Intake --   Output 150 ml   Net -150 ml              Physical Exam  Gen: in NAD, appears stated age, jaundiced  Neuro: AAOx4, CN2-12 grossly intact BL; motor, sensory, and strength grossly intact BL  HEENT: + icteric sclera. NTNC, EOMI, PERRLA, MMM; no thyromegaly or lymphadenopathy; no JVD appreciated  CVS: RRR, no m/r/g; S1/S2 auscultated with no S3 or S4; capillary refill < 2 sec  Resp: lungs CTAB, no w/r/r; no belabored breathing or accessory muscle  "use appreciated   Abd: + Abdominal distension. BS+ in all 4 quadrants; NTND, soft to palpation; no organomegaly appreciated   Extrem: pulses full, equal, and regular over all 4 extremities; no UE or LE edema BL         Vents:  Oxygen Concentration (%): 21 (01/21/24 0436)  Lines/Drains/Airways       Peripheral Intravenous Line  Duration                  Peripheral IV - Single Lumen 01/03/24 1045 18 G;1 1/4 in Anterior;Left Upper Arm 18 days         Peripheral IV - Single Lumen 01/03/24 1400 18 G;1 1/4 in Anterior;Right Upper Arm 18 days                  Significant Labs:    CBC/Anemia Profile:  Recent Labs   Lab 01/20/24  0439 01/21/24  0600   WBC 8.11 9.75   HGB 7.1* 7.1*   HCT 22.0* 22.2*   PLT 25* 24*   * 105*   RDW 22.5* 22.4*          Chemistries:  Recent Labs   Lab 01/20/24  0439 01/21/24  0600   * 132*   K 4.1 4.3    104   CO2 19* 19*   BUN 72* 80*   CREATININE 4.2* 4.5*   CALCIUM 9.2 9.4   ALBUMIN 3.3* 3.1*   PROT 5.7* 5.8*   BILITOT 24.4* 24.9*   ALKPHOS 154* 166*   ALT 45* 46*   * 104*   MG 2.4 2.4   PHOS 4.5 4.6*         CMP:   Recent Labs   Lab 01/20/24  0439 01/21/24  0600   * 132*   K 4.1 4.3    104   CO2 19* 19*    90   BUN 72* 80*   CREATININE 4.2* 4.5*   CALCIUM 9.2 9.4   PROT 5.7* 5.8*   ALBUMIN 3.3* 3.1*   BILITOT 24.4* 24.9*   ALKPHOS 154* 166*   * 104*   ALT 45* 46*   ANIONGAP 7* 9       Coagulation:   Recent Labs   Lab 01/21/24  0600   INR 3.0*       Lactic Acid:   No results for input(s): "LACTATE" in the last 48 hours.        Significant Imaging:  I have reviewed all pertinent imaging results/findings within the past 24 hours.          "

## 2024-01-21 NOTE — PROGRESS NOTES
[facility] Medicine  Progress Note    Patient Name: Blu Deleon  MRN: [id number]  Patient Class: IP- Inpatient   Admission Date: 1/2/2024  Length of Stay: 18 days  Attending Physician: Bailey Cline MD  Primary Care Provider: Radha, Primary Doctor        Subjective:     Principal Problem:Leukocytosis        HPI:  Mr. Deleon is a 58 year old male with PMH notable for HTN, ETOH use, reported cirrhosis (has not seen a hepatologist), and depression who presented to [facility] ED with acute GI bleeding. In speaking with patient and wife at bedside, patient reports four days of nausea and vomiting with bright red blood and black tarry diarrhea. Additionally, he reports a fall yesterday after attempting to go to the restroom secondary to being lightheaded. He denies this happening previously. He was scheduled for an outpatient EGD, which has not been completed. He has not had a colonoscopy. He denies any anticoagulation or antiplatelet use.      In the emergency department he was found to be acutely anemic with a hgb of 5. He has not been hypotensive while in the emergency department, however, notably tachycardic with -140. Labs otherwise notable for WBC 13.56, Plt 107, INR 2.4, BUN 57, Cr 1.6, TB 5.4, , ALT 44. Initial lactate >12. ETOH level <10. Patient is s/p 3u pRBCs.    Admitted to MICU for UGIB.      Overview/Hospital Course:  Patient admitted for UGIB. GI consulted and EGD showed esophageal ulcers. Pt to continue PPI for 8 weeks with repeat scope to follow. Hgb stable s/p 3u PRBC and 1u FFP. HDS and tolerating clear liquid diet. CIWA ordered and no ativan required.  Patient was transferred to floors on 01/05.  Creatinine has remained stable at 1.4.  T bilirubin trending up.  Hepatology was consulted in the setting of decompensated liver cirrhosis.  Ultrasound right upper quadrant with Dopplers, PEth, AFP, serological workup, and acute hepatitis panel.  Patient underwent  paracentesis on 01/08 and ascitic fluid negative for SBP.  As patient is not a candidate for transplant and with his high MELD score he would require air transfer to [place] where he can go to the hospital close to his home. Accepted for transfer to [facility] in [place], awaiting logistics of flight transport.    Patient was doing well the morning of 1/12, however after a walk to the bathroom he started to have chills and rigors. He was started on CTX for concern for possible SBP. Repeat CBC with stable Hgb and WBC WNL. Paracentesis performed with IR on 1/12 with 4.2L out. Ascitic labs with no evidence of SBP, and ascitic cultures NGTD. Blood cultures 1/12 NGTD. Had some RUQ discomfort and RUQ US ordered with gallbladder wall thickening with possible signs of cholecystitis. CXR obtained without any signs of pneumonia however possible small amount of edema vs viral. Covid/Influenza negative.     On 1/13, WBC elevated, patient remains afebrile and normotensive. Abdominal exam benign, negative Sen's sign. UA non-infectious. Antibiotics broadened to Vanc/cefe with low threshold for micafungin per hepatology. CT-CAP without evidence of infection. Patient appeared much better morning of 1/14, however became somnolent and Broadened to Micafungin and ID consulted. Patient improved and ID consulted. Vanc discontinued and cultures/infectious work up remained inconclusive. ID recommending 7 days of cefepime and 7 days of fluconazole. Unfortunately, kidney function continues to worsen. UA/urine sediment as well as elevated/normotensive blood pressures not in line with HRS. Unclear if would need a biopsy , nephrology planning for dialysis in the future if kidney function does not improve.. S/p therapeutic LP on 1/17. At this time patients stable for transfer to [place] via flight.  Patient has been accepted by [facility] in [place].  Flight scheduled on Monday 1/22.    Interval History/Significant Events: Pt seen  and examined this morning on rell WOODY. .  Patient has been accepted by [facility].  Scheduled for transfer on 01/22.  Has no active complaints.  Hemoglobin trending down 7.5--->7.1     Review of Systems   Constitutional:  Negative for chills, diaphoresis, fever and malaise/fatigue.   HENT:  Negative for sore throat.    Eyes:  Negative for double vision.   Respiratory:  Negative for cough, shortness of breath and wheezing.    Cardiovascular:  Negative for chest pain, palpitations, orthopnea, leg swelling and PND.   Gastrointestinal:  Negative for abdominal pain, blood in stool, constipation, heartburn, nausea and vomiting.   Genitourinary:  Negative for hematuria.   Musculoskeletal:  Negative for falls and myalgias.   Neurological:  Negative for dizziness, loss of consciousness, weakness and headaches.   Psychiatric/Behavioral:  The patient is not nervous/anxious.          Vital Signs (Most Recent):  Temp: 97.6 °F (36.4 °C) (01/21/24 1527)  Pulse: 80 (01/21/24 1527)  Resp: 18 (01/21/24 0803)  BP: 137/73 (01/21/24 1527)  SpO2: 99 % (01/21/24 1527) Vital Signs (24h Range):  Temp:  [97.6 °F (36.4 °C)-98.3 °F (36.8 °C)] 97.6 °F (36.4 °C)  Pulse:  [68-82] 80  Resp:  [14-20] 18  SpO2:  [97 %-99 %] 99 %  BP: (120-163)/(58-77) 137/73   Weight: 97.6 kg (215 lb 2.7 oz)  Body mass index is 32.72 kg/m².      Intake/Output Summary (Last 24 hours) at 1/21/2024 1528  Last data filed at 1/21/2024 0514  Gross per 24 hour   Intake --   Output 150 ml   Net -150 ml              Physical Exam  Gen: in NAD, appears stated age, jaundiced  Neuro: AAOx4, CN2-12 grossly intact BL; motor, sensory, and strength grossly intact BL  HEENT: + icteric sclera. NTNC, EOMI, PERRLA, MMM; no thyromegaly or lymphadenopathy; no JVD appreciated  CVS: RRR, no m/r/g; S1/S2 auscultated with no S3 or S4; capillary refill < 2 sec  Resp: lungs CTAB, no w/r/r; no belabored breathing or accessory muscle use appreciated   Abd: + Abdominal  "distension. BS+ in all 4 quadrants; NTND, soft to palpation; no organomegaly appreciated   Extrem: pulses full, equal, and regular over all 4 extremities; no UE or LE edema BL         Vents:  Oxygen Concentration (%): 21 (01/21/24 0436)  Lines/Drains/Airways       Peripheral Intravenous Line  Duration                  Peripheral IV - Single Lumen 01/03/24 1045 18 G;1 1/4 in Anterior;Left Upper Arm 18 days         Peripheral IV - Single Lumen 01/03/24 1400 18 G;1 1/4 in Anterior;Right Upper Arm 18 days                  Significant Labs:    CBC/Anemia Profile:  Recent Labs   Lab 01/20/24  0439 01/21/24  0600   WBC 8.11 9.75   HGB 7.1* 7.1*   HCT 22.0* 22.2*   PLT 25* 24*   * 105*   RDW 22.5* 22.4*          Chemistries:  Recent Labs   Lab 01/20/24  0439 01/21/24  0600   * 132*   K 4.1 4.3    104   CO2 19* 19*   BUN 72* 80*   CREATININE 4.2* 4.5*   CALCIUM 9.2 9.4   ALBUMIN 3.3* 3.1*   PROT 5.7* 5.8*   BILITOT 24.4* 24.9*   ALKPHOS 154* 166*   ALT 45* 46*   * 104*   MG 2.4 2.4   PHOS 4.5 4.6*         CMP:   Recent Labs   Lab 01/20/24  0439 01/21/24  0600   * 132*   K 4.1 4.3    104   CO2 19* 19*    90   BUN 72* 80*   CREATININE 4.2* 4.5*   CALCIUM 9.2 9.4   PROT 5.7* 5.8*   ALBUMIN 3.3* 3.1*   BILITOT 24.4* 24.9*   ALKPHOS 154* 166*   * 104*   ALT 45* 46*   ANIONGAP 7* 9       Coagulation:   Recent Labs   Lab 01/21/24  0600   INR 3.0*       Lactic Acid:   No results for input(s): "LACTATE" in the last 48 hours.        Significant Imaging:  I have reviewed all pertinent imaging results/findings within the past 24 hours.            Assessment/Plan:      * Leukocytosis  Patient with new onset chills/rigors 1/12. Now with leukocytosis, remains afebrile and normotensive (previously normotensive/hypertensive).  Infectious work up:  - CXR obtained without any signs of pneumonia however possible small amount of edema vs viral component. Covid/Influenza negative.   - UA " non-infectious.   - He was initially started on CTX for concern for possible SBP.    - Paracentesis performed with IR on 1/12 with 4.2L out. Ascitic labs with no evidence of SBP, and ascitic cultures NGTD.   - Blood cultures 1/12 NGTD  - Had some RUQ discomfort 1/12 and RUQ US showed gallbladder wall thickening with possible signs of cholecystitis. No abdominal pain today and negative Sen's sign  - Broaded from CTX to Vanc/cefe with low threshold to start micafungin per hepatology  - CT-CAP without evidence of infection, no concern for cholecystitis    - Had another episode of rigors and temp renetta to 100.0 on 1/14. Remains HDS.  - Started on Micafungin  - ID consulted and recommending DC vanc, continue cefepime x7 days and switching micafungin to fluconazole for 7 day treatment  - Fungitell assay negative    ACP (advance care planning)        Palliative care encounter        Transaminitis  - 2:1 pattern; likely 2/2 ETOH use d/o.   - LFT's stable/improving  - T bili worsening  - Will continue to monitor        Thrombocytopenia  Etiology likely 2/2 cirrhosis  - Monitor w/ daily CBC and transfuse if Plt <10 or <50 w/ signs of active bleeding   Recent Labs   Lab 01/21/24  0600   PLT 24*         CODY (acute kidney injury)  - Cr 1.6 at admission, unclear baseline  - Initially likely pre-renal in setting of GIB.  - Now with slow elevation of creatinine  - on albumin 50 g t.i.d..  - S/p 48 hours albumin  - Cr 1.6 -> 2.0 -> 2.2 -> 2.5 -> 3.0-->3.7  - Nephrology consulted, urine studies sent, uric acid ordered   - 1500ml fluid restriction   - May need HRS treatment, though BP remains elevated and urine sediment does not appear to be inline with HRS   - Autoimmune panel sent and pending  - Discussed with family that if continued worsening renal function, dialysis may need to be initiated  - Renally dosing all medications  - Avoiding nephrotoxins  - Will continue to monitor on daily labs    Alcohol use disorder  - Last  alcoholic drink on 1/01. Alcohol level <10 on admission. Denies history of withdrawal in the past including seizures. Remains tachycardic but likely 2/2 acute GIB. Will continue to monitor for signs of withdrawal w/ CIWA protocol and start benzodiazepines if indicated.   - thiamine, folic acid, MV  - DC CIWA protocol as now out of withdrawal window       Coagulopathy  - INR elevated in setting of cirrhosis  - S/p IV Vit K for x3 days.       Cirrhosis  Patient is visiting from out of town; outside records not available to review thus etiology of cirrhosis not confirmed. However, patient does have a history of ETOH use d/o so this is included in the differential. States he is currently in the process of being referred to a hepatologist and having an EGD scheduled.     MELD-Na score calculated; MELD 3.0: 43 at 1/21/2024  6:00 AM  MELD-Na: 43 at 1/21/2024  6:00 AM  Calculated from:  Serum Creatinine: 4.5 mg/dL (Using max of 3 mg/dL) at 1/21/2024  6:00 AM  Serum Sodium: 132 mmol/L at 1/21/2024  6:00 AM  Total Bilirubin: 24.9 mg/dL at 1/21/2024  6:00 AM  Serum Albumin: 3.1 g/dL at 1/21/2024  6:00 AM  INR(ratio): 3.0 at 1/21/2024  6:00 AM  Age at listing (hypothetical): 58 years  Sex: Male at 1/21/2024  6:00 AM    Continue chronic meds. Etiology likely ETOH. Will avoid any hepatotoxic meds, and monitor CBC/CMP/INR for synthetic function.   T bilirubin trending up. Hepatology was consulted in the setting of decompensated liver cirrhosis. Ultrasound right upper quadrant with Dopplers, PEth, AFP, serological workup, and acute hepatitis panel. Cirrhotic morphology of the liver with sequela of portal hypertension as detailed above. No discrete hepatic lesions identified. Biliary sludge with thickened gallbladder wall, likely secondary to hepatic dysfunction.  Underwent paracentesis.  As patient is not a candidate for transplant and with his high MELD score he would require air transfer to  [place] where he can go to the  hospital close to his home. On lactulose and albumin per hepatology recs.   - Continue lactulose goal 3 BM's daily  - S/p albumin 50g BID for 48 hours  - on Rifaximin  - S/p IV Vit K x3 doses x2  - Repeat paracentesis 1/12 as detailed above  - underwent  LVP with IR on1/17    Acute upper GI bleed  58 year old M w/ cirrhosis and ETOH use d/o presenting w/ melana and ground coffee emesis/hematemesis. Denies prior symptoms. No EGDs on file. Normotensive and tachycardic w/ HR 130s. Initial Hgb 5.0; now s/p 2u pRBCs. Initial lactate >12. Labs otherwise notable for Plt 116, INR 2.4, BUN 57, Cr 1.6, TB 5.4, , ALT 44. Admitted to MICU for management of acute upper GIB.      EGD showed esophageal ulcers. Hgb stable s/p 3u PRBC. Will need scope after 8 week course of PPI.     - Soft and bite-sized diet, advance as tolerated  - Trend Hgb and transfuse for goal Hgb > 7, unless otherwise indicated  - Maintain IV access with 2 large bore IV's  - Intravascular resuscitation/support with IVF's   - Hold all NSAIDs and anticoagulants, unless contraindicated  - Pantoprazole 40 mg BID for a total of 8 weeks  - CTM CBC    - Hgb stable, BM's without evidence of melena      VTE Risk Mitigation (From admission, onward)           Ordered     Place sequential compression device  Until discontinued         01/03/24 0256     IP VTE LOW RISK PATIENT  Once         01/03/24 0256                    Discharge Planning   MILKA: 1/22/2024     Code Status: Full Code   Is the patient medically ready for discharge?: No    Reason for patient still in hospital (select all that apply): Patient trending condition, Laboratory test, Treatment, and Consult recommendations  Discharge Plan A: Hospital Transfer   Discharge Delays: None known at this time              Bailey Cline MD  Department of Hospital Medicine   [facility]

## 2024-01-21 NOTE — PLAN OF CARE
Problem: Adult Inpatient Plan of Care  Goal: Plan of Care Review  Outcome: Ongoing, Progressing  Goal: Patient-Specific Goal (Individualized)  Outcome: Ongoing, Progressing  Goal: Absence of Hospital-Acquired Illness or Injury  Outcome: Ongoing, Progressing  Goal: Optimal Comfort and Wellbeing  Outcome: Ongoing, Progressing  Goal: Readiness for Transition of Care  Outcome: Ongoing, Progressing     Problem: Fluid and Electrolyte Imbalance (Acute Kidney Injury/Impairment)  Goal: Fluid and Electrolyte Balance  Outcome: Ongoing, Progressing     Problem: Oral Intake Inadequate (Acute Kidney Injury/Impairment)  Goal: Optimal Nutrition Intake  Outcome: Ongoing, Progressing     Problem: Renal Function Impairment (Acute Kidney Injury/Impairment)  Goal: Effective Renal Function  Outcome: Ongoing, Progressing     Problem: Fall Injury Risk  Goal: Absence of Fall and Fall-Related Injury  Outcome: Ongoing, Progressing     Problem: Electrolyte Imbalance  Goal: Electrolyte Balance  Outcome: Ongoing, Progressing     Problem: Adjustment to Illness (Liver Failure)  Goal: Optimal Coping with Liver Failure  Outcome: Ongoing, Progressing     Problem: Fluid and Electrolyte Imbalance (Liver Failure)  Goal: Fluid and Electrolyte Balance  Outcome: Ongoing, Progressing     Problem: Gastrointestinal Complications (Liver Failure)  Goal: Optimal Gastrointestinal Function  Outcome: Ongoing, Progressing     Problem: Impaired Coagulation (Liver Failure)  Goal: Optimal Coagulation Function  Outcome: Ongoing, Progressing     Problem: Infection (Liver Failure)  Goal: Absence of Infection Signs and Symptoms  Outcome: Ongoing, Progressing     Problem: Neurologic Function Impaired (Liver Failure)  Goal: Optimal Neurologic Function  Outcome: Ongoing, Progressing     Problem: Oral Intake Inadequate (Liver Failure)  Goal: Improved Oral Intake  Outcome: Ongoing, Progressing     Problem: Pain (Liver Failure)  Goal: Optimal Pain Control  Outcome: Ongoing,  Progressing     Problem: Renal Dysfunction (Liver Failure)  Goal: Optimize Renal Function  Outcome: Ongoing, Progressing     Problem: Respiratory Compromise (Liver Failure)  Goal: Effective Oxygenation and Ventilation  Outcome: Ongoing, Progressing     Problem: Infection  Goal: Absence of Infection Signs and Symptoms  Outcome: Ongoing, Progressing     Problem: Coping Ineffective  Goal: Effective Coping  Outcome: Ongoing, Progressing

## 2024-01-21 NOTE — ASSESSMENT & PLAN NOTE
Etiology likely 2/2 cirrhosis  - Monitor w/ daily CBC and transfuse if Plt <10 or <50 w/ signs of active bleeding   Recent Labs   Lab 01/21/24  0600   PLT 24*

## 2024-01-21 NOTE — RESPIRATORY THERAPY
"RAPID RESPONSE RESPIRATORY THERAPY PROACTIVE NOTE           Time of visit: 907     Code Status: Full Code   : 1965  Bed: 610/610 A:   MRN: [id number]    SITUATION    Evaluated patient for: Non-Invasive Positive Pressure Ventilation Compliance     BACKGROUND    Patient has a past medical history of Hypertension.  Clinically Significant Surgical Hx: None    24 Hours Vitals Range:  Temp:  [97.6 °F (36.4 °C)-98.3 °F (36.8 °C)]   Pulse:  [74-81]   Resp:  [14-20]   BP: (120-163)/(58-77)   SpO2:  [97 %-99 %]     Labs:    Recent Labs     24  0542 24  0439 24  0600   * 130* 132*   K 4.1 4.1 4.3    104 104   CO2 19* 19* 19*   BUN 69* 72* 80*   CREATININE 4.0* 4.2* 4.5*   GLU 92 100 90   PHOS 4.4 4.5 4.6*   MG 2.3 2.4 2.4        No results for input(s): "PH", "PCO2", "PO2", "HCO3", "POCSATURATED", "BE" in the last 72 hours.    ASSESSMENT/INTERVENTIONS    Patient has CPAP QHS order. Patient was compliant with wearing all night.    Last VS   Temp: 97.6 °F (36.4 °C) (803)  Pulse: 76 (0843)  Resp: 16 (426)  BP: 143/73 (803)  SpO2: 99 % (803)    Level of Consciousness: Level of Consciousness (AVPU): alert  Respiratory Effort: Respiratory Effort: Unlabored Expansion/Accessory Muscle Usage: Expansion/Accessory Muscles/Retractions: no use of accessory muscles  All Lung Field Breath Sounds: All Lung Fields Breath Sounds: clear, equal bilaterally  O2 Device/Concentration: 21%  NIPPV: Yes, Type: CPAP Settings: 5    Ambu at bedside:       Active Orders   Respiratory Care    CPAP QHS     Frequency: QHS     Number of Occurrences: Until Specified     Order Questions:      Expiratory pressure: 5       RECOMMENDATIONS    We recommend: RRT Recs: Continue POC per primary team.    FOLLOW-UP    Please call back the Rapid Response RTRosalie RRT at x [phone] for any questions or concerns.        "

## 2024-01-21 NOTE — ASSESSMENT & PLAN NOTE
Patient is visiting from out of town; outside records not available to review thus etiology of cirrhosis not confirmed. However, patient does have a history of ETOH use d/o so this is included in the differential. States he is currently in the process of being referred to a hepatologist and having an EGD scheduled.     MELD-Na score calculated; MELD 3.0: 43 at 1/21/2024  6:00 AM  MELD-Na: 43 at 1/21/2024  6:00 AM  Calculated from:  Serum Creatinine: 4.5 mg/dL (Using max of 3 mg/dL) at 1/21/2024  6:00 AM  Serum Sodium: 132 mmol/L at 1/21/2024  6:00 AM  Total Bilirubin: 24.9 mg/dL at 1/21/2024  6:00 AM  Serum Albumin: 3.1 g/dL at 1/21/2024  6:00 AM  INR(ratio): 3.0 at 1/21/2024  6:00 AM  Age at listing (hypothetical): 58 years  Sex: Male at 1/21/2024  6:00 AM    Continue chronic meds. Etiology likely ETOH. Will avoid any hepatotoxic meds, and monitor CBC/CMP/INR for synthetic function.   T bilirubin trending up. Hepatology was consulted in the setting of decompensated liver cirrhosis. Ultrasound right upper quadrant with Dopplers, PEth, AFP, serological workup, and acute hepatitis panel. Cirrhotic morphology of the liver with sequela of portal hypertension as detailed above. No discrete hepatic lesions identified. Biliary sludge with thickened gallbladder wall, likely secondary to hepatic dysfunction.  Underwent paracentesis.  As patient is not a candidate for transplant and with his high MELD score he would require air transfer to  [place] where he can go to the hospital close to his home. On lactulose and albumin per hepatology recs.   - Continue lactulose goal 3 BM's daily  - S/p albumin 50g BID for 48 hours  - on Rifaximin  - S/p IV Vit K x3 doses x2  - Repeat paracentesis 1/12 as detailed above  - underwent  LVP with IR on1/17

## 2024-01-21 NOTE — PROGRESS NOTES
"Nephrology progress note    Assessment and plan  CODY on CKD (baseline 1.2-1.3) suspected iATN 2/2 hemodynamic instability, bilirubin cast nephropathy vs HRS. High risk.   -Given no evidence of esophageal varices on EGD (signs of prolonged high mentioned) and blood pressure at a higher side, the HRS is low on my differential diagnosis list.   -Ongoing worsening creatinine but without urgent indication for dialysis, we will continue to monitor.  Worsening bilirubin is still noted, managed per the team.  Hypoalbuminemia status post albumin infusion, current albumin level at 3.1.  We will try to keep intake and output balance, please record strict I and O.  -Last paracentesis was done on January 12 Abdominal distention noted, both suggest to check an abdominal ultrasound to see if further paracentesis needed.  -Keep MAP > 65 and avoid nephrotoxic agents    Metabolic acidosis  -suggest to give sodium bicarbonate 650 b.i.d.    Subjective  No specific complaint  /69 mmHg   cc (the urine output was not documented correctly per the wife and the patient)    Objective  Vitals:    01/21/24 0436 01/21/24 0803 01/21/24 0843 01/21/24 1043   BP:  (!) 143/73     BP Location:       Patient Position:       Pulse:  77 76 82   Resp:       Temp:  97.6 °F (36.4 °C)     TempSrc:  Oral     SpO2: 98% 99%     Weight:       Height:       PHYSICAL EXAMINATION:  Blood pressure (!) 143/73, pulse 82, temperature 97.6 °F (36.4 °C), temperature source Oral, resp. rate 16, height 5' 8" (1.727 m), weight 97.6 kg (215 lb 2.7 oz), SpO2 99 %.  Constitutional - chronic ill-looking  HEENT - Grossly normal, icteric sclera  Neck - supple.   Cardiovascular - JVP normal  Respiratory - Clear  Abdomen- distended, not rigid but at the tense side  Musculoskeletal - Peripheral edema 1+  Dermatologic/Skin - Skin warm and dry.  No rashes.    Neurologic - No acute neurological deficit  Psychiatric - AAOx3   "

## 2024-01-22 VITALS
BODY MASS INDEX: 32.61 KG/M2 | HEIGHT: 68 IN | OXYGEN SATURATION: 100 % | RESPIRATION RATE: 18 BRPM | WEIGHT: 215.19 LBS | TEMPERATURE: 98 F | DIASTOLIC BLOOD PRESSURE: 65 MMHG | SYSTOLIC BLOOD PRESSURE: 139 MMHG | HEART RATE: 77 BPM

## 2024-01-22 LAB
ALBUMIN SERPL BCP-MCNC: 3 G/DL (ref 3.5–5.2)
ALP SERPL-CCNC: 161 U/L (ref 55–135)
ALT SERPL W/O P-5'-P-CCNC: 45 U/L (ref 10–44)
ANCA AB TITR SER IF: NORMAL TITER
ANION GAP SERPL CALC-SCNC: 10 MMOL/L (ref 8–16)
ANISOCYTOSIS BLD QL SMEAR: SLIGHT
AST SERPL-CCNC: 102 U/L (ref 10–40)
BASOPHILS # BLD AUTO: 0.07 K/UL (ref 0–0.2)
BASOPHILS NFR BLD: 0.7 % (ref 0–1.9)
BILIRUB SERPL-MCNC: 23.1 MG/DL (ref 0.1–1)
BUN SERPL-MCNC: 90 MG/DL (ref 6–20)
CALCIUM SERPL-MCNC: 9.5 MG/DL (ref 8.7–10.5)
CHLORIDE SERPL-SCNC: 107 MMOL/L (ref 95–110)
CO2 SERPL-SCNC: 18 MMOL/L (ref 23–29)
CREAT SERPL-MCNC: 5.1 MG/DL (ref 0.5–1.4)
DIFFERENTIAL METHOD BLD: ABNORMAL
EOSINOPHIL # BLD AUTO: 0.4 K/UL (ref 0–0.5)
EOSINOPHIL NFR BLD: 3.9 % (ref 0–8)
ERYTHROCYTE [DISTWIDTH] IN BLOOD BY AUTOMATED COUNT: 22.5 % (ref 11.5–14.5)
EST. GFR  (NO RACE VARIABLE): 12.4 ML/MIN/1.73 M^2
GLUCOSE SERPL-MCNC: 100 MG/DL (ref 70–110)
HCT VFR BLD AUTO: 19.5 % (ref 40–54)
HGB BLD-MCNC: 6.5 G/DL (ref 14–18)
HYPOCHROMIA BLD QL SMEAR: ABNORMAL
IMM GRANULOCYTES # BLD AUTO: 0.11 K/UL (ref 0–0.04)
IMM GRANULOCYTES NFR BLD AUTO: 1.1 % (ref 0–0.5)
INR PPP: 2.9 (ref 0.8–1.2)
LYMPHOCYTES # BLD AUTO: 1.2 K/UL (ref 1–4.8)
LYMPHOCYTES NFR BLD: 12.8 % (ref 18–48)
MAGNESIUM SERPL-MCNC: 2.5 MG/DL (ref 1.6–2.6)
MCH RBC QN AUTO: 33.7 PG (ref 27–31)
MCHC RBC AUTO-ENTMCNC: 33.3 G/DL (ref 32–36)
MCV RBC AUTO: 101 FL (ref 82–98)
MONOCYTES # BLD AUTO: 1 K/UL (ref 0.3–1)
MONOCYTES NFR BLD: 10.4 % (ref 4–15)
MYELOPEROXIDASE AB SER-ACNC: 0.8 U/ML
NEUTROPHILS # BLD AUTO: 6.9 K/UL (ref 1.8–7.7)
NEUTROPHILS NFR BLD: 71.1 % (ref 38–73)
NRBC BLD-RTO: 0 /100 WBC
OVALOCYTES BLD QL SMEAR: ABNORMAL
P-ANCA TITR SER IF: NORMAL TITER
PHOSPHATE SERPL-MCNC: 4.6 MG/DL (ref 2.7–4.5)
PLATELET # BLD AUTO: 25 K/UL (ref 150–450)
PMV BLD AUTO: 13.8 FL (ref 9.2–12.9)
POIKILOCYTOSIS BLD QL SMEAR: SLIGHT
POLYCHROMASIA BLD QL SMEAR: ABNORMAL
POTASSIUM SERPL-SCNC: 4.5 MMOL/L (ref 3.5–5.1)
PROT SERPL-MCNC: 5.6 G/DL (ref 6–8.4)
PROTHROMBIN TIME: 29.2 SEC (ref 9–12.5)
RBC # BLD AUTO: 1.93 M/UL (ref 4.6–6.2)
SODIUM SERPL-SCNC: 135 MMOL/L (ref 136–145)
SPHEROCYTES BLD QL SMEAR: ABNORMAL
TARGETS BLD QL SMEAR: ABNORMAL
WBC # BLD AUTO: 9.72 K/UL (ref 3.9–12.7)

## 2024-01-22 PROCEDURE — 85610 PROTHROMBIN TIME: CPT | Performed by: STUDENT IN AN ORGANIZED HEALTH CARE EDUCATION/TRAINING PROGRAM

## 2024-01-22 PROCEDURE — 80053 COMPREHEN METABOLIC PANEL: CPT | Performed by: NURSE PRACTITIONER

## 2024-01-22 PROCEDURE — 85025 COMPLETE CBC W/AUTO DIFF WBC: CPT | Performed by: INTERNAL MEDICINE

## 2024-01-22 PROCEDURE — 94761 N-INVAS EAR/PLS OXIMETRY MLT: CPT

## 2024-01-22 PROCEDURE — 27100171 HC OXYGEN HIGH FLOW UP TO 24 HOURS

## 2024-01-22 PROCEDURE — 84100 ASSAY OF PHOSPHORUS: CPT | Performed by: NURSE PRACTITIONER

## 2024-01-22 PROCEDURE — 25000003 PHARM REV CODE 250

## 2024-01-22 PROCEDURE — 25000003 PHARM REV CODE 250: Performed by: NURSE PRACTITIONER

## 2024-01-22 PROCEDURE — 36415 COLL VENOUS BLD VENIPUNCTURE: CPT | Performed by: STUDENT IN AN ORGANIZED HEALTH CARE EDUCATION/TRAINING PROGRAM

## 2024-01-22 PROCEDURE — 99900035 HC TECH TIME PER 15 MIN (STAT)

## 2024-01-22 PROCEDURE — 25000003 PHARM REV CODE 250: Performed by: STUDENT IN AN ORGANIZED HEALTH CARE EDUCATION/TRAINING PROGRAM

## 2024-01-22 PROCEDURE — 83735 ASSAY OF MAGNESIUM: CPT | Performed by: NURSE PRACTITIONER

## 2024-01-22 RX ORDER — HYDROCODONE BITARTRATE AND ACETAMINOPHEN 500; 5 MG/1; MG/1
TABLET ORAL
Status: DISCONTINUED | OUTPATIENT
Start: 2024-01-22 | End: 2024-01-22 | Stop reason: HOSPADM

## 2024-01-22 RX ADMIN — AMLODIPINE BESYLATE 10 MG: 10 TABLET ORAL at 08:01

## 2024-01-22 RX ADMIN — LACTULOSE 20 G: 20 SOLUTION ORAL at 08:01

## 2024-01-22 RX ADMIN — ESCITALOPRAM OXALATE 10 MG: 10 TABLET ORAL at 08:01

## 2024-01-22 RX ADMIN — PANTOPRAZOLE SODIUM 40 MG: 40 TABLET, DELAYED RELEASE ORAL at 08:01

## 2024-01-22 RX ADMIN — THERA TABS 1 TABLET: TAB at 08:01

## 2024-01-22 RX ADMIN — Medication 100 MG: at 08:01

## 2024-01-22 RX ADMIN — RIFAXIMIN 550 MG: 550 TABLET ORAL at 08:01

## 2024-01-22 RX ADMIN — FOLIC ACID 1 MG: 1 TABLET ORAL at 08:01

## 2024-01-22 NOTE — SIGNIFICANT EVENT
Patient departed for [place] this morning via Med flight.  Hepatology will sign off.      Lyle Thomas MD  PGY-V, Gastroenterology & Hepatology

## 2024-01-23 NOTE — PLAN OF CARE
[facility] - Med Surg  Discharge Final Note    Primary Care Provider: Radha Primary Doctor    Expected Discharge Date: 1/22/2024    Pt discharged to [facility] in [place]. Pt discharged via air flight with wife and son.  Flight scheduled for 8:00a.m.      Discharge Plan A and Plan B have been determined by review of patient's clinical status, future medical and therapeutic needs, and coverage/benefits for post-acute care in coordination with multidisciplinary team members.      Final Discharge Note (most recent)       Final Note - 01/23/24 0849          Final Note    Assessment Type Final Discharge Note     Anticipated Discharge Disposition Another Health Care Institution Not Defined   Pt discharged to [facility].       Post-Acute Status    Post-Acute Authorization Other     Coverage Generic Commercial     Other Status See Comments   Transferred to [facility]    Discharge Delays None known at this time                     Important Message from Medicare             Contact Info       No, Primary Doctor   Relationship: PCP - General        Next Steps: Follow up    No, Primary Doctor   Relationship: PCP - General        Next Steps: Follow up in 1 week(s)          Mirian Wells LMSW  Part-Time-  [facility]  Ext. [phone]

## 2024-01-23 NOTE — DISCHARGE SUMMARY
[facility] Medicine  Discharge Summary      Patient Name: Blu Deleon  MRN: [id number]  LAURA: [id number]  Patient Class: IP- Inpatient  Admission Date: 1/2/2024  Hospital Length of Stay: 19 days  Discharge Date and Time: 1/22/2024  9:27 AM  Attending Physician: Radha att. providers found   Discharging Provider: Bailey Cline MD  Primary Care Provider: Radha Primary Doctor  [facility] Medicine Team: [facility] R Bailey Cline MD  Primary Care Team: [facility]    HPI:   Mr. Deleon is a 58 year old male with PMH notable for HTN, ETOH use, reported cirrhosis (has not seen a hepatologist), and depression who presented to [facility] ED with acute GI bleeding. In speaking with patient and wife at bedside, patient reports four days of nausea and vomiting with bright red blood and black tarry diarrhea. Additionally, he reports a fall yesterday after attempting to go to the restroom secondary to being lightheaded. He denies this happening previously. He was scheduled for an outpatient EGD, which has not been completed. He has not had a colonoscopy. He denies any anticoagulation or antiplatelet use.      In the emergency department he was found to be acutely anemic with a hgb of 5. He has not been hypotensive while in the emergency department, however, notably tachycardic with -140. Labs otherwise notable for WBC 13.56, Plt 107, INR 2.4, BUN 57, Cr 1.6, TB 5.4, , ALT 44. Initial lactate >12. ETOH level <10. Patient is s/p 3u pRBCs.    Admitted to MICU for UGIB.      Procedure(s) (LRB):  EGD (ESOPHAGOGASTRODUODENOSCOPY) (N/A)      Hospital Course:   Patient admitted for UGIB. GI consulted and EGD showed esophageal ulcers. Pt to continue PPI for 8 weeks with repeat scope to follow. Hgb stable s/p 3u PRBC and 1u FFP. HDS and tolerating clear liquid diet. CIWA ordered and no ativan required.  Patient was transferred to floors on 01/05.  Creatinine has remained stable at 1.4.  T  bilirubin trending up.  Hepatology was consulted in the setting of decompensated liver cirrhosis.  Ultrasound right upper quadrant with Dopplers, PEth, AFP, serological workup, and acute hepatitis panel were ordered.  Patient underwent paracentesis on 01/08 and ascitic fluid negative for SBP.  As patient has history of alcohol abuse even after diagnosis of cirrhosis patient is not a candidate for liver transplant.   He was started on CTX for concern for possible SBP. Repeat CBC with stable Hgb and WBC WNL. Paracentesis performed with IR on 1/12 with 4.2L out. Ascitic labs with no evidence of SBP, ascitic and blood cultures showed no growth.  Hospital course further complicated with abdominal pain.  CT-CAP without evidence of infection.  Antibiotics were broadened to cefepime and micafungin.  Patient remained alert and oriented.  Was receiving lactulose and rifaximin.   Unfortunately, kidney function continued to worsen. UA/urine sediment as well as elevated/normotensive blood pressures not in line with HRS.  Nephrology was on board.  Patient had no urgent need for dialysis.  S/p therapeutic LP on 1/17. At this time patients stable for transfer to [place] via flight.  Patient has been accepted by Monik zapata in [place].      Blu Deleon was deemed appropriate for discharge.  At the time of discharge, the plan of care was discussed with patient/family, who were agreeable and amenable.  All medications were verbally reviewed and discussed with the patient/family.  They endorsed understanding and compliance.  Informed them that these changes will be available on their discharge paperwork, as well.  Outpatient follow-ups were scheduled, or if unable to be scheduled ambulatory referrals were placed, and ER return precautions were given.  All questions were answered to the patient/family's satisfaction.  He was subsequently discharged in stable condition.         Goals of Care Treatment Preferences:  Code Status:  Full Code          What is most important right now is to focus on curative/life-prolongation (regardless of treatment burdens).  Accordingly, we have decided that the best plan to meet the patient's goals includes continuing with treatment.      Consults:   Consults (From admission, onward)          Status Ordering Provider     Inpatient consult to Palliative Care  Once        Provider:  (Not yet assigned)    Completed TANISHA YEAGER     Inpatient consult to Infectious Diseases  Once        Provider:  (Not yet assigned)    Completed KIRILL KHANNA     Inpatient consult to Nephrology  Once        Provider:  (Not yet assigned)    Completed KIRILL KHANNA     Inpatient consult to Registered Dietitian/Nutritionist  Once        Provider:  (Not yet assigned)    Completed MELO ESPITIA     Inpatient consult to Spiritual Care  Once        Provider:  (Not yet assigned)    Completed MELO ESPITIA     Inpatient consult to Hepatology  Once        Provider:  (Not yet assigned)    Completed TANISHA YEAGER     Inpatient consult to Gastroenterology  Once        Provider:  (Not yet assigned)    Completed GEORGE STREETER     Inpatient consult to Critical Care Medicine  Once        Provider:  (Not yet assigned)    Completed GEORGE STREETER            No new Assessment & Plan notes have been filed under this hospital service since the last note was generated.  Service: Hospital Medicine    Final Active Diagnoses:    Diagnosis Date Noted POA    PRINCIPAL PROBLEM:  Leukocytosis [D72.829] 01/13/2024 No    Palliative care encounter [Z51.5] 01/19/2024 Not Applicable    ACP (advance care planning) [Z71.89] 01/19/2024 Not Applicable    Acute upper GI bleed [K92.2] 01/03/2024 Yes    Cirrhosis [K74.60] 01/03/2024 Yes    Coagulopathy [D68.9] 01/03/2024 Yes    Alcohol use disorder [Z78.9] 01/03/2024 Yes    CODY (acute kidney injury) [N17.9] 01/03/2024 Yes    Thrombocytopenia [D69.6] 01/03/2024 Yes    Transaminitis  "[R74.01] 01/03/2024 Yes      Problems Resolved During this Admission:    Diagnosis Date Noted Date Resolved POA    Lactic acidosis [E87.20] 01/03/2024 01/11/2024 Yes    High anion gap metabolic acidosis [E87.29] 01/03/2024 01/11/2024 Yes       Discharged Condition: good    Disposition: Home or Self Care    Follow Up:   Follow-up Information       No, Primary Doctor Follow up.               No, Primary Doctor Follow up in 1 week(s).                           Patient Instructions:      Ambulatory referral/consult to Gastroenterology   Standing Status: Future   Referral Priority: Routine Referral Type: Consultation   Referral Reason: Specialty Services Required   Requested Specialty: Gastroenterology   Number of Visits Requested: 1       Significant Diagnostic Studies: Labs: BMP:   Recent Labs   Lab 01/21/24  0600 01/22/24  0443   GLU 90 100   * 135*   K 4.3 4.5    107   CO2 19* 18*   BUN 80* 90*   CREATININE 4.5* 5.1*   CALCIUM 9.4 9.5   MG 2.4 2.5   , CMP   Recent Labs   Lab 01/21/24  0600 01/22/24  0443   * 135*   K 4.3 4.5    107   CO2 19* 18*   GLU 90 100   BUN 80* 90*   CREATININE 4.5* 5.1*   CALCIUM 9.4 9.5   PROT 5.8* 5.6*   ALBUMIN 3.1* 3.0*   BILITOT 24.9* 23.1*   ALKPHOS 166* 161*   * 102*   ALT 46* 45*   ANIONGAP 9 10   , CBC   Recent Labs   Lab 01/21/24 0600 01/22/24 0443   WBC 9.75 9.72   HGB 7.1* 6.5*   HCT 22.2* 19.5*   PLT 24* 25*   , Lipid Panel No results found for: "CHOL", "HDL", "LDLCALC", "TRIG", "CHOLHDL", and Troponin No results for input(s): "TROPONINI" in the last 168 hours.    Pending Diagnostic Studies:       Procedure Component Value Units Date/Time    Cryoglobulin [[id number]] Collected: 01/16/24 5251    Order Status: Sent Lab Status: In process Updated: 01/16/24 6832    Specimen: Blood            Medications:  Reconciled Home Medications:      Medication List        START taking these medications      amLODIPine 10 MG tablet  Commonly known as: " NORVASC  Take 1 tablet (10 mg total) by mouth once daily.     dextrose 5 % in water (D5W) PgBk 100 mL with ceFEPIme 2 gram SolR 2 g  Inject 2 g into the vein Daily.     folic acid 1 MG tablet  Commonly known as: FOLVITE  Take 1 tablet (1 mg total) by mouth once daily.     lactulose 20 gram/30 mL Soln  Commonly known as: CHRONULAC  Take 30 mLs (20 g total) by mouth 3 (three) times daily.     pantoprazole 40 MG tablet  Commonly known as: PROTONIX  Take 1 tablet (40 mg total) by mouth 2 (two) times a day.     rifAXIMin 550 mg Tab  Commonly known as: XIFAXAN  Take 1 tablet (550 mg total) by mouth 2 (two) times daily.     thiamine 100 MG tablet  Take 1 tablet (100 mg total) by mouth once daily.            CONTINUE taking these medications      EScitalopram oxalate 10 MG tablet  Commonly known as: LEXAPRO  Take 10 mg by mouth once daily.     hydroCHLOROthiazide 25 MG tablet  Commonly known as: HYDRODIURIL  Take 25 mg by mouth once daily.     traZODone 50 MG tablet  Commonly known as: DESYREL  Take  mg by mouth every evening.            ASK your doctor about these medications      albumin human 25% 25 % bottle  Inject 200 mLs (50 g total) into the vein 2 (two) times a day. for 1 day  Ask about: Should I take this medication?              Indwelling Lines/Drains at time of discharge:   Lines/Drains/Airways       None                   Time spent on the discharge of patient: 39 minutes         Bailey Cline MD  Department of Hospital Medicine  [facility]

## 2024-01-29 LAB — CRYOGLOB SER QL: NORMAL

## 2024-02-15 NOTE — ASSESSMENT & PLAN NOTE
58 year old M w/ cirrhosis and ETOH use d/o presenting w/ melana and ground coffee emesis/hematemesis. Denies prior symptoms. No EGDs on file. Normotensive and tachycardic w/ HR 130s. Initial Hgb 5.0; now s/p 2u pRBCs. Initial lactate >12. Labs otherwise notable for Plt 116, INR 2.4, BUN 57, Cr 1.6, TB 5.4, , ALT 44. Admitted to MICU for management of acute upper GIB.      EGD showed esophageal ulcers. Hgb stable s/p 3u PRBC. Will need scope after 8 week course of PPI.     - Soft and bite-sized diet, advance as tolerated  - Trend Hgb and transfuse for goal Hgb > 7, unless otherwise indicated  - Maintain IV access with 2 large bore IV's  - Intravascular resuscitation/support with IVF's   - Hold all NSAIDs and anticoagulants, unless contraindicated  - Pantoprazole 40 mg BID for a total of 8 weeks  - Correct any coagulopathy with platelets and FFP for goal of platelets >50K and INR <2.0  - CTM CBC    - Hgb stable, BM's without evidence of melena    Psychiatric Progress Note    Obdulia Abreu  [id number]  02/15/24    CHIEF COMPLAINT: medication management    HPI: Obdulia Abreu  Patient is a 36 year old female with pmhx of  T2DM, schizaffective disorder, PTSD, BPD  who presents to [facility] via EMS with concerns of dizziness and headache x several weeks s/p striking her head. Patient recently started Clozaril and this medication was held due to concerns it may be the cause of her dizziness. Pt placed on suicide precautions until decision made regarding Clozaril. Psychiatry consulted by Dr Lakhani due to \"dizziness, lightheadedness on getting up, possibility of contribution from clozapine.\"     Met with patient at bedside. Safety sitter present. She is resting, but alert and oriented x 4. Denies SI/HI. Denies auditory and visual hallucinations. She reports feeling better but still nauseous. She is more bright and reactive. Discussed changes of medications and that she needs to follow up with [facility] within a week for medication management at [facility]. Patient voiced understanding. Insight and judgment are adequate.    Allergies   Allergen Reactions    Latex Hives    Coconut (Cocos Nucifera) Shortness Of Breath     Tickling in throat    Red Dye Anaphylaxis    Guava Flavor [Flavoring Agent] Hives    Sulfamethoxazole-Trimethoprim     Sulfa Antibiotics Rash       Medications Prior to Admission: hydrOXYzine pamoate (VISTARIL) 50 MG capsule, Take 1 capsule by mouth 3 times daily as needed for Anxiety  [DISCONTINUED] Insulin Aspart (NOVOLOG FLEXPEN SC), Inject 20 Units into the skin 3 times daily (before meals) Parandial  [DISCONTINUED] cloZAPine (CLOZARIL) 25 MG tablet, Take 1 tablet by mouth daily  [DISCONTINUED] cloZAPine (CLOZARIL) 100 MG tablet, Take 3 tablets by mouth nightly  [DISCONTINUED] naproxen (NAPROSYN) 500 MG tablet, Take 1 tablet by mouth 2 times daily (with meals) for 10 days  PARoxetine (PAXIL) 40 MG tablet, Take 1 tablet by mouth every

## 2024-04-08 PROBLEM — N17.9 AKI (ACUTE KIDNEY INJURY): Status: RESOLVED | Noted: 2024-01-03 | Resolved: 2024-04-08

## 2024-04-15 PROBLEM — K92.2 ACUTE UPPER GI BLEED: Status: RESOLVED | Noted: 2024-01-03 | Resolved: 2024-04-15
